# Patient Record
Sex: MALE | Race: WHITE | NOT HISPANIC OR LATINO | Employment: OTHER | URBAN - METROPOLITAN AREA
[De-identification: names, ages, dates, MRNs, and addresses within clinical notes are randomized per-mention and may not be internally consistent; named-entity substitution may affect disease eponyms.]

---

## 2018-03-13 ENCOUNTER — HOSPITAL ENCOUNTER (OUTPATIENT)
Dept: MRI IMAGING | Facility: HOSPITAL | Age: 43
Discharge: HOME | End: 2018-03-13
Attending: ORTHOPAEDIC SURGERY | Admitting: ORTHOPAEDIC SURGERY

## 2019-10-15 ENCOUNTER — APPOINTMENT (OUTPATIENT)
Dept: CT IMAGING | Facility: HOSPITAL | Age: 44
End: 2019-10-15

## 2019-10-15 ENCOUNTER — HOSPITAL ENCOUNTER (INPATIENT)
Facility: HOSPITAL | Age: 44
LOS: 9 days | Discharge: NURSING, REHAB OR LONG-TERM CARE | End: 2019-10-24
Attending: EMERGENCY MEDICINE | Admitting: INTERNAL MEDICINE

## 2019-10-15 ENCOUNTER — ANESTHESIA EVENT (OUTPATIENT)
Dept: PERIOP | Facility: HOSPITAL | Age: 44
End: 2019-10-15

## 2019-10-15 ENCOUNTER — ANESTHESIA (OUTPATIENT)
Dept: PERIOP | Facility: HOSPITAL | Age: 44
End: 2019-10-15

## 2019-10-15 ENCOUNTER — APPOINTMENT (OUTPATIENT)
Dept: GENERAL RADIOLOGY | Facility: HOSPITAL | Age: 44
End: 2019-10-15

## 2019-10-15 DIAGNOSIS — N49.3 FOURNIER'S GANGRENE IN MALE: ICD-10-CM

## 2019-10-15 DIAGNOSIS — L03.317 ABSCESS AND CELLULITIS OF GLUTEAL REGION: ICD-10-CM

## 2019-10-15 DIAGNOSIS — E11.65 TYPE 2 DIABETES MELLITUS WITH HYPERGLYCEMIA, UNSPECIFIED WHETHER LONG TERM INSULIN USE (HCC): ICD-10-CM

## 2019-10-15 DIAGNOSIS — L02.31 ABSCESS AND CELLULITIS OF GLUTEAL REGION: ICD-10-CM

## 2019-10-15 DIAGNOSIS — A41.9 SEPSIS, DUE TO UNSPECIFIED ORGANISM, UNSPECIFIED WHETHER ACUTE ORGAN DYSFUNCTION PRESENT (HCC): Primary | ICD-10-CM

## 2019-10-15 DIAGNOSIS — L02.31 ABSCESS, GLUTEAL, LEFT: ICD-10-CM

## 2019-10-15 PROBLEM — I87.2 DEEP VENOUS INSUFFICIENCY: Status: ACTIVE | Noted: 2018-10-03

## 2019-10-15 PROBLEM — K57.90 DIVERTICULOSIS: Status: ACTIVE | Noted: 2017-12-15

## 2019-10-15 LAB
ALBUMIN SERPL-MCNC: 2.4 G/DL (ref 3.5–5.2)
ALBUMIN/GLOB SERPL: 0.6 G/DL
ALP SERPL-CCNC: 150 U/L (ref 39–117)
ALT SERPL W P-5'-P-CCNC: 7 U/L (ref 1–41)
ANION GAP SERPL CALCULATED.3IONS-SCNC: 17.7 MMOL/L (ref 5–15)
AST SERPL-CCNC: 13 U/L (ref 1–40)
BACTERIA UR QL AUTO: ABNORMAL /HPF
BILIRUB SERPL-MCNC: 1 MG/DL (ref 0.2–1.2)
BILIRUB UR QL STRIP: NEGATIVE
BUN BLD-MCNC: 37 MG/DL (ref 6–20)
BUN/CREAT SERPL: 16.4 (ref 7–25)
CALCIUM SPEC-SCNC: 8.3 MG/DL (ref 8.6–10.5)
CHLORIDE SERPL-SCNC: 108 MMOL/L (ref 98–107)
CLARITY UR: ABNORMAL
CO2 SERPL-SCNC: 20 MMOL/L (ref 22–29)
COLOR UR: ABNORMAL
CREAT BLD-MCNC: 2.26 MG/DL (ref 0.76–1.27)
CREAT BLDA-MCNC: 2.3 MG/DL (ref 0.6–1.3)
D-LACTATE SERPL-SCNC: 2.3 MMOL/L (ref 0.5–2)
DEPRECATED RDW RBC AUTO: 51.6 FL (ref 37–54)
DEPRECATED RDW RBC AUTO: 54.3 FL (ref 37–54)
ERYTHROCYTE [DISTWIDTH] IN BLOOD BY AUTOMATED COUNT: 16.2 % (ref 12.3–15.4)
ERYTHROCYTE [DISTWIDTH] IN BLOOD BY AUTOMATED COUNT: 16.5 % (ref 12.3–15.4)
GFR SERPL CREATININE-BSD FRML MDRD: 32 ML/MIN/1.73
GLOBULIN UR ELPH-MCNC: 3.9 GM/DL
GLUCOSE BLD-MCNC: 252 MG/DL (ref 65–99)
GLUCOSE BLDC GLUCOMTR-MCNC: 155 MG/DL (ref 70–105)
GLUCOSE BLDC GLUCOMTR-MCNC: 168 MG/DL (ref 70–105)
GLUCOSE BLDC GLUCOMTR-MCNC: 183 MG/DL (ref 70–105)
GLUCOSE BLDC GLUCOMTR-MCNC: 194 MG/DL (ref 70–105)
GLUCOSE BLDC GLUCOMTR-MCNC: 209 MG/DL (ref 70–105)
GLUCOSE BLDC GLUCOMTR-MCNC: 242 MG/DL (ref 70–105)
GLUCOSE UR STRIP-MCNC: NEGATIVE MG/DL
HCT VFR BLD AUTO: 36.4 % (ref 37.5–51)
HCT VFR BLD AUTO: 43 % (ref 37.5–51)
HGB BLD-MCNC: 11.7 G/DL (ref 13–17.7)
HGB BLD-MCNC: 13.6 G/DL (ref 13–17.7)
HGB UR QL STRIP.AUTO: NEGATIVE
HOLD SPECIMEN: NORMAL
HYALINE CASTS UR QL AUTO: ABNORMAL /LPF
KETONES UR QL STRIP: ABNORMAL
LEUKOCYTE ESTERASE UR QL STRIP.AUTO: ABNORMAL
LIPASE SERPL-CCNC: 8 U/L (ref 13–60)
LYMPHOCYTES # BLD MANUAL: 0 10*3/MM3 (ref 0.7–3.1)
LYMPHOCYTES NFR BLD MANUAL: 0 % (ref 19.6–45.3)
LYMPHOCYTES NFR BLD MANUAL: 9 % (ref 5–12)
MCH RBC QN AUTO: 29.4 PG (ref 26.6–33)
MCH RBC QN AUTO: 29.6 PG (ref 26.6–33)
MCHC RBC AUTO-ENTMCNC: 31.5 G/DL (ref 31.5–35.7)
MCHC RBC AUTO-ENTMCNC: 32.3 G/DL (ref 31.5–35.7)
MCV RBC AUTO: 91.6 FL (ref 79–97)
MCV RBC AUTO: 93.4 FL (ref 79–97)
METAMYELOCYTES NFR BLD MANUAL: 3 % (ref 0–0)
MONOCYTES # BLD AUTO: 2.53 10*3/MM3 (ref 0.1–0.9)
NEUTROPHILS # BLD AUTO: 24.73 10*3/MM3 (ref 1.7–7)
NEUTROPHILS NFR BLD MANUAL: 58 % (ref 42.7–76)
NEUTS BAND NFR BLD MANUAL: 30 % (ref 0–5)
NEUTS VAC BLD QL SMEAR: ABNORMAL
NITRITE UR QL STRIP: NEGATIVE
PH UR STRIP.AUTO: <=5 [PH] (ref 5–8)
PLAT MORPH BLD: NORMAL
PLATELET # BLD AUTO: 167 10*3/MM3 (ref 140–450)
PLATELET # BLD AUTO: 184 10*3/MM3 (ref 140–450)
PMV BLD AUTO: 8.6 FL (ref 6–12)
PMV BLD AUTO: 9.3 FL (ref 6–12)
POTASSIUM BLD-SCNC: 4.7 MMOL/L (ref 3.5–5.2)
PROT SERPL-MCNC: 6.3 G/DL (ref 6–8.5)
PROT UR QL STRIP: ABNORMAL
RBC # BLD AUTO: 3.97 10*6/MM3 (ref 4.14–5.8)
RBC # BLD AUTO: 4.61 10*6/MM3 (ref 4.14–5.8)
RBC # UR: ABNORMAL /HPF
RBC MORPH BLD: NORMAL
REF LAB TEST METHOD: ABNORMAL
SCAN SLIDE: NORMAL
SODIUM BLD-SCNC: 141 MMOL/L (ref 136–145)
SP GR UR STRIP: 1.02 (ref 1–1.03)
SQUAMOUS #/AREA URNS HPF: ABNORMAL /HPF
UROBILINOGEN UR QL STRIP: ABNORMAL
WBC NRBC COR # BLD: 25.6 10*3/MM3 (ref 3.4–10.8)
WBC NRBC COR # BLD: 28.1 10*3/MM3 (ref 3.4–10.8)
WBC UR QL AUTO: ABNORMAL /HPF

## 2019-10-15 PROCEDURE — 82962 GLUCOSE BLOOD TEST: CPT

## 2019-10-15 PROCEDURE — 74176 CT ABD & PELVIS W/O CONTRAST: CPT

## 2019-10-15 PROCEDURE — 83036 HEMOGLOBIN GLYCOSYLATED A1C: CPT | Performed by: NURSE PRACTITIONER

## 2019-10-15 PROCEDURE — 80053 COMPREHEN METABOLIC PANEL: CPT | Performed by: PHYSICIAN ASSISTANT

## 2019-10-15 PROCEDURE — 82565 ASSAY OF CREATININE: CPT

## 2019-10-15 PROCEDURE — 99222 1ST HOSP IP/OBS MODERATE 55: CPT | Performed by: SURGERY

## 2019-10-15 PROCEDURE — 25010000002 KETOROLAC TROMETHAMINE PER 15 MG: Performed by: PHYSICIAN ASSISTANT

## 2019-10-15 PROCEDURE — 99223 1ST HOSP IP/OBS HIGH 75: CPT | Performed by: INTERNAL MEDICINE

## 2019-10-15 PROCEDURE — 25010000002 VANCOMYCIN 1 G RECONSTITUTED SOLUTION 1 EACH VIAL: Performed by: ANESTHESIOLOGY

## 2019-10-15 PROCEDURE — 87040 BLOOD CULTURE FOR BACTERIA: CPT | Performed by: EMERGENCY MEDICINE

## 2019-10-15 PROCEDURE — 63710000001 INSULIN LISPRO (HUMAN) PER 5 UNITS: Performed by: NURSE PRACTITIONER

## 2019-10-15 PROCEDURE — 80177 DRUG SCRN QUAN LEVETIRACETAM: CPT | Performed by: NURSE PRACTITIONER

## 2019-10-15 PROCEDURE — 87070 CULTURE OTHR SPECIMN AEROBIC: CPT | Performed by: EMERGENCY MEDICINE

## 2019-10-15 PROCEDURE — 25010000002 FENTANYL CITRATE (PF) 100 MCG/2ML SOLUTION: Performed by: ANESTHESIOLOGY

## 2019-10-15 PROCEDURE — 90471 IMMUNIZATION ADMIN: CPT | Performed by: EMERGENCY MEDICINE

## 2019-10-15 PROCEDURE — 85007 BL SMEAR W/DIFF WBC COUNT: CPT | Performed by: PHYSICIAN ASSISTANT

## 2019-10-15 PROCEDURE — 25010000002 MORPHINE PER 10 MG: Performed by: EMERGENCY MEDICINE

## 2019-10-15 PROCEDURE — 25010000003 AMPICILLIN-SULBACTAM PER 1.5 G: Performed by: EMERGENCY MEDICINE

## 2019-10-15 PROCEDURE — 25010000002 PROPOFOL 10 MG/ML EMULSION: Performed by: ANESTHESIOLOGY

## 2019-10-15 PROCEDURE — 90715 TDAP VACCINE 7 YRS/> IM: CPT | Performed by: EMERGENCY MEDICINE

## 2019-10-15 PROCEDURE — 83690 ASSAY OF LIPASE: CPT | Performed by: PHYSICIAN ASSISTANT

## 2019-10-15 PROCEDURE — 85027 COMPLETE CBC AUTOMATED: CPT | Performed by: NURSE PRACTITIONER

## 2019-10-15 PROCEDURE — 25010000002 TDAP 5-2.5-18.5 LF-MCG/0.5 SUSPENSION: Performed by: EMERGENCY MEDICINE

## 2019-10-15 PROCEDURE — 25010000002 PROMETHAZINE PER 50 MG: Performed by: EMERGENCY MEDICINE

## 2019-10-15 PROCEDURE — 25010000002 MEROPENEM PER 100 MG: Performed by: INTERNAL MEDICINE

## 2019-10-15 PROCEDURE — 25010000002 ONDANSETRON PER 1 MG: Performed by: ANESTHESIOLOGY

## 2019-10-15 PROCEDURE — 71045 X-RAY EXAM CHEST 1 VIEW: CPT

## 2019-10-15 PROCEDURE — 25010000002 ONDANSETRON PER 1 MG: Performed by: PHYSICIAN ASSISTANT

## 2019-10-15 PROCEDURE — 02HV33Z INSERTION OF INFUSION DEVICE INTO SUPERIOR VENA CAVA, PERCUTANEOUS APPROACH: ICD-10-PCS | Performed by: NURSE PRACTITIONER

## 2019-10-15 PROCEDURE — 83605 ASSAY OF LACTIC ACID: CPT

## 2019-10-15 PROCEDURE — 87147 CULTURE TYPE IMMUNOLOGIC: CPT | Performed by: EMERGENCY MEDICINE

## 2019-10-15 PROCEDURE — 25010000002 MIDAZOLAM PER 1 MG: Performed by: ANESTHESIOLOGY

## 2019-10-15 PROCEDURE — B548ZZA ULTRASONOGRAPHY OF SUPERIOR VENA CAVA, GUIDANCE: ICD-10-PCS | Performed by: NURSE PRACTITIONER

## 2019-10-15 PROCEDURE — 85025 COMPLETE CBC W/AUTO DIFF WBC: CPT | Performed by: PHYSICIAN ASSISTANT

## 2019-10-15 PROCEDURE — 81001 URINALYSIS AUTO W/SCOPE: CPT | Performed by: PHYSICIAN ASSISTANT

## 2019-10-15 PROCEDURE — 99285 EMERGENCY DEPT VISIT HI MDM: CPT

## 2019-10-15 PROCEDURE — 87205 SMEAR GRAM STAIN: CPT | Performed by: EMERGENCY MEDICINE

## 2019-10-15 PROCEDURE — 25010000002 DEXAMETHASONE PER 1 MG: Performed by: ANESTHESIOLOGY

## 2019-10-15 PROCEDURE — 25010000002 VANCOMYCIN 10 G RECONSTITUTED SOLUTION: Performed by: EMERGENCY MEDICINE

## 2019-10-15 PROCEDURE — 0JB90ZZ EXCISION OF BUTTOCK SUBCUTANEOUS TISSUE AND FASCIA, OPEN APPROACH: ICD-10-PCS | Performed by: SURGERY

## 2019-10-15 PROCEDURE — 10061 I&D ABSCESS COMP/MULTIPLE: CPT | Performed by: SURGERY

## 2019-10-15 RX ORDER — PROPOFOL 10 MG/ML
VIAL (ML) INTRAVENOUS AS NEEDED
Status: DISCONTINUED | OUTPATIENT
Start: 2019-10-15 | End: 2019-10-15 | Stop reason: SURG

## 2019-10-15 RX ORDER — KETOROLAC TROMETHAMINE 30 MG/ML
30 INJECTION, SOLUTION INTRAMUSCULAR; INTRAVENOUS ONCE
Status: COMPLETED | OUTPATIENT
Start: 2019-10-15 | End: 2019-10-15

## 2019-10-15 RX ORDER — BUPROPION HYDROCHLORIDE 150 MG/1
300 TABLET ORAL DAILY
Status: DISCONTINUED | OUTPATIENT
Start: 2019-10-15 | End: 2019-10-24 | Stop reason: HOSPADM

## 2019-10-15 RX ORDER — ROCURONIUM BROMIDE 10 MG/ML
INJECTION, SOLUTION INTRAVENOUS AS NEEDED
Status: DISCONTINUED | OUTPATIENT
Start: 2019-10-15 | End: 2019-10-15 | Stop reason: SURG

## 2019-10-15 RX ORDER — DEXAMETHASONE SODIUM PHOSPHATE 4 MG/ML
INJECTION, SOLUTION INTRA-ARTICULAR; INTRALESIONAL; INTRAMUSCULAR; INTRAVENOUS; SOFT TISSUE AS NEEDED
Status: DISCONTINUED | OUTPATIENT
Start: 2019-10-15 | End: 2019-10-15 | Stop reason: SURG

## 2019-10-15 RX ORDER — OXYCODONE HYDROCHLORIDE 5 MG/1
10 TABLET ORAL EVERY 4 HOURS PRN
Status: DISCONTINUED | OUTPATIENT
Start: 2019-10-15 | End: 2019-10-24 | Stop reason: HOSPADM

## 2019-10-15 RX ORDER — ONDANSETRON 2 MG/ML
INJECTION INTRAMUSCULAR; INTRAVENOUS AS NEEDED
Status: DISCONTINUED | OUTPATIENT
Start: 2019-10-15 | End: 2019-10-15 | Stop reason: SURG

## 2019-10-15 RX ORDER — PHENYLEPHRINE HCL IN 0.9% NACL 0.5 MG/5ML
SYRINGE (ML) INTRAVENOUS AS NEEDED
Status: DISCONTINUED | OUTPATIENT
Start: 2019-10-15 | End: 2019-10-15 | Stop reason: SURG

## 2019-10-15 RX ORDER — LIDOCAINE HYDROCHLORIDE 20 MG/ML
INJECTION, SOLUTION EPIDURAL; INFILTRATION; INTRACAUDAL; PERINEURAL AS NEEDED
Status: DISCONTINUED | OUTPATIENT
Start: 2019-10-15 | End: 2019-10-15 | Stop reason: SURG

## 2019-10-15 RX ORDER — MORPHINE SULFATE 4 MG/ML
4 INJECTION, SOLUTION INTRAMUSCULAR; INTRAVENOUS ONCE
Status: COMPLETED | OUTPATIENT
Start: 2019-10-15 | End: 2019-10-15

## 2019-10-15 RX ORDER — BACLOFEN 10 MG/1
10 TABLET ORAL DAILY
Status: DISCONTINUED | OUTPATIENT
Start: 2019-10-15 | End: 2019-10-24 | Stop reason: HOSPADM

## 2019-10-15 RX ORDER — ALUMINA, MAGNESIA, AND SIMETHICONE 2400; 2400; 240 MG/30ML; MG/30ML; MG/30ML
15 SUSPENSION ORAL EVERY 6 HOURS PRN
Status: DISCONTINUED | OUTPATIENT
Start: 2019-10-15 | End: 2019-10-24 | Stop reason: HOSPADM

## 2019-10-15 RX ORDER — ONDANSETRON 2 MG/ML
4 INJECTION INTRAMUSCULAR; INTRAVENOUS EVERY 6 HOURS PRN
Status: DISCONTINUED | OUTPATIENT
Start: 2019-10-15 | End: 2019-10-24 | Stop reason: HOSPADM

## 2019-10-15 RX ORDER — METOPROLOL TARTRATE 100 MG/1
100 TABLET ORAL 2 TIMES DAILY
COMMUNITY

## 2019-10-15 RX ORDER — DEXTROSE MONOHYDRATE 25 G/50ML
25 INJECTION, SOLUTION INTRAVENOUS
Status: DISCONTINUED | OUTPATIENT
Start: 2019-10-15 | End: 2019-10-24 | Stop reason: HOSPADM

## 2019-10-15 RX ORDER — MEPERIDINE HYDROCHLORIDE 25 MG/ML
12.5 INJECTION INTRAMUSCULAR; INTRAVENOUS; SUBCUTANEOUS
Status: DISCONTINUED | OUTPATIENT
Start: 2019-10-15 | End: 2019-10-15 | Stop reason: HOSPADM

## 2019-10-15 RX ORDER — METOPROLOL TARTRATE 50 MG/1
100 TABLET, FILM COATED ORAL EVERY 12 HOURS SCHEDULED
Status: CANCELLED | OUTPATIENT
Start: 2019-10-15

## 2019-10-15 RX ORDER — SODIUM CHLORIDE, SODIUM LACTATE, POTASSIUM CHLORIDE, CALCIUM CHLORIDE 600; 310; 30; 20 MG/100ML; MG/100ML; MG/100ML; MG/100ML
INJECTION, SOLUTION INTRAVENOUS CONTINUOUS PRN
Status: DISCONTINUED | OUTPATIENT
Start: 2019-10-15 | End: 2019-10-15 | Stop reason: SURG

## 2019-10-15 RX ORDER — BISACODYL 10 MG
10 SUPPOSITORY, RECTAL RECTAL DAILY PRN
Status: DISCONTINUED | OUTPATIENT
Start: 2019-10-15 | End: 2019-10-24 | Stop reason: HOSPADM

## 2019-10-15 RX ORDER — ROPINIROLE 0.25 MG/1
0.5 TABLET, FILM COATED ORAL 3 TIMES DAILY
Status: DISCONTINUED | OUTPATIENT
Start: 2019-10-15 | End: 2019-10-24 | Stop reason: HOSPADM

## 2019-10-15 RX ORDER — HYDROMORPHONE HCL 110MG/55ML
0.5 PATIENT CONTROLLED ANALGESIA SYRINGE INTRAVENOUS
Status: DISCONTINUED | OUTPATIENT
Start: 2019-10-15 | End: 2019-10-24 | Stop reason: HOSPADM

## 2019-10-15 RX ORDER — SODIUM CHLORIDE 0.9 % (FLUSH) 0.9 %
10 SYRINGE (ML) INJECTION EVERY 12 HOURS SCHEDULED
Status: DISCONTINUED | OUTPATIENT
Start: 2019-10-15 | End: 2019-10-24 | Stop reason: HOSPADM

## 2019-10-15 RX ORDER — SODIUM CHLORIDE 0.9 % (FLUSH) 0.9 %
3-10 SYRINGE (ML) INJECTION AS NEEDED
Status: DISCONTINUED | OUTPATIENT
Start: 2019-10-15 | End: 2019-10-15 | Stop reason: HOSPADM

## 2019-10-15 RX ORDER — HYDROMORPHONE HCL 110MG/55ML
0.5 PATIENT CONTROLLED ANALGESIA SYRINGE INTRAVENOUS
Status: DISCONTINUED | OUTPATIENT
Start: 2019-10-15 | End: 2019-10-15 | Stop reason: HOSPADM

## 2019-10-15 RX ORDER — ROPINIROLE 0.5 MG/1
0.5 TABLET, FILM COATED ORAL 3 TIMES DAILY
COMMUNITY

## 2019-10-15 RX ORDER — CHOLECALCIFEROL (VITAMIN D3) 125 MCG
5 CAPSULE ORAL NIGHTLY PRN
Status: DISCONTINUED | OUTPATIENT
Start: 2019-10-15 | End: 2019-10-24 | Stop reason: HOSPADM

## 2019-10-15 RX ORDER — ATORVASTATIN CALCIUM 40 MG/1
40 TABLET, FILM COATED ORAL DAILY
Status: DISCONTINUED | OUTPATIENT
Start: 2019-10-15 | End: 2019-10-24 | Stop reason: HOSPADM

## 2019-10-15 RX ORDER — SODIUM CHLORIDE 9 MG/ML
50 INJECTION, SOLUTION INTRAVENOUS CONTINUOUS
Status: DISCONTINUED | OUTPATIENT
Start: 2019-10-15 | End: 2019-10-24 | Stop reason: HOSPADM

## 2019-10-15 RX ORDER — OXYCODONE HYDROCHLORIDE 5 MG/1
5 TABLET ORAL EVERY 4 HOURS PRN
Status: DISCONTINUED | OUTPATIENT
Start: 2019-10-15 | End: 2019-10-24 | Stop reason: HOSPADM

## 2019-10-15 RX ORDER — NICOTINE POLACRILEX 4 MG
15 LOZENGE BUCCAL
Status: DISCONTINUED | OUTPATIENT
Start: 2019-10-15 | End: 2019-10-24 | Stop reason: HOSPADM

## 2019-10-15 RX ORDER — FENTANYL CITRATE 50 UG/ML
INJECTION, SOLUTION INTRAMUSCULAR; INTRAVENOUS AS NEEDED
Status: DISCONTINUED | OUTPATIENT
Start: 2019-10-15 | End: 2019-10-15 | Stop reason: SURG

## 2019-10-15 RX ORDER — PANTOPRAZOLE SODIUM 40 MG/10ML
40 INJECTION, POWDER, LYOPHILIZED, FOR SOLUTION INTRAVENOUS
Status: DISCONTINUED | OUTPATIENT
Start: 2019-10-16 | End: 2019-10-17

## 2019-10-15 RX ORDER — SODIUM CHLORIDE 0.9 % (FLUSH) 0.9 %
10 SYRINGE (ML) INJECTION AS NEEDED
Status: DISCONTINUED | OUTPATIENT
Start: 2019-10-15 | End: 2019-10-24 | Stop reason: HOSPADM

## 2019-10-15 RX ORDER — LISINOPRIL 40 MG/1
40 TABLET ORAL DAILY
COMMUNITY
End: 2020-10-05 | Stop reason: ALTCHOICE

## 2019-10-15 RX ORDER — BACLOFEN 10 MG/1
10 TABLET ORAL DAILY
COMMUNITY

## 2019-10-15 RX ORDER — LEVETIRACETAM 500 MG/1
500 TABLET ORAL EVERY 12 HOURS SCHEDULED
Status: DISCONTINUED | OUTPATIENT
Start: 2019-10-15 | End: 2019-10-24 | Stop reason: HOSPADM

## 2019-10-15 RX ORDER — BUPROPION HYDROCHLORIDE 150 MG/1
300 TABLET ORAL DAILY
COMMUNITY
End: 2020-01-13 | Stop reason: ALTCHOICE

## 2019-10-15 RX ORDER — ACETAMINOPHEN 650 MG
TABLET, EXTENDED RELEASE ORAL AS NEEDED
Status: DISCONTINUED | OUTPATIENT
Start: 2019-10-15 | End: 2019-10-15 | Stop reason: HOSPADM

## 2019-10-15 RX ORDER — ONDANSETRON 2 MG/ML
4 INJECTION INTRAMUSCULAR; INTRAVENOUS ONCE
Status: COMPLETED | OUTPATIENT
Start: 2019-10-15 | End: 2019-10-15

## 2019-10-15 RX ORDER — ONDANSETRON 4 MG/1
4 TABLET, FILM COATED ORAL EVERY 6 HOURS PRN
Status: DISCONTINUED | OUTPATIENT
Start: 2019-10-15 | End: 2019-10-24 | Stop reason: HOSPADM

## 2019-10-15 RX ORDER — FENTANYL CITRATE 50 UG/ML
50 INJECTION, SOLUTION INTRAMUSCULAR; INTRAVENOUS
Status: DISCONTINUED | OUTPATIENT
Start: 2019-10-15 | End: 2019-10-15 | Stop reason: HOSPADM

## 2019-10-15 RX ORDER — POVIDONE-IODINE 10 MG/G
OINTMENT TOPICAL AS NEEDED
Status: DISCONTINUED | OUTPATIENT
Start: 2019-10-15 | End: 2019-10-15 | Stop reason: HOSPADM

## 2019-10-15 RX ORDER — SODIUM CHLORIDE 0.9 % (FLUSH) 0.9 %
3 SYRINGE (ML) INJECTION EVERY 12 HOURS SCHEDULED
Status: DISCONTINUED | OUTPATIENT
Start: 2019-10-15 | End: 2019-10-15 | Stop reason: HOSPADM

## 2019-10-15 RX ORDER — CLINDAMYCIN PHOSPHATE 900 MG/50ML
900 INJECTION, SOLUTION INTRAVENOUS EVERY 8 HOURS
Status: DISCONTINUED | OUTPATIENT
Start: 2019-10-15 | End: 2019-10-23

## 2019-10-15 RX ORDER — ATORVASTATIN CALCIUM 40 MG/1
40 TABLET, FILM COATED ORAL DAILY
COMMUNITY

## 2019-10-15 RX ORDER — AMLODIPINE BESYLATE 10 MG/1
10 TABLET ORAL DAILY
COMMUNITY

## 2019-10-15 RX ORDER — NOREPINEPHRINE BIT/0.9 % NACL 8 MG/250ML
.02-.3 INFUSION BOTTLE (ML) INTRAVENOUS
Status: DISCONTINUED | OUTPATIENT
Start: 2019-10-15 | End: 2019-10-17

## 2019-10-15 RX ORDER — LEVETIRACETAM 500 MG/1
500 TABLET ORAL 2 TIMES DAILY
COMMUNITY

## 2019-10-15 RX ORDER — ONDANSETRON 2 MG/ML
4 INJECTION INTRAMUSCULAR; INTRAVENOUS ONCE AS NEEDED
Status: DISCONTINUED | OUTPATIENT
Start: 2019-10-15 | End: 2019-10-15 | Stop reason: HOSPADM

## 2019-10-15 RX ORDER — MIDAZOLAM HYDROCHLORIDE 1 MG/ML
INJECTION INTRAMUSCULAR; INTRAVENOUS AS NEEDED
Status: DISCONTINUED | OUTPATIENT
Start: 2019-10-15 | End: 2019-10-15 | Stop reason: SURG

## 2019-10-15 RX ORDER — VANCOMYCIN 1.75 GRAM/500 ML IN 0.9 % SODIUM CHLORIDE INTRAVENOUS
1750 ONCE
Status: COMPLETED | OUTPATIENT
Start: 2019-10-15 | End: 2019-10-15

## 2019-10-15 RX ADMIN — SODIUM CHLORIDE, SODIUM LACTATE, POTASSIUM CHLORIDE, AND CALCIUM CHLORIDE: .6; .31; .03; .02 INJECTION, SOLUTION INTRAVENOUS at 16:00

## 2019-10-15 RX ADMIN — PHENYLEPHRINE HYDROCHLORIDE 100 MCG: 10 INJECTION INTRAVENOUS at 16:44

## 2019-10-15 RX ADMIN — INSULIN LISPRO 4 UNITS: 100 INJECTION, SOLUTION INTRAVENOUS; SUBCUTANEOUS at 15:56

## 2019-10-15 RX ADMIN — ROCURONIUM BROMIDE 50 MG: 10 INJECTION, SOLUTION INTRAVENOUS at 16:09

## 2019-10-15 RX ADMIN — SODIUM CHLORIDE 1000 ML: 0.9 INJECTION, SOLUTION INTRAVENOUS at 19:39

## 2019-10-15 RX ADMIN — FENTANYL CITRATE 100 MCG: 50 INJECTION, SOLUTION INTRAMUSCULAR; INTRAVENOUS at 16:03

## 2019-10-15 RX ADMIN — LIDOCAINE HYDROCHLORIDE 50 MG: 20 INJECTION, SOLUTION EPIDURAL; INFILTRATION; INTRACAUDAL; PERINEURAL at 16:08

## 2019-10-15 RX ADMIN — INSULIN LISPRO 4 UNITS: 100 INJECTION, SOLUTION INTRAVENOUS; SUBCUTANEOUS at 22:57

## 2019-10-15 RX ADMIN — SODIUM CHLORIDE 125 ML/HR: 900 INJECTION, SOLUTION INTRAVENOUS at 19:12

## 2019-10-15 RX ADMIN — MORPHINE SULFATE 4 MG: 4 INJECTION INTRAVENOUS at 12:36

## 2019-10-15 RX ADMIN — TETANUS TOXOID, REDUCED DIPHTHERIA TOXOID AND ACELLULAR PERTUSSIS VACCINE, ADSORBED 0.5 ML: 5; 2.5; 8; 8; 2.5 SUSPENSION INTRAMUSCULAR at 12:37

## 2019-10-15 RX ADMIN — VANCOMYCIN HYDROCHLORIDE 1.75 G: 1 INJECTION, POWDER, LYOPHILIZED, FOR SOLUTION INTRAVENOUS at 16:40

## 2019-10-15 RX ADMIN — PROPOFOL 200 MG: 10 INJECTION, EMULSION INTRAVENOUS at 16:08

## 2019-10-15 RX ADMIN — NOREPINEPHRINE BITARTRATE 0.07 MCG/KG/MIN: 1 INJECTION, SOLUTION, CONCENTRATE INTRAVENOUS at 20:47

## 2019-10-15 RX ADMIN — PROMETHAZINE HYDROCHLORIDE 12.5 MG: 25 INJECTION INTRAMUSCULAR; INTRAVENOUS at 12:36

## 2019-10-15 RX ADMIN — PHENYLEPHRINE HYDROCHLORIDE 200 MCG: 10 INJECTION INTRAVENOUS at 16:49

## 2019-10-15 RX ADMIN — SUGAMMADEX 200 MG: 100 INJECTION, SOLUTION INTRAVENOUS at 17:16

## 2019-10-15 RX ADMIN — SODIUM CHLORIDE, SODIUM LACTATE, POTASSIUM CHLORIDE, AND CALCIUM CHLORIDE 1707 ML: 600; 310; 30; 20 INJECTION, SOLUTION INTRAVENOUS at 12:29

## 2019-10-15 RX ADMIN — SODIUM CHLORIDE 3 G: 9 INJECTION, SOLUTION INTRAVENOUS at 12:36

## 2019-10-15 RX ADMIN — ONDANSETRON 4 MG: 2 INJECTION INTRAMUSCULAR; INTRAVENOUS at 16:44

## 2019-10-15 RX ADMIN — KETOROLAC TROMETHAMINE 30 MG: 30 INJECTION, SOLUTION INTRAMUSCULAR at 10:38

## 2019-10-15 RX ADMIN — BACLOFEN 10 MG: 10 TABLET ORAL at 22:57

## 2019-10-15 RX ADMIN — MEROPENEM 1 G: 1 INJECTION, POWDER, FOR SOLUTION INTRAVENOUS at 22:56

## 2019-10-15 RX ADMIN — SODIUM CHLORIDE 1000 ML: 900 INJECTION, SOLUTION INTRAVENOUS at 10:38

## 2019-10-15 RX ADMIN — CLINDAMYCIN PHOSPHATE 900 MG: 900 INJECTION, SOLUTION INTRAVENOUS at 22:56

## 2019-10-15 RX ADMIN — MIDAZOLAM 2 MG: 1 INJECTION INTRAMUSCULAR; INTRAVENOUS at 16:03

## 2019-10-15 RX ADMIN — INSULIN LISPRO 4 UNITS: 100 INJECTION, SOLUTION INTRAVENOUS; SUBCUTANEOUS at 18:12

## 2019-10-15 RX ADMIN — LEVETIRACETAM 500 MG: 500 TABLET, FILM COATED ORAL at 22:56

## 2019-10-15 RX ADMIN — Medication 10 ML: at 22:58

## 2019-10-15 RX ADMIN — ATORVASTATIN CALCIUM 40 MG: 40 TABLET, FILM COATED ORAL at 22:56

## 2019-10-15 RX ADMIN — DEXAMETHASONE SODIUM PHOSPHATE 4 MG: 4 INJECTION, SOLUTION INTRAMUSCULAR; INTRAVENOUS at 16:44

## 2019-10-15 RX ADMIN — BUPROPION HYDROCHLORIDE 300 MG: 150 TABLET, EXTENDED RELEASE ORAL at 22:56

## 2019-10-15 RX ADMIN — ROPINIROLE 0.5 MG: 0.25 TABLET, FILM COATED ORAL at 22:57

## 2019-10-15 RX ADMIN — VANCOMYCIN HYDROCHLORIDE 1750 MG: 10 INJECTION, POWDER, LYOPHILIZED, FOR SOLUTION INTRAVENOUS at 13:46

## 2019-10-15 RX ADMIN — ONDANSETRON 4 MG: 2 INJECTION INTRAMUSCULAR; INTRAVENOUS at 10:38

## 2019-10-15 NOTE — ANESTHESIA PREPROCEDURE EVALUATION
Anesthesia Evaluation     Patient summary reviewed and Nursing notes reviewed   NPO Solid Status: > 8 hours  NPO Liquid Status: > 8 hours           Airway   Mallampati: I  TM distance: >3 FB  Neck ROM: full  No difficulty expected  Dental - normal exam     Pulmonary - negative pulmonary ROS and normal exam   Cardiovascular - normal exam    (+) hypertension, hyperlipidemia,       Neuro/Psych  (+) seizures,     GI/Hepatic/Renal/Endo    (+) obesity, morbid obesity, GERD,  diabetes mellitus,     Musculoskeletal (-) negative ROS    Abdominal   (+) obese,     Bowel sounds: normal.   Substance History - negative use     OB/GYN negative ob/gyn ROS         Other                        Anesthesia Plan    ASA 3     general     intravenous induction   Anesthetic plan, all risks, benefits, and alternatives have been provided, discussed and informed consent has been obtained with: patient.

## 2019-10-15 NOTE — H&P
"[facility] HOSPITALIST       PCP:  Sanjay Chance MD      CHIEF COMPLAINT:     Buttock pain      HISTORY OF PRESENT ILLNESS:    This is a 44-year-old  male with a past medical history of diabetes mellitus type 2, CVA, seizures, hypertension, hyperlipidemia, cholecystectomy, and kidney stones who presented to [facility] on 10/15/2019 with complaints of buttock pain.  Patient states 3 days ago he developed a pint on his rear end and has progressively gotten worse.  He states it hurts very badly to sit down and pain medication makes the pain pain better.  He thought he had been sitting in a recliner too long and this had caused a bump.  He states his pain is 8 out of 10.  He denies any recent nausea, vomiting, diarrhea, fever, chills.  Patient states when moving from one bed to the next bed in radiology, he felt a \"pop\"and his buttock started draining.  Patient will be rushed to the OR.    In the ED, CT abdomen and pelvis showed FINDINGS consistent with the clinical suspicion of Dale's  gangrene. Abnormal subcutaneous fat induration and superficial and deep  air loculations extend from the left gluteal region, into the left  ischial rectal fossa, and into the fat adjacent to the left lateral  margin of the penile shaft. Of note, the perineum is not imaged in its  entirety. No drainable fluid collection or abscess is seen within the pelvis. Nonobstructing left renal stone. Chronic calcific pancreatitis.  All labs unremarkable except creatinine 2.2, BUN 37, blood glucose 252, alk phos 150, white blood cells 28.1, lactate 2.3.  Blood culture and wound culture were obtained.  All vital signs stable upon admission except blood pressure 91/65 and heart rate 106.  Patient received ampicillin, Toradol, 30 mL/kg bolus, Flagyl, morphine, Zofran, Phenergan, normal saline 1 L bolus, Tdap, and vancomycin in the ED.  General surgery consulted in the ED.      Past Medical History: "   Diagnosis Date   • Diabetes (CMS/[facility])    • GERD (gastroesophageal reflux disease)    • Hypertension    • Seizures (CMS/HCC)      History reviewed. No pertinent surgical history.  History reviewed. No pertinent family history.  Social History     Tobacco Use   • Smoking status: Not on file   Substance Use Topics   • Alcohol use: Not on file   • Drug use: Not on file       Medications Prior to Admission   Medication Sig Dispense Refill Last Dose   • amLODIPine (NORVASC) 10 MG tablet Take 10 mg by mouth Daily.   10/15/2019 at 0800   • atorvastatin (LIPITOR) 40 MG tablet Take 40 mg by mouth Daily.   10/15/2019 at 0800   • baclofen (LIORESAL) 10 MG tablet Take 10 mg by mouth Daily.   10/15/2019 at 0800   • buPROPion XL (WELLBUTRIN XL) 150 MG 24 hr tablet Take 300 mg by mouth Daily.   10/15/2019 at 0800   • insulin aspart (novoLOG) 100 UNIT/ML injection Inject 40 Units under the skin into the appropriate area as directed 3 (Three) Times a Day Before Meals.   10/14/2019 at Unknown time   • levETIRAcetam (KEPPRA) 500 MG tablet Take 500 mg by mouth 2 (Two) Times a Day.   10/15/2019 at 0800   • lisinopril (PRINIVIL,ZESTRIL) 40 MG tablet Take 40 mg by mouth Daily.   10/15/2019 at 0800   • metoprolol tartrate (LOPRESSOR) 100 MG tablet Take 100 mg by mouth 2 (Two) Times a Day.   10/15/2019 at 0800   • rOPINIRole (REQUIP) 0.5 MG tablet Take 0.5 mg by mouth 3 (Three) Times a Day. Take 1 hour before bedtime.   10/15/2019 at 0800       Allergies:  Patient has no known allergies.    Immunization History   Administered Date(s) Administered   • Tdap 10/15/2019         REVIEW OF SYSTEMS:    Review of Systems   Constitutional: Negative.    HENT: Negative.    Respiratory: Negative.    Cardiovascular: Negative.    Gastrointestinal: Negative.    Genitourinary: Positive for scrotal swelling.        Buttock pain 8/10    Musculoskeletal: Negative.    Skin: Negative.    Neurological: Negative.    Psychiatric/Behavioral: Negative.        Vital  "Signs  Temp:  [98.3 °F (36.8 °C)] 98.3 °F (36.8 °C)  Heart Rate:  [101-106] 101  Resp:  [16-18] 16  BP: ()/(44-74) 96/60    Flowsheet Rows      First Filed Value   Admission Height  160 cm (63\") Documented at 10/15/2019 0954   Admission Weight  106 kg (232 lb 12.9 oz) Documented at 10/15/2019 0954           Physical Exam:  Physical Exam   Constitutional: He is oriented to person, place, and time and well-developed, well-nourished, and in no distress.   HENT:   Head: Normocephalic and atraumatic.   Eyes: Conjunctivae and EOM are normal. Pupils are equal, round, and reactive to light.   Neck: Normal range of motion.   Cardiovascular: Regular rhythm and normal heart sounds.   Sinus tachycardia noted    Pulmonary/Chest: Effort normal and breath sounds normal.   On room air    Abdominal: Soft. Bowel sounds are normal.   Musculoskeletal: Normal range of motion.   Neurological: He is alert and oriented to person, place, and time.   Skin:   Buttock erythema noted with brown discharge- foul smelling    Psychiatric: Mood, memory, affect and judgment normal.         Results Review:   I reviewed the patient's new clinical results.    Lab Results (most recent)     Procedure Component Value Units Date/Time    Wound Culture - Wound, Buttock, Left [[id number]] Collected:  10/15/19 1347    Specimen:  Wound from Buttock, Left Updated:  10/15/19 1353    Blood Culture - Blood, Arm, Left [[id number]] Collected:  10/15/19 1230    Specimen:  Blood from Arm, Left Updated:  10/15/19 1308    Comprehensive Metabolic Panel [[id number]]  (Abnormal) Collected:  10/15/19 1231    Specimen:  Blood Updated:  10/15/19 1301     Glucose 252 mg/dL      BUN 37 mg/dL      Creatinine 2.26 mg/dL      Sodium 141 mmol/L      Potassium 4.7 mmol/L      Chloride 108 mmol/L      CO2 20.0 mmol/L      Calcium 8.3 mg/dL      Total Protein 6.3 g/dL      Albumin 2.40 g/dL      ALT (SGPT) 7 U/L      Comment: Specimen hemolyzed.  Results may be affected.        AST " (SGOT) 13 U/L      Comment: Specimen hemolyzed.  Results may be affected.        Alkaline Phosphatase 150 U/L      Total Bilirubin 1.0 mg/dL      Comment: Specimen hemolyzed.  Results may be affected.        eGFR Non African Amer 32 mL/min/1.73      Globulin 3.9 gm/dL      A/G Ratio 0.6 g/dL      BUN/Creatinine Ratio 16.4     Anion Gap 17.7 mmol/L     Narrative:       GFR Normal >60  Chronic Kidney Disease <60  Kidney Failure <15    POC Creatinine [[id number]]  (Abnormal) Collected:  10/15/19 1248    Specimen:  Blood Updated:  10/15/19 1253     Creatinine 2.30 mg/dL      Comment: Serial Number: [id number]:  [id number]        GFR MDRD  --     Comment: Serial Number: [id number]:  [id number]        GFR MDRD Non  --     Comment: Serial Number: [id number]:  [id number]       POC Lactate [[id number]]  (Abnormal) Collected:  10/15/19 1236    Specimen:  Blood Updated:  10/15/19 1237     Lactate 2.3 mmol/L      Comment: Serial Number: [id number]:  [id number]       Lactic Acid, Reflex Timer (This will reflex a repeat order 3-3:15 hours after ordered.) [[id number]] Collected:  10/15/19 1236    Specimen:  Blood Updated:  10/15/19 1237    Scan Slide [[id number]] Collected:  10/15/19 1037    Specimen:  Blood Updated:  10/15/19 1148     Scan Slide --     Comment: See Manual Differential Results       Manual Differential [[id number]]  (Abnormal) Collected:  10/15/19 1037    Specimen:  Blood Updated:  10/15/19 1148     Neutrophil % 58.0 %      Lymphocyte % 0.0 %      Monocyte % 9.0 %      Bands %  30.0 %      Metamyelocyte % 3.0 %      Neutrophils Absolute 24.73 10*3/mm3      Lymphocytes Absolute 0.00 10*3/mm3      Monocytes Absolute 2.53 10*3/mm3      RBC Morphology Normal     Vacuolated Neutrophils Slight/1+     Platelet Morphology Normal    CBC & Differential [[id number]] Collected:  10/15/19 1037    Specimen:  Blood Updated:  10/15/19 1148    Narrative:       The following orders were created for  panel order CBC & Differential.  Procedure                               Abnormality         Status                     ---------                               -----------         ------                     CBC Auto Differential[[id number]]        Abnormal            Final result                 Please view results for these tests on the individual orders.    CBC Auto Differential [[id number]]  (Abnormal) Collected:  10/15/19 1037    Specimen:  Blood Updated:  10/15/19 1148     WBC 28.10 10*3/mm3      RBC 4.61 10*6/mm3      Hemoglobin 13.6 g/dL      Hematocrit 43.0 %      MCV 93.4 fL      MCH 29.4 pg      MCHC 31.5 g/dL      RDW 16.5 %      RDW-SD 54.3 fl      MPV 9.3 fL      Platelets 184 10*3/mm3     Lipase [[id number]]  (Abnormal) Collected:  10/15/19 1037    Specimen:  Blood Updated:  10/15/19 1121     Lipase 8 U/L     POC Glucose Once [[id number]]  (Abnormal) Collected:  10/15/19 1014    Specimen:  Blood Updated:  10/15/19 1017     Glucose 242 mg/dL      Comment: Serial Number: [id number]:  [id number]             Imaging Results (most recent)     Procedure Component Value Units Date/Time    CT Abdomen Pelvis Without Contrast [[id number]] Collected:  10/15/19 1332     Updated:  10/15/19 1340    Narrative:       CT ABDOMEN PELVIS WO CONTRAST-     Date of Exam: 10/15/2019 1:15 PM     Indication: 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  Elevated  creatinine prevents contrast  . Generalized abdominal pain.     Comparison: None. The patient's previous CT scan from 07/24/2014 is no  longer available for comparison.     Technique: Contiguous axial CT images were obtained from the lung bases  to the pubic symphysis without contrast. Sagittal and coronal  reconstructions were performed.  Automated exposure control and  iterative reconstruction methods were used.     FINDINGS:     Abnormal skin thickening and subcutaneous fat induration mild along with  multiple locules of gas, is seen within the posterior medial  left  gluteal soft tissues extending contiguously into the left ischial rectal  fossa.. While the perineum is not included in the imaging field of view  in its entirety, locules of gas are demonstrated within the deep pelvic  fat adjacent to the left lateral margin of the penile shaft, worrisome  for Dale's gangrene, as suggested in the clinical history. Drainable  fluid collection or abscess is seen.     The inflamed soft tissues in the left ischial rectal fossa displace the  rectum and anus slightly toward the right, but the anus and rectum  themselves do not appear abnormally thickened or inflamed.     There are shotty bilateral inguinal lymph nodes are thought to be  reactive. No pathologically enlarged lymph nodes are identified.     Cholecystectomy. Features of chronic calcific pancreatitis without acute  pancreatitis. Spleen, adrenals and right kidney have a normal  noncontrast appearance. Small nonobstructing left renal stone. Urinary  bladder, prostate are normal.     Heart size within normal limits. Minimal linear subsegmental atelectasis  in lung bases. No acute osseous abnormality.          Impression:          1. FINDINGS consistent with the clinical suspicion of Dale's  gangrene. Abnormal subcutaneous fat induration and superficial and deep  air loculations extend from the left gluteal region, into the left  ischial rectal fossa, and into the fat adjacent to the left lateral  margin of the penile shaft. Of note, the perineum is not imaged in its  entirety.  2. No drainable fluid collection or abscess is seen within the pelvis.  3. Nonobstructing left renal stone.  4. Chronic calcific pancreatitis.           Electronically Signed By-Dr. Rowena Carballo MD On:10/15/2019 1:38 PM  This report was finalized on [id number] by Dr. Rowena Carballo MD.            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 Abnormal subcutaneous fat induration and superficial and deep  air loculations extend from the left gluteal region, into the left  ischial rectal fossa, and into the fat adjacent to the left lateral  margin of the penile shaft. Of note, the perineum is not imaged in its  entirety. No drainable fluid collection or abscess is seen within the pelvis. Nonobstructing left renal stone. Chronic calcific pancreatitis.    -White blood cells 28.1, monitor  -Lactate 2.3, trend  -Blood cultures pending  -Wound culture pending  -Blood pressure 91/65 and heart rate 106 upon admission, monitor vital signs  -  Patient received ampicillin, Toradol, 30 mL/kg bolus, morphine, Zofran, Phenergan, normal saline 1 L bolus, Tdap, and vancomycin in the ED.  -IV fluids, vancomycin, and Unasyn ordered  -General surgery consulted    Acute hypotension with history of essential hypertension  -Blood pressure 91/65 upon admission, monitor  -IV fluids ordered  -Holding home blood pressure medications, lisinopril, metoprolol, and amlodipine     Acute kidney injury  -Creatinine 2.2, BUN 37-monitor  -Patient denies history of CKD  -Avoid nephrotoxic medications  -IV fluids ordered    Elevated alk phos  -Alk phos 150, monitor    Hyperlipidemia  -Continue atorvastatin    Diabetes mellitus type 2  -Blood glucose 252 upon admission, monitor  -Holding home NovoLog 40 units 3 times daily  -Start sliding scale  -Accu-Cheks ACH S  -Check hemoglobin A1c    GERD  - IV protonix ordered     Seizure  -Seizure precautions  -Continue Keppra    Restless leg syndrome  -Continue Requip and baclofen    Depression  -Continue Wellbutrin    Morbid obesity  -BMI 41.2, monitor    VTE Prophylaxis - SCD

## 2019-10-15 NOTE — ED PROVIDER NOTES
Subjective   44-year-old male with a 3-day history of increasing hardness and pain in his left buttocks.  He states that today the swelling appears to be taking a turn around his anal area and extending into the area at the base of the scrotum.  He reports he has had subjective fever as well as shaking chills today.  He reports no spontaneous drainage from the area.  He states his blood sugars are probably running higher than normal but he infrequently checks his blood sugar.  He reports that he is unsure of his tetanus status.  He reports no cough or dysuria.  He denies hematuria.  He states his last bowel movement was about 3 days ago and was associated with pain in the left side of the buttocks and anal region.  He denies anal intercourse and reports no recent unusual food water travel or activity            Review of Systems    Past medical history: The patient has history of seizure disorder.  He has a history of hypertension.  He states he was told in the past he may have had a stroke.  He states that he has been a type II diabetic for about 10 years  No Known Allergies    History reviewed. No pertinent surgical history.    History reviewed. No pertinent family history.    Social History     Socioeconomic History   • Marital status:      Spouse name: Not on file   • Number of children: Not on file   • Years of education: Not on file   • Highest education level: Not on file           Objective   Physical Exam    Procedures           ED Course  ED Course as of Oct 15 1432   Tue Oct 15, 2019   1208 Care assumed after patient sent from rapid assessment unit.  Patient fits sepsis presentation, sepsis work-up initiated with therapy IV vancomycin and 30 cc/kg bolus after cultures obtained  [TH]   1358 The patient was allowed to refuse catheterization by the nursing staff  [TH]   1358 Patient had the onset of spontaneous drainage after CT scan.  Culture was obtained  [TH]   1432 Discussion with surgery the  patient had metronidazole added to his antibiotic regimen.  The patient had an additional liter of lactated Ringer started his blood pressure remained marginal.  She vocalized understanding of this plan of treatment.  The patient's critical care time does not include any time spent on procedures  [TH]      ED Course User Index  [TH] Yassine Strong MD        Labs Reviewed   COMPREHENSIVE METABOLIC PANEL - Abnormal; Notable for the following components:       Result Value    Glucose 252 (*)     BUN 37 (*)     Creatinine 2.26 (*)     Chloride 108 (*)     CO2 20.0 (*)     Calcium 8.3 (*)     Albumin 2.40 (*)     Alkaline Phosphatase 150 (*)     eGFR Non  Amer 32 (*)     Anion Gap 17.7 (*)     All other components within normal limits    Narrative:     GFR Normal >60  Chronic Kidney Disease <60  Kidney Failure <15   LIPASE - Abnormal; Notable for the following components:    Lipase 8 (*)     All other components within normal limits   CBC WITH AUTO DIFFERENTIAL - Abnormal; Notable for the following components:    WBC 28.10 (*)     RDW 16.5 (*)     RDW-SD 54.3 (*)     All other components within normal limits   POCT GLUCOSE FINGERSTICK - Abnormal; Notable for the following components:    Glucose 242 (*)     All other components within normal limits   POC LACTATE - Abnormal; Notable for the following components:    Lactate 2.3 (*)     All other components within normal limits   POCT CREATININE - Abnormal; Notable for the following components:    Creatinine 2.30 (*)     All other components within normal limits   MANUAL DIFFERENTIAL - Abnormal; Notable for the following components:    Lymphocyte % 0.0 (*)     Bands %  30.0 (*)     Metamyelocyte % 3.0 (*)     Neutrophils Absolute 24.73 (*)     Lymphocytes Absolute 0.00 (*)     Monocytes Absolute 2.53 (*)     All other components within normal limits   WOUND CULTURE   BLOOD CULTURE   BLOOD CULTURE   SCAN SLIDE   URINALYSIS W/ MICROSCOPIC IF INDICATED (NO CULTURE)    LACTIC ACID REFLEX TIMER   POC LACTATE   CBC AND DIFFERENTIAL    Narrative:     The following orders were created for panel order CBC & Differential.  Procedure                               Abnormality         Status                     ---------                               -----------         ------                     CBC Auto Differential[[id number]]        Abnormal            Final result                 Please view results for these tests on the individual orders.     Medications   sodium chloride 0.9 % flush 10 mL (not administered)   Pharmacy to dose vancomycin (not administered)   ampicillin-sulbactam (UNASYN) 3 g in sodium chloride 0.9 % 100 mL IVPB-MBP (0 g Intravenous Stopped 10/15/19 1330)   metroNIDAZOLE (FLAGYL) 500 mg/100mL IVPB (not administered)   sodium chloride 0.9 % bolus 1,000 mL (0 mL Intravenous Stopped 10/15/19 1134)   ondansetron (ZOFRAN) injection 4 mg (4 mg Intravenous Given 10/15/19 1038)   ketorolac (TORADOL) injection 30 mg (30 mg Intravenous Given 10/15/19 1038)   lactated ringers - IBW for BMI > 30 bolus 1,707 mL (1,707 mL Intravenous New Bag 10/15/19 1229)   Tdap (BOOSTRIX) injection 0.5 mL (0.5 mL Intramuscular Given 10/15/19 1237)   Morphine sulfate (PF) injection 4 mg (4 mg Intravenous Given 10/15/19 1236)   promethazine (PHENERGAN) 12.5 mg in sodium chloride 0.9 % 50 mL (12.5 mg Intravenous Given 10/15/19 1236)   vancomycin IVPB 1750 mg in 0.9% Sodium Chloride (premix) 500 mL (1,750 mg Intravenous New Bag 10/15/19 1346)     Ct Abdomen Pelvis Without Contrast    Result Date: 10/15/2019   1. 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 Abnormal subcutaneous fat induration and superficial and deep air loculations extend from the left gluteal region, into the left ischial rectal fossa, and into the fat adjacent to the left lateral margin of the penile shaft. Of note, the perineum is not imaged in its entirety. 2. No drainable fluid collection or abscess  is seen within the pelvis. 3. Nonobstructing left renal stone. 4. Chronic calcific pancreatitis.    Electronically Signed By-Dr. Rowena Carballo MD On:10/15/2019 1:38 PM This report was finalized on [id number] by Dr. Rowena Carballo MD.              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:   Sepsis, due to unspecified organism, unspecified whether acute organ dysfunction present (CMS/HCC):   Type 2 diabetes mellitus with hyperglycemia, unspecified whether long term insulin use (CMS/HCC):      Amount and/or Complexity of Data Reviewed  Clinical lab tests: reviewed  Tests in the radiology section of CPT®: reviewed  Obtain history from someone other than the patient: yes  Review and summarize past medical records: yes  Discuss the patient with other providers: yes  Independent visualization of images, tracings, or specimens: yes    Risk of Complications, Morbidity, and/or Mortality  Presenting problems: high  Diagnostic procedures: high  Management options: high  General comments: Case was discussed with the radiologist.  The case was discussed with the on-call surgeon.  The patient received IV vancomycin and Unasyn.  The case was also discussed with pharmacy.  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

## 2019-10-15 NOTE — CONSULTS
GENERAL SURGERY CONSULTATION NOTE    Consult requested by: MD Tae    Patient Care Team:  Sanjay Chance MD as PCP - General    Reason for consult: Dale's gangrene    Subjective     Patient is a 44 y.o. male presents with a left gluteal abscess which started approximately 3 days ago.  Patient reports that he first noticed a small bump on his left buttocks, but then it got progressively worse over the last 3 days.  He noticed that the area of redness, induration got worse as well as the pain.  He reports that he sits on his buttocks for long periods of time while he plays Xbox with his brother-in-law.  He has had problems like this before in the past, but never been this severe.  He presented to the ER, and was found to have an elevated white blood cell count of 28.  He then received a CT scan of the abdomen and pelvis which demonstrated a large left-sided abscess with air-fluid levels and subcutaneous gas tracking into the soft tissues of the left gluteal region towards the left perirectal space and towards the left thigh.  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  Of note, the patient's past medical history significant for type 2 diabetes, hypertension, and history of stroke with left-sided hemiparesis and seizures.    Review of Systems   Constitutional: Positive for chills and fever. Negative for appetite change.   HENT: Negative for congestion and sore throat.    Respiratory: Negative for cough and shortness of breath.    Cardiovascular: Negative for chest pain and palpitations.   Gastrointestinal: Positive for abdominal pain and constipation. Negative for diarrhea, nausea, vomiting and GERD.   Genitourinary: Positive for difficulty urinating. Negative for dysuria and frequency.   Musculoskeletal: Positive for back pain. Negative for arthralgias.   Skin: Positive for color change and skin lesions. Negative for rash.   Neurological: Positive for seizures and headache. Negative for  dizziness and memory problem.   Hematological: Negative for adenopathy. Does not bruise/bleed easily.   Psychiatric/Behavioral: Positive for sleep disturbance and depressed mood.        History  Past Medical History:   Diagnosis Date   • Diabetes (CMS/HCC)    • GERD (gastroesophageal reflux disease)    • Hypertension    • Seizures (CMS/HCC)      History reviewed. No pertinent surgical history.  History reviewed. No pertinent family history.  Social History     Tobacco Use   • Smoking status: Not on file   Substance Use Topics   • Alcohol use: Not on file   • Drug use: Not on file     Medications Prior to Admission   Medication Sig Dispense Refill Last Dose   • amLODIPine (NORVASC) 10 MG tablet Take 10 mg by mouth Daily.   10/15/2019 at 0800   • atorvastatin (LIPITOR) 40 MG tablet Take 40 mg by mouth Daily.   10/15/2019 at 0800   • baclofen (LIORESAL) 10 MG tablet Take 10 mg by mouth Daily.   10/15/2019 at 0800   • buPROPion XL (WELLBUTRIN XL) 150 MG 24 hr tablet Take 300 mg by mouth Daily.   10/15/2019 at 0800   • insulin aspart (novoLOG) 100 UNIT/ML injection Inject 40 Units under the skin into the appropriate area as directed 3 (Three) Times a Day Before Meals.   10/14/2019 at Unknown time   • levETIRAcetam (KEPPRA) 500 MG tablet Take 500 mg by mouth 2 (Two) Times a Day.   10/15/2019 at 0800   • lisinopril (PRINIVIL,ZESTRIL) 40 MG tablet Take 40 mg by mouth Daily.   10/15/2019 at 0800   • metoprolol tartrate (LOPRESSOR) 100 MG tablet Take 100 mg by mouth 2 (Two) Times a Day.   10/15/2019 at 0800   • rOPINIRole (REQUIP) 0.5 MG tablet Take 0.5 mg by mouth 3 (Three) Times a Day. Take 1 hour before bedtime.   10/15/2019 at 0800     Allergies:  Patient has no known allergies.    Objective     Vital Signs  Temp:  [98.3 °F (36.8 °C)] 98.3 °F (36.8 °C)  Heart Rate:  [101-106] 101  Resp:  [16-18] 16  BP: ()/(44-74) 96/60    Physical Exam   Constitutional: He is oriented to person, place, and time. He appears  well-developed and well-nourished.   Obese   HENT:   Head: Normocephalic and atraumatic.   Eyes: Pupils are equal, round, and reactive to light.   Neck: Normal range of motion.   Cardiovascular: Normal rate and regular rhythm.   Pulmonary/Chest: Effort normal and breath sounds normal.   Abdominal: Soft. He exhibits no distension. There is no tenderness. No hernia.   Genitourinary:   Genitourinary Comments: Left buttocks is indurated with pinpoint area opening, and draining dishwater appearing fluid which is foul-smelling.  There is a moderate amount of subcutaneous crepitus and gas emanating from the pinpoint opening.   Musculoskeletal: Normal range of motion. He exhibits no edema.   Lymphadenopathy:     He has no cervical adenopathy.     He has no axillary adenopathy.   Neurological: He is alert and oriented to person, place, and time.   Skin: Skin is warm and dry. No rash noted.   Psychiatric: He has a normal mood and affect.       Results Review:   Lab Results (last 24 hours)     Procedure Component Value Units Date/Time    Urinalysis, Microscopic Only - Urine, Clean Catch [[id number]] Collected:  10/15/19 1444    Specimen:  Urine, Clean Catch Updated:  10/15/19 1459    Urinalysis With Microscopic If Indicated (No Culture) - Urine, Clean Catch [[id number]] Collected:  10/15/19 1444    Specimen:  Urine, Clean Catch Updated:  10/15/19 1449    Wound Culture - Wound, Buttock, Left [[id number]] Collected:  10/15/19 1347    Specimen:  Wound from Buttock, Left Updated:  10/15/19 1431     Gram Stain Many (4+) WBCs per low power field      Moderate (3+) Gram positive cocci in chains      Mixed bacterial morphotypes seen on Gram Stain    Blood Culture - Blood, Arm, Left [[id number]] Collected:  10/15/19 1230    Specimen:  Blood from Arm, Left Updated:  10/15/19 1308    Comprehensive Metabolic Panel [[id number]]  (Abnormal) Collected:  10/15/19 1231    Specimen:  Blood Updated:  10/15/19 1301     Glucose 252 mg/dL      BUN 37  mg/dL      Creatinine 2.26 mg/dL      Sodium 141 mmol/L      Potassium 4.7 mmol/L      Chloride 108 mmol/L      CO2 20.0 mmol/L      Calcium 8.3 mg/dL      Total Protein 6.3 g/dL      Albumin 2.40 g/dL      ALT (SGPT) 7 U/L      Comment: Specimen hemolyzed.  Results may be affected.        AST (SGOT) 13 U/L      Comment: Specimen hemolyzed.  Results may be affected.        Alkaline Phosphatase 150 U/L      Total Bilirubin 1.0 mg/dL      Comment: Specimen hemolyzed.  Results may be affected.        eGFR Non African Amer 32 mL/min/1.73      Globulin 3.9 gm/dL      A/G Ratio 0.6 g/dL      BUN/Creatinine Ratio 16.4     Anion Gap 17.7 mmol/L     Narrative:       GFR Normal >60  Chronic Kidney Disease <60  Kidney Failure <15    POC Creatinine [[id number]]  (Abnormal) Collected:  10/15/19 1248    Specimen:  Blood Updated:  10/15/19 1253     Creatinine 2.30 mg/dL      Comment: Serial Number: [id number]:  [id number]        GFR MDRD  --     Comment: Serial Number: [id number]:  [id number]        GFR MDRD Non  --     Comment: Serial Number: [id number]:  [id number]       POC Lactate [[id number]]  (Abnormal) Collected:  10/15/19 1236    Specimen:  Blood Updated:  10/15/19 1237     Lactate 2.3 mmol/L      Comment: Serial Number: [id number]:  [id number]       Lactic Acid, Reflex Timer (This will reflex a repeat order 3-3:15 hours after ordered.) [[id number]] Collected:  10/15/19 1236    Specimen:  Blood Updated:  10/15/19 1237    Scan Slide [[id number]] Collected:  10/15/19 1037    Specimen:  Blood Updated:  10/15/19 1148     Scan Slide --     Comment: See Manual Differential Results       Manual Differential [[id number]]  (Abnormal) Collected:  10/15/19 1037    Specimen:  Blood Updated:  10/15/19 1148     Neutrophil % 58.0 %      Lymphocyte % 0.0 %      Monocyte % 9.0 %      Bands %  30.0 %      Metamyelocyte % 3.0 %      Neutrophils Absolute 24.73 10*3/mm3      Lymphocytes Absolute 0.00 10*3/mm3       Monocytes Absolute 2.53 10*3/mm3      RBC Morphology Normal     Vacuolated Neutrophils Slight/1+     Platelet Morphology Normal    CBC & Differential [[id number]] Collected:  10/15/19 1037    Specimen:  Blood Updated:  10/15/19 1148    Narrative:       The following orders were created for panel order CBC & Differential.  Procedure                               Abnormality         Status                     ---------                               -----------         ------                     CBC Auto Differential[[id number]]        Abnormal            Final result                 Please view results for these tests on the individual orders.    CBC Auto Differential [[id number]]  (Abnormal) Collected:  10/15/19 1037    Specimen:  Blood Updated:  10/15/19 1148     WBC 28.10 10*3/mm3      RBC 4.61 10*6/mm3      Hemoglobin 13.6 g/dL      Hematocrit 43.0 %      MCV 93.4 fL      MCH 29.4 pg      MCHC 31.5 g/dL      RDW 16.5 %      RDW-SD 54.3 fl      MPV 9.3 fL      Platelets 184 10*3/mm3     Lipase [[id number]]  (Abnormal) Collected:  10/15/19 1037    Specimen:  Blood Updated:  10/15/19 1121     Lipase 8 U/L     POC Glucose Once [[id number]]  (Abnormal) Collected:  10/15/19 1014    Specimen:  Blood Updated:  10/15/19 1017     Glucose 242 mg/dL      Comment: Serial Number: [id number]:  [id number]           Ct Abdomen Pelvis Without Contrast    Result Date: 10/15/2019   1. 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 Abnormal subcutaneous fat induration and superficial and deep air loculations extend from the left gluteal region, into the left ischial rectal fossa, and into the fat adjacent to the left lateral margin of the penile shaft. Of note, the perineum is not imaged in its entirety. 2. No drainable fluid collection or abscess is seen within the pelvis. 3. Nonobstructing left renal stone. 4. Chronic calcific pancreatitis.    Electronically Signed By-Dr. Rowena Carballo MD On:10/15/2019  1:38 PM This report was finalized on [id number] by Dr. Rowena Carballo MD.        I reviewed the patient's new imaging results and agree with the interpretation.  I reviewed the patient's other test results and agree with the interpretation    Assessment/Plan     Principal Problem:    Abscess, gluteal, left  Active Problems:    Benign essential hypertension    GERD (gastroesophageal reflux disease)    Hyperlipidemia, mixed    Major depression, chronic    Seizure disorder (CMS/HCC)    Type 2 diabetes mellitus (CMS/HCC)    Sepsis (CMS/HCC)    Patient with evidence of necrotizing soft tissue infection of the left gluteal region.  Needs urgent surgical intervention.  Discussed with the patient and his wife, we will perform a incision and drainage with debridement of devitalized tissue and packed the wound.  He understands that he may require multiple trips to the operating room to fully debride the devitalized tissue.  Risks include bleeding, infection, and need to return to the operating room.  Admit to hospitalist, I will follow along as consult  Continue antibiotics, follow-up cultures  Encouraged to stop smoking    Niraj Almanzar MD  10/15/19  3:06 PM

## 2019-10-15 NOTE — ED NOTES
Pt c/o knot to his left buttocks about 3 days ago and is getting bigger and harder to touch. Denies it opening up or having any drainage. Also c/o new lower abdominal pain starting on left hip that travels around to periumbilical area. C/O intermittent nausea x 2 days.     Maritza Andrade, RN  10/15/19 7220

## 2019-10-15 NOTE — ED NOTES
Pt's delay in care was R/T poor vascular. Was able to obtain IV via ultrasound ACCUCATH.  Pt also was unable to void and still refuses to be cathed for urine Dr. Strong aware of situation.      Demetrius Saucedo, RN  10/15/19 2369

## 2019-10-15 NOTE — PROGRESS NOTES
"Pharmacokinetic Consult - Vancomycin Dosing    Lonnie Arora is a 44 y.o. 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 Pharmacy has been consulted to dose vancomycin.         Assessment/Plan  1. Day #1 vancomycin: Goal trough: 10-20 mg/L with -600 mg*h/mL. Patient received vancomycin 1750 mg IV once (~23mg/kg DBW) in Er. Will continue with 1250mg IV q24H (~16mg/kg DBW). Will obtain vancomycin peak on 10/17 @ 1800 and trough on 10/18 @ 1300 for clinical review, or earlier if clinically warranted.     2. Day # 1 ampicillin-sulbactam IV 3g q6H, appropriate for CrCL >30 ml/min    3. Day # 1 metronidazole IV 500mg q8H.    4. Pharmacy will continue to monitor for changes in culture results, renal function, and clinical status. Adjustments in antimicrobial pharmacokinetic dosing will be made per protocol and recommendations for de-escalation and adjustments to antimicrobial therapy will be communicated when clinically appropriate.        Relevant clinical data and objective history reviewed:  160 cm (63\")   106 kg (232 lb 12.9 oz)   Ideal body weight: 56.9 kg (125 lb 7.1 oz)  Adjusted ideal body weight: 76.4 kg (168 lb 6.2 oz)    Lab Results   Component Value Date    CREATININE 2.30 (H) 10/15/2019    CREATININE 2.26 (H) 10/15/2019    CREATININE 1.9 (H) 04/22/2019     Estimated Creatinine Clearance: 44.8 mL/min (A) (by C-G formula based on SCr of 2.3 mg/dL (H)).  No intake/output data recorded.    Lab Results   Component Value Date    WBC 28.10 (H) 10/15/2019     Temperature    10/15/19 0954 10/15/19 1510   Temp: 98.3 °F (36.8 °C) 100.5 °F (38.1 °C)             Baseline culture/source/susceptibility:  Microbiology Results (last 10 days)       Procedure Component Value - Date/Time    Wound Culture - Wound, Buttock, Left [[id number]] Collected:  10/15/19 5017    Lab Status:  Preliminary result Specimen:  Wound from Buttock, Left Updated:  10/15/19 1433     Gram Stain Many (4+) WBCs per low power " field      Moderate (3+) Gram positive cocci in chains      Mixed bacterial morphotypes seen on Gram Stain             Sanjay Quinteros, Pharm D Candidate  10/15/19 4:17 PM

## 2019-10-15 NOTE — ED NOTES
Pt's IV was pulled out by accident During CT per patient ; not able to obtain another site at this time. Flagyl is not compatible with Vanc and LR that's running in the left IV at this time.        Demetrius Saucedo, RN  10/15/19 8790       Demetrius Saucedo, CRISTINO  10/15/19 9447

## 2019-10-15 NOTE — ED NOTES
Jesu called for General Surgery @ 0789. Spoke to Linn in the office.  1404 call returned.       Jessica Ferrer  10/15/19 1400

## 2019-10-15 NOTE — ANESTHESIA PROCEDURE NOTES
Airway  Urgency: elective    Date/Time: 10/15/2019 4:10 PM  Airway not difficult    General Information and Staff    Patient location during procedure: OR    Indications and Patient Condition  Indications for airway management: airway protection    Preoxygenated: yes  Mask difficulty assessment: 1 - vent by mask    Final Airway Details  Final airway type: endotracheal airway      Successful airway: ETT  Cuffed: yes   Successful intubation technique: direct laryngoscopy  Endotracheal tube insertion site: oral  Blade: Stuart  Blade size: 4  ETT size (mm): 7.0  Cormack-Lehane Classification: grade I - full view of glottis  Placement verified by: chest auscultation and capnometry   Measured from: lips  ETT/EBT  to lips (cm): 21  Number of attempts at approach: 1  Assessment: lips, teeth, and gum same as pre-op and atraumatic intubation

## 2019-10-15 NOTE — OP NOTE
INCISION AND DRAINAGE WOUND  Operative Note    Patient Name:  Lonnie Arora  YOB: 1975    Date of Surgery:  10/15/2019     Indications: The patient is a 44-year-old gentleman with diabetes and history of stroke who presented to the ER with an abscess in the left gluteal region.  Patient was noted to have an elevated white blood cell count to 28,000, and CT scan of the abdomen and pelvis demonstrated a large perianal abscess with soft tissue subcutaneous emphysema concerning for Dale's gangrene.  The decision was made to proceed to the operating room emergently for incision and drainage and debridement of devitalized tissue.    Pre-op Diagnosis:   Abscess, gluteal, left [L02.31]    Post-op Diagnosis:  Post-Op Diagnosis Codes:     * Abscess, gluteal, left [L02.31]    Procedure/CPT® Codes:      Procedure(s):  INCISION AND DRAINAGE OF LEFT GLUTEAL REGION    Staff:  Surgeon(s):  Niraj Almanzar MD         Anesthesia: General    Estimated Blood Loss: <500ml    Implants:    Nothing was implanted during the procedure    Specimen:          None    Findings: Large amount of devitalized tissue with foul-smelling dishwater appearing gray fluid, the area of incision tore medially towards the anal canal, but does not appear to involve the anus directly.    Complications: None, immediately    Description of Procedure: After obtaining informed consent in the preop holding area, the patient was brought to the operating room and placed in the supine position.  SCDs were applied, and preoperative antibiotics were administered.  The patient underwent uncomplicated induction of general endotracheal anesthesia.  He was then transferred to the operating room table in the prone jackknife position.  The patient's buttocks and perineal area were then prepped and draped in the usual sterile fashion.  After a brief timeout the procedure began.  I began by making a generous incision on the lateral edge of the  area of induration and subcutaneous emphysema with electrocautery to maintain hemostasis.  Once I entered the abscess cavity I was met with a large reflux of gray/brown dishwater appearing fluid.  We then opened the gluteal skin widely to better examine the deep aspects of the abscess cavity.  Using sharp scissors, I was able to debride all of the gray, devitalized tissue back to yellow/pink healthy appearing subcutaneous fat.  It did not appear as though the abscess invaded the muscle, but rather ran along the fascial planes consistent with a Dale's gangrene type necrotizing soft tissue infection.  Following the fascial planes I was able to open these areas and debride more of the devitalized tissue sharply using a pair of scissors.  Care was taken to ensure that stasis was maintained using electrocautery.  The abscess cavity appeared to go close to the anal canal, but did not invade it medially.  It tracked posteriorly and superiorly towards the rectum, but again did not appear to invade the rectum.  It tracked anteriorly towards the base of the scrotum, but did not appear to invade the scrotum.  The skin of the medial aspect of the wound, split a 90 degree angle towards the incision and tracked towards the anus.  It did not appear to go all the way into the anus, however it did come very close to the anal verge in the perianal skin.  Once the area had been adequately debrided and drained, the wound was irrigated with warm normal saline and packed using Betadine soaked Kerlix.  We then cover the wound with fluffs, ABD pads.  Patient was extubated and taken to PACU in satisfactory condition.  I discussed with the patient's family the findings intraoperatively, as well as the need for potentially returning to the operating room for further debridements.  We also did mention that based on the proximity to the anal canal, wound care may become an issue, and if we are unable to adequately care for this wound due to  its proximity to the anus, he may require a diverting colostomy.    Niraj Almanzar MD     Date: 10/15/2019  Time: 5:29 PM

## 2019-10-16 LAB
ANION GAP SERPL CALCULATED.3IONS-SCNC: 16.7 MMOL/L (ref 5–15)
ANION GAP SERPL CALCULATED.3IONS-SCNC: 16.7 MMOL/L (ref 5–15)
BUN BLD-MCNC: 35 MG/DL (ref 6–20)
BUN BLD-MCNC: 36 MG/DL (ref 6–20)
BUN/CREAT SERPL: 16.6 (ref 7–25)
BUN/CREAT SERPL: 17.7 (ref 7–25)
CALCIUM SPEC-SCNC: 7.8 MG/DL (ref 8.6–10.5)
CALCIUM SPEC-SCNC: 8.4 MG/DL (ref 8.6–10.5)
CHLORIDE SERPL-SCNC: 109 MMOL/L (ref 98–107)
CHLORIDE SERPL-SCNC: 109 MMOL/L (ref 98–107)
CO2 SERPL-SCNC: 19 MMOL/L (ref 22–29)
CO2 SERPL-SCNC: 20 MMOL/L (ref 22–29)
CREAT BLD-MCNC: 1.98 MG/DL (ref 0.76–1.27)
CREAT BLD-MCNC: 2.17 MG/DL (ref 0.76–1.27)
D-LACTATE SERPL-SCNC: 1.2 MMOL/L (ref 0.5–2)
D-LACTATE SERPL-SCNC: 2.1 MMOL/L (ref 0.5–2)
D-LACTATE SERPL-SCNC: 3.4 MMOL/L (ref 0.5–2)
DEPRECATED RDW RBC AUTO: 52.9 FL (ref 37–54)
DOHLE BODIES: PRESENT
ERYTHROCYTE [DISTWIDTH] IN BLOOD BY AUTOMATED COUNT: 16.2 % (ref 12.3–15.4)
GFR SERPL CREATININE-BSD FRML MDRD: 33 ML/MIN/1.73
GFR SERPL CREATININE-BSD FRML MDRD: 37 ML/MIN/1.73
GLUCOSE BLD-MCNC: 226 MG/DL (ref 65–99)
GLUCOSE BLD-MCNC: 303 MG/DL (ref 65–99)
GLUCOSE BLDC GLUCOMTR-MCNC: 152 MG/DL (ref 70–105)
GLUCOSE BLDC GLUCOMTR-MCNC: 172 MG/DL (ref 70–105)
GLUCOSE BLDC GLUCOMTR-MCNC: 219 MG/DL (ref 70–105)
GLUCOSE BLDC GLUCOMTR-MCNC: 228 MG/DL (ref 70–105)
GLUCOSE BLDC GLUCOMTR-MCNC: 247 MG/DL (ref 70–105)
GLUCOSE BLDC GLUCOMTR-MCNC: 354 MG/DL (ref 70–105)
HBA1C MFR BLD: 8 % (ref 3.5–5.6)
HCT VFR BLD AUTO: 36.9 % (ref 37.5–51)
HGB BLD-MCNC: 11.8 G/DL (ref 13–17.7)
LARGE PLATELETS: ABNORMAL
LYMPHOCYTES # BLD MANUAL: 0 10*3/MM3 (ref 0.7–3.1)
LYMPHOCYTES NFR BLD MANUAL: 0 % (ref 19.6–45.3)
LYMPHOCYTES NFR BLD MANUAL: 4 % (ref 5–12)
MCH RBC QN AUTO: 29.5 PG (ref 26.6–33)
MCHC RBC AUTO-ENTMCNC: 32.1 G/DL (ref 31.5–35.7)
MCV RBC AUTO: 92.1 FL (ref 79–97)
MONOCYTES # BLD AUTO: 1.05 10*3/MM3 (ref 0.1–0.9)
NEUTROPHILS # BLD AUTO: 25.25 10*3/MM3 (ref 1.7–7)
NEUTROPHILS NFR BLD MANUAL: 55 % (ref 42.7–76)
NEUTS BAND NFR BLD MANUAL: 41 % (ref 0–5)
PLATELET # BLD AUTO: 175 10*3/MM3 (ref 140–450)
PMV BLD AUTO: 8.7 FL (ref 6–12)
POTASSIUM BLD-SCNC: 4.7 MMOL/L (ref 3.5–5.2)
POTASSIUM BLD-SCNC: 4.7 MMOL/L (ref 3.5–5.2)
RBC # BLD AUTO: 4.01 10*6/MM3 (ref 4.14–5.8)
RBC MORPH BLD: NORMAL
SCAN SLIDE: NORMAL
SODIUM BLD-SCNC: 140 MMOL/L (ref 136–145)
SODIUM BLD-SCNC: 141 MMOL/L (ref 136–145)
WBC NRBC COR # BLD: 26.3 10*3/MM3 (ref 3.4–10.8)

## 2019-10-16 PROCEDURE — 80048 BASIC METABOLIC PNL TOTAL CA: CPT | Performed by: NURSE PRACTITIONER

## 2019-10-16 PROCEDURE — 83605 ASSAY OF LACTIC ACID: CPT | Performed by: INTERNAL MEDICINE

## 2019-10-16 PROCEDURE — 25010000002 VANCOMYCIN 10 G RECONSTITUTED SOLUTION: Performed by: NURSE PRACTITIONER

## 2019-10-16 PROCEDURE — 63710000001 INSULIN GLARGINE PER 5 UNITS: Performed by: INTERNAL MEDICINE

## 2019-10-16 PROCEDURE — 63710000001 INSULIN LISPRO (HUMAN) PER 5 UNITS: Performed by: NURSE PRACTITIONER

## 2019-10-16 PROCEDURE — 85025 COMPLETE CBC W/AUTO DIFF WBC: CPT | Performed by: NURSE PRACTITIONER

## 2019-10-16 PROCEDURE — 99024 POSTOP FOLLOW-UP VISIT: CPT | Performed by: SURGERY

## 2019-10-16 PROCEDURE — 85007 BL SMEAR W/DIFF WBC COUNT: CPT | Performed by: NURSE PRACTITIONER

## 2019-10-16 PROCEDURE — 87040 BLOOD CULTURE FOR BACTERIA: CPT | Performed by: EMERGENCY MEDICINE

## 2019-10-16 PROCEDURE — 82962 GLUCOSE BLOOD TEST: CPT

## 2019-10-16 PROCEDURE — 25010000002 MEROPENEM PER 100 MG: Performed by: INTERNAL MEDICINE

## 2019-10-16 RX ORDER — INSULIN GLARGINE 100 [IU]/ML
20 INJECTION, SOLUTION SUBCUTANEOUS EVERY MORNING
Status: DISCONTINUED | OUTPATIENT
Start: 2019-10-16 | End: 2019-10-24 | Stop reason: HOSPADM

## 2019-10-16 RX ADMIN — INSULIN LISPRO 8 UNITS: 100 INJECTION, SOLUTION INTRAVENOUS; SUBCUTANEOUS at 18:13

## 2019-10-16 RX ADMIN — ATORVASTATIN CALCIUM 40 MG: 40 TABLET, FILM COATED ORAL at 08:35

## 2019-10-16 RX ADMIN — INSULIN GLARGINE 20 UNITS: 100 INJECTION, SOLUTION SUBCUTANEOUS at 10:28

## 2019-10-16 RX ADMIN — LEVETIRACETAM 500 MG: 500 TABLET, FILM COATED ORAL at 08:35

## 2019-10-16 RX ADMIN — OXYCODONE HYDROCHLORIDE 5 MG: 5 TABLET ORAL at 13:16

## 2019-10-16 RX ADMIN — NOREPINEPHRINE BITARTRATE 0.1 MCG/KG/MIN: 1 INJECTION, SOLUTION, CONCENTRATE INTRAVENOUS at 07:46

## 2019-10-16 RX ADMIN — INSULIN LISPRO 4 UNITS: 100 INJECTION, SOLUTION INTRAVENOUS; SUBCUTANEOUS at 20:24

## 2019-10-16 RX ADMIN — SODIUM CHLORIDE 1000 ML: 900 INJECTION, SOLUTION INTRAVENOUS at 10:46

## 2019-10-16 RX ADMIN — BUPROPION HYDROCHLORIDE 300 MG: 150 TABLET, EXTENDED RELEASE ORAL at 08:33

## 2019-10-16 RX ADMIN — ROPINIROLE 0.5 MG: 0.25 TABLET, FILM COATED ORAL at 16:50

## 2019-10-16 RX ADMIN — PANTOPRAZOLE SODIUM 40 MG: 40 INJECTION, POWDER, FOR SOLUTION INTRAVENOUS at 06:05

## 2019-10-16 RX ADMIN — VANCOMYCIN HYDROCHLORIDE 1250 MG: 10 INJECTION, POWDER, LYOPHILIZED, FOR SOLUTION INTRAVENOUS at 13:16

## 2019-10-16 RX ADMIN — BACLOFEN 10 MG: 10 TABLET ORAL at 08:35

## 2019-10-16 RX ADMIN — CLINDAMYCIN PHOSPHATE 900 MG: 900 INJECTION, SOLUTION INTRAVENOUS at 13:16

## 2019-10-16 RX ADMIN — INSULIN LISPRO 8 UNITS: 100 INJECTION, SOLUTION INTRAVENOUS; SUBCUTANEOUS at 07:39

## 2019-10-16 RX ADMIN — OXYCODONE HYDROCHLORIDE 5 MG: 5 TABLET ORAL at 09:40

## 2019-10-16 RX ADMIN — LEVETIRACETAM 500 MG: 500 TABLET, FILM COATED ORAL at 20:24

## 2019-10-16 RX ADMIN — INSULIN LISPRO 20 UNITS: 100 INJECTION, SOLUTION INTRAVENOUS; SUBCUTANEOUS at 11:30

## 2019-10-16 RX ADMIN — Medication 10 ML: at 20:35

## 2019-10-16 RX ADMIN — ROPINIROLE 0.5 MG: 0.25 TABLET, FILM COATED ORAL at 08:33

## 2019-10-16 RX ADMIN — MEROPENEM 1 G: 1 INJECTION, POWDER, FOR SOLUTION INTRAVENOUS at 22:06

## 2019-10-16 RX ADMIN — OXYCODONE HYDROCHLORIDE 5 MG: 5 TABLET ORAL at 08:35

## 2019-10-16 RX ADMIN — ROPINIROLE 0.5 MG: 0.25 TABLET, FILM COATED ORAL at 20:24

## 2019-10-16 RX ADMIN — MEROPENEM 1 G: 1 INJECTION, POWDER, FOR SOLUTION INTRAVENOUS at 10:40

## 2019-10-16 RX ADMIN — CLINDAMYCIN PHOSPHATE 900 MG: 900 INJECTION, SOLUTION INTRAVENOUS at 06:05

## 2019-10-16 RX ADMIN — Medication 10 ML: at 08:36

## 2019-10-16 RX ADMIN — CLINDAMYCIN PHOSPHATE 900 MG: 900 INJECTION, SOLUTION INTRAVENOUS at 22:05

## 2019-10-16 NOTE — PROGRESS NOTES
Discharge Planning Assessment  [facility]     Patient Name: Lonnie Arora  MRN: [id number]  Today's Date: 10/16/2019    Admit Date: 10/15/2019    Discharge Needs Assessment     Row Name 10/16/19 0932       Living Environment    Lives With  spouse    Current Living Arrangements  home/apartment/condo    Primary Care Provided by  self    Quality of Family Relationships  supportive    Able to Return to Prior Arrangements  yes       Resource/Environmental Concerns    Transportation Concerns  car, none       Transition Planning    Patient/Family Anticipates Transition to  home with help/services;inpatient rehabilitation facility       Discharge Needs Assessment    Discharge Coordination/Progress  D/c plan : Pending         Discharge Plan     Row Name 10/16/19 0933       Plan    Plan  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 Will watch for rehab needs vs home iv antibiotics    Plan Comments  Pt came in with a abscess and cellulitis of his gluteal region , Fourniers Gangrene . Pt is POD #1 of a I and D and is on iv Vanc and iv Merrem . Pt will need more surgeries in the near fututre .         Destination      No service coordination in this encounter.      Durable Medical Equipment      No service coordination in this encounter.      Dialysis/Infusion      No service coordination in this encounter.      Home Medical Care      No service coordination in this encounter.      Therapy      No service coordination in this encounter.      Community Resources      No service coordination in this encounter.          Demographic Summary    No documentation.       Functional Status    No documentation.       Psychosocial    No documentation.       Abuse/Neglect    No documentation.       Legal    No documentation.       Substance Abuse    No documentation.       Patient Forms    No documentation.           Jojo Lopes RN

## 2019-10-16 NOTE — PROGRESS NOTES
Pulmonary/ Critical Care progress Note        Patient Name:  Lonnie Arora    :  1975    Medical Record:  [id number]    Requesting Physician    Sanjay Chance MD    Primary Care Physician     Sanjay Chance MD    Reason for consultation    Lonnie Arora is a 44 y.o. 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  Patient was admitted through the ER on 10/15/19 with a three day history of left buttock pain.  He states that he was unable to sit in a chair or in bed due to the pain.  Workup in the ER with a CT scan was suspicious for Dale's gangrene for which general surgery was consulted and he was taken to the OR with I/D.  Postoperatively in the PCU he developed hypotension which was unresponsive to IVF fluids.  The patient was transferred to the ICU and started on Levophed.      10/16/19:  Remains on Levophed, patient without complaints.  No SOA.  No complaints of pain currently    Review of Systems    As above    Home medicationS  Prior to Admission medications    Medication Sig Start Date End Date Taking? Authorizing Provider   amLODIPine (NORVASC) 10 MG tablet Take 10 mg by mouth Daily.   Yes Zunilda Olmedo MD   atorvastatin (LIPITOR) 40 MG tablet Take 40 mg by mouth Daily.   Yes Zunilda Olmedo MD   baclofen (LIORESAL) 10 MG tablet Take 10 mg by mouth Daily.   Yes Zunilda Olmedo MD   buPROPion XL (WELLBUTRIN XL) 150 MG 24 hr tablet Take 300 mg by mouth Daily.   Yes Zunilda Olmedo MD   insulin aspart (novoLOG) 100 UNIT/ML injection Inject 40 Units under the skin into the appropriate area as directed 3 (Three) Times a Day Before Meals.   Yes Zunilda Olmedo MD   levETIRAcetam (KEPPRA) 500 MG tablet Take 500 mg by mouth 2 (Two) Times a Day.   Yes Zunilda Olmedo MD   lisinopril (PRINIVIL,ZESTRIL) 40 MG tablet Take 40 mg by mouth Daily.   Yes Zunilda Olmedo MD   metoprolol tartrate (LOPRESSOR) 100  MG tablet Take 100 mg by mouth 2 (Two) Times a Day.   Yes ProviderZunilda MD   rOPINIRole (REQUIP) 0.5 MG tablet Take 0.5 mg by mouth 3 (Three) Times a Day. Take 1 hour before bedtime.   Yes Provider, MD Zunilda       Medical History    Past Medical History:   Diagnosis Date   • Diabetes (CMS/HCC)    • GERD (gastroesophageal reflux disease)    • Hypertension    • Seizures (CMS/[facility])         Surgical History    History reviewed. No pertinent surgical history.     Family History    Family History   Problem Relation Age of Onset   • Breast cancer Mother    • Cervical cancer Mother        Social History    Social History     Tobacco Use   • Smoking status: Current Every Day Smoker     Types: Electronic Cigarette   Substance Use Topics   • Alcohol use: No     Frequency: Never     Comment: Quit several years ago at time of his CVA     Allergies    No Known Allergies    Medications    Scheduled Meds:    [START ON 10/18/2019] !Vancomycin Level Draw Needed  Does not apply Once   [START ON 10/17/2019] !Vancomycin Level Draw Needed  Does not apply Once   atorvastatin 40 mg Oral Daily   baclofen 10 mg Oral Daily   buPROPion  mg Oral Daily   clindamycin 900 mg Intravenous Q8H   insulin lispro 0-24 Units Subcutaneous 4x Daily With Meals & Nightly   levETIRAcetam 500 mg Oral Q12H   meropenem 1 g Intravenous Q12H   pantoprazole 40 mg Intravenous Q AM   rOPINIRole 0.5 mg Oral TID   sodium chloride 10 mL Intravenous Q12H   vancomycin 1,250 mg Intravenous Q24H     Continuous Infusions:    norepinephrine 0.02-0.3 mcg/kg/min Last Rate: 0.1 mcg/kg/min (10/16/19 0746)   Pharmacy to Dose meropenem (MERREM)     Pharmacy to dose vancomycin     sodium chloride 125 mL/hr Last Rate: 125 mL/hr (10/15/19 1912)     PRN Meds:.aluminum-magnesium hydroxide-simethicone  •  bisacodyl  •  dextrose  •  dextrose  •  glucagon (human recombinant)  •  HYDROmorphone  •  magnesium hydroxide  •  melatonin  •  ondansetron **OR** ondansetron  •   oxyCODONE **OR** oxyCODONE  •  Pharmacy to Dose meropenem (MERREM)  •  sodium chloride  •  sodium chloride      Physical Exam    tMax 24 hrs:  Temp (24hrs), Av.5 °F (36.9 °C), Min:97.3 °F (36.3 °C), Max:100.5 °F (38.1 °C)    Vitals Ranges:  Temp:  [97.3 °F (36.3 °C)-100.5 °F (38.1 °C)] 98.2 °F (36.8 °C)  Heart Rate:  [] 94  Resp:  [16-27] 18  BP: ()/(44-79) 106/51  Intake and Output Last 3 Shifts:  I/O last 3 completed shifts:  In: 3240 [I.V.:3140; IV Piggyback:100]  Out: 600 [Urine:600]    Constitutional:  Alert, no acute respiratory distress   HEENT:  Atraumatic, PERRL, conjunctiva normal, moist oral mucosa, no nasal discharge.  Trachea is midline.  Respiratory:  No respiratory distress, normal breath sounds, no rales, no wheezing   Cardiovascular:  Normal rate, normal rhythm and no murmurs.  Pulses 2+ and equal in all four extremities.    GI:  Soft, nondistended, positive bowel sounds.  :  No costovertebral angle tenderness   Extremities: No edema, cyanosis or tenderness.  Integument:  No rashes.   Left gluteal I/D surgical dressing intact without signs of bleeding.    Neurologic:  Alert & oriented x 3.    Psychiatric:  Speech and behavior appropriate     labs    Lab Results (last 24 hours)     Procedure Component Value Units Date/Time    Hemoglobin A1c [[id number]]  (Abnormal) Collected:  10/15/19 2344    Specimen:  Blood Updated:  10/16/19 0849     Hemoglobin A1C 8.0 %     Narrative:       Hemoglobin A1C Reference Range:    <5.7 %        Normal  5.7-6.4 %     Increased risk for diabetes  > 6.4 %        Diabetes       These guidelines have been recommended by the American Diabetic Association for Hgb A1c.      The following 2010 guidelines have been recommended by the American Diabetes Association for Hemoglobin A1c.    HBA1c 5.7-6.4% Increased risk for future diabetes (pre-diabetes)  HBA1c     >6.4% Diabetes    Scan Slide [[id number]] Collected:  10/16/19 0615    Specimen:  Blood Updated:   10/16/19 0749     Scan Slide --     Comment: See Manual Differential Results       Manual Differential [[id number]]  (Abnormal) Collected:  10/16/19 0615    Specimen:  Blood Updated:  10/16/19 0749     Neutrophil % 55.0 %      Lymphocyte % 0.0 %      Monocyte % 4.0 %      Bands %  41.0 %      Neutrophils Absolute 25.25 10*3/mm3      Lymphocytes Absolute 0.00 10*3/mm3      Monocytes Absolute 1.05 10*3/mm3      RBC Morphology Normal     Dohle Bodies Present     Large Platelets Slight/1+    CBC Auto Differential [[id number]]  (Abnormal) Collected:  10/16/19 0615    Specimen:  Blood Updated:  10/16/19 0749     WBC 26.30 10*3/mm3      RBC 4.01 10*6/mm3      Hemoglobin 11.8 g/dL      Hematocrit 36.9 %      MCV 92.1 fL      MCH 29.5 pg      MCHC 32.1 g/dL      RDW 16.2 %      RDW-SD 52.9 fl      MPV 8.7 fL      Platelets 175 10*3/mm3     POC Glucose Once [[id number]]  (Abnormal) Collected:  10/16/19 0718    Specimen:  Blood Updated:  10/16/19 0719     Glucose 247 mg/dL      Comment: Serial Number: [id number]:  [id number]       Basic Metabolic Panel [[id number]]  (Abnormal) Collected:  10/16/19 0615    Specimen:  Blood Updated:  10/16/19 0653     Glucose 303 mg/dL      BUN 35 mg/dL      Creatinine 1.98 mg/dL      Sodium 140 mmol/L      Potassium 4.7 mmol/L      Chloride 109 mmol/L      CO2 19.0 mmol/L      Calcium 7.8 mg/dL      eGFR Non African Amer 37 mL/min/1.73      BUN/Creatinine Ratio 17.7     Anion Gap 16.7 mmol/L     Narrative:       GFR Normal >60  Chronic Kidney Disease <60  Kidney Failure <15    Lactic Acid, Reflex [[id number]]  (Abnormal) Collected:  10/15/19 2344    Specimen:  Blood Updated:  10/16/19 0057     Lactate 2.1 mmol/L     Basic Metabolic Panel [[id number]]  (Abnormal) Collected:  10/15/19 2344    Specimen:  Blood Updated:  10/16/19 0044     Glucose 226 mg/dL      BUN 36 mg/dL      Creatinine 2.17 mg/dL      Sodium 141 mmol/L      Potassium 4.7 mmol/L      Chloride 109 mmol/L      CO2 20.0 mmol/L       Calcium 8.4 mg/dL      eGFR Non African Amer 33 mL/min/1.73      BUN/Creatinine Ratio 16.6     Anion Gap 16.7 mmol/L     Narrative:       GFR Normal >60  Chronic Kidney Disease <60  Kidney Failure <15    CBC (No Diff) [[id number]]  (Abnormal) Collected:  10/15/19 2344    Specimen:  Blood Updated:  10/15/19 2354     WBC 25.60 10*3/mm3      RBC 3.97 10*6/mm3      Hemoglobin 11.7 g/dL      Hematocrit 36.4 %      MCV 91.6 fL      MCH 29.6 pg      MCHC 32.3 g/dL      RDW 16.2 %      RDW-SD 51.6 fl      MPV 8.6 fL      Platelets 167 10*3/mm3     POC Glucose Once [[id number]]  (Abnormal) Collected:  10/15/19 2350    Specimen:  Blood Updated:  10/15/19 2351     Glucose 209 mg/dL      Comment: Serial Number: [id number]:  [id number]       Levetiracetam Level (Keppra) [[id number]] Collected:  10/15/19 2344    Specimen:  Blood Updated:  10/15/19 2350    POC Glucose Once [[id number]]  (Abnormal) Collected:  10/15/19 2159    Specimen:  Blood Updated:  10/15/19 2200     Glucose 168 mg/dL      Comment: Serial Number: [id number]:  [id number]       POC Glucose Once [[id number]]  (Abnormal) Collected:  10/15/19 2033    Specimen:  Blood Updated:  10/15/19 2034     Glucose 155 mg/dL      Comment: Serial Number: [id number]:  [id number]       POC Glucose Once [[id number]]  (Abnormal) Collected:  10/15/19 1743    Specimen:  Blood Updated:  10/15/19 1745     Glucose 183 mg/dL      Comment: Serial Number: [id number]:  [id number]       Lactic Acid, Reflex Timer (This will reflex a repeat order 3-3:15 hours after ordered.) [[id number]] Collected:  10/15/19 1236    Specimen:  Blood Updated:  10/15/19 1546     Extra Tube Hold for add-ons.     Comment: Auto resulted.       Urinalysis With Microscopic If Indicated (No Culture) - Urine, Clean Catch [[id number]]  (Abnormal) Collected:  10/15/19 1444    Specimen:  Urine, Clean Catch Updated:  10/15/19 1523     Color, UA Dark Yellow     Appearance, UA Turbid     pH, UA <=5.0      Specific Gravity, UA 1.024     Glucose, UA Negative     Ketones, UA Trace     Bilirubin, UA Negative     Blood, UA Negative     Protein, UA 30 mg/dL (1+)     Leuk Esterase, UA Trace     Nitrite, UA Negative     Urobilinogen, UA 0.2 E.U./dL    Urinalysis, Microscopic Only - Urine, Clean Catch [[id number]]  (Abnormal) Collected:  10/15/19 1444    Specimen:  Urine, Clean Catch Updated:  10/15/19 1523     RBC, UA None Seen /HPF      WBC, UA 3-5 /HPF      Bacteria, UA 1+ /HPF      Squamous Epithelial Cells, UA 0-2 /HPF      Hyaline Casts, UA None Seen /LPF      Methodology Manual Light Microscopy    POC Glucose Once [[id number]]  (Abnormal) Collected:  10/15/19 1509    Specimen:  Blood Updated:  10/15/19 1510     Glucose 194 mg/dL      Comment: Serial Number: [id number]:  [id number]       Wound Culture - Wound, Buttock, Left [[id number]] Collected:  10/15/19 1347    Specimen:  Wound from Buttock, Left Updated:  10/15/19 1431     Gram Stain Many (4+) WBCs per low power field      Moderate (3+) Gram positive cocci in chains      Mixed bacterial morphotypes seen on Gram Stain    Blood Culture - Blood, Arm, Left [[id number]] Collected:  10/15/19 1230    Specimen:  Blood from Arm, Left Updated:  10/15/19 1308    Comprehensive Metabolic Panel [[id number]]  (Abnormal) Collected:  10/15/19 1231    Specimen:  Blood Updated:  10/15/19 1301     Glucose 252 mg/dL      BUN 37 mg/dL      Creatinine 2.26 mg/dL      Sodium 141 mmol/L      Potassium 4.7 mmol/L      Chloride 108 mmol/L      CO2 20.0 mmol/L      Calcium 8.3 mg/dL      Total Protein 6.3 g/dL      Albumin 2.40 g/dL      ALT (SGPT) 7 U/L      Comment: Specimen hemolyzed.  Results may be affected.        AST (SGOT) 13 U/L      Comment: Specimen hemolyzed.  Results may be affected.        Alkaline Phosphatase 150 U/L      Total Bilirubin 1.0 mg/dL      Comment: Specimen hemolyzed.  Results may be affected.        eGFR Non African Amer 32 mL/min/1.73      Globulin 3.9 gm/dL       A/G Ratio 0.6 g/dL      BUN/Creatinine Ratio 16.4     Anion Gap 17.7 mmol/L     Narrative:       GFR Normal >60  Chronic Kidney Disease <60  Kidney Failure <15    POC Creatinine [[id number]]  (Abnormal) Collected:  10/15/19 1248    Specimen:  Blood Updated:  10/15/19 1253     Creatinine 2.30 mg/dL      Comment: Serial Number: [id number]:  [id number]        GFR MDRD  --     Comment: Serial Number: [id number]:  [id number]        GFR MDRD Non  --     Comment: Serial Number: [id number]:  [id number]       POC Lactate [[id number]]  (Abnormal) Collected:  10/15/19 1236    Specimen:  Blood Updated:  10/15/19 1237     Lactate 2.3 mmol/L      Comment: Serial Number: [id number]:  [id number]       Scan Slide [[id number]] Collected:  10/15/19 1037    Specimen:  Blood Updated:  10/15/19 1148     Scan Slide --     Comment: See Manual Differential Results       Manual Differential [[id number]]  (Abnormal) Collected:  10/15/19 1037    Specimen:  Blood Updated:  10/15/19 1148     Neutrophil % 58.0 %      Lymphocyte % 0.0 %      Monocyte % 9.0 %      Bands %  30.0 %      Metamyelocyte % 3.0 %      Neutrophils Absolute 24.73 10*3/mm3      Lymphocytes Absolute 0.00 10*3/mm3      Monocytes Absolute 2.53 10*3/mm3      RBC Morphology Normal     Vacuolated Neutrophils Slight/1+     Platelet Morphology Normal    CBC & Differential [[id number]] Collected:  10/15/19 1037    Specimen:  Blood Updated:  10/15/19 1148    Narrative:       The following orders were created for panel order CBC & Differential.  Procedure                               Abnormality         Status                     ---------                               -----------         ------                     CBC Auto Differential[[id number]]        Abnormal            Final result                 Please view results for these tests on the individual orders.    CBC Auto Differential [[id number]]  (Abnormal) Collected:  10/15/19 1037    Specimen:   Blood Updated:  10/15/19 1148     WBC 28.10 10*3/mm3      RBC 4.61 10*6/mm3      Hemoglobin 13.6 g/dL      Hematocrit 43.0 %      MCV 93.4 fL      MCH 29.4 pg      MCHC 31.5 g/dL      RDW 16.5 %      RDW-SD 54.3 fl      MPV 9.3 fL      Platelets 184 10*3/mm3     Lipase [[id number]]  (Abnormal) Collected:  10/15/19 1037    Specimen:  Blood Updated:  10/15/19 1121     Lipase 8 U/L     POC Glucose Once [[id number]]  (Abnormal) Collected:  10/15/19 1014    Specimen:  Blood Updated:  10/15/19 1017     Glucose 242 mg/dL      Comment: Serial Number: [id number]:  [id number]                 CBC  Results from last 7 days   Lab Units 10/16/19  0615 10/15/19  2344 10/15/19  1037   WBC 10*3/mm3 26.30* 25.60* 28.10*   RBC 10*6/mm3 4.01* 3.97* 4.61   HEMOGLOBIN g/dL 11.8* 11.7* 13.6   HEMATOCRIT % 36.9* 36.4* 43.0   MCV fL 92.1 91.6 93.4   PLATELETS 10*3/mm3 175 167 184       BMP  Results from last 7 days   Lab Units 10/16/19  0615 10/15/19  2344 10/15/19  1248 10/15/19  1231   SODIUM mmol/L 140 141  --  141   POTASSIUM mmol/L 4.7 4.7  --  4.7   CHLORIDE mmol/L 109* 109*  --  108*   CO2 mmol/L 19.0* 20.0*  --  20.0*   BUN mg/dL 35* 36*  --  37*   CREATININE mg/dL 1.98* 2.17* 2.30* 2.26*   GLUCOSE mg/dL 303* 226*  --  252*       CMP   Results from last 7 days   Lab Units 10/16/19  0615 10/15/19  2344 10/15/19  1248 10/15/19  1231 10/15/19  1037   SODIUM mmol/L 140 141  --  141  --    POTASSIUM mmol/L 4.7 4.7  --  4.7  --    CHLORIDE mmol/L 109* 109*  --  108*  --    CO2 mmol/L 19.0* 20.0*  --  20.0*  --    BUN mg/dL 35* 36*  --  37*  --    CREATININE mg/dL 1.98* 2.17* 2.30* 2.26*  --    GLUCOSE mg/dL 303* 226*  --  252*  --    ALBUMIN g/dL  --   --   --  2.40*  --    BILIRUBIN mg/dL  --   --   --  1.0  --    ALK PHOS U/L  --   --   --  150*  --    AST (SGOT) U/L  --   --   --  13  --    ALT (SGPT) U/L  --   --   --  7  --    LIPASE U/L  --   --   --   --  8*         BNP        TROPONIN        CoAg        Creatinine  Clearance  Estimated Creatinine Clearance: 54.2 mL/min (A) (by C-G formula based on SCr of 1.98 mg/dL (H)).    ABG        Imaging & Other Studies    Imaging Results (last 72 hours)     Procedure Component Value Units Date/Time    XR Chest 1 View [[id number]] Updated:  10/15/19 2349    CT Abdomen Pelvis Without Contrast [[id number]] Collected:  10/15/19 1332     Updated:  10/15/19 1340    Narrative:       CT ABDOMEN PELVIS WO CONTRAST-     Date of Exam: 10/15/2019 1:15 PM     Indication: 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  Elevated  creatinine prevents contrast  . Generalized abdominal pain.     Comparison: None. The patient's previous CT scan from 07/24/2014 is no  longer available for comparison.     Technique: Contiguous axial CT images were obtained from the lung bases  to the pubic symphysis without contrast. Sagittal and coronal  reconstructions were performed.  Automated exposure control and  iterative reconstruction methods were used.     FINDINGS:     Abnormal skin thickening and subcutaneous fat induration mild along with  multiple locules of gas, is seen within the posterior medial left  gluteal soft tissues extending contiguously into the left ischial rectal  fossa.. While the perineum is not included in the imaging field of view  in its entirety, locules of gas are demonstrated within the deep pelvic  fat adjacent to the left lateral margin of the penile shaft, worrisome  for Dale's gangrene, as suggested in the clinical history. Drainable  fluid collection or abscess is seen.     The inflamed soft tissues in the left ischial rectal fossa displace the  rectum and anus slightly toward the right, but the anus and rectum  themselves do not appear abnormally thickened or inflamed.     There are shotty bilateral inguinal lymph nodes are thought to be  reactive. No pathologically enlarged lymph nodes are identified.     Cholecystectomy. Features of chronic calcific pancreatitis without  acute  pancreatitis. Spleen, adrenals and right kidney have a normal  noncontrast appearance. Small nonobstructing left renal stone. Urinary  bladder, prostate are normal.     Heart size within normal limits. Minimal linear subsegmental atelectasis  in lung bases. No acute osseous abnormality.          Impression:          1. FINDINGS consistent with the clinical suspicion of Dale's  gangrene. Abnormal subcutaneous fat induration and superficial and deep  air loculations extend from the left gluteal region, into the left  ischial rectal fossa, and into the fat adjacent to the left lateral  margin of the penile shaft. Of note, the perineum is not imaged in its  entirety.  2. No drainable fluid collection or abscess is seen within the pelvis.  3. Nonobstructing left renal stone.  4. Chronic calcific pancreatitis.           Electronically Signed By-Dr. Rowena Carballo MD On:10/15/2019 1:38 PM  This report was finalized on [id number] by Dr. Rowena Carballo MD.            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  If glucose remains high he may need an insulin gtt     [facility] 10/15    PUD: Protonix  Insulin:  Sliding scale  VTE:  SCDs  Nutrition:  CC, NPO at midnight for possible OR tomorrow     F/C

## 2019-10-16 NOTE — NURSING NOTE
Patient's normal saline started at 125 ml/hr (see orders and MAR). Patient's systolic BP below 90's. RN placed call-out to Dr. Tabor. Dr. Tabor stated to give 1L fluid bolus (see orders and MAR). Patient's BP 75/50, so RN placed call-out and spoke with Dr. Tabor. Dr. Tabor stated for patient to go to ICU and have drip started. Fast team called at 2030. ICU nurse started patient on drip per NP order (see orders and MAR). Family present at time of fast team.

## 2019-10-16 NOTE — ANESTHESIA POSTPROCEDURE EVALUATION
Patient: Lonnie Arora    Procedure Summary     Date:  10/15/19 Room / Location:  [facility] OR 09 / [facility] MAIN OR    Anesthesia Start:  1600 Anesthesia Stop:  1728    Procedure:  INCISION AND DRAINAGE OF LEFT GLUTEAL REGION (Left ) Diagnosis:       Abscess, gluteal, left      (Abscess, gluteal, left [L02.31])    Surgeon:  Niraj Almanzar MD Provider:  Dusty Soriano MD    Anesthesia Type:  general ASA Status:  3          Anesthesia Type: general  Last vitals  BP   92/50 (10/15/19 1848)   Temp   98.6 °F (37 °C) (10/15/19 1848)   Pulse   100 (10/15/19 1848)   Resp   18 (10/15/19 1848)     SpO2   97 % (10/15/19 1848)     Post Anesthesia Care and Evaluation    Patient location during evaluation: PACU  Patient participation: complete - patient participated  Level of consciousness: awake  Pain scale: See nurse's notes for pain score.  Pain management: adequate  Airway patency: patent  Anesthetic complications: No anesthetic complications  PONV Status: none  Cardiovascular status: acceptable  Respiratory status: acceptable  Hydration status: acceptable    Comments: Patient seen and examined postoperatively; vital signs stable; SpO2 greater than or equal to 90%; cardiopulmonary status stable; nausea/vomiting adequately controlled; pain adequately controlled; no apparent anesthesia complications; patient discharged from anesthesia care when discharge criteria were met

## 2019-10-16 NOTE — CONSULTS
Pulmonary/ Critical Care Consult Note        Patient Name:  Lonnie Arora    :  1975    Medical Record:  [id number]    Requesting Physician    Sanjay Chance MD    Primary Care Physician     Sanjay Chance MD    Reason for consultation    Lonnie Arora is a 44 y.o. 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  Patient was admitted through the ER on 10/15/19 with a three day history of left buttock pain.  He states that he was unable to sit in a chair or in bed due to the pain.  Workup in the ER with a CT scan was suspicious for Dale's gangrene for which general surgery was consulted and he was taken to the OR with I/D.  Postoperatively in the PCU he developed hypotension which was unresponsive to IVF fluids.  The patient was transferred to the ICU and started on Levophed.      Review of Systems    Constitutional:  Denies fever or chills   Eyes:  Denies change in visual acuity   HENT:  Denies nasal congestion or sore throat   Respiratory:  Denies cough or shortness of breath   Cardiovascular:  Denies chest pain or edema   GI:  Denies abdominal pain, nausea, vomiting, bloody stools or diarrhea   :  Denies dysuria   Musculoskeletal:  Denies back pain or joint pain   Integument:  Denies rash.    Neurologic:  Denies headache, focal weakness or sensory changes   Endocrine:  Denies polyuria or polydipsia   Lymphatic:  Denies swollen glands   Psychiatric:  Denies depression or anxiety     Home medicationS  Prior to Admission medications    Medication Sig Start Date End Date Taking? Authorizing Provider   amLODIPine (NORVASC) 10 MG tablet Take 10 mg by mouth Daily.   Yes ProviderZunilda MD   atorvastatin (LIPITOR) 40 MG tablet Take 40 mg by mouth Daily.   Yes ProviderZunilda MD   baclofen (LIORESAL) 10 MG tablet Take 10 mg by mouth Daily.   Yes Zunilda Olmedo MD   buPROPion XL (WELLBUTRIN XL) 150 MG 24 hr tablet Take 300 mg by  mouth Daily.   Yes Zunilda Olmedo MD   insulin aspart (novoLOG) 100 UNIT/ML injection Inject 40 Units under the skin into the appropriate area as directed 3 (Three) Times a Day Before Meals.   Yes Zunilda Olmedo MD   levETIRAcetam (KEPPRA) 500 MG tablet Take 500 mg by mouth 2 (Two) Times a Day.   Yes Zunilda Olmedo MD   lisinopril (PRINIVIL,ZESTRIL) 40 MG tablet Take 40 mg by mouth Daily.   Yes Zunilda Olmedo MD   metoprolol tartrate (LOPRESSOR) 100 MG tablet Take 100 mg by mouth 2 (Two) Times a Day.   Yes Zunilda Olmedo MD   rOPINIRole (REQUIP) 0.5 MG tablet Take 0.5 mg by mouth 3 (Three) Times a Day. Take 1 hour before bedtime.   Yes Zunilda Olmedo MD       Medical History    Past Medical History:   Diagnosis Date   • Diabetes (CMS/HCC)    • GERD (gastroesophageal reflux disease)    • Hypertension    • Seizures (CMS/[facility])         Surgical History    History reviewed. No pertinent surgical history.     Family History    Family History   Problem Relation Age of Onset   • Breast cancer Mother    • Cervical cancer Mother        Social History    Social History     Tobacco Use   • Smoking status: Current Every Day Smoker     Types: Electronic Cigarette   Substance Use Topics   • Alcohol use: No     Frequency: Never     Comment: Quit several years ago at time of his CVA     Allergies    No Known Allergies    Medications    Scheduled Meds:    [START ON 10/18/2019] !Vancomycin Level Draw Needed  Does not apply Once   [START ON 10/17/2019] !Vancomycin Level Draw Needed  Does not apply Once   atorvastatin 40 mg Oral Daily   baclofen 10 mg Oral Daily   buPROPion  mg Oral Daily   clindamycin 900 mg Intravenous Q8H   insulin lispro 0-24 Units Subcutaneous 4x Daily With Meals & Nightly   levETIRAcetam 500 mg Oral Q12H   meropenem 1 g Intravenous Q12H   pantoprazole 40 mg Intravenous Q AM   rOPINIRole 0.5 mg Oral TID   sodium chloride 10 mL Intravenous Q12H   vancomycin 1,250 mg  Intravenous Q24H     Continuous Infusions:    norepinephrine 0.02-0.3 mcg/kg/min Last Rate: 0.06 mcg/kg/min (10/15/19 2108)   Pharmacy to Dose meropenem (MERREM)     Pharmacy to dose vancomycin     sodium chloride 125 mL/hr Last Rate: 125 mL/hr (10/15/19 1912)     PRN Meds:.aluminum-magnesium hydroxide-simethicone  •  bisacodyl  •  dextrose  •  dextrose  •  glucagon (human recombinant)  •  HYDROmorphone  •  magnesium hydroxide  •  melatonin  •  ondansetron **OR** ondansetron  •  oxyCODONE **OR** oxyCODONE  •  Pharmacy to Dose meropenem (MERREM)  •  sodium chloride  •  sodium chloride      Physical Exam    tMax 24 hrs:  Temp (24hrs), Av.5 °F (36.9 °C), Min:97.3 °F (36.3 °C), Max:100.5 °F (38.1 °C)    Vitals Ranges:  Temp:  [97.3 °F (36.3 °C)-100.5 °F (38.1 °C)] 97.8 °F (36.6 °C)  Heart Rate:  [] 89  Resp:  [16-27] 18  BP: ()/(44-79) 91/62  Intake and Output Last 3 Shifts:  I/O last 3 completed shifts:  In: 1200 [I.V.:1100; IV Piggyback:100]  Out: -     Constitutional:  Alert, no acute respiratory distress   HEENT:  Atraumatic, PERRL, conjunctiva normal, moist oral mucosa, no nasal discharge.  Trachea is midline.  Respiratory:  No respiratory distress, normal breath sounds, no rales, no wheezing   Cardiovascular:  Normal rate, normal rhythm and no murmurs.  Pulses 2+ and equal in all four extremities.    GI:  Soft, nondistended, positive bowel sounds.  :  No costovertebral angle tenderness   Extremities: No edema, cyanosis or tenderness.  Integument:  No rashes.   Left gluteal I/D surgical dressing intact without signs of bleeding.    Neurologic:  Alert & oriented x 3.    Psychiatric:  Speech and behavior appropriate     labs    Lab Results (last 24 hours)     Procedure Component Value Units Date/Time    Lactic Acid, Reflex [[id number]]  (Abnormal) Collected:  10/15/19 2344    Specimen:  Blood Updated:  10/16/19 0057     Lactate 2.1 mmol/L     Basic Metabolic Panel [[id number]]  (Abnormal) Collected:   10/15/19 2344    Specimen:  Blood Updated:  10/16/19 0044     Glucose 226 mg/dL      BUN 36 mg/dL      Creatinine 2.17 mg/dL      Sodium 141 mmol/L      Potassium 4.7 mmol/L      Chloride 109 mmol/L      CO2 20.0 mmol/L      Calcium 8.4 mg/dL      eGFR Non African Amer 33 mL/min/1.73      BUN/Creatinine Ratio 16.6     Anion Gap 16.7 mmol/L     Narrative:       GFR Normal >60  Chronic Kidney Disease <60  Kidney Failure <15    CBC (No Diff) [[id number]]  (Abnormal) Collected:  10/15/19 2344    Specimen:  Blood Updated:  10/15/19 2354     WBC 25.60 10*3/mm3      RBC 3.97 10*6/mm3      Hemoglobin 11.7 g/dL      Hematocrit 36.4 %      MCV 91.6 fL      MCH 29.6 pg      MCHC 32.3 g/dL      RDW 16.2 %      RDW-SD 51.6 fl      MPV 8.6 fL      Platelets 167 10*3/mm3     POC Glucose Once [[id number]]  (Abnormal) Collected:  10/15/19 2350    Specimen:  Blood Updated:  10/15/19 2351     Glucose 209 mg/dL      Comment: Serial Number: [id number]:  [id number]       Levetiracetam Level (Keppra) [[id number]] Collected:  10/15/19 2344    Specimen:  Blood Updated:  10/15/19 2350    Hemoglobin A1c [[id number]] Collected:  10/15/19 2344    Specimen:  Blood Updated:  10/15/19 2350    POC Glucose Once [[id number]]  (Abnormal) Collected:  10/15/19 2159    Specimen:  Blood Updated:  10/15/19 2200     Glucose 168 mg/dL      Comment: Serial Number: [id number]:  [id number]       POC Glucose Once [[id number]]  (Abnormal) Collected:  10/15/19 2033    Specimen:  Blood Updated:  10/15/19 2034     Glucose 155 mg/dL      Comment: Serial Number: [id number]:  [id number]       POC Glucose Once [[id number]]  (Abnormal) Collected:  10/15/19 1743    Specimen:  Blood Updated:  10/15/19 1745     Glucose 183 mg/dL      Comment: Serial Number: [id number]:  [id number]       Lactic Acid, Reflex Timer (This will reflex a repeat order 3-3:15 hours after ordered.) [[id number]] Collected:  10/15/19 1236    Specimen:  Blood Updated:  10/15/19 6688      Extra Tube Hold for add-ons.     Comment: Auto resulted.       Urinalysis With Microscopic If Indicated (No Culture) - Urine, Clean Catch [[id number]]  (Abnormal) Collected:  10/15/19 1444    Specimen:  Urine, Clean Catch Updated:  10/15/19 1523     Color, UA Dark Yellow     Appearance, UA Turbid     pH, UA <=5.0     Specific Gravity, UA 1.024     Glucose, UA Negative     Ketones, UA Trace     Bilirubin, UA Negative     Blood, UA Negative     Protein, UA 30 mg/dL (1+)     Leuk Esterase, UA Trace     Nitrite, UA Negative     Urobilinogen, UA 0.2 E.U./dL    Urinalysis, Microscopic Only - Urine, Clean Catch [[id number]]  (Abnormal) Collected:  10/15/19 1444    Specimen:  Urine, Clean Catch Updated:  10/15/19 1523     RBC, UA None Seen /HPF      WBC, UA 3-5 /HPF      Bacteria, UA 1+ /HPF      Squamous Epithelial Cells, UA 0-2 /HPF      Hyaline Casts, UA None Seen /LPF      Methodology Manual Light Microscopy    POC Glucose Once [[id number]]  (Abnormal) Collected:  10/15/19 1509    Specimen:  Blood Updated:  10/15/19 1510     Glucose 194 mg/dL      Comment: Serial Number: [id number]:  [id number]       Wound Culture - Wound, Buttock, Left [[id number]] Collected:  10/15/19 1347    Specimen:  Wound from Buttock, Left Updated:  10/15/19 1431     Gram Stain Many (4+) WBCs per low power field      Moderate (3+) Gram positive cocci in chains      Mixed bacterial morphotypes seen on Gram Stain    Blood Culture - Blood, Arm, Left [[id number]] Collected:  10/15/19 1230    Specimen:  Blood from Arm, Left Updated:  10/15/19 1308    Comprehensive Metabolic Panel [[id number]]  (Abnormal) Collected:  10/15/19 1231    Specimen:  Blood Updated:  10/15/19 1301     Glucose 252 mg/dL      BUN 37 mg/dL      Creatinine 2.26 mg/dL      Sodium 141 mmol/L      Potassium 4.7 mmol/L      Chloride 108 mmol/L      CO2 20.0 mmol/L      Calcium 8.3 mg/dL      Total Protein 6.3 g/dL      Albumin 2.40 g/dL      ALT (SGPT) 7 U/L      Comment: Specimen  hemolyzed.  Results may be affected.        AST (SGOT) 13 U/L      Comment: Specimen hemolyzed.  Results may be affected.        Alkaline Phosphatase 150 U/L      Total Bilirubin 1.0 mg/dL      Comment: Specimen hemolyzed.  Results may be affected.        eGFR Non African Amer 32 mL/min/1.73      Globulin 3.9 gm/dL      A/G Ratio 0.6 g/dL      BUN/Creatinine Ratio 16.4     Anion Gap 17.7 mmol/L     Narrative:       GFR Normal >60  Chronic Kidney Disease <60  Kidney Failure <15    POC Creatinine [[id number]]  (Abnormal) Collected:  10/15/19 1248    Specimen:  Blood Updated:  10/15/19 1253     Creatinine 2.30 mg/dL      Comment: Serial Number: [id number]:  [id number]        GFR MDRD  --     Comment: Serial Number: [id number]:  [id number]        GFR MDRD Non  --     Comment: Serial Number: [id number]:  [id number]       POC Lactate [[id number]]  (Abnormal) Collected:  10/15/19 1236    Specimen:  Blood Updated:  10/15/19 1237     Lactate 2.3 mmol/L      Comment: Serial Number: [id number]:  [id number]       Scan Slide [[id number]] Collected:  10/15/19 1037    Specimen:  Blood Updated:  10/15/19 1148     Scan Slide --     Comment: See Manual Differential Results       Manual Differential [[id number]]  (Abnormal) Collected:  10/15/19 1037    Specimen:  Blood Updated:  10/15/19 1148     Neutrophil % 58.0 %      Lymphocyte % 0.0 %      Monocyte % 9.0 %      Bands %  30.0 %      Metamyelocyte % 3.0 %      Neutrophils Absolute 24.73 10*3/mm3      Lymphocytes Absolute 0.00 10*3/mm3      Monocytes Absolute 2.53 10*3/mm3      RBC Morphology Normal     Vacuolated Neutrophils Slight/1+     Platelet Morphology Normal    CBC & Differential [[id number]] Collected:  10/15/19 1037    Specimen:  Blood Updated:  10/15/19 1148    Narrative:       The following orders were created for panel order CBC & Differential.  Procedure                               Abnormality         Status                      ---------                               -----------         ------                     CBC Auto Differential[[id number]]        Abnormal            Final result                 Please view results for these tests on the individual orders.    CBC Auto Differential [[id number]]  (Abnormal) Collected:  10/15/19 1037    Specimen:  Blood Updated:  10/15/19 1148     WBC 28.10 10*3/mm3      RBC 4.61 10*6/mm3      Hemoglobin 13.6 g/dL      Hematocrit 43.0 %      MCV 93.4 fL      MCH 29.4 pg      MCHC 31.5 g/dL      RDW 16.5 %      RDW-SD 54.3 fl      MPV 9.3 fL      Platelets 184 10*3/mm3     Lipase [[id number]]  (Abnormal) Collected:  10/15/19 1037    Specimen:  Blood Updated:  10/15/19 1121     Lipase 8 U/L     POC Glucose Once [[id number]]  (Abnormal) Collected:  10/15/19 1014    Specimen:  Blood Updated:  10/15/19 1017     Glucose 242 mg/dL      Comment: Serial Number: [id number]:  [id number]                 CBC  Results from last 7 days   Lab Units 10/15/19  2344 10/15/19  1037   WBC 10*3/mm3 25.60* 28.10*   RBC 10*6/mm3 3.97* 4.61   HEMOGLOBIN g/dL 11.7* 13.6   HEMATOCRIT % 36.4* 43.0   MCV fL 91.6 93.4   PLATELETS 10*3/mm3 167 184       BMP  Results from last 7 days   Lab Units 10/15/19  2344 10/15/19  1248 10/15/19  1231   SODIUM mmol/L 141  --  141   POTASSIUM mmol/L 4.7  --  4.7   CHLORIDE mmol/L 109*  --  108*   CO2 mmol/L 20.0*  --  20.0*   BUN mg/dL 36*  --  37*   CREATININE mg/dL 2.17* 2.30* 2.26*   GLUCOSE mg/dL 226*  --  252*       CMP   Results from last 7 days   Lab Units 10/15/19  2344 10/15/19  1248 10/15/19  1231 10/15/19  1037   SODIUM mmol/L 141  --  141  --    POTASSIUM mmol/L 4.7  --  4.7  --    CHLORIDE mmol/L 109*  --  108*  --    CO2 mmol/L 20.0*  --  20.0*  --    BUN mg/dL 36*  --  37*  --    CREATININE mg/dL 2.17* 2.30* 2.26*  --    GLUCOSE mg/dL 226*  --  252*  --    ALBUMIN g/dL  --   --  2.40*  --    BILIRUBIN mg/dL  --   --  1.0  --    ALK PHOS U/L  --   --  150*  --    AST (SGOT) U/L   --   --  13  --    ALT (SGPT) U/L  --   --  7  --    LIPASE U/L  --   --   --  8*         BNP        TROPONIN        CoAg        Creatinine Clearance  Estimated Creatinine Clearance: 49 mL/min (A) (by C-G formula based on SCr of 2.17 mg/dL (H)).    ABG        Imaging & Other Studies    Imaging Results (last 72 hours)     Procedure Component Value Units Date/Time    XR Chest 1 View [[id number]] Updated:  10/15/19 2349    CT Abdomen Pelvis Without Contrast [[id number]] Collected:  10/15/19 1332     Updated:  10/15/19 1340    Narrative:       CT ABDOMEN PELVIS WO CONTRAST-     Date of Exam: 10/15/2019 1:15 PM     Indication: 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  Elevated  creatinine prevents contrast  . Generalized abdominal pain.     Comparison: None. The patient's previous CT scan from 07/24/2014 is no  longer available for comparison.     Technique: Contiguous axial CT images were obtained from the lung bases  to the pubic symphysis without contrast. Sagittal and coronal  reconstructions were performed.  Automated exposure control and  iterative reconstruction methods were used.     FINDINGS:     Abnormal skin thickening and subcutaneous fat induration mild along with  multiple locules of gas, is seen within the posterior medial left  gluteal soft tissues extending contiguously into the left ischial rectal  fossa.. While the perineum is not included in the imaging field of view  in its entirety, locules of gas are demonstrated within the deep pelvic  fat adjacent to the left lateral margin of the penile shaft, worrisome  for Dale's gangrene, as suggested in the clinical history. Drainable  fluid collection or abscess is seen.     The inflamed soft tissues in the left ischial rectal fossa displace the  rectum and anus slightly toward the right, but the anus and rectum  themselves do not appear abnormally thickened or inflamed.     There are shotty bilateral inguinal lymph nodes are thought to be  reactive.  No pathologically enlarged lymph nodes are identified.     Cholecystectomy. Features of chronic calcific pancreatitis without acute  pancreatitis. Spleen, adrenals and right kidney have a normal  noncontrast appearance. Small nonobstructing left renal stone. Urinary  bladder, prostate are normal.     Heart size within normal limits. Minimal linear subsegmental atelectasis  in lung bases. No acute osseous abnormality.          Impression:          1. FINDINGS consistent with the clinical suspicion of Dale's  gangrene. Abnormal subcutaneous fat induration and superficial and deep  air loculations extend from the left gluteal region, into the left  ischial rectal fossa, and into the fat adjacent to the left lateral  margin of the penile shaft. Of note, the perineum is not imaged in its  entirety.  2. No drainable fluid collection or abscess is seen within the pelvis.  3. Nonobstructing left renal stone.  4. Chronic calcific pancreatitis.           Electronically Signed By-Dr. Rowena Carballo MD On:10/15/2019 1:38 PM  This report was finalized on [id number] by Dr. Rowena Carballo MD.            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

## 2019-10-16 NOTE — PROGRESS NOTES
"Pharmacokinetic Consult - Vancomycin Dosing    Lonnie Arora is a 44 y.o. year old male  with abscess on left gluteal w/ carlos's gangrene. Pharmacy has been consulted to dose vancomycin.         Assessment/Plan    1. Day #2 vancomycin:  Patient received vancomycin 1750 mg IV once (~23mg/kg DBW) on admission followed by current regimen of 1250 mg IV q24H (~16mg/kg DBW). Will obtain pharmacokinetic levels prior to 4th total dose. Peak 10/17 @ 1800 and trough 10/18 @ 1300. Goal trough: 10-20 mg/L with -600 mg*h/mL.    2. Day #2 meropenem: 1q IV q12h, appropriate for CrCL >50 ml/min    3. Day #2 clindamycin: 900mg IV q8h         Relevant clinical data and objective history reviewed:  160 cm (63\")   114 kg (250 lb 7.1 oz)   Ideal body weight: 56.9 kg (125 lb 7.1 oz)  Adjusted ideal body weight: 79.6 kg (175 lb 7.1 oz)    Lab Results   Component Value Date    CREATININE 1.98 (H) 10/16/2019    CREATININE 2.17 (H) 10/15/2019    CREATININE 2.30 (H) 10/15/2019     Estimated Creatinine Clearance: 54.2 mL/min (A) (by C-G formula based on SCr of 1.98 mg/dL (H)).  I/O last 3 completed shifts:  In: 3240 [I.V.:3140; IV Piggyback:100]  Out: 600 [Urine:600]    Lab Results   Component Value Date    WBC 26.30 (H) 10/16/2019    WBC 25.60 (H) 10/15/2019    WBC 28.10 (H) 10/15/2019     Temperature    10/16/19 0000 10/16/19 0400 10/16/19 0800   Temp: 97.8 °F (36.6 °C) 98.7 °F (37.1 °C) 98.2 °F (36.8 °C)             Baseline culture/source/susceptibility:  Microbiology Results (last 10 days)       Procedure Component Value - Date/Time    Wound Culture - Wound, Buttock, Left [[id number]]  (Abnormal) Collected:  10/15/19 5570    Lab Status:  Preliminary result Specimen:  Wound from Buttock, Left Updated:  10/16/19 1202     Wound Culture Moderate growth (3+) Streptococcus, Beta Hemolytic, Group F     Comment: This organism is considered to be universally susceptible to penicillin.  No further antibiotic testing will be performed. "        STREP GROUPING F     Wound Culture Scant growth (1+) Normal Skin [place]     Gram Stain Many (4+) WBCs per low power field      Moderate (3+) Gram positive cocci in chains      Mixed bacterial morphotypes seen on Gram Stain             Jyotsna Waddell PharmD  10/16/19 12:12 PM

## 2019-10-16 NOTE — CONSULTS
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    Subjective     History of present illness:      This is 44-year-old male with past medical history significant for diabetes mellitus.  Patient noticed to have boil in his left buttock 3 days ago.  Things got worse over time and required to come to the hospital yesterday with severe pain.  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  Patient was found to have large abscess.  The abscess was very close to the anus.  The patient scheduled to have diverting colostomy tomorrow.  The patient is hypotensive and required to be started on Levophed.  He is currently on low-dose.  The patient is awake and alert and denied having any other complaints other than left left buttock pain.    Review of Systems   Review of Systems   Constitutional: Negative.    HENT: Negative.    Eyes: Negative.    Respiratory: Negative.    Cardiovascular: Negative.    Gastrointestinal: Negative.    Genitourinary: Negative.    Musculoskeletal: Negative.         Left buttock pain   Skin: Negative.    Neurological: Negative.    Hematological: Negative.    Psychiatric/Behavioral: Negative.        Medications  Medications Prior to Admission   Medication Sig Dispense Refill Last Dose   • amLODIPine (NORVASC) 10 MG tablet Take 10 mg by mouth Daily.   10/15/2019 at 0800   • atorvastatin (LIPITOR) 40 MG tablet Take 40 mg by mouth Daily.   10/15/2019 at 0800   • baclofen (LIORESAL) 10 MG tablet Take 10 mg by mouth Daily.   10/15/2019 at 0800   • buPROPion XL (WELLBUTRIN XL) 150 MG 24 hr tablet Take 300 mg by mouth Daily.   10/15/2019 at 0800   • insulin aspart (novoLOG) 100 UNIT/ML injection Inject 40 Units under the skin into the appropriate  area as directed 3 (Three) Times a Day Before Meals.   10/14/2019 at Unknown time   • levETIRAcetam (KEPPRA) 500 MG tablet Take 500 mg by mouth 2 (Two) Times a Day.   10/15/2019 at 0800   • lisinopril (PRINIVIL,ZESTRIL) 40 MG tablet Take 40 mg by mouth Daily.   10/15/2019 at 0800   • metoprolol tartrate (LOPRESSOR) 100 MG tablet Take 100 mg by mouth 2 (Two) Times a Day.   10/15/2019 at 0800   • rOPINIRole (REQUIP) 0.5 MG tablet Take 0.5 mg by mouth 3 (Three) Times a Day. Take 1 hour before bedtime.   10/15/2019 at 0800       History  Past Medical History:   Diagnosis Date   • Diabetes (CMS/HCC)    • GERD (gastroesophageal reflux disease)    • Hypertension    • Seizures (CMS/HCC)      Past Surgical History:   Procedure Laterality Date   • INCISION AND DRAINAGE OF WOUND Left 10/15/2019    Procedure: INCISION AND DRAINAGE OF LEFT GLUTEAL REGION;  Surgeon: Niraj Almanzar MD;  Location: [facility];  Service: General       Family History  Family History   Problem Relation Age of Onset   • Breast cancer Mother    • Cervical cancer Mother        Social History   reports that he has been smoking electronic cigarette.  He does not have any smokeless tobacco history on file. He reports that he uses drugs. Drug: Marijuana. He reports that he does not drink alcohol.    Allergies  Patient has no known allergies.    Objective     Vital Signs   Vital Signs (last 24 hours)       10/15 0700  -  10/16 0659 10/16 0700  -  10/16 1316   Most Recent    Temp (°F) 97.3 -  100.5      98.2     98.2 (36.8)    Heart Rate 82 -  110      102     102    Resp 16 -  27       18    BP (!)70/44 -  111/79       106/51    SpO2 (%) 91 -  100      94     94          Physical Exam:  Physical Exam   Constitutional: He is oriented to person, place, and time. He appears well-developed and well-nourished.   HENT:   Head: Normocephalic and atraumatic.   Eyes: EOM are normal. Pupils are equal, round, and reactive to light.   Neck: Normal range of  motion. Neck supple.   Cardiovascular: Normal rate, regular rhythm and normal heart sounds.   Pulmonary/Chest: Effort normal and breath sounds normal. No respiratory distress. He has no wheezes. He has no rales.   Abdominal: Soft. Bowel sounds are normal. He exhibits no distension and no mass. There is no tenderness. There is no rebound and no guarding.   Musculoskeletal: Normal range of motion. He exhibits no edema or deformity.   Neurological: He is alert and oriented to person, place, and time. No cranial nerve deficit.   Skin: Skin is warm. No rash noted. No erythema.   Large wound in the left buttock very close to the rectum with some mild necrotic areas.  There is no significant induration or surrounding erythema.   Psychiatric: He has a normal mood and affect.   Nursing note and vitals reviewed.      Microbiology  Microbiology Results (last 10 days)     Procedure Component Value - Date/Time    Wound Culture - Wound, Buttock, Left [[id number]]  (Abnormal) Collected:  10/15/19 1347    Lab Status:  Preliminary result Specimen:  Wound from Buttock, Left Updated:  10/16/19 1202     Wound Culture Moderate growth (3+) Streptococcus, Beta Hemolytic, Group F     Comment: This organism is considered to be universally susceptible to penicillin.  No further antibiotic testing will be performed.        STREP GROUPING F     Wound Culture Scant growth (1+) Normal Skin [place]     Gram Stain Many (4+) WBCs per low power field      Moderate (3+) Gram positive cocci in chains      Mixed bacterial morphotypes seen on Gram Stain    Blood Culture - Blood, Arm, Left [[id number]] Collected:  10/15/19 1230    Lab Status:  Preliminary result Specimen:  Blood from Arm, Left Updated:  10/16/19 1315     Blood Culture No growth at 24 hours          Laboratory  Results from last 7 days   Lab Units 10/16/19  0615   WBC 10*3/mm3 26.30*   HEMOGLOBIN g/dL 11.8*   HEMATOCRIT % 36.9*   PLATELETS 10*3/mm3 175     Results from last 7 days   Lab Units  10/16/19  0615   SODIUM mmol/L 140   POTASSIUM mmol/L 4.7   CHLORIDE mmol/L 109*   CO2 mmol/L 19.0*   BUN mg/dL 35*   CREATININE mg/dL 1.98*   GLUCOSE mg/dL 303*   CALCIUM mg/dL 7.8*     Results from last 7 days   Lab Units 10/16/19  0615   SODIUM mmol/L 140   POTASSIUM mmol/L 4.7   CHLORIDE mmol/L 109*   CO2 mmol/L 19.0*   BUN mg/dL 35*   CREATININE mg/dL 1.98*   GLUCOSE mg/dL 303*   CALCIUM mg/dL 7.8*                   Radiology  Imaging Results (last 72 hours)     Procedure Component Value Units Date/Time    XR Chest 1 View [[id number]] Collected:  10/16/19 0734     Updated:  10/16/19 0934    Narrative:       DATE OF EXAM:  10/15/2019 11:40 PM     PROCEDURE:  XR CHEST 1 VW-     INDICATIONS:  central line placement; A41.9-Sepsis, unspecified organism;  L02.31-Cutaneous abscess of buttock; 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     FINDINGS:  Lungs normal expanded. Cardiomegaly. Internal jugular central venous  catheter tip terminated the superior vena cava. Subsegmental atelectasis  right lower lobe. Osteopenia.       Impression:       1.Subsegmental atelectasis right lower lobe.  2.Cardiomegaly.  3.Central venous catheter tip has appropriate position.     Electronically Signed By-Winifred Simental On:10/16/2019 7:36 AM  This report was finalized on [id number] by  Winifred Simental, .    CT Abdomen Pelvis Without Contrast [[id number]] Collected:  10/15/19 1332     Updated:  10/15/19 1340    Narrative:       CT ABDOMEN PELVIS WO CONTRAST-     Date of Exam: 10/15/2019 1:15 PM     Indication: 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  Elevated  creatinine prevents contrast  . Generalized abdominal pain.     Comparison: None. The patient's previous CT scan from 07/24/2014 is no  longer available for  comparison.     Technique: Contiguous axial CT images were obtained from the lung bases  to the pubic symphysis without contrast. Sagittal and coronal  reconstructions were performed.  Automated exposure control and  iterative reconstruction methods were used.     FINDINGS:     Abnormal skin thickening and subcutaneous fat induration mild along with  multiple locules of gas, is seen within the posterior medial left  gluteal soft tissues extending contiguously into the left ischial rectal  fossa.. While the perineum is not included in the imaging field of view  in its entirety, locules of gas are demonstrated within the deep pelvic  fat adjacent to the left lateral margin of the penile shaft, worrisome  for Dale's gangrene, as suggested in the clinical history. Drainable  fluid collection or abscess is seen.     The inflamed soft tissues in the left ischial rectal fossa displace the  rectum and anus slightly toward the right, but the anus and rectum  themselves do not appear abnormally thickened or inflamed.     There are shotty bilateral inguinal lymph nodes are thought to be  reactive. No pathologically enlarged lymph nodes are identified.     Cholecystectomy. Features of chronic calcific pancreatitis without acute  pancreatitis. Spleen, adrenals and right kidney have a normal  noncontrast appearance. Small nonobstructing left renal stone. Urinary  bladder, prostate are normal.     Heart size within normal limits. Minimal linear subsegmental atelectasis  in lung bases. No acute osseous abnormality.          Impression:          1. FINDINGS consistent with the clinical suspicion of Dale's  gangrene. Abnormal subcutaneous fat induration and superficial and deep  air loculations extend from the left gluteal region, into the left  ischial rectal fossa, and into the fat adjacent to the left lateral  margin of the penile shaft. Of note, the perineum is not imaged in its  entirety.  2. No drainable fluid  collection or abscess is seen within the pelvis.  3. Nonobstructing left renal stone.  4. Chronic calcific pancreatitis.           Electronically Signed By-Dr. Rowena Carballo MD On:10/15/2019 1:38 PM  This report was finalized on [id number] by Dr. Rowena Carballo MD.          Cardiology      Results Review:  I have reviewed all clinical data, test, lab, and imaging results.       Schedule Meds    [START ON 10/18/2019] !Vancomycin Level Draw Needed  Does not apply Once   [START ON 10/17/2019] !Vancomycin Level Draw Needed  Does not apply Once   atorvastatin 40 mg Oral Daily   baclofen 10 mg Oral Daily   buPROPion  mg Oral Daily   clindamycin 900 mg Intravenous Q8H   insulin glargine 20 Units Subcutaneous QAM   insulin lispro 0-24 Units Subcutaneous 4x Daily With Meals & Nightly   levETIRAcetam 500 mg Oral Q12H   meropenem 1 g Intravenous Q12H   pantoprazole 40 mg Intravenous Q AM   rOPINIRole 0.5 mg Oral TID   sodium chloride 10 mL Intravenous Q12H   vancomycin 1,250 mg Intravenous Q24H       Infusion Meds    norepinephrine 0.02-0.3 mcg/kg/min Last Rate: 0.1 mcg/kg/min (10/16/19 0746)   Pharmacy to Dose meropenem (MERREM)     Pharmacy to dose vancomycin     sodium chloride 125 mL/hr Last Rate: 125 mL/hr (10/15/19 1912)       PRN Meds  aluminum-magnesium hydroxide-simethicone  •  bisacodyl  •  dextrose  •  dextrose  •  glucagon (human recombinant)  •  HYDROmorphone  •  magnesium hydroxide  •  melatonin  •  ondansetron **OR** ondansetron  •  oxyCODONE **OR** oxyCODONE  •  Pharmacy to Dose meropenem (MERREM)  •  sodium chloride  •  sodium chloride      Assessment/Plan       Assessment    Left buttock abscess associated with skin and soft tissue necrosis.  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  Intraoperative cultures are pending.  This type of infection usually associated with polymicrobial infection.  So far be growing beta-hemolytic Streptococcus probably group F    Diabetes mellitus with diabetic  neuropathy    Mild septic shock currently on low-dose of norepinephrine    Plan    Continue IV vancomycin, IV clindamycin and IV meropenem waiting on the final culture results  De-escalate antimicrobial therapy upon final culture results.  I will probably de-escalate to IV Rocephin and leave clindamycin on board for a few days  Continue supportive care  Labs in a.m.  Diverting colostomy probably tomorrow    Joel Portillo MD  10/16/19  1:16 PM      Note is dictated utilizing voice recognition software/Dragon

## 2019-10-16 NOTE — PROCEDURES
"Insert Central Line At Bedside  Date/Time: 10/15/2019 11:00 PM  Performed by: Alex Devine APRN  Authorized by: Alex Devine APRN   Consent: Written consent obtained.  Risks and benefits: risks, benefits and alternatives were discussed  Consent given by: patient  Patient understanding: patient states understanding of the procedure being performed  Patient consent: the patient's understanding of the procedure matches consent given  Procedure consent: procedure consent matches procedure scheduled  Relevant documents: relevant documents present and verified  Test results: test results available and properly labeled  Site marked: the operative site was marked  Imaging studies: imaging studies available  Required items: required blood products, implants, devices, and special equipment available  Patient identity confirmed: arm band  Time out: Immediately prior to procedure a \"time out\" was called to verify the correct patient, procedure, equipment, support staff and site/side marked as required.  Indications: vascular access    Anesthesia:  Local Anesthetic: lidocaine 1% without epinephrine  Preparation: skin prepped with 2% chlorhexidine  Skin prep agent dried: skin prep agent completely dried prior to procedure  Sterile barriers: all five maximum sterile barriers used - cap, mask, sterile gown, sterile gloves, and large sterile sheet  Hand hygiene: hand hygiene performed prior to central venous catheter insertion  Location details: right internal jugular  Patient position: flat  Catheter type: triple lumen  Catheter size: 7 Fr  Pre-procedure: landmarks identified  Ultrasound guidance: yes  Sterile ultrasound techniques: sterile gel and sterile probe covers were used  Number of attempts: 1  Successful placement: yes  Post-procedure: line sutured and dressing applied  Assessment: blood return through all ports,  free fluid flow,  placement verified by x-ray and no pneumothorax on x-ray  Patient tolerance: Patient " tolerated the procedure well with no immediate complications

## 2019-10-16 NOTE — CODE DOCUMENTATION
Awaiting bed from ICU to be cleaned to transfer for Levophed. Patient stable on levophed at this time. ICU RN at bedside.

## 2019-10-16 NOTE — PROGRESS NOTES
"Post-op Note  INCISION AND DRAINAGE WOUND  10/15/2019    Subjective   Lonnie Arora is a 44 y.o. male s/p incision and drainage with debridement of left gluteal wound. Patient became hypotensive post-op and was transferred to the ICU to be placed on pressors. His pain is better. He has no complaints      Objective   /51   Pulse 102   Temp 98.2 °F (36.8 °C) (Axillary)   Resp 18   Ht 160 cm (63\")   Wt 114 kg (250 lb 7.1 oz)   SpO2 94%   BMI 44.36 kg/m²   Wound bed still has some malodor, but no evidence of necrotic tissue requiring immediate re-excision    WBC   Date Value Ref Range Status   10/16/2019 26.30 (H) 3.40 - 10.80 10*3/mm3 Final     RBC   Date Value Ref Range Status   10/16/2019 4.01 (L) 4.14 - 5.80 10*6/mm3 Final     Hemoglobin   Date Value Ref Range Status   10/16/2019 11.8 (L) 13.0 - 17.7 g/dL Final     Hematocrit   Date Value Ref Range Status   10/16/2019 36.9 (L) 37.5 - 51.0 % Final     MCV   Date Value Ref Range Status   10/16/2019 92.1 79.0 - 97.0 fL Final     MCH   Date Value Ref Range Status   10/16/2019 29.5 26.6 - 33.0 pg Final     MCHC   Date Value Ref Range Status   10/16/2019 32.1 31.5 - 35.7 g/dL Final     RDW   Date Value Ref Range Status   10/16/2019 16.2 (H) 12.3 - 15.4 % Final     RDW-SD   Date Value Ref Range Status   10/16/2019 52.9 37.0 - 54.0 fl Final     MPV   Date Value Ref Range Status   10/16/2019 8.7 6.0 - 12.0 fL Final     Platelets   Date Value Ref Range Status   10/16/2019 175 140 - 450 10*3/mm3 Final     Neutrophils Absolute   Date Value Ref Range Status   10/16/2019 25.25 (H) 1.70 - 7.00 10*3/mm3 Final       Assessment/Plan   43 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    - continue supportive care per ICU  - If WBC not improved tomorrow and still on pressors will proceed to OR to re-examine wound and potentially perform re-excision of the area  - May need diverting colostomy " and this was discussed with the patient and his SO at bedside    Niraj Almanzar MD  10/16/2019  12:58 PM

## 2019-10-16 NOTE — NURSING NOTE
The patient is a 44-year-old gentleman with diabetes and history of stroke who presented to the ER with an abscess in the left gluteal region.  Patient was noted to have an elevated white blood cell count to 28,000, and CT scan of the abdomen and pelvis demonstrated a large perianal abscess with soft tissue subcutaneous emphysema concerning for Dale's gangrene.  Patient underwent I&D yesterday.    The abscess has a dark red base there is a great deal of induration surrounding it and the ecchymosis is noted surrounding the tissue.  There is a moderate amount of serosanguineous exudate noted.  Approximate size of the wound is 6 x 7 cm with a depth of 4.8 cm there is some undermining noted 3-9 o'clock of approximately 1 cm.    Per patient patient is going back to surgery tomorrow for further debridement of the area.  I would agree with the current orders for wound care will follow along as needed.  Patient may need diverting ostomy to facilitate closure of the wound.

## 2019-10-17 LAB
ALBUMIN SERPL-MCNC: 1.9 G/DL (ref 3.5–5.2)
ALBUMIN/GLOB SERPL: 0.6 G/DL
ALP SERPL-CCNC: 124 U/L (ref 39–117)
ALT SERPL W P-5'-P-CCNC: 11 U/L (ref 1–41)
ANION GAP SERPL CALCULATED.3IONS-SCNC: 13.2 MMOL/L (ref 5–15)
ANISOCYTOSIS BLD QL: ABNORMAL
AST SERPL-CCNC: 19 U/L (ref 1–40)
BILIRUB SERPL-MCNC: 0.4 MG/DL (ref 0.2–1.2)
BUN BLD-MCNC: 31 MG/DL (ref 6–20)
BUN/CREAT SERPL: 18.2 (ref 7–25)
CALCIUM SPEC-SCNC: 7.2 MG/DL (ref 8.6–10.5)
CHLORIDE SERPL-SCNC: 113 MMOL/L (ref 98–107)
CO2 SERPL-SCNC: 20 MMOL/L (ref 22–29)
CREAT BLD-MCNC: 1.7 MG/DL (ref 0.76–1.27)
DEPRECATED RDW RBC AUTO: 53.8 FL (ref 37–54)
ERYTHROCYTE [DISTWIDTH] IN BLOOD BY AUTOMATED COUNT: 16.6 % (ref 12.3–15.4)
GFR SERPL CREATININE-BSD FRML MDRD: 44 ML/MIN/1.73
GLOBULIN UR ELPH-MCNC: 3.3 GM/DL
GLUCOSE BLD-MCNC: 121 MG/DL (ref 65–99)
GLUCOSE BLDC GLUCOMTR-MCNC: 107 MG/DL (ref 70–105)
GLUCOSE BLDC GLUCOMTR-MCNC: 108 MG/DL (ref 70–105)
GLUCOSE BLDC GLUCOMTR-MCNC: 111 MG/DL (ref 70–105)
GLUCOSE BLDC GLUCOMTR-MCNC: 151 MG/DL (ref 70–105)
GLUCOSE BLDC GLUCOMTR-MCNC: 154 MG/DL (ref 70–105)
HCT VFR BLD AUTO: 32 % (ref 37.5–51)
HGB BLD-MCNC: 10.2 G/DL (ref 13–17.7)
LYMPHOCYTES # BLD MANUAL: 0.42 10*3/MM3 (ref 0.7–3.1)
LYMPHOCYTES NFR BLD MANUAL: 2 % (ref 19.6–45.3)
LYMPHOCYTES NFR BLD MANUAL: 5 % (ref 5–12)
MCH RBC QN AUTO: 29.5 PG (ref 26.6–33)
MCHC RBC AUTO-ENTMCNC: 32 G/DL (ref 31.5–35.7)
MCV RBC AUTO: 92.3 FL (ref 79–97)
MONOCYTES # BLD AUTO: 1.04 10*3/MM3 (ref 0.1–0.9)
NEUTROPHILS # BLD AUTO: 19.34 10*3/MM3 (ref 1.7–7)
NEUTROPHILS NFR BLD MANUAL: 75 % (ref 42.7–76)
NEUTS BAND NFR BLD MANUAL: 18 % (ref 0–5)
PLAT MORPH BLD: NORMAL
PLATELET # BLD AUTO: 132 10*3/MM3 (ref 140–450)
PMV BLD AUTO: 8.6 FL (ref 6–12)
POTASSIUM BLD-SCNC: 4.2 MMOL/L (ref 3.5–5.2)
PROT SERPL-MCNC: 5.2 G/DL (ref 6–8.5)
RBC # BLD AUTO: 3.47 10*6/MM3 (ref 4.14–5.8)
SCAN SLIDE: NORMAL
SODIUM BLD-SCNC: 142 MMOL/L (ref 136–145)
TOXIC GRANULATION: ABNORMAL
VANCOMYCIN PEAK SERPL-MCNC: 31.9 MCG/ML (ref 20–40)
WBC NRBC COR # BLD: 20.8 10*3/MM3 (ref 3.4–10.8)

## 2019-10-17 PROCEDURE — 25010000002 LEVETIRACETAM IN NACL 0.82% 500 MG/100ML SOLUTION: Performed by: NURSE PRACTITIONER

## 2019-10-17 PROCEDURE — 99221 1ST HOSP IP/OBS SF/LOW 40: CPT | Performed by: FAMILY MEDICINE

## 2019-10-17 PROCEDURE — 85025 COMPLETE CBC W/AUTO DIFF WBC: CPT | Performed by: INTERNAL MEDICINE

## 2019-10-17 PROCEDURE — 80202 ASSAY OF VANCOMYCIN: CPT | Performed by: SURGERY

## 2019-10-17 PROCEDURE — 25010000002 VANCOMYCIN 10 G RECONSTITUTED SOLUTION: Performed by: NURSE PRACTITIONER

## 2019-10-17 PROCEDURE — 25010000002 MEROPENEM PER 100 MG: Performed by: INTERNAL MEDICINE

## 2019-10-17 PROCEDURE — 82962 GLUCOSE BLOOD TEST: CPT

## 2019-10-17 PROCEDURE — 63710000001 INSULIN LISPRO (HUMAN) PER 5 UNITS: Performed by: NURSE PRACTITIONER

## 2019-10-17 PROCEDURE — 99024 POSTOP FOLLOW-UP VISIT: CPT | Performed by: SURGERY

## 2019-10-17 PROCEDURE — 80053 COMPREHEN METABOLIC PANEL: CPT | Performed by: INTERNAL MEDICINE

## 2019-10-17 PROCEDURE — 85007 BL SMEAR W/DIFF WBC COUNT: CPT | Performed by: INTERNAL MEDICINE

## 2019-10-17 PROCEDURE — 25010000002 HYDROMORPHONE PER 4 MG: Performed by: SURGERY

## 2019-10-17 RX ORDER — LEVETIRACETAM 5 MG/ML
500 INJECTION INTRAVASCULAR ONCE
Status: COMPLETED | OUTPATIENT
Start: 2019-10-17 | End: 2019-10-17

## 2019-10-17 RX ADMIN — ROPINIROLE 0.5 MG: 0.25 TABLET, FILM COATED ORAL at 21:01

## 2019-10-17 RX ADMIN — Medication 10 ML: at 09:06

## 2019-10-17 RX ADMIN — LEVETIRACETAM 500 MG: 500 TABLET, FILM COATED ORAL at 21:01

## 2019-10-17 RX ADMIN — OXYCODONE HYDROCHLORIDE 10 MG: 5 TABLET ORAL at 10:50

## 2019-10-17 RX ADMIN — INSULIN LISPRO 4 UNITS: 100 INJECTION, SOLUTION INTRAVENOUS; SUBCUTANEOUS at 18:13

## 2019-10-17 RX ADMIN — Medication: at 18:00

## 2019-10-17 RX ADMIN — PANTOPRAZOLE SODIUM 40 MG: 40 INJECTION, POWDER, FOR SOLUTION INTRAVENOUS at 05:52

## 2019-10-17 RX ADMIN — VANCOMYCIN HYDROCHLORIDE 1250 MG: 10 INJECTION, POWDER, LYOPHILIZED, FOR SOLUTION INTRAVENOUS at 14:22

## 2019-10-17 RX ADMIN — ROPINIROLE 0.5 MG: 0.25 TABLET, FILM COATED ORAL at 16:00

## 2019-10-17 RX ADMIN — CLINDAMYCIN PHOSPHATE 900 MG: 900 INJECTION, SOLUTION INTRAVENOUS at 05:52

## 2019-10-17 RX ADMIN — HYDROMORPHONE HYDROCHLORIDE 0.5 MG: 2 INJECTION, SOLUTION INTRAMUSCULAR; INTRAVENOUS; SUBCUTANEOUS at 14:25

## 2019-10-17 RX ADMIN — MEROPENEM 1 G: 1 INJECTION, POWDER, FOR SOLUTION INTRAVENOUS at 16:00

## 2019-10-17 RX ADMIN — Medication 10 ML: at 21:20

## 2019-10-17 RX ADMIN — CLINDAMYCIN PHOSPHATE 900 MG: 900 INJECTION, SOLUTION INTRAVENOUS at 13:26

## 2019-10-17 RX ADMIN — CLINDAMYCIN PHOSPHATE 900 MG: 900 INJECTION, SOLUTION INTRAVENOUS at 21:00

## 2019-10-17 RX ADMIN — LEVETIRACETAM 500 MG: 5 INJECTION INTRAVENOUS at 09:06

## 2019-10-17 RX ADMIN — MEROPENEM 1 G: 1 INJECTION, POWDER, FOR SOLUTION INTRAVENOUS at 09:06

## 2019-10-17 RX ADMIN — INSULIN LISPRO 4 UNITS: 100 INJECTION, SOLUTION INTRAVENOUS; SUBCUTANEOUS at 21:00

## 2019-10-17 NOTE — PLAN OF CARE
Problem: Patient Care Overview  Goal: Plan of Care Review  Outcome: Ongoing (interventions implemented as appropriate)   10/17/19 0509   Coping/Psychosocial   Plan of Care Reviewed With patient;family   Plan of Care Review   Progress improving   OTHER   Outcome Summary Pt is not requiring pressors at this time. Awaiting decision on surgery for today. NPO since midnight. Will continue to monitor.        Problem: Skin Injury Risk (Adult)  Goal: Skin Health and Integrity  Outcome: Ongoing (interventions implemented as appropriate)   10/17/19 1780   Skin Injury Risk (Adult)   Skin Health and Integrity making progress toward outcome

## 2019-10-17 NOTE — PLAN OF CARE
Problem: Patient Care Overview  Goal: Plan of Care Review  Outcome: Ongoing (interventions implemented as appropriate)   10/17/19 1320   Coping/Psychosocial   Plan of Care Reviewed With patient;spouse   Plan of Care Review   Progress improving   OTHER   Outcome Summary Pt remains off pressors. Dr. Almanzar to re-eval for possible colostomy tomorrow or this weekend. No further debridement needed at this time. PCU overflow.     Goal: Discharge Needs Assessment  Outcome: Ongoing (interventions implemented as appropriate)   10/17/19 1320   Discharge Needs Assessment   Readmission Within the Last 30 Days no previous admission in last 30 days   Concerns to be Addressed home safety;medication   Patient/Family Anticipates Transition to home with help/services;inpatient rehabilitation facility   Patient/Family Anticipated Services at Transition home health care;rehabilitation services;skilled nursing     Goal: Interprofessional Rounds/Family Conf  Outcome: Ongoing (interventions implemented as appropriate)   10/17/19 1320   Interdisciplinary Rounds/Family Conf   Summary Pt remains off pressors. Dr. Almanzar to re-eval for possible colostomy tomorrow or this weekend. No further debridement needed at this time. PCU overflow.   Participants nursing;family;patient;physician       Problem: Skin Injury Risk (Adult)  Goal: Identify Related Risk Factors and Signs and Symptoms  Outcome: Ongoing (interventions implemented as appropriate)   10/17/19 1320   Skin Injury Risk (Adult)   Related Risk Factors (Skin Injury Risk) critical care admission;infection;mobility impaired;medication     Goal: Skin Health and Integrity  Outcome: Ongoing (interventions implemented as appropriate)   10/17/19 1320   Skin Injury Risk (Adult)   Skin Health and Integrity making progress toward outcome

## 2019-10-17 NOTE — PROGRESS NOTES
Pulmonary/ Critical Care progress Note        Patient Name:  Lonnie Arora    :  1975    Medical Record:  [id number]    Requesting Physician    Sanjay Chance MD    Primary Care Physician     Sanjay Chance MD    Reason for consultation    Lonnie Arora is a 44 y.o. 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  Patient was admitted through the ER on 10/15/19 with a three day history of left buttock pain.  He states that he was unable to sit in a chair or in bed due to the pain.  Workup in the ER with a CT scan was suspicious for Dale's gangrene for which general surgery was consulted and he was taken to the OR with I/D.  Postoperatively in the PCU he developed hypotension which was unresponsive to IVF fluids.  The patient was transferred to the ICU and started on Levophed.      10/16/19:  Remains on Levophed, patient without complaints.  No SOA.  No complaints of pain currently  10/17/19:  Off Levophed.  Patient reports no new issues.  Feeling ok.  Remains on 2L    Review of Systems    As above    Home medicationS  Prior to Admission medications    Medication Sig Start Date End Date Taking? Authorizing Provider   amLODIPine (NORVASC) 10 MG tablet Take 10 mg by mouth Daily.   Yes Zunilda Olmedo MD   atorvastatin (LIPITOR) 40 MG tablet Take 40 mg by mouth Daily.   Yes Zunilda Olmedo MD   baclofen (LIORESAL) 10 MG tablet Take 10 mg by mouth Daily.   Yes Zunilda Olmedo MD   buPROPion XL (WELLBUTRIN XL) 150 MG 24 hr tablet Take 300 mg by mouth Daily.   Yes Zunilda Olmedo MD   insulin aspart (novoLOG) 100 UNIT/ML injection Inject 40 Units under the skin into the appropriate area as directed 3 (Three) Times a Day Before Meals.   Yes Zunilda Olmedo MD   levETIRAcetam (KEPPRA) 500 MG tablet Take 500 mg by mouth 2 (Two) Times a Day.   Yes Zunilda Olmedo MD   lisinopril (PRINIVIL,ZESTRIL) 40 MG tablet Take 40  mg by mouth Daily.   Yes ProviderZunilda MD   metoprolol tartrate (LOPRESSOR) 100 MG tablet Take 100 mg by mouth 2 (Two) Times a Day.   Yes Zunilda Olmedo MD   rOPINIRole (REQUIP) 0.5 MG tablet Take 0.5 mg by mouth 3 (Three) Times a Day. Take 1 hour before bedtime.   Yes Zunilda Olmedo MD       Medical History    Past Medical History:   Diagnosis Date   • Diabetes (CMS/HCC)    • GERD (gastroesophageal reflux disease)    • Hypertension    • Seizures (CMS/HCC)         Surgical History    Past Surgical History:   Procedure Laterality Date   • INCISION AND DRAINAGE OF WOUND Left 10/15/2019    Procedure: INCISION AND DRAINAGE OF LEFT GLUTEAL REGION;  Surgeon: Niraj Almanzar MD;  Location: [facility] MAIN OR;  Service: General        Family History    Family History   Problem Relation Age of Onset   • Breast cancer Mother    • Cervical cancer Mother        Social History    Social History     Tobacco Use   • Smoking status: Current Every Day Smoker     Types: Electronic Cigarette   Substance Use Topics   • Alcohol use: No     Frequency: Never     Comment: Quit several years ago at time of his CVA     Allergies    No Known Allergies    Medications    Scheduled Meds:    [START ON 10/18/2019] !Vancomycin Level Draw Needed  Does not apply Once   !Vancomycin Level Draw Needed  Does not apply Once   atorvastatin 40 mg Oral Daily   baclofen 10 mg Oral Daily   buPROPion  mg Oral Daily   clindamycin 900 mg Intravenous Q8H   insulin glargine 20 Units Subcutaneous QAM   insulin lispro 0-24 Units Subcutaneous 4x Daily With Meals & Nightly   levETIRAcetam 500 mg Oral Q12H   meropenem 1 g Intravenous Q8H   pantoprazole 40 mg Intravenous Q AM   rOPINIRole 0.5 mg Oral TID   sodium chloride 10 mL Intravenous Q12H   vancomycin 1,250 mg Intravenous Q24H     Continuous Infusions:    norepinephrine 0.02-0.3 mcg/kg/min Last Rate: Stopped (10/17/19 0052)   Pharmacy to Dose meropenem (MERREM)     Pharmacy to dose  vancomycin     sodium chloride 125 mL/hr Last Rate: 125 mL/hr (10/15/19 1912)     PRN Meds:.aluminum-magnesium hydroxide-simethicone  •  bisacodyl  •  dextrose  •  dextrose  •  glucagon (human recombinant)  •  HYDROmorphone  •  magnesium hydroxide  •  melatonin  •  ondansetron **OR** ondansetron  •  oxyCODONE **OR** oxyCODONE  •  Pharmacy to Dose meropenem (MERREM)  •  sodium chloride  •  sodium chloride      Physical Exam    tMax 24 hrs:  Temp (24hrs), Av.3 °F (36.8 °C), Min:97.8 °F (36.6 °C), Max:98.8 °F (37.1 °C)    Vitals Ranges:  Temp:  [97.8 °F (36.6 °C)-98.8 °F (37.1 °C)] 97.8 °F (36.6 °C)  Heart Rate:  [] 97  Resp:  [15-19] 15  BP: ()/(40-80) 111/73  Intake and Output Last 3 Shifts:  I/O last 3 completed shifts:  In: 7139 [I.V.:7139]  Out: 2300 [Urine:2300]    Constitutional:  Alert, no acute respiratory distress   HEENT:  Atraumatic, PERRL, conjunctiva normal, moist oral mucosa, no nasal discharge.  Trachea is midline.  Respiratory:  No respiratory distress, normal breath sounds, no rales, no wheezing   Cardiovascular:  Normal rate, normal rhythm and no murmurs.  Pulses 2+ and equal in all four extremities.    GI:  Soft, nondistended, positive bowel sounds.  :  No costovertebral angle tenderness   Extremities: No edema, cyanosis or tenderness.  Integument:  No rashes.   Left gluteal I/D surgical dressing intact without signs of bleeding.    Neurologic:  Alert & oriented x 3.    Psychiatric:  Speech and behavior appropriate     labs    Lab Results (last 24 hours)     Procedure Component Value Units Date/Time    Wound Culture - Wound, Buttock, Left [[id number]]  (Abnormal) Collected:  10/15/19 1347    Specimen:  Wound from Buttock, Left Updated:  10/17/19 0746     Wound Culture Moderate growth (3+) Streptococcus, Beta Hemolytic, Group F     Comment: This organism is considered to be universally susceptible to penicillin.  No further antibiotic testing will be performed.        STREP GROUPING  F     Wound Culture Scant growth (1+) Normal Skin Chuyita     Gram Stain Many (4+) WBCs per low power field      Moderate (3+) Gram positive cocci in chains      Mixed bacterial morphotypes seen on Gram Stain    POC Glucose Once [[id number]]  (Abnormal) Collected:  10/17/19 0736    Specimen:  Blood Updated:  10/17/19 0740     Glucose 111 mg/dL      Comment: Serial Number: [id number]:  [id number]       Comprehensive Metabolic Panel [[id number]]  (Abnormal) Collected:  10/17/19 0609    Specimen:  Blood Updated:  10/17/19 0740     Glucose 121 mg/dL      BUN 31 mg/dL      Creatinine 1.70 mg/dL      Sodium 142 mmol/L      Potassium 4.2 mmol/L      Chloride 113 mmol/L      CO2 20.0 mmol/L      Calcium 7.2 mg/dL      Total Protein 5.2 g/dL      Albumin 1.90 g/dL      ALT (SGPT) 11 U/L      AST (SGOT) 19 U/L      Alkaline Phosphatase 124 U/L      Total Bilirubin 0.4 mg/dL      eGFR Non African Amer 44 mL/min/1.73      Globulin 3.3 gm/dL      A/G Ratio 0.6 g/dL      BUN/Creatinine Ratio 18.2     Anion Gap 13.2 mmol/L     Narrative:       GFR Normal >60  Chronic Kidney Disease <60  Kidney Failure <15    Scan Slide [[id number]] Collected:  10/17/19 0609    Specimen:  Blood Updated:  10/17/19 0657     Scan Slide --     Comment: See Manual Differential Results       Manual Differential [[id number]]  (Abnormal) Collected:  10/17/19 0609    Specimen:  Blood Updated:  10/17/19 0657     Neutrophil % 75.0 %      Lymphocyte % 2.0 %      Monocyte % 5.0 %      Bands %  18.0 %      Neutrophils Absolute 19.34 10*3/mm3      Lymphocytes Absolute 0.42 10*3/mm3      Monocytes Absolute 1.04 10*3/mm3      Anisocytosis Slight/1+     Toxic Granulation Slight/1+     Platelet Morphology Normal    CBC & Differential [[id number]] Collected:  10/17/19 0609    Specimen:  Blood Updated:  10/17/19 0657    Narrative:       The following orders were created for panel order CBC & Differential.  Procedure                               Abnormality          Status                     ---------                               -----------         ------                     CBC Auto Differential[[id number]]        Abnormal            Final result                 Please view results for these tests on the individual orders.    CBC Auto Differential [[id number]]  (Abnormal) Collected:  10/17/19 0609    Specimen:  Blood Updated:  10/17/19 0657     WBC 20.80 10*3/mm3      RBC 3.47 10*6/mm3      Hemoglobin 10.2 g/dL      Hematocrit 32.0 %      MCV 92.3 fL      MCH 29.5 pg      MCHC 32.0 g/dL      RDW 16.6 %      RDW-SD 53.8 fl      MPV 8.6 fL      Platelets 132 10*3/mm3     POC Glucose Once [[id number]]  (Abnormal) Collected:  10/17/19 0551    Specimen:  Blood Updated:  10/17/19 0551     Glucose 107 mg/dL      Comment: Serial Number: [id number]:  [id number]       POC Glucose Once [[id number]]  (Abnormal) Collected:  10/16/19 2208    Specimen:  Blood Updated:  10/16/19 2209     Glucose 152 mg/dL      Comment: Serial Number: [id number]:  [id number]       POC Glucose Once [[id number]]  (Abnormal) Collected:  10/16/19 1944    Specimen:  Blood Updated:  10/16/19 1946     Glucose 172 mg/dL      Comment: Serial Number: [id number]:  [id number]       POC Glucose Once [[id number]]  (Abnormal) Collected:  10/16/19 1724    Specimen:  Blood Updated:  10/16/19 1726     Glucose 219 mg/dL      Comment: Serial Number: [id number]:  [id number]       Lactic Acid, Plasma [[id number]]  (Normal) Collected:  10/16/19 1548    Specimen:  Blood Updated:  10/16/19 1636     Lactate 1.2 mmol/L     POC Glucose Once [[id number]]  (Abnormal) Collected:  10/16/19 1610    Specimen:  Blood Updated:  10/16/19 1612     Glucose 228 mg/dL      Comment: Serial Number: [id number]:  [id number]       Blood Culture - Blood, Arm, Left [[id number]] Collected:  10/15/19 1230    Specimen:  Blood from Arm, Left Updated:  10/16/19 1315     Blood Culture No growth at 24 hours    POC Glucose Once [[id number]]   (Abnormal) Collected:  10/16/19 1121    Specimen:  Blood Updated:  10/16/19 1130     Glucose 354 mg/dL      Comment: Serial Number: [id number]:  [id number]       Lactic Acid, Plasma [[id number]]  (Abnormal) Collected:  10/16/19 1032    Specimen:  Blood Updated:  10/16/19 1121     Lactate 3.4 mmol/L     Blood Culture - Blood, Arm, Right [[id number]] Collected:  10/16/19 1032    Specimen:  Blood from Arm, Right Updated:  10/16/19 1052              CBC  Results from last 7 days   Lab Units 10/17/19  0609 10/16/19  0615 10/15/19  2344 10/15/19  1037   WBC 10*3/mm3 20.80* 26.30* 25.60* 28.10*   RBC 10*6/mm3 3.47* 4.01* 3.97* 4.61   HEMOGLOBIN g/dL 10.2* 11.8* 11.7* 13.6   HEMATOCRIT % 32.0* 36.9* 36.4* 43.0   MCV fL 92.3 92.1 91.6 93.4   PLATELETS 10*3/mm3 132* 175 167 184       BMP  Results from last 7 days   Lab Units 10/17/19  0609 10/16/19  0615 10/15/19  2344 10/15/19  1248 10/15/19  1231   SODIUM mmol/L 142 140 141  --  141   POTASSIUM mmol/L 4.2 4.7 4.7  --  4.7   CHLORIDE mmol/L 113* 109* 109*  --  108*   CO2 mmol/L 20.0* 19.0* 20.0*  --  20.0*   BUN mg/dL 31* 35* 36*  --  37*   CREATININE mg/dL 1.70* 1.98* 2.17* 2.30* 2.26*   GLUCOSE mg/dL 121* 303* 226*  --  252*       CMP   Results from last 7 days   Lab Units 10/17/19  0609 10/16/19  0615 10/15/19  2344 10/15/19  1248 10/15/19  1231 10/15/19  1037   SODIUM mmol/L 142 140 141  --  141  --    POTASSIUM mmol/L 4.2 4.7 4.7  --  4.7  --    CHLORIDE mmol/L 113* 109* 109*  --  108*  --    CO2 mmol/L 20.0* 19.0* 20.0*  --  20.0*  --    BUN mg/dL 31* 35* 36*  --  37*  --    CREATININE mg/dL 1.70* 1.98* 2.17* 2.30* 2.26*  --    GLUCOSE mg/dL 121* 303* 226*  --  252*  --    ALBUMIN g/dL 1.90*  --   --   --  2.40*  --    BILIRUBIN mg/dL 0.4  --   --   --  1.0  --    ALK PHOS U/L 124*  --   --   --  150*  --    AST (SGOT) U/L 19  --   --   --  13  --    ALT (SGPT) U/L 11  --   --   --  7  --    LIPASE U/L  --   --   --   --   --  8*         BNP        TROPONIN         CoAg        Creatinine Clearance  Estimated Creatinine Clearance: 62.8 mL/min (A) (by C-G formula based on SCr of 1.7 mg/dL (H)).    ABG        Imaging & Other Studies    Imaging Results (last 72 hours)     Procedure Component Value Units Date/Time    XR Chest 1 View [[id number]] Updated:  10/15/19 2349    CT Abdomen Pelvis Without Contrast [[id number]] Collected:  10/15/19 1332     Updated:  10/15/19 1340    Narrative:       CT ABDOMEN PELVIS WO CONTRAST-     Date of Exam: 10/15/2019 1:15 PM     Indication: 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  Elevated  creatinine prevents contrast  . Generalized abdominal pain.     Comparison: None. The patient's previous CT scan from 07/24/2014 is no  longer available for comparison.     Technique: Contiguous axial CT images were obtained from the lung bases  to the pubic symphysis without contrast. Sagittal and coronal  reconstructions were performed.  Automated exposure control and  iterative reconstruction methods were used.     FINDINGS:     Abnormal skin thickening and subcutaneous fat induration mild along with  multiple locules of gas, is seen within the posterior medial left  gluteal soft tissues extending contiguously into the left ischial rectal  fossa.. While the perineum is not included in the imaging field of view  in its entirety, locules of gas are demonstrated within the deep pelvic  fat adjacent to the left lateral margin of the penile shaft, worrisome  for Dale's gangrene, as suggested in the clinical history. Drainable  fluid collection or abscess is seen.     The inflamed soft tissues in the left ischial rectal fossa displace the  rectum and anus slightly toward the right, but the anus and rectum  themselves do not appear abnormally thickened or inflamed.     There are shotty bilateral inguinal lymph nodes are thought to be  reactive. No pathologically enlarged lymph nodes are identified.     Cholecystectomy. Features of chronic calcific  pancreatitis without acute  pancreatitis. Spleen, adrenals and right kidney have a normal  noncontrast appearance. Small nonobstructing left renal stone. Urinary  bladder, prostate are normal.     Heart size within normal limits. Minimal linear subsegmental atelectasis  in lung bases. No acute osseous abnormality.          Impression:          1. FINDINGS consistent with the clinical suspicion of Dale's  gangrene. Abnormal subcutaneous fat induration and superficial and deep  air loculations extend from the left gluteal region, into the left  ischial rectal fossa, and into the fat adjacent to the left lateral  margin of the penile shaft. Of note, the perineum is not imaged in its  entirety.  2. No drainable fluid collection or abscess is seen within the pelvis.  3. Nonobstructing left renal stone.  4. Chronic calcific pancreatitis.           Electronically Signed By-Dr. Rowena Carballo MD On:10/15/2019 1:38 PM  This report was finalized on [id number] by Dr. Rowena Carballo MD.            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

## 2019-10-17 NOTE — PROGRESS NOTES
Continued Stay Note  LILLIAN Lowery     Patient Name: Lonnie Arora  MRN: [id number]  Today's Date: 10/17/2019    Admit Date: 10/15/2019    Discharge Plan     Row Name 10/17/19 8557       Plan    Plan Comments   Continuing to follow for potential long term IV abx needs. Cultures pending at this time. Awaiting ID decision regarding duration of IV Abx.         Lisa Rivers, SHANNONW, LSW  PRN   Phone: [phone]

## 2019-10-17 NOTE — PROGRESS NOTES
"Pharmacokinetic Consult - Vancomycin Dosing    Lonnie Arora is a 44 y.o. year old male  with abscess on left gluteal w/ carlos's gangrene. Pharmacy has been consulted to dose vancomycin. ID following.     10/15 Wound culture: Streptococcus, group F      Assessment/Plan  1. Day #3 vancomycin:  Patient received vancomycin 1750 mg IV once (~23mg/kg DBW) on admission followed by current regimen of 1250 mg IV q24H (~16mg/kg DBW). Will obtain pharmacokinetic levels prior to 4th total dose. Peak 10/17 @ 1800 and trough 10/18 @ 1300. Goal trough: 10-20 mg/L with -600 mg*h/mL.    2. Day #3 meropenem: 1q IV q8h, appropriate for ICU patient w/ CrCL >50 ml/min    3. Day #3 clindamycin: 900mg IV q8h         Relevant clinical data and objective history reviewed:  160 cm (63\")   113 kg (249 lb 1.9 oz)   Ideal body weight: 56.9 kg (125 lb 7.1 oz)  Adjusted ideal body weight: 79.3 kg (174 lb 14.6 oz)    Lab Results   Component Value Date    CREATININE 1.70 (H) 10/17/2019    CREATININE 1.98 (H) 10/16/2019    CREATININE 2.17 (H) 10/15/2019     Estimated Creatinine Clearance: 62.8 mL/min (A) (by C-G formula based on SCr of 1.7 mg/dL (H)).  I/O last 3 completed shifts:  In: 7139 [I.V.:7139]  Out: 2300 [Urine:2300]    Lab Results   Component Value Date    WBC 20.80 (H) 10/17/2019    WBC 26.30 (H) 10/16/2019    WBC 25.60 (H) 10/15/2019     Temperature    10/17/19 0403 10/17/19 0700 10/17/19 1200   Temp: 98.2 °F (36.8 °C) 97.8 °F (36.6 °C) 99 °F (37.2 °C)             Baseline culture/source/susceptibility:  Microbiology Results (last 10 days)       Procedure Component Value - Date/Time    Blood Culture - Blood, Arm, Right [[id number]] Collected:  10/16/19 1032    Lab Status:  Preliminary result Specimen:  Blood from Arm, Right Updated:  10/17/19 1101     Blood Culture No growth at 24 hours    Wound Culture - Wound, Buttock, Left [[id number]]  (Abnormal) Collected:  10/15/19 1347    Lab Status:  Preliminary result Specimen:  " Wound from Buttock, Left Updated:  10/17/19 0746     Wound Culture Moderate growth (3+) Streptococcus, Beta Hemolytic, Group F     Comment: This organism is considered to be universally susceptible to penicillin.  No further antibiotic testing will be performed.        STREP GROUPING F     Wound Culture Scant growth (1+) Normal Skin Chuyita     Gram Stain Many (4+) WBCs per low power field      Moderate (3+) Gram positive cocci in chains      Mixed bacterial morphotypes seen on Gram Stain    Blood Culture - Blood, Arm, Left [[id number]] Collected:  10/15/19 1230    Lab Status:  Preliminary result Specimen:  Blood from Arm, Left Updated:  10/16/19 1315     Blood Culture No growth at 24 hours             Jyotsna Waddell PharmD  10/17/19 1:12 PM

## 2019-10-17 NOTE — PROGRESS NOTES
"Post-op Note  INCISION AND DRAINAGE WOUND  10/15/2019    Subjective   Lonnie Arora is a 44 y.o. male s/p incision and drainage with debridement of left gluteal wound.  Patient off pressors around 0100.  Feeling better.      Objective   /67   Pulse 99   Temp 97.8 °F (36.6 °C)   Resp 15   Ht 160 cm (63\")   Wt 113 kg (249 lb 1.9 oz)   SpO2 98%   BMI 44.13 kg/m²   Wound bed with some soupy, brown exudates overlying the base of the wound.  These were wiped away using 4 x 4 gauze to reveal healthy appearing pink tissue at the base.  The medial aspect of the wound still tracks very close to the patient's anus.  This was dressed with a bordered gauze in an attempt to wall this area off from any stool.    WBC   Date Value Ref Range Status   10/17/2019 20.80 (H) 3.40 - 10.80 10*3/mm3 Final     RBC   Date Value Ref Range Status   10/17/2019 3.47 (L) 4.14 - 5.80 10*6/mm3 Final     Hemoglobin   Date Value Ref Range Status   10/17/2019 10.2 (L) 13.0 - 17.7 g/dL Final     Hematocrit   Date Value Ref Range Status   10/17/2019 32.0 (L) 37.5 - 51.0 % Final     MCV   Date Value Ref Range Status   10/17/2019 92.3 79.0 - 97.0 fL Final     MCH   Date Value Ref Range Status   10/17/2019 29.5 26.6 - 33.0 pg Final     MCHC   Date Value Ref Range Status   10/17/2019 32.0 31.5 - 35.7 g/dL Final     RDW   Date Value Ref Range Status   10/17/2019 16.6 (H) 12.3 - 15.4 % Final     RDW-SD   Date Value Ref Range Status   10/17/2019 53.8 37.0 - 54.0 fl Final     MPV   Date Value Ref Range Status   10/17/2019 8.6 6.0 - 12.0 fL Final     Platelets   Date Value Ref Range Status   10/17/2019 132 (L) 140 - 450 10*3/mm3 Final     Neutrophils Absolute   Date Value Ref Range Status   10/17/2019 19.34 (H) 1.70 - 7.00 10*3/mm3 Final       Assessment/Plan   43 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    Patient is okay for transfer out of the ICU at this " point  Continue broad-spectrum antibiotics per ID  Okay for consistent carbohydrate diet today, please make n.p.o. at midnight in the event that the patient needs to go to the operating room tomorrow.  We discussed that he may need further debridement if there is ongoing signs of necrosis within the wound.  He also understands that if we are unable to adequately protect the wound from the flow of stool coming from his anus, he may require a colostomy.  The patient would prefer to not have a colostomy if at all possible, but does understand that it may be necessary to heal his wound.    Niraj Almanzar MD  10/17/2019  10:51 AM

## 2019-10-17 NOTE — PROGRESS NOTES
Infectious Diseases Progress Note      LOS: 2 days   Patient Care Team:  Sanjay Chance MD as PCP - General    Chief Complaint: Buttock pain    Subjective     The patient had no high-grade fever during the last 24 hours.  The patient was taking off vasopressors earlier today.  He is awake and alert and hemodynamically stable.  He is tolerating the current antimicrobial therapy with no side effects.    Review of Systems:   Review of Systems   Constitutional: Negative.    HENT: Negative.    Eyes: Negative.    Respiratory: Negative.    Cardiovascular: Negative.    Gastrointestinal: Negative.    Genitourinary: Negative.    Musculoskeletal: Negative.    Skin: Positive for wound.   Neurological: Negative.    Hematological: Negative.    Psychiatric/Behavioral: Negative.         Objective     Vital Signs  Temp:  [97.8 °F (36.6 °C)-99 °F (37.2 °C)] 99 °F (37.2 °C)  Heart Rate:  [] 97  Resp:  [15-19] 15  BP: ()/(40-80) 108/70    Physical Exam:  Physical Exam   Constitutional: He is oriented to person, place, and time. He appears well-developed and well-nourished.   HENT:   Head: Normocephalic and atraumatic.   Eyes: EOM are normal. Pupils are equal, round, and reactive to light.   Neck: Normal range of motion. Neck supple.   Cardiovascular: Normal rate, regular rhythm and normal heart sounds.   Pulmonary/Chest: Effort normal and breath sounds normal. No respiratory distress. He has no wheezes. He has no rales.   Abdominal: Soft. Bowel sounds are normal. He exhibits no distension and no mass. There is no tenderness. There is no rebound and no guarding.   Musculoskeletal: Normal range of motion. He exhibits no edema or deformity.   Neurological: He is alert and oriented to person, place, and time. No cranial nerve deficit.   Skin: Skin is warm. No rash noted. No erythema.   Large wound in the left buttock very close anus.  The wound is packed with no significant purulent or necrotic tissue   Psychiatric: He has a  normal mood and affect.   Nursing note and vitals reviewed.       Results Review:    I have reviewed all clinical data, test, lab, and imaging results.     Radiology  No Radiology Exams Resulted Within Past 24 Hours    Cardiology    Laboratory  Results from last 7 days   Lab Units 10/17/19  0609   WBC 10*3/mm3 20.80*   HEMOGLOBIN g/dL 10.2*   HEMATOCRIT % 32.0*   PLATELETS 10*3/mm3 132*     Results from last 7 days   Lab Units 10/17/19  0609   SODIUM mmol/L 142   POTASSIUM mmol/L 4.2   CHLORIDE mmol/L 113*   CO2 mmol/L 20.0*   BUN mg/dL 31*   CREATININE mg/dL 1.70*   GLUCOSE mg/dL 121*   CALCIUM mg/dL 7.2*     Results from last 7 days   Lab Units 10/17/19  0609   SODIUM mmol/L 142   POTASSIUM mmol/L 4.2   CHLORIDE mmol/L 113*   CO2 mmol/L 20.0*   BUN mg/dL 31*   CREATININE mg/dL 1.70*   GLUCOSE mg/dL 121*   CALCIUM mg/dL 7.2*                 Microbiology   Microbiology Results (last 10 days)     Procedure Component Value - Date/Time    Blood Culture - Blood, Arm, Right [[id number]] Collected:  10/16/19 1032    Lab Status:  Preliminary result Specimen:  Blood from Arm, Right Updated:  10/17/19 1101     Blood Culture No growth at 24 hours    Wound Culture - Wound, Buttock, Left [[id number]]  (Abnormal) Collected:  10/15/19 1347    Lab Status:  Preliminary result Specimen:  Wound from Buttock, Left Updated:  10/17/19 0746     Wound Culture Moderate growth (3+) Streptococcus, Beta Hemolytic, Group F     Comment: This organism is considered to be universally susceptible to penicillin.  No further antibiotic testing will be performed.        STREP GROUPING F     Wound Culture Scant growth (1+) Normal Skin Chuyita     Gram Stain Many (4+) WBCs per low power field      Moderate (3+) Gram positive cocci in chains      Mixed bacterial morphotypes seen on Gram Stain    Blood Culture - Blood, Arm, Left [[id number]] Collected:  10/15/19 1230    Lab Status:  Preliminary result Specimen:  Blood from Arm, Left Updated:  10/16/19 1315      Blood Culture No growth at 24 hours          Medication Review:       Schedule Meds    [START ON 10/18/2019] !Vancomycin Level Draw Needed  Does not apply Once   !Vancomycin Level Draw Needed  Does not apply Once   atorvastatin 40 mg Oral Daily   baclofen 10 mg Oral Daily   buPROPion  mg Oral Daily   clindamycin 900 mg Intravenous Q8H   insulin glargine 20 Units Subcutaneous QAM   insulin lispro 0-24 Units Subcutaneous 4x Daily With Meals & Nightly   levETIRAcetam 500 mg Oral Q12H   meropenem 1 g Intravenous Q8H   pantoprazole 40 mg Intravenous Q AM   rOPINIRole 0.5 mg Oral TID   sodium chloride 10 mL Intravenous Q12H   vancomycin 1,250 mg Intravenous Q24H       Infusion Meds    norepinephrine 0.02-0.3 mcg/kg/min Last Rate: Stopped (10/17/19 0052)   Pharmacy to Dose meropenem (MERREM)     Pharmacy to dose vancomycin     sodium chloride 125 mL/hr Last Rate: 125 mL/hr (10/15/19 1912)       PRN Meds  aluminum-magnesium hydroxide-simethicone  •  bisacodyl  •  dextrose  •  dextrose  •  glucagon (human recombinant)  •  HYDROmorphone  •  magnesium hydroxide  •  melatonin  •  ondansetron **OR** ondansetron  •  oxyCODONE **OR** oxyCODONE  •  Pharmacy to Dose meropenem (MERREM)  •  sodium chloride  •  sodium chloride        Assessment/Plan       Antimicrobial Therapy   1.  IV clindamycin      day  2.  IV meropenem      day  3.  IV vancomycin      day  4.      Day  5.      Day      Assessment     Left buttock abscess associated with skin and soft tissue necrosis.  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  Intraoperative cultures are pending.  This type of infection usually associated with polymicrobial infection.  So far be growing beta-hemolytic Streptococcus probably group F     Diabetes mellitus with diabetic neuropathy     Mild septic shock was on low-dose of norepinephrine.  Currently off pressors     Plan     Continue IV vancomycin, IV clindamycin and IV meropenem waiting on the final culture  results  De-escalate antimicrobial therapy upon final culture results.  I will probably de-escalate to IV Rocephin and leave clindamycin on board for a few days  Continue supportive care  Labs in a.m.  Probable diverting colostomy this admission        Joel Portillo MD  10/17/19  12:13 PM    Note is dictated utilizing voice recognition software/Dragon

## 2019-10-17 NOTE — PROGRESS NOTES
"      [facility] Medicine Services Daily Progress Note      Hospitalist Team  LOS 2 days      Patient Care Team:  Sanjay Chance MD as PCP - General        Chief Complaint / Subjective  Chief Complaint   Patient presents with   • Abdominal Pain       Brief Synopsis of Hospital Course/HPI    This is a 44-year-old  male with a past medical history of diabetes mellitus type 2, CVA, seizures, hypertension, hyperlipidemia, cholecystectomy, and kidney stones who presented to the ED on 10/15/2019 with complaints of buttock pain.  Patient stated 3 days ago he developed a bump on his rear end and has progressively gotten worse.  He states it hurts very badly to sit down and pain medication makes the pain pain better.  He thought he had been sitting in a recliner too long and this had caused a bump.  He states his pain is 8 out of 10.  He denies any recent nausea, vomiting, diarrhea, fever, chills.  Patient states when moving from one bed to the next bed in radiology, he felt a \"pop\"and his buttock started draining.  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  General surgery was consulted and the patient was taken to the OR for an I&D and debridement of devitalized tissue.      The patient was subsequently admitted to the PCU postoperatively, the patient became hypotensive and was unresponsive to IV fluids.  He developed septic shock and required Levophed.       General surgery advised the patient may need a diverting colostomy.  The patient will be placed NPO at midnight 10/17 for potential colostomy and/or further debridement of the wound.  This could occur later this weekend, as well.    Infectious disease has started the patient on IV vancomycin/clindamycin/meropenem, pending final culture results.      The hospitalists were consulted on 10/17 for medical management.  The " "patient complains of left buttock pain since general surgery re-packed the wound.  He states he is doing well otherwise.      Review of Systems   Constitution: Negative for chills and fever.   Cardiovascular: Negative for chest pain.   Respiratory: Negative for shortness of breath.    Gastrointestinal: Negative for abdominal pain, constipation, diarrhea, nausea and vomiting.        Left buttock pain   All other systems reviewed and are negative.      Family History   Problem Relation Age of Onset   • Breast cancer Mother    • Cervical cancer Mother        Past Medical History:   Diagnosis Date   • Diabetes (CMS/HCC)    • GERD (gastroesophageal reflux disease)    • Hypertension    • Seizures (CMS/HCC)        Social History     Socioeconomic History   • Marital status:      Spouse name: Not on file   • Number of children: Not on file   • Years of education: Not on file   • Highest education level: Not on file   Tobacco Use   • Smoking status: Current Every Day Smoker     Types: Electronic Cigarette   Substance and Sexual Activity   • Alcohol use: No     Frequency: Never     Comment: Quit several years ago at time of his CVA   • Drug use: Yes     Types: Marijuana           Objective      Vital Signs  Temp:  [97.8 °F (36.6 °C)-99 °F (37.2 °C)] 99 °F (37.2 °C)  Heart Rate:  [] 103  Resp:  [15-19] 15  BP: ()/() 108/72  Oxygen Therapy  SpO2: 92 %  Pulse Oximetry Type: Continuous  Device (Oxygen Therapy): nasal cannula  Flow (L/min): 2  Flowsheet Rows      First Filed Value   Admission Height  160 cm (63\") Documented at 10/15/2019 0954   Admission Weight  106 kg (232 lb 12.9 oz) Documented at 10/15/2019 0954        Intake & Output (last 3 days)       10/14 0701 - 10/15 0700 10/15 0701 - 10/16 0700 10/16 0701 - 10/17 0700 10/17 0701 - 10/18 0700    I.V. (mL/kg)  3140 (27.5) 5099 (45.1) 1020 (9)    IV Piggyback  100  100    Total Intake(mL/kg)  3240 (28.4) 5099 (45.1) 1120 (9.9)    Urine (mL/kg/hr)  " 600 1700 (0.6) 400 (0.4)    Total Output  600 1700 400    Net  +2640 +3399 +720                Lines, Drains & Airways    Active LDAs     Name:   Placement date:   Placement time:   Site:   Days:    CVC Triple Lumen 10/15/19 Right Internal jugular   10/15/19    2345    Internal jugular   1    Peripheral IV 10/15/19 1249 Left Antecubital   10/15/19    1249    Antecubital   2    Peripheral IV 10/15/19 1900 Right Wrist   10/15/19    1900 IV site present when patient arrived to PCU    Wrist   1    Urethral Catheter Non-latex 16 Fr.   10/15/19    1615     1                  Physical Exam:    Physical Exam   Constitutional: He is oriented to person, place, and time. He appears well-developed and well-nourished. No distress.   HENT:   Head: Normocephalic and atraumatic.   Eyes: EOM are normal. Pupils are equal, round, and reactive to light.   Neck: Normal range of motion.   Cardiovascular: Normal rate, regular rhythm and normal heart sounds. Exam reveals no gallop and no friction rub.   No murmur heard.  Pulmonary/Chest: Effort normal and breath sounds normal. No stridor. No respiratory distress. He has no wheezes.   Abdominal: Soft. Bowel sounds are normal. He exhibits no distension. There is no tenderness. There is no guarding.   Genitourinary:   Genitourinary Comments: Left buttock dressing clean and dry without drainage   Musculoskeletal: Normal range of motion. He exhibits no edema or deformity.   Neurological: He is alert and oriented to person, place, and time.   Skin: Skin is warm and dry. He is not diaphoretic. No erythema.   Psychiatric: He has a normal mood and affect.     Procedures:    Procedure(s):  INCISION AND DRAINAGE OF LEFT GLUTEAL REGION      Results Review:     I reviewed the patient's new clinical results.  reviewed    Results from last 7 days   Lab Units 10/17/19  0609 10/16/19  0615 10/15/19  2344 10/15/19  1037   WBC 10*3/mm3 20.80* 26.30* 25.60* 28.10*   HEMOGLOBIN g/dL 10.2* 11.8* 11.7* 13.6    HEMATOCRIT % 32.0* 36.9* 36.4* 43.0   PLATELETS 10*3/mm3 132* 175 167 184   MONOCYTES % % 5.0 4.0*  --  9.0     Results from last 7 days   Lab Units 10/17/19  0609 10/16/19  0615 10/15/19  2344  10/15/19  1231   SODIUM mmol/L 142 140 141  --  141   POTASSIUM mmol/L 4.2 4.7 4.7  --  4.7   CHLORIDE mmol/L 113* 109* 109*  --  108*   CO2 mmol/L 20.0* 19.0* 20.0*  --  20.0*   BUN mg/dL 31* 35* 36*  --  37*   CREATININE mg/dL 1.70* 1.98* 2.17*   < > 2.26*   CALCIUM mg/dL 7.2* 7.8* 8.4*  --  8.3*   BILIRUBIN mg/dL 0.4  --   --   --  1.0   ALK PHOS U/L 124*  --   --   --  150*   ALT (SGPT) U/L 11  --   --   --  7   AST (SGOT) U/L 19  --   --   --  13   GLUCOSE mg/dL 121* 303* 226*  --  252*    < > = values in this interval not displayed.         Lab Results   Component Value Date    CALCIUM 7.2 (L) 10/17/2019     Hemoglobin A1C   Date Value Ref Range Status   10/15/2019 8.0 (H) 3.5 - 5.6 % Final                   Microbiology Results (last 10 days)     Procedure Component Value - Date/Time    Blood Culture - Blood, Arm, Right [[id number]] Collected:  10/16/19 1032    Lab Status:  Preliminary result Specimen:  Blood from Arm, Right Updated:  10/17/19 1101     Blood Culture No growth at 24 hours    Wound Culture - Wound, Buttock, Left [[id number]]  (Abnormal) Collected:  10/15/19 1347    Lab Status:  Preliminary result Specimen:  Wound from Buttock, Left Updated:  10/17/19 0746     Wound Culture Moderate growth (3+) Streptococcus, Beta Hemolytic, Group F     Comment: This organism is considered to be universally susceptible to penicillin.  No further antibiotic testing will be performed.        STREP GROUPING F     Wound Culture Scant growth (1+) Normal Skin Chuyita     Gram Stain Many (4+) WBCs per low power field      Moderate (3+) Gram positive cocci in chains      Mixed bacterial morphotypes seen on Gram Stain    Blood Culture - Blood, Arm, Left [[id number]] Collected:  10/15/19 1230    Lab Status:  Preliminary result  Specimen:  Blood from Arm, Left Updated:  10/17/19 1316     Blood Culture No growth at 2 days          ECG/EMG Results (most recent)     None                    Ct Abdomen Pelvis Without Contrast    Result Date: 10/15/2019   1. 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 Abnormal subcutaneous fat induration and superficial and deep air loculations extend from the left gluteal region, into the left ischial rectal fossa, and into the fat adjacent to the left lateral margin of the penile shaft. Of note, the perineum is not imaged in its entirety. 2. No drainable fluid collection or abscess is seen within the pelvis. 3. Nonobstructing left renal stone. 4. Chronic calcific pancreatitis.    Electronically Signed By-Dr. Rowena Carballo MD On:10/15/2019 1:38 PM This report was finalized on [id number] by Dr. Rowena Carballo MD.    Xr Chest 1 View    Result Date: 10/16/2019  1.Subsegmental atelectasis right lower lobe. 2.Cardiomegaly. 3.Central venous catheter tip has appropriate position.  Electronically Signed By-Winifred Simental On:10/16/2019 7:36 AM This report was finalized on [id number] by  Winifred Simental, .      Xrays, labs reviewed personally by physician.    Medication Review:   I have reviewed the patient's current medication list  reviewed    Scheduled Meds    [START ON 10/18/2019] !Vancomycin Level Draw Needed  Does not apply Once   !Vancomycin Level Draw Needed  Does not apply Once   atorvastatin 40 mg Oral Daily   baclofen 10 mg Oral Daily   buPROPion  mg Oral Daily   clindamycin 900 mg Intravenous Q8H   insulin glargine 20 Units Subcutaneous QAM   insulin lispro 0-24 Units Subcutaneous 4x Daily With Meals & Nightly   levETIRAcetam 500 mg Oral Q12H   meropenem 1 g Intravenous Q8H   rOPINIRole 0.5 mg Oral TID   sodium chloride 10 mL Intravenous Q12H   vancomycin 1,250 mg Intravenous Q24H       Meds Infusions    norepinephrine 0.02-0.3 mcg/kg/min Last Rate: Stopped (10/17/19 0052)    Pharmacy to Dose meropenem (MERREM)     Pharmacy to dose vancomycin     sodium chloride 125 mL/hr Last Rate: 125 mL/hr (10/15/19 1912)       Meds PRN  aluminum-magnesium hydroxide-simethicone  •  bisacodyl  •  dextrose  •  dextrose  •  glucagon (human recombinant)  •  HYDROmorphone  •  magnesium hydroxide  •  melatonin  •  ondansetron **OR** ondansetron  •  oxyCODONE **OR** oxyCODONE  •  Pharmacy to Dose meropenem (MERREM)  •  sodium chloride  •  sodium chloride        Assessment / Plan    Active Hospital Problems    Diagnosis  POA   • **Abscess, gluteal, left [L02.31]  Yes   • Sepsis (CMS/HCC) [A41.9]  Yes   • Benign essential hypertension [I10]  Yes   • GERD (gastroesophageal reflux disease) [K21.9]  Yes   • Hyperlipidemia, mixed [E78.2]  Yes   • Major depression, chronic [F32.9]  Yes   • Seizure disorder (CMS/[facility]) [G40.909]  Yes   • Type 2 diabetes mellitus (CMS/[facility]) [E11.9]  Yes      Resolved Hospital Problems   No resolved problems to display.     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 Almanzar   - possible colostomy tomorrow 10/18 or this weekend   - general surgery following  - ID following  - cultures pending   - patient given fluid bolus without improvement, transfer to ICU and started on Levophed  - OFF Levophed   - continue IV vancomycin, IV Clindamycin and IV Meropenem  - diabetic diet / NPO at midnight     Acute kidney injury, improving   - Cr 1.7  - continue IV NS @ 125/hr   - monitor BMP     Diabetes mellitus type 2   - continue Lantus daily  - SSI   - accuchecks AC HS    Essential hypertension, chronic  - home meds held secondary to hypotension for now     Hyperlipidemia   - continue home atorvastatin     Depression, chronic  - continue home wellbutrin     Seizure disorder   - continue home Keppra     Morbid obesity (BMI 44.13)  - encourage lifestyle modifications     VTE prophylaxis - bilateral  SCDs      Tashia Irwin PA-C  10/17/19  3:55 PM

## 2019-10-18 LAB
ALBUMIN SERPL-MCNC: 1.9 G/DL (ref 3.5–5.2)
ALBUMIN/GLOB SERPL: 0.5 G/DL
ALP SERPL-CCNC: 142 U/L (ref 39–117)
ALT SERPL W P-5'-P-CCNC: 29 U/L (ref 1–41)
ANION GAP SERPL CALCULATED.3IONS-SCNC: 6 MMOL/L (ref 5–15)
ANISOCYTOSIS BLD QL: ABNORMAL
AST SERPL-CCNC: 49 U/L (ref 1–40)
BACTERIA SPEC AEROBE CULT: ABNORMAL
BACTERIA SPEC AEROBE CULT: ABNORMAL
BILIRUB SERPL-MCNC: 0.5 MG/DL (ref 0.2–1.2)
BUN BLD-MCNC: 32 MG/DL (ref 6–20)
BUN/CREAT SERPL: 20.5 (ref 7–25)
CALCIUM SPEC-SCNC: 7.6 MG/DL (ref 8.6–10.5)
CHLORIDE SERPL-SCNC: 113 MMOL/L (ref 98–107)
CO2 SERPL-SCNC: 21 MMOL/L (ref 22–29)
CREAT BLD-MCNC: 1.56 MG/DL (ref 0.76–1.27)
DEPRECATED RDW RBC AUTO: 54.7 FL (ref 37–54)
ERYTHROCYTE [DISTWIDTH] IN BLOOD BY AUTOMATED COUNT: 16.8 % (ref 12.3–15.4)
GFR SERPL CREATININE-BSD FRML MDRD: 49 ML/MIN/1.73
GLOBULIN UR ELPH-MCNC: 3.5 GM/DL
GLUCOSE BLD-MCNC: 180 MG/DL (ref 65–99)
GLUCOSE BLDC GLUCOMTR-MCNC: 125 MG/DL (ref 70–105)
GLUCOSE BLDC GLUCOMTR-MCNC: 166 MG/DL (ref 70–105)
GLUCOSE BLDC GLUCOMTR-MCNC: 166 MG/DL (ref 70–105)
GLUCOSE BLDC GLUCOMTR-MCNC: 173 MG/DL (ref 70–105)
GLUCOSE BLDC GLUCOMTR-MCNC: 198 MG/DL (ref 70–105)
GRAM STN SPEC: ABNORMAL
HCT VFR BLD AUTO: 33.2 % (ref 37.5–51)
HGB BLD-MCNC: 10.6 G/DL (ref 13–17.7)
LEVETIRACETAM SERPL-MCNC: 27.4 UG/ML (ref 10–40)
LYMPHOCYTES # BLD MANUAL: 0 10*3/MM3 (ref 0.7–3.1)
LYMPHOCYTES NFR BLD MANUAL: 0 % (ref 19.6–45.3)
LYMPHOCYTES NFR BLD MANUAL: 5 % (ref 5–12)
MCH RBC QN AUTO: 29.3 PG (ref 26.6–33)
MCHC RBC AUTO-ENTMCNC: 32 G/DL (ref 31.5–35.7)
MCV RBC AUTO: 91.5 FL (ref 79–97)
MONOCYTES # BLD AUTO: 0.92 10*3/MM3 (ref 0.1–0.9)
MYELOCYTES NFR BLD MANUAL: 1 % (ref 0–0)
NEUTROPHILS # BLD AUTO: 17.2 10*3/MM3 (ref 1.7–7)
NEUTROPHILS NFR BLD MANUAL: 78 % (ref 42.7–76)
NEUTS BAND NFR BLD MANUAL: 16 % (ref 0–5)
NEUTS VAC BLD QL SMEAR: ABNORMAL
PLAT MORPH BLD: NORMAL
PLATELET # BLD AUTO: 120 10*3/MM3 (ref 140–450)
PMV BLD AUTO: 8.9 FL (ref 6–12)
POTASSIUM BLD-SCNC: 4.4 MMOL/L (ref 3.5–5.2)
PROT SERPL-MCNC: 5.4 G/DL (ref 6–8.5)
RBC # BLD AUTO: 3.63 10*6/MM3 (ref 4.14–5.8)
SCAN SLIDE: NORMAL
SODIUM BLD-SCNC: 140 MMOL/L (ref 136–145)
STREP GROUPING: ABNORMAL
TOXIC GRANULATION: ABNORMAL
WBC NRBC COR # BLD: 18.3 10*3/MM3 (ref 3.4–10.8)

## 2019-10-18 PROCEDURE — 99024 POSTOP FOLLOW-UP VISIT: CPT | Performed by: SURGERY

## 2019-10-18 PROCEDURE — 99232 SBSQ HOSP IP/OBS MODERATE 35: CPT | Performed by: FAMILY MEDICINE

## 2019-10-18 PROCEDURE — 82962 GLUCOSE BLOOD TEST: CPT

## 2019-10-18 PROCEDURE — 63710000001 INSULIN LISPRO (HUMAN) PER 5 UNITS: Performed by: INTERNAL MEDICINE

## 2019-10-18 PROCEDURE — 85025 COMPLETE CBC W/AUTO DIFF WBC: CPT | Performed by: INTERNAL MEDICINE

## 2019-10-18 PROCEDURE — 63710000001 INSULIN GLARGINE PER 5 UNITS: Performed by: INTERNAL MEDICINE

## 2019-10-18 PROCEDURE — 85007 BL SMEAR W/DIFF WBC COUNT: CPT | Performed by: INTERNAL MEDICINE

## 2019-10-18 PROCEDURE — 25010000002 MEROPENEM PER 100 MG: Performed by: INTERNAL MEDICINE

## 2019-10-18 PROCEDURE — 80053 COMPREHEN METABOLIC PANEL: CPT | Performed by: INTERNAL MEDICINE

## 2019-10-18 PROCEDURE — 63710000001 INSULIN LISPRO (HUMAN) PER 5 UNITS: Performed by: NURSE PRACTITIONER

## 2019-10-18 PROCEDURE — 25010000002 CEFTRIAXONE PER 250 MG: Performed by: INTERNAL MEDICINE

## 2019-10-18 PROCEDURE — 25010000002 HYDROMORPHONE PER 4 MG: Performed by: SURGERY

## 2019-10-18 RX ADMIN — BACLOFEN 10 MG: 10 TABLET ORAL at 09:30

## 2019-10-18 RX ADMIN — HYDROMORPHONE HYDROCHLORIDE 0.5 MG: 2 INJECTION, SOLUTION INTRAMUSCULAR; INTRAVENOUS; SUBCUTANEOUS at 11:08

## 2019-10-18 RX ADMIN — MEROPENEM 1 G: 1 INJECTION, POWDER, FOR SOLUTION INTRAVENOUS at 00:53

## 2019-10-18 RX ADMIN — INSULIN GLARGINE 20 UNITS: 100 INJECTION, SOLUTION SUBCUTANEOUS at 06:35

## 2019-10-18 RX ADMIN — Medication 10 ML: at 09:30

## 2019-10-18 RX ADMIN — INSULIN LISPRO 4 UNITS: 100 INJECTION, SOLUTION INTRAVENOUS; SUBCUTANEOUS at 22:41

## 2019-10-18 RX ADMIN — HYDROMORPHONE HYDROCHLORIDE 0.5 MG: 2 INJECTION, SOLUTION INTRAMUSCULAR; INTRAVENOUS; SUBCUTANEOUS at 16:41

## 2019-10-18 RX ADMIN — MEROPENEM 1 G: 1 INJECTION, POWDER, FOR SOLUTION INTRAVENOUS at 09:29

## 2019-10-18 RX ADMIN — HYDROMORPHONE HYDROCHLORIDE 0.5 MG: 2 INJECTION, SOLUTION INTRAMUSCULAR; INTRAVENOUS; SUBCUTANEOUS at 09:29

## 2019-10-18 RX ADMIN — BUPROPION HYDROCHLORIDE 300 MG: 150 TABLET, EXTENDED RELEASE ORAL at 09:30

## 2019-10-18 RX ADMIN — Medication 10 ML: at 23:03

## 2019-10-18 RX ADMIN — CLINDAMYCIN PHOSPHATE 900 MG: 900 INJECTION, SOLUTION INTRAVENOUS at 21:43

## 2019-10-18 RX ADMIN — HYDROMORPHONE HYDROCHLORIDE 0.5 MG: 2 INJECTION, SOLUTION INTRAMUSCULAR; INTRAVENOUS; SUBCUTANEOUS at 21:43

## 2019-10-18 RX ADMIN — ATORVASTATIN CALCIUM 40 MG: 40 TABLET, FILM COATED ORAL at 09:33

## 2019-10-18 RX ADMIN — CLINDAMYCIN PHOSPHATE 900 MG: 900 INJECTION, SOLUTION INTRAVENOUS at 14:55

## 2019-10-18 RX ADMIN — CEFTRIAXONE SODIUM 2 G: 2 INJECTION, POWDER, FOR SOLUTION INTRAMUSCULAR; INTRAVENOUS at 14:55

## 2019-10-18 RX ADMIN — LEVETIRACETAM 500 MG: 500 TABLET, FILM COATED ORAL at 21:43

## 2019-10-18 RX ADMIN — CLINDAMYCIN PHOSPHATE 900 MG: 900 INJECTION, SOLUTION INTRAVENOUS at 06:23

## 2019-10-18 RX ADMIN — ROPINIROLE 0.5 MG: 0.25 TABLET, FILM COATED ORAL at 16:41

## 2019-10-18 RX ADMIN — INSULIN LISPRO 4 UNITS: 100 INJECTION, SOLUTION INTRAVENOUS; SUBCUTANEOUS at 18:47

## 2019-10-18 RX ADMIN — ROPINIROLE 0.5 MG: 0.25 TABLET, FILM COATED ORAL at 21:43

## 2019-10-18 RX ADMIN — LEVETIRACETAM 500 MG: 500 TABLET, FILM COATED ORAL at 09:30

## 2019-10-18 RX ADMIN — ROPINIROLE 0.5 MG: 0.25 TABLET, FILM COATED ORAL at 09:29

## 2019-10-18 NOTE — PLAN OF CARE
Problem: Patient Care Overview  Goal: Plan of Care Review  Outcome: Ongoing (interventions implemented as appropriate)   10/18/19 3734   Coping/Psychosocial   Plan of Care Reviewed With patient;family   Plan of Care Review   Progress no change   OTHER   Outcome Summary Pt remains off pressors and stable. Monitoring for BM to determine plan for colostomy. Pt is PCU OF. Will continue to monitor.

## 2019-10-18 NOTE — PROGRESS NOTES
Infectious Diseases Progress Note      LOS: 3 days   Patient Care Team:  Sanjay Chance MD as PCP - General    Chief Complaint: Buttock pain    Subjective     The patient had no high-grade fever during the last 24 hours.  The patient remained off vasopressors.  The patient remained hemodynamically stable.  He is awake and alert.    Review of Systems:   Review of Systems   Constitutional: Negative.    HENT: Negative.    Eyes: Negative.    Respiratory: Negative.    Cardiovascular: Negative.    Gastrointestinal: Negative.    Genitourinary: Negative.    Musculoskeletal: Negative.    Skin: Positive for wound.   Neurological: Negative.    Hematological: Negative.    Psychiatric/Behavioral: Negative.         Objective     Vital Signs  Temp:  [98.1 °F (36.7 °C)-99.6 °F (37.6 °C)] 99.6 °F (37.6 °C)  Heart Rate:  [] 99  Resp:  [24] 24  BP: ()/() 110/70    Physical Exam:  Physical Exam   Constitutional: He is oriented to person, place, and time. He appears well-developed and well-nourished.   HENT:   Head: Normocephalic and atraumatic.   Eyes: EOM are normal. Pupils are equal, round, and reactive to light.   Neck: Normal range of motion. Neck supple.   Cardiovascular: Normal rate, regular rhythm and normal heart sounds.   Pulmonary/Chest: Effort normal and breath sounds normal. No respiratory distress. He has no wheezes. He has no rales.   Abdominal: Soft. Bowel sounds are normal. He exhibits no distension and no mass. There is no tenderness. There is no rebound and no guarding.   Musculoskeletal: Normal range of motion. He exhibits no edema or deformity.   Neurological: He is alert and oriented to person, place, and time. No cranial nerve deficit.   Skin: Skin is warm. No rash noted. No erythema.   Large wound in the left buttock very close anus.  The wound is packed with no significant purulent or necrotic tissue   Psychiatric: He has a normal mood and affect.   Nursing note and vitals  reviewed.       Results Review:    I have reviewed all clinical data, test, lab, and imaging results.     Radiology  No Radiology Exams Resulted Within Past 24 Hours    Cardiology    Laboratory  Results from last 7 days   Lab Units 10/18/19  0640   WBC 10*3/mm3 18.30*   HEMOGLOBIN g/dL 10.6*   HEMATOCRIT % 33.2*   PLATELETS 10*3/mm3 120*     Results from last 7 days   Lab Units 10/18/19  0640   SODIUM mmol/L 140   POTASSIUM mmol/L 4.4   CHLORIDE mmol/L 113*   CO2 mmol/L 21.0*   BUN mg/dL 32*   CREATININE mg/dL 1.56*   GLUCOSE mg/dL 180*   CALCIUM mg/dL 7.6*     Results from last 7 days   Lab Units 10/18/19  0640   SODIUM mmol/L 140   POTASSIUM mmol/L 4.4   CHLORIDE mmol/L 113*   CO2 mmol/L 21.0*   BUN mg/dL 32*   CREATININE mg/dL 1.56*   GLUCOSE mg/dL 180*   CALCIUM mg/dL 7.6*                 Microbiology   Microbiology Results (last 10 days)     Procedure Component Value - Date/Time    Blood Culture - Blood, Arm, Right [[id number]] Collected:  10/16/19 1032    Lab Status:  Preliminary result Specimen:  Blood from Arm, Right Updated:  10/17/19 1101     Blood Culture No growth at 24 hours    Wound Culture - Wound, Buttock, Left [[id number]]  (Abnormal) Collected:  10/15/19 1347    Lab Status:  Final result Specimen:  Wound from Buttock, Left Updated:  10/18/19 1004     Wound Culture Moderate growth (3+) Streptococcus, Beta Hemolytic, Group F     Comment: This organism is considered to be universally susceptible to penicillin.  No further antibiotic testing will be performed.        STREP GROUPING F     Wound Culture Scant growth (1+) Normal Skin Chuyita     Gram Stain Many (4+) WBCs per low power field      Moderate (3+) Gram positive cocci in chains      Mixed bacterial morphotypes seen on Gram Stain    Blood Culture - Blood, Arm, Left [[id number]] Collected:  10/15/19 1230    Lab Status:  Preliminary result Specimen:  Blood from Arm, Left Updated:  10/17/19 1316     Blood Culture No growth at 2 days          Medication  Review:       Schedule Meds    atorvastatin 40 mg Oral Daily   baclofen 10 mg Oral Daily   buPROPion  mg Oral Daily   ceftriaxone 2 g Intravenous Q24H   clindamycin 900 mg Intravenous Q8H   insulin glargine 20 Units Subcutaneous QAM   insulin lispro 0-24 Units Subcutaneous Q6H   levETIRAcetam 500 mg Oral Q12H   rOPINIRole 0.5 mg Oral TID   sodium chloride 10 mL Intravenous Q12H       Infusion Meds    Pharmacy to dose vancomycin     sodium chloride 125 mL/hr Last Rate: 125 mL/hr (10/15/19 1912)       PRN Meds  •  aluminum-magnesium hydroxide-simethicone  •  bisacodyl  •  dextrose  •  dextrose  •  glucagon (human recombinant)  •  HYDROmorphone  •  magnesium hydroxide  •  melatonin  •  ondansetron **OR** ondansetron  •  oxyCODONE **OR** oxyCODONE  •  sodium chloride  •  sodium chloride        Assessment/Plan       Antimicrobial Therapy   1.  IV clindamycin      day  2.  IV meropenem      day  3.  IV vancomycin      day  4.      Day  5.      Day      Assessment     Left buttock abscess associated with skin and soft tissue necrosis.  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  Intraoperative cultures are pending.  This type of infection usually associated with polymicrobial infection.    Intraoperative culture grew group F Streptococcus     Diabetes mellitus with diabetic neuropathy     Mild septic shock was on low-dose of norepinephrine.  Currently off pressors     Plan     Continue IV clindamycin for now and consider short course  Discontinue IV vancomycin and IV meropenem  Start Rocephin 2 g IV daily for 2 weeks  Continue supportive care  Labs in a.m.  Probable diverting colostomy this admission  Case was discussed with general surgery service        Joel Portillo MD  10/18/19  12:28 PM    Note is dictated utilizing voice recognition software/Dragon

## 2019-10-18 NOTE — PROGRESS NOTES
"      [facility] Medicine Services Daily Progress Note      Hospitalist Team  LOS 3 days      Patient Care Team:  Sanjay Chance MD as PCP - General        Chief Complaint / Subjective  Chief Complaint   Patient presents with   • Abdominal Pain     Patient reports overall doing well.  Pain controlled and no respiratory distress.  Patient thinks he is most likely going towards having colostomy done which would happen on Monday.  No nausea.    Brief Synopsis of Hospital Course/HPI    This is a 44-year-old  male with a past medical history of diabetes mellitus type 2, CVA, seizures, hypertension, hyperlipidemia, cholecystectomy, and kidney stones who presented to the ED on 10/15/2019 with complaints of buttock pain.  Patient stated 3 days ago he developed a bump on his rear end and has progressively gotten worse.  He states it hurts very badly to sit down and pain medication makes the pain pain better.  He thought he had been sitting in a recliner too long and this had caused a bump.  He states his pain is 8 out of 10.  He denies any recent nausea, vomiting, diarrhea, fever, chills.  Patient states when moving from one bed to the next bed in radiology, he felt a \"pop\"and his buttock started draining.  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  General surgery was consulted and the patient was taken to the OR for an I&D and debridement of devitalized tissue.       The patient was subsequently admitted to the PCU postoperatively, the patient became hypotensive and was unresponsive to IV fluids.  He developed septic shock and required Levophed.       General surgery advised the patient may need a diverting colostomy.  The patient will be placed NPO at midnight 10/17 for potential colostomy and/or further debridement of the wound.  This could occur later this weekend, as " "well.     Infectious disease has started the patient on IV vancomycin/clindamycin/meropenem, pending final culture results.       The hospitalists were consulted on 10/17 for medical management.  The patient complains of left buttock pain since general surgery re-packed the wound.  He states he is doing well otherwise.        Review of systems negative for all other 10 systems    Review of Systems   Constitution: Negative for chills and fever.   Cardiovascular: Negative for chest pain and irregular heartbeat.   Respiratory: Negative for cough and shortness of breath.    Gastrointestinal: Negative for nausea and vomiting.   Neurological: Negative for dizziness and headaches.   Psychiatric/Behavioral: Negative for altered mental status. The patient does not have insomnia.        Family History   Problem Relation Age of Onset   • Breast cancer Mother    • Cervical cancer Mother        Past Medical History:   Diagnosis Date   • Diabetes (CMS/[facility])    • GERD (gastroesophageal reflux disease)    • Hypertension    • Seizures (CMS/HCC)        Social History     Socioeconomic History   • Marital status:      Spouse name: Not on file   • Number of children: Not on file   • Years of education: Not on file   • Highest education level: Not on file   Tobacco Use   • Smoking status: Current Every Day Smoker     Types: Electronic Cigarette   Substance and Sexual Activity   • Alcohol use: No     Frequency: Never     Comment: Quit several years ago at time of his CVA   • Drug use: Yes     Types: Marijuana           Objective      Vital Signs  Temp:  [98.1 °F (36.7 °C)-99.6 °F (37.6 °C)] 99.6 °F (37.6 °C)  Heart Rate:  [] 99  Resp:  [24] 24  BP: ()/(47-75) 110/70  Oxygen Therapy  SpO2: 92 %  Pulse Oximetry Type: Continuous  Device (Oxygen Therapy): nasal cannula  Flow (L/min): 2  Flowsheet Rows      First Filed Value   Admission Height  160 cm (63\") Documented at 10/15/2019 0954   Admission Weight  106 kg (232 lb " 12.9 oz) Documented at 10/15/2019 0954        Intake & Output (last 3 days)       10/15 0701 - 10/16 0700 10/16 0701 - 10/17 0700 10/17 0701 - 10/18 0700 10/18 0701 - 10/19 0700    I.V. (mL/kg) 3140 (27.5) 5099 (45.1) 1020 (9)     IV Piggyback 100  100     Total Intake(mL/kg) 3240 (28.4) 5099 (45.1) 1120 (9.9)     Urine (mL/kg/hr) 600 1700 (0.6) 400 (0.1) 1300 (1.6)    Stool    0    Total Output 600 [phone]    Net +2640 +3399 +720 -1300            Stool Unmeasured Occurrence    1 x        Lines, Drains & Airways    Active LDAs     Name:   Placement date:   Placement time:   Site:   Days:    CVC Triple Lumen 10/15/19 Right Internal jugular   10/15/19    2345    Internal jugular   2    Peripheral IV 10/15/19 1249 Left Antecubital   10/15/19    1249    Antecubital   3    Peripheral IV 10/15/19 1900 Right Wrist   10/15/19    1900 IV site present when patient arrived to PCU    Wrist   2    Urethral Catheter Non-latex 16 Fr.   10/15/19    1615     2                  Physical Exam:     General: Morbidly obese male lying in bed appearing comfortable, on supplemental oxygen via nasal cannula   HEENT: NC/AT, EOMI, mucosa moist  Heart: RRR. No murmur   Chest: CTAB, no w/r/r, normal respiratory effort  Abdominal: Soft. NT/ND. Bowel sounds present  Musculoskeletal: Normal ROM.  No edema. No calf tenderness.  Neurological: AAOx3, no focal deficits  Skin: Skin is warm and dry.  Gluteal wound dressed and not assessed  Psychiatric: Normal mood and affect.    Procedures:    Procedure(s):  INCISION AND DRAINAGE OF LEFT GLUTEAL REGION          Results Review:     I reviewed the patient's new clinical results.  reviewed    Results from last 7 days   Lab Units 10/18/19  0640 10/17/19  0609 10/16/19  0615   WBC 10*3/mm3 18.30* 20.80* 26.30*   HEMOGLOBIN g/dL 10.6* 10.2* 11.8*   HEMATOCRIT % 33.2* 32.0* 36.9*   PLATELETS 10*3/mm3 120* 132* 175   MONOCYTES % % 5.0 5.0 4.0*     Results from last 7 days   Lab Units 10/18/19  0696  10/17/19  0609 10/16/19  0615  10/15/19  1231   SODIUM mmol/L 140 142 140   < > 141   POTASSIUM mmol/L 4.4 4.2 4.7   < > 4.7   CHLORIDE mmol/L 113* 113* 109*   < > 108*   CO2 mmol/L 21.0* 20.0* 19.0*   < > 20.0*   BUN mg/dL 32* 31* 35*   < > 37*   CREATININE mg/dL 1.56* 1.70* 1.98*   < > 2.26*   CALCIUM mg/dL 7.6* 7.2* 7.8*   < > 8.3*   BILIRUBIN mg/dL 0.5 0.4  --   --  1.0   ALK PHOS U/L 142* 124*  --   --  150*   ALT (SGPT) U/L 29 11  --   --  7   AST (SGOT) U/L 49* 19  --   --  13   GLUCOSE mg/dL 180* 121* 303*   < > 252*    < > = values in this interval not displayed.         Lab Results   Component Value Date    CALCIUM 7.6 (L) 10/18/2019     Hemoglobin A1C   Date Value Ref Range Status   10/15/2019 8.0 (H) 3.5 - 5.6 % Final                   Microbiology Results (last 10 days)     Procedure Component Value - Date/Time    Blood Culture - Blood, Arm, Right [[id number]] Collected:  10/16/19 1032    Lab Status:  Preliminary result Specimen:  Blood from Arm, Right Updated:  10/17/19 1101     Blood Culture No growth at 24 hours    Wound Culture - Wound, Buttock, Left [[id number]]  (Abnormal) Collected:  10/15/19 1347    Lab Status:  Final result Specimen:  Wound from Buttock, Left Updated:  10/18/19 1004     Wound Culture Moderate growth (3+) Streptococcus, Beta Hemolytic, Group F     Comment: This organism is considered to be universally susceptible to penicillin.  No further antibiotic testing will be performed.        STREP GROUPING F     Wound Culture Scant growth (1+) Normal Skin Chuyita     Gram Stain Many (4+) WBCs per low power field      Moderate (3+) Gram positive cocci in chains      Mixed bacterial morphotypes seen on Gram Stain    Blood Culture - Blood, Arm, Left [[id number]] Collected:  10/15/19 1230    Lab Status:  Preliminary result Specimen:  Blood from Arm, Left Updated:  10/18/19 1314     Blood Culture No growth at 3 days          ECG/EMG Results (most recent)     None                    Ct Abdomen  Pelvis Without Contrast    Result Date: 10/15/2019   1. 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 Abnormal subcutaneous fat induration and superficial and deep air loculations extend from the left gluteal region, into the left ischial rectal fossa, and into the fat adjacent to the left lateral margin of the penile shaft. Of note, the perineum is not imaged in its entirety. 2. No drainable fluid collection or abscess is seen within the pelvis. 3. Nonobstructing left renal stone. 4. Chronic calcific pancreatitis.    Electronically Signed By-Dr. Rowena Carballo MD On:10/15/2019 1:38 PM This report was finalized on [id number] by Dr. Rowena Carballo MD.    Xr Chest 1 View    Result Date: 10/16/2019  1.Subsegmental atelectasis right lower lobe. 2.Cardiomegaly. 3.Central venous catheter tip has appropriate position.  Electronically Signed By-Winifred Simental On:10/16/2019 7:36 AM This report was finalized on [id number] by  Winifred Simental, .      Xrays, labs reviewed personally by physician.    Medication Review:   I have reviewed the patient's current medication list  reviewed    Scheduled Meds    atorvastatin 40 mg Oral Daily   baclofen 10 mg Oral Daily   buPROPion  mg Oral Daily   ceftriaxone 2 g Intravenous Q24H   clindamycin 900 mg Intravenous Q8H   insulin glargine 20 Units Subcutaneous QAM   insulin lispro 0-24 Units Subcutaneous Q6H   levETIRAcetam 500 mg Oral Q12H   rOPINIRole 0.5 mg Oral TID   sodium chloride 10 mL Intravenous Q12H       Meds Infusions    sodium chloride 125 mL/hr Last Rate: 125 mL/hr (10/15/19 1912)       Meds PRN  •  aluminum-magnesium hydroxide-simethicone  •  bisacodyl  •  dextrose  •  dextrose  •  glucagon (human recombinant)  •  HYDROmorphone  •  magnesium hydroxide  •  melatonin  •  ondansetron **OR** ondansetron  •  oxyCODONE **OR** oxyCODONE  •  sodium chloride  •  sodium chloride        Assessment / Plan    Active Hospital Problems    Diagnosis  POA   •  **Abscess, gluteal, left [L02.31]  Yes   • Sepsis (CMS/HCC) [A41.9]  Yes   • Benign essential hypertension [I10]  Yes   • GERD (gastroesophageal reflux disease) [K21.9]  Yes   • Hyperlipidemia, mixed [E78.2]  Yes   • Major depression, chronic [F32.9]  Yes   • Seizure disorder (CMS/[facility]) [G40.909]  Yes   • Type 2 diabetes mellitus (CMS/[facility]) [E11.9]  Yes      Resolved Hospital Problems   No resolved problems to display.     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  -Pain control  -IV antibiotics  -Status post I&D, discussion of possible diverting colostomy given patient unsure if he can manage wound  -General surgery following  -Off pressors  -Antibiotics per ID  -Wound cultures showing group F Streptococcus     Septic shock -as above  -No further pressor support     Acute kidney injury, improving -likely component of chronic renal insufficiency  -Improving from 1.7-1.56  - continue IV NS @ 125/hr   - monitor BMP     Leukocytosis -secondary to infection, improving   -Monitor daily    Anemia -mild likely some loss postoperatively and then degree of chronic inflammation currently stable  -Monitor daily    diabetes mellitus type 2 -uncertain degree of control at home  - continue Lantus daily  - SSI   - accuchecks AC HS     Essential hypertension - chronic relatively controlled  - home meds held secondary to hypotension for now      Hyperlipidemia -stable  - continue home atorvastatin      Depression, chronic -appears controlled  - continue home wellbutrin      Seizure disorder -no seizure episodes noted  - continue home Keppra      Morbid obesity (BMI 44.13)  - encourage lifestyle modifications      VTE prophylaxis - bilateral SCDs    Disposition -inpatient      Jett Skelton MD  10/18/19  2:25 PM

## 2019-10-18 NOTE — PROGRESS NOTES
Pulmonary/ Critical Care progress Note        Lonnie Arora is a 44 y.o. 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  Patient was admitted through the ER on 10/15/19 with a three day history of left buttock pain.  He states that he was unable to sit in a chair or in bed due to the pain.  Workup in the ER with a CT scan was suspicious for Dale's gangrene for which general surgery was consulted and he was taken to the OR with I/D.  Postoperatively in the PCU he developed hypotension which was unresponsive to IVF fluids.  The patient was transferred to the ICU and started on Levophed.      10/16/19:  Remains on Levophed, patient without complaints.  No SOA.  No complaints of pain currently  10/17/19:  Off Levophed.  Patient reports no new issues.  Feeling ok.  Remains on 2L  10/18: Remains on 2 L N/C. Remains on NS. Off pressors. No C/O cough or productive phlegm, No CP, No N/V    Review of Systems    As above      Allergies    No Known Allergies    Medications    Scheduled Meds:    !Vancomycin Level Draw Needed  Does not apply Once   atorvastatin 40 mg Oral Daily   baclofen 10 mg Oral Daily   buPROPion  mg Oral Daily   clindamycin 900 mg Intravenous Q8H   insulin glargine 20 Units Subcutaneous QAM   insulin lispro 0-24 Units Subcutaneous 4x Daily With Meals & Nightly   levETIRAcetam 500 mg Oral Q12H   meropenem 1 g Intravenous Q8H   rOPINIRole 0.5 mg Oral TID   sodium chloride 10 mL Intravenous Q12H   vancomycin 1,250 mg Intravenous Q24H     Continuous Infusions:    Pharmacy to Dose meropenem (MERREM)     Pharmacy to dose vancomycin     sodium chloride 125 mL/hr Last Rate: 125 mL/hr (10/15/19 1912)     PRN Meds:.aluminum-magnesium hydroxide-simethicone  •  bisacodyl  •  dextrose  •  dextrose  •  glucagon (human recombinant)  •  HYDROmorphone  •  magnesium hydroxide  •  melatonin  •  ondansetron **OR** ondansetron  •  oxyCODONE **OR** oxyCODONE  •   Pharmacy to Dose meropenem (MERREM)  •  sodium chloride  •  sodium chloride      Physical Exam    tMax 24 hrs:  Temp (24hrs), Av.7 °F (37.1 °C), Min:98.1 °F (36.7 °C), Max:99.2 °F (37.3 °C)    Vitals Ranges:  Temp:  [98.1 °F (36.7 °C)-99.2 °F (37.3 °C)] 98.4 °F (36.9 °C)  Heart Rate:  [] 93  BP: ()/() 117/63  Intake and Output Last 3 Shifts:  I/O last 3 completed shifts:  In: 6219 [I.V.:6119; IV Piggyback:100]  Out: 2100 [Urine:2100]    Constitutional:  Alert, no acute respiratory distress   HEENT:  Atraumatic, PERRL, conjunctiva normal, moist oral mucosa, no nasal discharge.  Trachea is midline.  Respiratory:  No respiratory distress, normal breath sounds, no rales, no wheezing   Cardiovascular:  Normal rate, normal rhythm and no murmurs.  Pulses 2+ and equal in all four extremities.    GI:  Soft, nondistended, positive bowel sounds.  :  No costovertebral angle tenderness   Extremities: No edema, cyanosis or tenderness.  Integument:  No rashes.   Left gluteal I/D surgical dressing intact without signs of bleeding.    Neurologic:  Alert & oriented x 3.    Psychiatric:  Speech and behavior appropriate     labs    Lab Results (last 24 hours)     Procedure Component Value Units Date/Time    CBC Auto Differential [[id number]]  (Abnormal) Collected:  10/18/19 0640    Specimen:  Blood Updated:  10/18/19 0652     WBC 18.30 10*3/mm3      RBC 3.63 10*6/mm3      Hemoglobin 10.6 g/dL      Hematocrit 33.2 %      MCV 91.5 fL      MCH 29.3 pg      MCHC 32.0 g/dL      RDW 16.8 %      RDW-SD 54.7 fl      MPV 8.9 fL      Platelets 120 10*3/mm3     Scan Slide [[id number]] Collected:  10/18/19 0640    Specimen:  Blood Updated:  10/18/19 0646    Comprehensive Metabolic Panel [[id number]] Collected:  10/18/19 0640    Specimen:  Blood Updated:  10/18/19 0642    CBC & Differential [[id number]] Collected:  10/18/19 0640    Specimen:  Blood Updated:  10/18/19 0642    Narrative:       The following orders were created for  panel order CBC & Differential.  Procedure                               Abnormality         Status                     ---------                               -----------         ------                     CBC Auto Differential[[id number]]        Abnormal            Preliminary result           Please view results for these tests on the individual orders.    POC Glucose Once [[id number]]  (Abnormal) Collected:  10/18/19 0632    Specimen:  Blood Updated:  10/18/19 0634     Glucose 173 mg/dL      Comment: Serial Number: [id number]:  [id number]       POC Glucose Once [[id number]]  (Abnormal) Collected:  10/17/19 2056    Specimen:  Blood Updated:  10/17/19 2057     Glucose 154 mg/dL      Comment: Serial Number: [id number]:  [id number]       Vancomycin, Peak [[id number]]  (Normal) Collected:  10/17/19 1755    Specimen:  Blood Updated:  10/17/19 1827     Vancomycin Peak 31.90 mcg/mL     POC Glucose Once [[id number]]  (Abnormal) Collected:  10/17/19 1757    Specimen:  Blood Updated:  10/17/19 1758     Glucose 151 mg/dL      Comment: Serial Number: [id number]:  [id number]       Blood Culture - Blood, Arm, Left [[id number]] Collected:  10/15/19 1230    Specimen:  Blood from Arm, Left Updated:  10/17/19 1316     Blood Culture No growth at 2 days    POC Glucose Once [[id number]]  (Abnormal) Collected:  10/17/19 1143    Specimen:  Blood Updated:  10/17/19 1152     Glucose 108 mg/dL      Comment: Serial Number: [id number]:  [id number]       Blood Culture - Blood, Arm, Right [[id number]] Collected:  10/16/19 1032    Specimen:  Blood from Arm, Right Updated:  10/17/19 1101     Blood Culture No growth at 24 hours    Wound Culture - Wound, Buttock, Left [[id number]]  (Abnormal) Collected:  10/15/19 1347    Specimen:  Wound from Buttock, Left Updated:  10/17/19 0746     Wound Culture Moderate growth (3+) Streptococcus, Beta Hemolytic, Group F     Comment: This organism is considered to be universally susceptible to  penicillin.  No further antibiotic testing will be performed.        STREP GROUPING F     Wound Culture Scant growth (1+) Normal Skin Chuyita     Gram Stain Many (4+) WBCs per low power field      Moderate (3+) Gram positive cocci in chains      Mixed bacterial morphotypes seen on Gram Stain    POC Glucose Once [[id number]]  (Abnormal) Collected:  10/17/19 0736    Specimen:  Blood Updated:  10/17/19 0740     Glucose 111 mg/dL      Comment: Serial Number: [id number]:  [id number]       Comprehensive Metabolic Panel [[id number]]  (Abnormal) Collected:  10/17/19 0609    Specimen:  Blood Updated:  10/17/19 0740     Glucose 121 mg/dL      BUN 31 mg/dL      Creatinine 1.70 mg/dL      Sodium 142 mmol/L      Potassium 4.2 mmol/L      Chloride 113 mmol/L      CO2 20.0 mmol/L      Calcium 7.2 mg/dL      Total Protein 5.2 g/dL      Albumin 1.90 g/dL      ALT (SGPT) 11 U/L      AST (SGOT) 19 U/L      Alkaline Phosphatase 124 U/L      Total Bilirubin 0.4 mg/dL      eGFR Non African Amer 44 mL/min/1.73      Globulin 3.3 gm/dL      A/G Ratio 0.6 g/dL      BUN/Creatinine Ratio 18.2     Anion Gap 13.2 mmol/L     Narrative:       GFR Normal >60  Chronic Kidney Disease <60  Kidney Failure <15              CBC  Results from last 7 days   Lab Units 10/18/19  0640 10/17/19  0609 10/16/19  0615 10/15/19  2344 10/15/19  1037   WBC 10*3/mm3 18.30* 20.80* 26.30* 25.60* 28.10*   RBC 10*6/mm3 3.63* 3.47* 4.01* 3.97* 4.61   HEMOGLOBIN g/dL 10.6* 10.2* 11.8* 11.7* 13.6   HEMATOCRIT % 33.2* 32.0* 36.9* 36.4* 43.0   MCV fL 91.5 92.3 92.1 91.6 93.4   PLATELETS 10*3/mm3 120* 132* 175 167 184       BMP  Results from last 7 days   Lab Units 10/17/19  0609 10/16/19  0615 10/15/19  2344 10/15/19  1248 10/15/19  1231   SODIUM mmol/L 142 140 141  --  141   POTASSIUM mmol/L 4.2 4.7 4.7  --  4.7   CHLORIDE mmol/L 113* 109* 109*  --  108*   CO2 mmol/L 20.0* 19.0* 20.0*  --  20.0*   BUN mg/dL 31* 35* 36*  --  37*   CREATININE mg/dL 1.70* 1.98* 2.17* 2.30*  2.26*   GLUCOSE mg/dL 121* 303* 226*  --  252*       CMP   Results from last 7 days   Lab Units 10/17/19  0609 10/16/19  0615 10/15/19  2344 10/15/19  1248 10/15/19  1231 10/15/19  1037   SODIUM mmol/L 142 140 141  --  141  --    POTASSIUM mmol/L 4.2 4.7 4.7  --  4.7  --    CHLORIDE mmol/L 113* 109* 109*  --  108*  --    CO2 mmol/L 20.0* 19.0* 20.0*  --  20.0*  --    BUN mg/dL 31* 35* 36*  --  37*  --    CREATININE mg/dL 1.70* 1.98* 2.17* 2.30* 2.26*  --    GLUCOSE mg/dL 121* 303* 226*  --  252*  --    ALBUMIN g/dL 1.90*  --   --   --  2.40*  --    BILIRUBIN mg/dL 0.4  --   --   --  1.0  --    ALK PHOS U/L 124*  --   --   --  150*  --    AST (SGOT) U/L 19  --   --   --  13  --    ALT (SGPT) U/L 11  --   --   --  7  --    LIPASE U/L  --   --   --   --   --  8*         BNP        TROPONIN        CoAg        Creatinine Clearance  Estimated Creatinine Clearance: 62.8 mL/min (A) (by C-G formula based on SCr of 1.7 mg/dL (H)).    ABG        Imaging & Other Studies    Imaging Results (last 72 hours)     Procedure Component Value Units Date/Time    XR Chest 1 View [[id number]] Updated:  10/15/19 2349    CT Abdomen Pelvis Without Contrast [[id number]] Collected:  10/15/19 1332     Updated:  10/15/19 1340    Narrative:       CT ABDOMEN PELVIS WO CONTRAST-     Date of Exam: 10/15/2019 1:15 PM     Indication: 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  Elevated  creatinine prevents contrast  . Generalized abdominal pain.     Comparison: None. The patient's previous CT scan from 07/24/2014 is no  longer available for comparison.     Technique: Contiguous axial CT images were obtained from the lung bases  to the pubic symphysis without contrast. Sagittal and coronal  reconstructions were performed.  Automated exposure control and  iterative reconstruction methods were used.     FINDINGS:     Abnormal skin thickening and subcutaneous fat induration mild along with  multiple locules of gas, is seen within the posterior medial  left  gluteal soft tissues extending contiguously into the left ischial rectal  fossa.. While the perineum is not included in the imaging field of view  in its entirety, locules of gas are demonstrated within the deep pelvic  fat adjacent to the left lateral margin of the penile shaft, worrisome  for Dale's gangrene, as suggested in the clinical history. Drainable  fluid collection or abscess is seen.     The inflamed soft tissues in the left ischial rectal fossa displace the  rectum and anus slightly toward the right, but the anus and rectum  themselves do not appear abnormally thickened or inflamed.     There are shotty bilateral inguinal lymph nodes are thought to be  reactive. No pathologically enlarged lymph nodes are identified.     Cholecystectomy. Features of chronic calcific pancreatitis without acute  pancreatitis. Spleen, adrenals and right kidney have a normal  noncontrast appearance. Small nonobstructing left renal stone. Urinary  bladder, prostate are normal.     Heart size within normal limits. Minimal linear subsegmental atelectasis  in lung bases. No acute osseous abnormality.          Impression:          1. FINDINGS consistent with the clinical suspicion of Dale's  gangrene. Abnormal subcutaneous fat induration and superficial and deep  air loculations extend from the left gluteal region, into the left  ischial rectal fossa, and into the fat adjacent to the left lateral  margin of the penile shaft. Of note, the perineum is not imaged in its  entirety.  2. No drainable fluid collection or abscess is seen within the pelvis.  3. Nonobstructing left renal stone.  4. Chronic calcific pancreatitis.           Electronically Signed By-Dr. Rowean Carballo MD On:10/15/2019 1:38 PM  This report was finalized on [id number] by Dr. Rowena Carballo MD.            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 Remains npo after midnight today  IVFs 2L given, give an additional liter bolus 10/16. Currently on NS @125 ml/hr  Vanc, Meropenem, and Clindamycin #4 per ID. Wound Cx with Strep Group F  Lactic 2.1, repeat 1.2  Continue anti-seizure meds  OFF Levophed   SSI and Lantus 20 units daily     RIJ 10/15  F/C  PUD: Protonix  Insulin:  Sliding scale  VTE:  SCDs  Nutrition:  CC, NPO for possible Sx  D/W patient's family  Await bed to transfer out of ICU

## 2019-10-18 NOTE — PLAN OF CARE
Problem: Patient Care Overview  Goal: Plan of Care Review  Outcome: Ongoing (interventions implemented as appropriate)   10/18/19 1902   Coping/Psychosocial   Plan of Care Reviewed With patient;spouse   Plan of Care Review   Progress no change   OTHER   Outcome Summary Pt remains PCU overflow. BM x2 today. Plan for colostomy Monday 10/21

## 2019-10-18 NOTE — PROGRESS NOTES
"Post-op Note  INCISION AND DRAINAGE WOUND  10/15/2019    Subjective   Lonnie Arora is a 44 y.o. male s/p incision and drainage with debridement of left gluteal wound.  Remains off pressors, afebrile. Had small BM, able to be cleaned, but reported it was extremely painful.    Objective   /70   Pulse 99   Temp 99.3 °F (37.4 °C) (Oral)   Resp 24   Ht 160 cm (63\")   Wt 113 kg (249 lb 1.9 oz)   SpO2 92%   BMI 44.13 kg/m²   Wound bed with some minor bleeding. Mostly pink tissue, some feculent drainage on medial aspect very close to anus. Able to be cleaned effectively and re-dressed    WBC   Date Value Ref Range Status   10/18/2019 18.30 (H) 3.40 - 10.80 10*3/mm3 Final     RBC   Date Value Ref Range Status   10/18/2019 3.63 (L) 4.14 - 5.80 10*6/mm3 Final     Hemoglobin   Date Value Ref Range Status   10/18/2019 10.6 (L) 13.0 - 17.7 g/dL Final     Hematocrit   Date Value Ref Range Status   10/18/2019 33.2 (L) 37.5 - 51.0 % Final     MCV   Date Value Ref Range Status   10/18/2019 91.5 79.0 - 97.0 fL Final     MCH   Date Value Ref Range Status   10/18/2019 29.3 26.6 - 33.0 pg Final     MCHC   Date Value Ref Range Status   10/18/2019 32.0 31.5 - 35.7 g/dL Final     RDW   Date Value Ref Range Status   10/18/2019 16.8 (H) 12.3 - 15.4 % Final     RDW-SD   Date Value Ref Range Status   10/18/2019 54.7 (H) 37.0 - 54.0 fl Final     MPV   Date Value Ref Range Status   10/18/2019 8.9 6.0 - 12.0 fL Final     Platelets   Date Value Ref Range Status   10/18/2019 120 (L) 140 - 450 10*3/mm3 Final     Neutrophils Absolute   Date Value Ref Range Status   10/18/2019 17.20 (H) 1.70 - 7.00 10*3/mm3 Final       Assessment/Plan   43 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    Agree with PCU status  Continue antibiotics per ID - cxs show Group F strep  Okay for consistent carbohydrate diet  Will place patient on schedule for Monday to have " diverting colostomy. Patient is unsure whether or not he can manage wound without colostomy and wants to wait until after the weekend.  Please make patient NPO at [facility] for possible colostomy on Monday.    Niraj Almanzar MD  10/18/2019  11:47 AM

## 2019-10-19 LAB
ALBUMIN SERPL-MCNC: 2.2 G/DL (ref 3.5–5.2)
ALBUMIN/GLOB SERPL: 0.7 G/DL
ALP SERPL-CCNC: 163 U/L (ref 39–117)
ALT SERPL W P-5'-P-CCNC: 46 U/L (ref 1–41)
ANION GAP SERPL CALCULATED.3IONS-SCNC: 6 MMOL/L (ref 5–15)
AST SERPL-CCNC: 67 U/L (ref 1–40)
BILIRUB SERPL-MCNC: 0.4 MG/DL (ref 0.2–1.2)
BUN BLD-MCNC: 27 MG/DL (ref 6–20)
BUN/CREAT SERPL: 17.4 (ref 7–25)
CALCIUM SPEC-SCNC: 7.8 MG/DL (ref 8.6–10.5)
CHLORIDE SERPL-SCNC: 111 MMOL/L (ref 98–107)
CO2 SERPL-SCNC: 22 MMOL/L (ref 22–29)
CREAT BLD-MCNC: 1.55 MG/DL (ref 0.76–1.27)
DEPRECATED RDW RBC AUTO: 52.9 FL (ref 37–54)
EOSINOPHIL # BLD MANUAL: 0.22 10*3/MM3 (ref 0–0.4)
EOSINOPHIL NFR BLD MANUAL: 1 % (ref 0.3–6.2)
ERYTHROCYTE [DISTWIDTH] IN BLOOD BY AUTOMATED COUNT: 16.7 % (ref 12.3–15.4)
GFR SERPL CREATININE-BSD FRML MDRD: 49 ML/MIN/1.73
GLOBULIN UR ELPH-MCNC: 3.3 GM/DL
GLUCOSE BLD-MCNC: 133 MG/DL (ref 65–99)
GLUCOSE BLDC GLUCOMTR-MCNC: 113 MG/DL (ref 70–105)
GLUCOSE BLDC GLUCOMTR-MCNC: 117 MG/DL (ref 70–105)
GLUCOSE BLDC GLUCOMTR-MCNC: 127 MG/DL (ref 70–105)
GLUCOSE BLDC GLUCOMTR-MCNC: 147 MG/DL (ref 70–105)
GLUCOSE BLDC GLUCOMTR-MCNC: 158 MG/DL (ref 70–105)
HCT VFR BLD AUTO: 32.8 % (ref 37.5–51)
HGB BLD-MCNC: 10.6 G/DL (ref 13–17.7)
LYMPHOCYTES # BLD MANUAL: 0.65 10*3/MM3 (ref 0.7–3.1)
LYMPHOCYTES NFR BLD MANUAL: 3 % (ref 19.6–45.3)
LYMPHOCYTES NFR BLD MANUAL: 4 % (ref 5–12)
MCH RBC QN AUTO: 29.5 PG (ref 26.6–33)
MCHC RBC AUTO-ENTMCNC: 32.4 G/DL (ref 31.5–35.7)
MCV RBC AUTO: 91 FL (ref 79–97)
METAMYELOCYTES NFR BLD MANUAL: 2 % (ref 0–0)
MONOCYTES # BLD AUTO: 0.86 10*3/MM3 (ref 0.1–0.9)
NEUTROPHILS # BLD AUTO: 19.35 10*3/MM3 (ref 1.7–7)
NEUTROPHILS NFR BLD MANUAL: 73 % (ref 42.7–76)
NEUTS BAND NFR BLD MANUAL: 17 % (ref 0–5)
PLAT MORPH BLD: NORMAL
PLATELET # BLD AUTO: 141 10*3/MM3 (ref 140–450)
PMV BLD AUTO: 8.5 FL (ref 6–12)
POLYCHROMASIA BLD QL SMEAR: ABNORMAL
POTASSIUM BLD-SCNC: 4.4 MMOL/L (ref 3.5–5.2)
PROT SERPL-MCNC: 5.5 G/DL (ref 6–8.5)
RBC # BLD AUTO: 3.6 10*6/MM3 (ref 4.14–5.8)
SCAN SLIDE: NORMAL
SODIUM BLD-SCNC: 139 MMOL/L (ref 136–145)
WBC MORPH BLD: NORMAL
WBC NRBC COR # BLD: 21.5 10*3/MM3 (ref 3.4–10.8)

## 2019-10-19 PROCEDURE — 99232 SBSQ HOSP IP/OBS MODERATE 35: CPT | Performed by: FAMILY MEDICINE

## 2019-10-19 PROCEDURE — 85007 BL SMEAR W/DIFF WBC COUNT: CPT | Performed by: INTERNAL MEDICINE

## 2019-10-19 PROCEDURE — 85025 COMPLETE CBC W/AUTO DIFF WBC: CPT | Performed by: INTERNAL MEDICINE

## 2019-10-19 PROCEDURE — 63710000001 INSULIN LISPRO (HUMAN) PER 5 UNITS: Performed by: NURSE PRACTITIONER

## 2019-10-19 PROCEDURE — 99024 POSTOP FOLLOW-UP VISIT: CPT | Performed by: SURGERY

## 2019-10-19 PROCEDURE — 80053 COMPREHEN METABOLIC PANEL: CPT | Performed by: INTERNAL MEDICINE

## 2019-10-19 PROCEDURE — 25010000002 HYDROMORPHONE PER 4 MG: Performed by: SURGERY

## 2019-10-19 PROCEDURE — 25010000002 CEFTRIAXONE PER 250 MG: Performed by: INTERNAL MEDICINE

## 2019-10-19 PROCEDURE — 63710000001 INSULIN GLARGINE PER 5 UNITS: Performed by: INTERNAL MEDICINE

## 2019-10-19 PROCEDURE — 82962 GLUCOSE BLOOD TEST: CPT

## 2019-10-19 PROCEDURE — 94799 UNLISTED PULMONARY SVC/PX: CPT

## 2019-10-19 RX ADMIN — INSULIN GLARGINE 20 UNITS: 100 INJECTION, SOLUTION SUBCUTANEOUS at 06:19

## 2019-10-19 RX ADMIN — BUPROPION HYDROCHLORIDE 300 MG: 150 TABLET, EXTENDED RELEASE ORAL at 08:40

## 2019-10-19 RX ADMIN — Medication 10 ML: at 08:40

## 2019-10-19 RX ADMIN — HYDROMORPHONE HYDROCHLORIDE 0.5 MG: 2 INJECTION, SOLUTION INTRAMUSCULAR; INTRAVENOUS; SUBCUTANEOUS at 11:03

## 2019-10-19 RX ADMIN — LEVETIRACETAM 500 MG: 500 TABLET, FILM COATED ORAL at 21:28

## 2019-10-19 RX ADMIN — ROPINIROLE 0.5 MG: 0.25 TABLET, FILM COATED ORAL at 16:58

## 2019-10-19 RX ADMIN — CLINDAMYCIN PHOSPHATE 900 MG: 900 INJECTION, SOLUTION INTRAVENOUS at 06:19

## 2019-10-19 RX ADMIN — ATORVASTATIN CALCIUM 40 MG: 40 TABLET, FILM COATED ORAL at 08:40

## 2019-10-19 RX ADMIN — CLINDAMYCIN PHOSPHATE 900 MG: 900 INJECTION, SOLUTION INTRAVENOUS at 21:28

## 2019-10-19 RX ADMIN — ROPINIROLE 0.5 MG: 0.25 TABLET, FILM COATED ORAL at 21:28

## 2019-10-19 RX ADMIN — HYDROMORPHONE HYDROCHLORIDE 0.5 MG: 2 INJECTION, SOLUTION INTRAMUSCULAR; INTRAVENOUS; SUBCUTANEOUS at 20:59

## 2019-10-19 RX ADMIN — HYDROMORPHONE HYDROCHLORIDE 0.5 MG: 2 INJECTION, SOLUTION INTRAMUSCULAR; INTRAVENOUS; SUBCUTANEOUS at 07:36

## 2019-10-19 RX ADMIN — Medication 10 ML: at 21:28

## 2019-10-19 RX ADMIN — LEVETIRACETAM 500 MG: 500 TABLET, FILM COATED ORAL at 08:40

## 2019-10-19 RX ADMIN — BACLOFEN 10 MG: 10 TABLET ORAL at 08:40

## 2019-10-19 RX ADMIN — ROPINIROLE 0.5 MG: 0.25 TABLET, FILM COATED ORAL at 08:40

## 2019-10-19 RX ADMIN — OXYCODONE HYDROCHLORIDE 10 MG: 5 TABLET ORAL at 21:59

## 2019-10-19 RX ADMIN — INSULIN LISPRO 4 UNITS: 100 INJECTION, SOLUTION INTRAVENOUS; SUBCUTANEOUS at 17:38

## 2019-10-19 RX ADMIN — HYDROMORPHONE HYDROCHLORIDE 0.5 MG: 2 INJECTION, SOLUTION INTRAMUSCULAR; INTRAVENOUS; SUBCUTANEOUS at 17:36

## 2019-10-19 RX ADMIN — CLINDAMYCIN PHOSPHATE 900 MG: 900 INJECTION, SOLUTION INTRAVENOUS at 14:58

## 2019-10-19 RX ADMIN — CEFTRIAXONE SODIUM 2 G: 2 INJECTION, POWDER, FOR SOLUTION INTRAMUSCULAR; INTRAVENOUS at 15:22

## 2019-10-19 NOTE — PROGRESS NOTES
Infectious Diseases Progress Note      LOS: 4 days   Patient Care Team:  Sanjay Chance MD as PCP - General    Chief Complaint: Buttock pain    Subjective     The patient had no high-grade fever during the last 24 hours.  The patient remained off vasopressors.  The patient remained hemodynamically stable.  He is awake and alert.    Review of Systems:   Review of Systems   Constitutional: Negative.    HENT: Negative.    Eyes: Negative.    Respiratory: Negative.    Cardiovascular: Negative.    Gastrointestinal: Negative.    Genitourinary: Negative.    Musculoskeletal: Negative.    Skin: Positive for wound.   Neurological: Negative.    Hematological: Negative.    Psychiatric/Behavioral: Negative.         Objective     Vital Signs  Temp:  [97.7 °F (36.5 °C)-99 °F (37.2 °C)] 97.7 °F (36.5 °C)  Heart Rate:  [] 99  BP: ()/(50-85) 136/76    Physical Exam:  Physical Exam   Constitutional: He is oriented to person, place, and time. He appears well-developed and well-nourished.   HENT:   Head: Normocephalic and atraumatic.   Eyes: EOM are normal. Pupils are equal, round, and reactive to light.   Neck: Normal range of motion. Neck supple.   Cardiovascular: Normal rate, regular rhythm and normal heart sounds.   Pulmonary/Chest: Effort normal and breath sounds normal. No respiratory distress. He has no wheezes. He has no rales.   Abdominal: Soft. Bowel sounds are normal. He exhibits no distension and no mass. There is no tenderness. There is no rebound and no guarding.   Musculoskeletal: Normal range of motion. He exhibits no edema or deformity.   Neurological: He is alert and oriented to person, place, and time. No cranial nerve deficit.   Skin: Skin is warm. No rash noted. No erythema.   Large wound in the left buttock very close anus.  The wound is packed with no significant purulent or necrotic tissue   Psychiatric: He has a normal mood and affect.   Nursing note and vitals reviewed.       Results Review:    I  have reviewed all clinical data, test, lab, and imaging results.     Radiology  No Radiology Exams Resulted Within Past 24 Hours    Cardiology    Laboratory  Results from last 7 days   Lab Units 10/19/19  0624   WBC 10*3/mm3 21.50*   HEMOGLOBIN g/dL 10.6*   HEMATOCRIT % 32.8*   PLATELETS 10*3/mm3 141     Results from last 7 days   Lab Units 10/19/19  0624   SODIUM mmol/L 139   POTASSIUM mmol/L 4.4   CHLORIDE mmol/L 111*   CO2 mmol/L 22.0   BUN mg/dL 27*   CREATININE mg/dL 1.55*   GLUCOSE mg/dL 133*   CALCIUM mg/dL 7.8*     Results from last 7 days   Lab Units 10/19/19  0624   SODIUM mmol/L 139   POTASSIUM mmol/L 4.4   CHLORIDE mmol/L 111*   CO2 mmol/L 22.0   BUN mg/dL 27*   CREATININE mg/dL 1.55*   GLUCOSE mg/dL 133*   CALCIUM mg/dL 7.8*                 Microbiology   Microbiology Results (last 10 days)     Procedure Component Value - Date/Time    Blood Culture - Blood, Arm, Right [[id number]] Collected:  10/16/19 1032    Lab Status:  Preliminary result Specimen:  Blood from Arm, Right Updated:  10/17/19 1101     Blood Culture No growth at 24 hours    Wound Culture - Wound, Buttock, Left [[id number]]  (Abnormal) Collected:  10/15/19 1347    Lab Status:  Final result Specimen:  Wound from Buttock, Left Updated:  10/18/19 1004     Wound Culture Moderate growth (3+) Streptococcus, Beta Hemolytic, Group F     Comment: This organism is considered to be universally susceptible to penicillin.  No further antibiotic testing will be performed.        STREP GROUPING F     Wound Culture Scant growth (1+) Normal Skin Chuyita     Gram Stain Many (4+) WBCs per low power field      Moderate (3+) Gram positive cocci in chains      Mixed bacterial morphotypes seen on Gram Stain    Blood Culture - Blood, Arm, Left [[id number]] Collected:  10/15/19 1230    Lab Status:  Preliminary result Specimen:  Blood from Arm, Left Updated:  10/19/19 1315     Blood Culture No growth at 4 days          Medication Review:       Schedule  Meds    atorvastatin 40 mg Oral Daily   baclofen 10 mg Oral Daily   buPROPion  mg Oral Daily   ceftriaxone 2 g Intravenous Q24H   clindamycin 900 mg Intravenous Q8H   insulin glargine 20 Units Subcutaneous QAM   insulin lispro 0-24 Units Subcutaneous 4x Daily With Meals & Nightly   levETIRAcetam 500 mg Oral Q12H   rOPINIRole 0.5 mg Oral TID   sodium chloride 10 mL Intravenous Q12H       Infusion Meds    sodium chloride 50 mL/hr Last Rate: 50 mL/hr (10/19/19 0591)       PRN Meds  •  aluminum-magnesium hydroxide-simethicone  •  bisacodyl  •  dextrose  •  dextrose  •  glucagon (human recombinant)  •  HYDROmorphone  •  magnesium hydroxide  •  melatonin  •  ondansetron **OR** ondansetron  •  oxyCODONE **OR** oxyCODONE  •  sodium chloride  •  sodium chloride        Assessment/Plan       Antimicrobial Therapy   1.  IV clindamycin      day  2.  IV Rocephin      day  3.      day  4.      Day  5.      Day      Assessment     Left buttock abscess associated with skin and soft tissue necrosis.  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  Intraoperative cultures are pending.  This type of infection usually associated with polymicrobial infection.    Intraoperative culture grew group F Streptococcus     Diabetes mellitus with diabetic neuropathy     Mild septic shock was on low-dose of norepinephrine.  Currently off pressors     Plan     Continue IV clindamycin for now and consider short course  Continue Rocephin 2 g IV daily for 2 weeks  Continue supportive care  Labs in a.m.  Probable diverting colostomy this admission  Case was discussed with primary service  May transfer to PCU        Joel Portillo MD  10/19/19  2:07 PM    Note is dictated utilizing voice recognition software/Dragon

## 2019-10-19 NOTE — PROGRESS NOTES
"      [facility] Medicine Services Daily Progress Note      Hospitalist Team  LOS 4 days      Patient Care Team:  Sanjay Chance MD as PCP - General        Chief Complaint / Subjective  Chief Complaint   Patient presents with   • Abdominal Pain     Patient doing well overall.  Patient elected to have diverting colostomy.  Patient having no respiratory or cardiac concerns.  Tolerating diet.  Doing well with dressing changes.    Brief Synopsis of Hospital Course/HPI    This is a 44-year-old  male with a past medical history of diabetes mellitus type 2, CVA, seizures, hypertension, hyperlipidemia, cholecystectomy, and kidney stones who presented to the ED on 10/15/2019 with complaints of buttock pain.  Patient stated 3 days ago he developed a bump on his rear end and has progressively gotten worse.  He states it hurts very badly to sit down and pain medication makes the pain pain better.  He thought he had been sitting in a recliner too long and this had caused a bump.  He states his pain is 8 out of 10.  He denies any recent nausea, vomiting, diarrhea, fever, chills.  Patient states when moving from one bed to the next bed in radiology, he felt a \"pop\"and his buttock started draining.  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  General surgery was consulted and the patient was taken to the OR for an I&D and debridement of devitalized tissue.       The patient was subsequently admitted to the PCU postoperatively, the patient became hypotensive and was unresponsive to IV fluids.  He developed septic shock and required Levophed.       General surgery advised the patient may need a diverting colostomy.  The patient will be placed NPO at midnight 10/17 for potential colostomy and/or further debridement of the wound.  This could occur later this weekend, as " "well.     Infectious disease has started the patient on IV vancomycin/clindamycin/meropenem, pending final culture results.       The hospitalists were consulted on 10/17 for medical management.  The patient complains of left buttock pain since general surgery re-packed the wound.  He states he is doing well otherwise.        Review of systems negative for all other 10 systems    Review of Systems   Constitution: Negative for chills and fever.   Cardiovascular: Negative for chest pain and cyanosis.   Respiratory: Negative for shortness of breath and wheezing.    Gastrointestinal: Negative for bowel incontinence and nausea.   Neurological: Negative for dizziness and light-headedness.   Psychiatric/Behavioral: Negative for altered mental status. The patient does not have insomnia.        Family History   Problem Relation Age of Onset   • Breast cancer Mother    • Cervical cancer Mother        Past Medical History:   Diagnosis Date   • Diabetes (CMS/HCC)    • GERD (gastroesophageal reflux disease)    • Hypertension    • Seizures (CMS/HCC)        Social History     Socioeconomic History   • Marital status:      Spouse name: Not on file   • Number of children: Not on file   • Years of education: Not on file   • Highest education level: Not on file   Tobacco Use   • Smoking status: Current Every Day Smoker     Types: Electronic Cigarette   Substance and Sexual Activity   • Alcohol use: No     Frequency: Never     Comment: Quit several years ago at time of his CVA   • Drug use: Yes     Types: Marijuana           Objective      Vital Signs  Temp:  [97.7 °F (36.5 °C)-99 °F (37.2 °C)] 97.7 °F (36.5 °C)  Heart Rate:  [] 99  BP: ()/(50-85) 136/76  Oxygen Therapy  SpO2: 100 %  Pulse Oximetry Type: Continuous  Device (Oxygen Therapy): nasal cannula  Flow (L/min): 2  Flowsheet Rows      First Filed Value   Admission Height  160 cm (63\") Documented at 10/15/2019 0954   Admission Weight  106 kg (232 lb 12.9 oz) " Documented at 10/15/2019 0954        Intake & Output (last 3 days)       10/16 0701 - 10/17 0700 10/17 0701 - 10/18 0700 10/18 0701 - 10/19 0700 10/19 0701 - 10/20 0700    I.V. (mL/kg) 5099 (45.1) 1020 (9) 4260 (37.7)     IV Piggyback  100      Total Intake(mL/kg) 5099 (45.1) 1120 (9.9) 4260 (37.7)     Urine (mL/kg/hr) 1700 (0.6) 400 (0.1) 3450 (1.3)     Stool   0     Total Output 8002 598 4307     Net +3399 +720 +810             Stool Unmeasured Occurrence   1 x         Lines, Drains & Airways    Active LDAs     Name:   Placement date:   Placement time:   Site:   Days:    CVC Triple Lumen 10/15/19 Right Internal jugular   10/15/19    2345    Internal jugular   2    Peripheral IV 10/15/19 1249 Left Antecubital   10/15/19    1249    Antecubital   3    Peripheral IV 10/15/19 1900 Right Wrist   10/15/19    1900 IV site present when patient arrived to PCU    Wrist   2    Urethral Catheter Non-latex 16 Fr.   10/15/19    1615     2                  Physical Exam:     General: Morbidly obese male lying in bed appearing comfortable, on supplemental oxygen via nasal cannula   HEENT: NC/AT, EOMI, mucosa moist  Heart: RRR. No murmur   Chest: CTAB, no w/r/r, normal respiratory effort  Abdominal: Soft. NT/ND. Bowel sounds present  Musculoskeletal: Normal ROM.  No edema. No calf tenderness.  Neurological: AAOx3, no focal deficits  Skin: Skin is warm and dry.  Gluteal wound dressed and not assessed  Psychiatric: Normal mood and affect.    Procedures:    Procedure(s):  INCISION AND DRAINAGE OF LEFT GLUTEAL REGION          Results Review:     I reviewed the patient's new clinical results.  reviewed    Results from last 7 days   Lab Units 10/19/19  0624 10/18/19  0640 10/17/19  0609   WBC 10*3/mm3 21.50* 18.30* 20.80*   HEMOGLOBIN g/dL 10.6* 10.6* 10.2*   HEMATOCRIT % 32.8* 33.2* 32.0*   PLATELETS 10*3/mm3 141 120* 132*   MONOCYTES % % 4.0* 5.0 5.0     Results from last 7 days   Lab Units 10/19/19  0624 10/18/19  0640 10/17/19  0609    SODIUM mmol/L 139 140 142   POTASSIUM mmol/L 4.4 4.4 4.2   CHLORIDE mmol/L 111* 113* 113*   CO2 mmol/L 22.0 21.0* 20.0*   BUN mg/dL 27* 32* 31*   CREATININE mg/dL 1.55* 1.56* 1.70*   CALCIUM mg/dL 7.8* 7.6* 7.2*   BILIRUBIN mg/dL 0.4 0.5 0.4   ALK PHOS U/L 163* 142* 124*   ALT (SGPT) U/L 46* 29 11   AST (SGOT) U/L 67* 49* 19   GLUCOSE mg/dL 133* 180* 121*         Lab Results   Component Value Date    CALCIUM 7.8 (L) 10/19/2019     No results found for: HGBA1C                Microbiology Results (last 10 days)     Procedure Component Value - Date/Time    Blood Culture - Blood, Arm, Right [[id number]] Collected:  10/16/19 1032    Lab Status:  Preliminary result Specimen:  Blood from Arm, Right Updated:  10/17/19 1101     Blood Culture No growth at 24 hours    Wound Culture - Wound, Buttock, Left [[id number]]  (Abnormal) Collected:  10/15/19 1347    Lab Status:  Final result Specimen:  Wound from Buttock, Left Updated:  10/18/19 1004     Wound Culture Moderate growth (3+) Streptococcus, Beta Hemolytic, Group F     Comment: This organism is considered to be universally susceptible to penicillin.  No further antibiotic testing will be performed.        STREP GROUPING F     Wound Culture Scant growth (1+) Normal Skin [place]     Gram Stain Many (4+) WBCs per low power field      Moderate (3+) Gram positive cocci in chains      Mixed bacterial morphotypes seen on Gram Stain    Blood Culture - Blood, Arm, Left [[id number]] Collected:  10/15/19 1230    Lab Status:  Preliminary result Specimen:  Blood from Arm, Left Updated:  10/18/19 1315     Blood Culture No growth at 3 days          ECG/EMG Results (most recent)     None                    Ct Abdomen Pelvis Without Contrast    Result Date: 10/15/2019   1. 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 Abnormal subcutaneous fat induration and superficial and deep air loculations extend from the left gluteal region, into the left ischial rectal fossa,  and into the fat adjacent to the left lateral margin of the penile shaft. Of note, the perineum is not imaged in its entirety. 2. No drainable fluid collection or abscess is seen within the pelvis. 3. Nonobstructing left renal stone. 4. Chronic calcific pancreatitis.    Electronically Signed By-Dr. Rowena Carballo MD On:10/15/2019 1:38 PM This report was finalized on [id number] by Dr. Rowena Carballo MD.    Xr Chest 1 View    Result Date: 10/16/2019  1.Subsegmental atelectasis right lower lobe. 2.Cardiomegaly. 3.Central venous catheter tip has appropriate position.  Electronically Signed By-Winifred Simental On:10/16/2019 7:36 AM This report was finalized on [id number] by  Winifred Simental, .      Xrays, labs reviewed personally by physician.    Medication Review:   I have reviewed the patient's current medication list  reviewed    Scheduled Meds    atorvastatin 40 mg Oral Daily   baclofen 10 mg Oral Daily   buPROPion  mg Oral Daily   ceftriaxone 2 g Intravenous Q24H   clindamycin 900 mg Intravenous Q8H   insulin glargine 20 Units Subcutaneous QAM   insulin lispro 0-24 Units Subcutaneous 4x Daily With Meals & Nightly   levETIRAcetam 500 mg Oral Q12H   rOPINIRole 0.5 mg Oral TID   sodium chloride 10 mL Intravenous Q12H       Meds Infusions    sodium chloride 125 mL/hr Last Rate: 125 mL/hr (10/15/19 1912)       Meds PRN  •  aluminum-magnesium hydroxide-simethicone  •  bisacodyl  •  dextrose  •  dextrose  •  glucagon (human recombinant)  •  HYDROmorphone  •  magnesium hydroxide  •  melatonin  •  ondansetron **OR** ondansetron  •  oxyCODONE **OR** oxyCODONE  •  sodium chloride  •  sodium chloride        Assessment / Plan    Active Hospital Problems    Diagnosis  POA   • **Abscess, gluteal, left [L02.31]  Yes   • Sepsis (CMS/HCC) [A41.9]  Yes   • Benign essential hypertension [I10]  Yes   • GERD (gastroesophageal reflux disease) [K21.9]  Yes   • Hyperlipidemia, mixed [E78.2]  Yes   • Major depression, chronic [F32.9]  Yes    • Seizure disorder (CMS/[facility]) [G40.909]  Yes   • Type 2 diabetes mellitus (CMS/[facility]) [E11.9]  Yes      Resolved Hospital Problems   No resolved problems to display.     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  -Pain control  -IV antibiotics  -Status post I&D, patient getting diverting colostomy  -General surgery following  -Off pressors  -Antibiotics per ID  -Wound cultures showing group F Streptococcus     Septic shock -as above  -No further pressor support     Acute kidney injury - improving, likely component of chronic renal insufficiency now at 1.55 which may be patient's new baseline  -Improving from 1.7-1.56  - continue IV NS @ 125/hr likely can de-escalate IV fluid  - monitor BMP     Leukocytosis -secondary to infection  -Monitor daily    Anemia -mild likely some loss postoperatively and then degree of chronic inflammation currently stable  -Monitor daily    diabetes mellitus type 2 -uncertain degree of control at home  - continue Lantus daily  - SSI   - accuchecks AC HS     Essential hypertension - chronic relatively controlled  - home meds held secondary to hypotension for now      Hyperlipidemia -stable  - continue home atorvastatin      Depression, chronic -appears controlled  - continue home wellbutrin      Seizure disorder -no seizure episodes noted  - continue home Keppra      Morbid obesity (BMI 44.13)  - encourage lifestyle modifications      VTE prophylaxis - bilateral SCDs    Disposition -inpatient      Jett Skelton MD  10/19/19  12:51 PM

## 2019-10-19 NOTE — PROGRESS NOTES
Patient is stable from a pulmonary/critical care standpoint.  He is a PCU overflow at this time.  Dr. Skelton is following the patient for medical management.  We will sign off at this time.  Please do not hesitate to call if we may be of any further assistance.  Shannon Polanco APRN  [facility]  [phone]

## 2019-10-19 NOTE — PLAN OF CARE
Problem: Patient Care Overview  Goal: Plan of Care Review  Outcome: Ongoing (interventions implemented as appropriate)   10/19/19 5322   Coping/Psychosocial   Plan of Care Reviewed With patient   Plan of Care Review   Progress no change   OTHER   Outcome Summary PCU overflow. Requires dressing change with every bowel movement. Vital signs remain stable

## 2019-10-19 NOTE — PLAN OF CARE
Problem: Patient Care Overview  Goal: Plan of Care Review  Outcome: Ongoing (interventions implemented as appropriate)   10/19/19 0504   Coping/Psychosocial   Plan of Care Reviewed With patient;spouse   Plan of Care Review   Progress no change   OTHER   Outcome Summary Pt remains PCU OF. Dressing change done at 2230 due to stool soiled dressing. Colostomy planned for monday. Will continue to monitor.        Problem: Skin Injury Risk (Adult)  Goal: Identify Related Risk Factors and Signs and Symptoms  Outcome: Outcome(s) achieved Date Met: 10/19/19   10/19/19 0504   Skin Injury Risk (Adult)   Related Risk Factors (Skin Injury Risk) body weight extremes;critical care admission;hospitalization prolonged;infection;mobility impaired     Goal: Skin Health and Integrity  Outcome: Ongoing (interventions implemented as appropriate)   10/19/19 0504   Skin Injury Risk (Adult)   Skin Health and Integrity making progress toward outcome

## 2019-10-19 NOTE — PROGRESS NOTES
LOS: 4 days   Patient Care Team:  Sanjay Chance MD as PCP - General    Chief Complaint: buttock wound  Interval History:   Patient Denies: Vomiting  History taken from: Patient      Objective     Vital Signs  Temp:  [99 °F (37.2 °C)] 99 °F (37.2 °C)  Heart Rate:  [] 99  BP: ()/(50-92) 136/76      Physical Exam:   Heart: RR   Lungs:  CTA B   Abd: ntnd   Buttock wound: wound is soiled due to bowel movement, nurses are cleaning it now. No erythema.    Ext:  No clubbing, cyanosis, edema     Results Review:     I reviewed the patient's new clinical results.    Lab Results (last 24 hours)     Procedure Component Value Units Date/Time    CBC & Differential [[id number]] Collected:  10/19/19 0624    Specimen:  Blood Updated:  10/19/19 0759    Narrative:       The following orders were created for panel order CBC & Differential.  Procedure                               Abnormality         Status                     ---------                               -----------         ------                     CBC Auto Differential[[id number]]        Abnormal            Final result                 Please view results for these tests on the individual orders.    CBC Auto Differential [[id number]]  (Abnormal) Collected:  10/19/19 0624    Specimen:  Blood Updated:  10/19/19 0759     WBC 21.50 10*3/mm3      RBC 3.60 10*6/mm3      Hemoglobin 10.6 g/dL      Hematocrit 32.8 %      MCV 91.0 fL      MCH 29.5 pg      MCHC 32.4 g/dL      RDW 16.7 %      RDW-SD 52.9 fl      MPV 8.5 fL      Platelets 141 10*3/mm3     Scan Slide [[id number]] Collected:  10/19/19 0624    Specimen:  Blood Updated:  10/19/19 0759     Scan Slide --     Comment: See Manual Differential Results       Manual Differential [[id number]]  (Abnormal) Collected:  10/19/19 0624    Specimen:  Blood Updated:  10/19/19 0759     Neutrophil % 73.0 %      Lymphocyte % 3.0 %      Monocyte % 4.0 %      Eosinophil % 1.0 %      Bands %  17.0 %      Metamyelocyte % 2.0 %       Neutrophils Absolute 19.35 10*3/mm3      Lymphocytes Absolute 0.65 10*3/mm3      Monocytes Absolute 0.86 10*3/mm3      Eosinophils Absolute 0.22 10*3/mm3      Polychromasia Slight/1+     WBC Morphology Normal     Platelet Morphology Normal    Narrative:       Slide Reviewed    POC Glucose Once [[id number]]  (Abnormal) Collected:  10/19/19 0741    Specimen:  Blood Updated:  10/19/19 0742     Glucose 117 mg/dL      Comment: Serial Number: [id number]:  [id number]       Comprehensive Metabolic Panel [[id number]]  (Abnormal) Collected:  10/19/19 0624    Specimen:  Blood Updated:  10/19/19 0652     Glucose 133 mg/dL      BUN 27 mg/dL      Creatinine 1.55 mg/dL      Sodium 139 mmol/L      Potassium 4.4 mmol/L      Chloride 111 mmol/L      CO2 22.0 mmol/L      Calcium 7.8 mg/dL      Total Protein 5.5 g/dL      Albumin 2.20 g/dL      ALT (SGPT) 46 U/L      AST (SGOT) 67 U/L      Alkaline Phosphatase 163 U/L      Total Bilirubin 0.4 mg/dL      eGFR Non African Amer 49 mL/min/1.73      Globulin 3.3 gm/dL      A/G Ratio 0.7 g/dL      BUN/Creatinine Ratio 17.4     Anion Gap 6.0 mmol/L     Narrative:       GFR Normal >60  Chronic Kidney Disease <60  Kidney Failure <15    POC Glucose Once [[id number]]  (Abnormal) Collected:  10/19/19 0129    Specimen:  Blood Updated:  10/19/19 0130     Glucose 127 mg/dL      Comment: Serial Number: [id number]:  [id number]       POC Glucose Once [[id number]]  (Abnormal) Collected:  10/18/19 2133    Specimen:  Blood Updated:  10/18/19 2133     Glucose 166 mg/dL      Comment: Serial Number: [id number]:  [id number]       POC Glucose Once [[id number]]  (Abnormal) Collected:  10/18/19 1938    Specimen:  Blood Updated:  10/18/19 1940     Glucose 198 mg/dL      Comment: Serial Number: [id number]:  [id number]       POC Glucose Once [[id number]]  (Abnormal) Collected:  10/18/19 1831    Specimen:  Blood Updated:  10/18/19 1832     Glucose 166 mg/dL      Comment: Serial Number:  [id number]:  [id number]       Blood Culture - Blood, Arm, Left [[id number]] Collected:  10/15/19 1230    Specimen:  Blood from Arm, Left Updated:  10/18/19 1315     Blood Culture No growth at 3 days    POC Glucose Once [[id number]]  (Abnormal) Collected:  10/18/19 1136    Specimen:  Blood Updated:  10/18/19 1144     Glucose 125 mg/dL      Comment: Serial Number: [id number]:  [id number]             Assessment/Plan:    Abscess, gluteal, left    Benign essential hypertension    GERD (gastroesophageal reflux disease)    Hyperlipidemia, mixed    Major depression, chronic    Seizure disorder (CMS/HCC)    Type 2 diabetes mellitus (CMS/HCC)    Sepsis (CMS/HCC)      44 y.o. 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 He is agreeable to colostomy on Monday with Dr. Almanzar.       Ksenia Roy MD  General Surgeon  10/19/19

## 2019-10-20 PROBLEM — N49.3 FOURNIER'S GANGRENE IN MALE: Status: ACTIVE | Noted: 2019-10-15

## 2019-10-20 LAB
ALBUMIN SERPL-MCNC: 2.2 G/DL (ref 3.5–5.2)
ALBUMIN/GLOB SERPL: 0.7 G/DL
ALP SERPL-CCNC: 157 U/L (ref 39–117)
ALT SERPL W P-5'-P-CCNC: 60 U/L (ref 1–41)
ANION GAP SERPL CALCULATED.3IONS-SCNC: 5 MMOL/L (ref 5–15)
AST SERPL-CCNC: 76 U/L (ref 1–40)
BACTERIA SPEC AEROBE CULT: NORMAL
BILIRUB SERPL-MCNC: 0.3 MG/DL (ref 0.2–1.2)
BUN BLD-MCNC: 27 MG/DL (ref 6–20)
BUN/CREAT SERPL: 19.6 (ref 7–25)
CALCIUM SPEC-SCNC: 7.7 MG/DL (ref 8.6–10.5)
CHLORIDE SERPL-SCNC: 108 MMOL/L (ref 98–107)
CO2 SERPL-SCNC: 23 MMOL/L (ref 22–29)
CREAT BLD-MCNC: 1.38 MG/DL (ref 0.76–1.27)
DEPRECATED RDW RBC AUTO: 51.6 FL (ref 37–54)
EOSINOPHIL # BLD MANUAL: 0.22 10*3/MM3 (ref 0–0.4)
EOSINOPHIL NFR BLD MANUAL: 1 % (ref 0.3–6.2)
ERYTHROCYTE [DISTWIDTH] IN BLOOD BY AUTOMATED COUNT: 16.6 % (ref 12.3–15.4)
GFR SERPL CREATININE-BSD FRML MDRD: 56 ML/MIN/1.73
GLOBULIN UR ELPH-MCNC: 3.3 GM/DL
GLUCOSE BLD-MCNC: 153 MG/DL (ref 65–99)
GLUCOSE BLDC GLUCOMTR-MCNC: 102 MG/DL (ref 70–105)
GLUCOSE BLDC GLUCOMTR-MCNC: 112 MG/DL (ref 70–105)
GLUCOSE BLDC GLUCOMTR-MCNC: 123 MG/DL (ref 70–105)
GLUCOSE BLDC GLUCOMTR-MCNC: 136 MG/DL (ref 70–105)
GLUCOSE BLDC GLUCOMTR-MCNC: 98 MG/DL (ref 70–105)
HCT VFR BLD AUTO: 31.7 % (ref 37.5–51)
HGB BLD-MCNC: 10.7 G/DL (ref 13–17.7)
LYMPHOCYTES # BLD MANUAL: 1.34 10*3/MM3 (ref 0.7–3.1)
LYMPHOCYTES NFR BLD MANUAL: 5 % (ref 5–12)
LYMPHOCYTES NFR BLD MANUAL: 6 % (ref 19.6–45.3)
MCH RBC QN AUTO: 30.3 PG (ref 26.6–33)
MCHC RBC AUTO-ENTMCNC: 33.7 G/DL (ref 31.5–35.7)
MCV RBC AUTO: 89.9 FL (ref 79–97)
METAMYELOCYTES NFR BLD MANUAL: 5 % (ref 0–0)
MONOCYTES # BLD AUTO: 1.12 10*3/MM3 (ref 0.1–0.9)
MYELOCYTES NFR BLD MANUAL: 1 % (ref 0–0)
NEUTROPHILS # BLD AUTO: 18.37 10*3/MM3 (ref 1.7–7)
NEUTROPHILS NFR BLD MANUAL: 67 % (ref 42.7–76)
NEUTS BAND NFR BLD MANUAL: 15 % (ref 0–5)
PLAT MORPH BLD: NORMAL
PLATELET # BLD AUTO: 160 10*3/MM3 (ref 140–450)
PMV BLD AUTO: 8.3 FL (ref 6–12)
POTASSIUM BLD-SCNC: 4.6 MMOL/L (ref 3.5–5.2)
PROT SERPL-MCNC: 5.5 G/DL (ref 6–8.5)
RBC # BLD AUTO: 3.52 10*6/MM3 (ref 4.14–5.8)
RBC MORPH BLD: NORMAL
SCAN SLIDE: NORMAL
SODIUM BLD-SCNC: 136 MMOL/L (ref 136–145)
TOXIC GRANULATION: ABNORMAL
WBC NRBC COR # BLD: 22.4 10*3/MM3 (ref 3.4–10.8)

## 2019-10-20 PROCEDURE — 94799 UNLISTED PULMONARY SVC/PX: CPT

## 2019-10-20 PROCEDURE — 82962 GLUCOSE BLOOD TEST: CPT

## 2019-10-20 PROCEDURE — 85007 BL SMEAR W/DIFF WBC COUNT: CPT | Performed by: INTERNAL MEDICINE

## 2019-10-20 PROCEDURE — 94760 N-INVAS EAR/PLS OXIMETRY 1: CPT

## 2019-10-20 PROCEDURE — 94640 AIRWAY INHALATION TREATMENT: CPT

## 2019-10-20 PROCEDURE — 25010000002 HYDROMORPHONE PER 4 MG: Performed by: SURGERY

## 2019-10-20 PROCEDURE — 85025 COMPLETE CBC W/AUTO DIFF WBC: CPT | Performed by: INTERNAL MEDICINE

## 2019-10-20 PROCEDURE — 25010000002 CEFTRIAXONE PER 250 MG: Performed by: INTERNAL MEDICINE

## 2019-10-20 PROCEDURE — 63710000001 INSULIN GLARGINE PER 5 UNITS: Performed by: INTERNAL MEDICINE

## 2019-10-20 PROCEDURE — 80053 COMPREHEN METABOLIC PANEL: CPT | Performed by: INTERNAL MEDICINE

## 2019-10-20 PROCEDURE — 99024 POSTOP FOLLOW-UP VISIT: CPT | Performed by: SURGERY

## 2019-10-20 PROCEDURE — 99232 SBSQ HOSP IP/OBS MODERATE 35: CPT | Performed by: FAMILY MEDICINE

## 2019-10-20 RX ORDER — GINSENG 100 MG
CAPSULE ORAL EVERY 12 HOURS SCHEDULED
Status: DISCONTINUED | OUTPATIENT
Start: 2019-10-20 | End: 2019-10-24 | Stop reason: HOSPADM

## 2019-10-20 RX ORDER — IPRATROPIUM BROMIDE AND ALBUTEROL SULFATE 2.5; .5 MG/3ML; MG/3ML
3 SOLUTION RESPIRATORY (INHALATION)
Status: DISCONTINUED | OUTPATIENT
Start: 2019-10-20 | End: 2019-10-24 | Stop reason: HOSPADM

## 2019-10-20 RX ADMIN — BUPROPION HYDROCHLORIDE 300 MG: 150 TABLET, EXTENDED RELEASE ORAL at 11:01

## 2019-10-20 RX ADMIN — IPRATROPIUM BROMIDE AND ALBUTEROL SULFATE 3 ML: .5; 3 SOLUTION RESPIRATORY (INHALATION) at 15:50

## 2019-10-20 RX ADMIN — INSULIN GLARGINE 20 UNITS: 100 INJECTION, SOLUTION SUBCUTANEOUS at 06:24

## 2019-10-20 RX ADMIN — HYDROMORPHONE HYDROCHLORIDE 0.5 MG: 2 INJECTION, SOLUTION INTRAMUSCULAR; INTRAVENOUS; SUBCUTANEOUS at 22:02

## 2019-10-20 RX ADMIN — CLINDAMYCIN PHOSPHATE 900 MG: 900 INJECTION, SOLUTION INTRAVENOUS at 13:59

## 2019-10-20 RX ADMIN — LEVETIRACETAM 500 MG: 500 TABLET, FILM COATED ORAL at 11:01

## 2019-10-20 RX ADMIN — LEVETIRACETAM 500 MG: 500 TABLET, FILM COATED ORAL at 22:06

## 2019-10-20 RX ADMIN — HYDROMORPHONE HYDROCHLORIDE 0.5 MG: 2 INJECTION, SOLUTION INTRAMUSCULAR; INTRAVENOUS; SUBCUTANEOUS at 11:01

## 2019-10-20 RX ADMIN — ATORVASTATIN CALCIUM 40 MG: 40 TABLET, FILM COATED ORAL at 11:01

## 2019-10-20 RX ADMIN — ROPINIROLE 0.5 MG: 0.25 TABLET, FILM COATED ORAL at 22:06

## 2019-10-20 RX ADMIN — IPRATROPIUM BROMIDE AND ALBUTEROL SULFATE 3 ML: .5; 3 SOLUTION RESPIRATORY (INHALATION) at 19:30

## 2019-10-20 RX ADMIN — HYDROMORPHONE HYDROCHLORIDE 0.5 MG: 2 INJECTION, SOLUTION INTRAMUSCULAR; INTRAVENOUS; SUBCUTANEOUS at 06:24

## 2019-10-20 RX ADMIN — ROPINIROLE 0.5 MG: 0.25 TABLET, FILM COATED ORAL at 15:38

## 2019-10-20 RX ADMIN — BACLOFEN 10 MG: 10 TABLET ORAL at 11:01

## 2019-10-20 RX ADMIN — CLINDAMYCIN PHOSPHATE 900 MG: 900 INJECTION, SOLUTION INTRAVENOUS at 06:24

## 2019-10-20 RX ADMIN — CEFTRIAXONE SODIUM 2 G: 2 INJECTION, POWDER, FOR SOLUTION INTRAMUSCULAR; INTRAVENOUS at 14:48

## 2019-10-20 RX ADMIN — Medication 10 ML: at 11:02

## 2019-10-20 RX ADMIN — SODIUM CHLORIDE 50 ML/HR: 900 INJECTION, SOLUTION INTRAVENOUS at 14:48

## 2019-10-20 RX ADMIN — Medication 10 ML: at 22:31

## 2019-10-20 RX ADMIN — HYDROMORPHONE HYDROCHLORIDE 0.5 MG: 2 INJECTION, SOLUTION INTRAMUSCULAR; INTRAVENOUS; SUBCUTANEOUS at 15:38

## 2019-10-20 RX ADMIN — ROPINIROLE 0.5 MG: 0.25 TABLET, FILM COATED ORAL at 11:01

## 2019-10-20 RX ADMIN — CLINDAMYCIN PHOSPHATE 900 MG: 900 INJECTION, SOLUTION INTRAVENOUS at 22:06

## 2019-10-20 NOTE — PLAN OF CARE
Problem: Patient Care Overview  Goal: Plan of Care Review  Outcome: Ongoing (interventions implemented as appropriate)   10/20/19 1800   Coping/Psychosocial   Plan of Care Reviewed With patient;spouse   Plan of Care Review   Progress no change   OTHER   Outcome Summary vss; dressing change 2x today with each bm; plan for colostomy on monday.

## 2019-10-20 NOTE — PROGRESS NOTES
Infectious Diseases Progress Note      LOS: 5 days   Patient Care Team:  Sanjay Chance MD as PCP - General    Chief Complaint: Buttock pain    Subjective     The patient had no high-grade fever during the last 24 hours.  The patient remained off vasopressors.  The patient remained hemodynamically stable.  He is awake and alert.    Review of Systems:   Review of Systems   Constitutional: Negative.    HENT: Negative.    Eyes: Negative.    Respiratory: Negative.    Cardiovascular: Negative.    Gastrointestinal: Negative.    Genitourinary: Negative.    Musculoskeletal: Negative.    Skin: Positive for wound.   Neurological: Negative.    Hematological: Negative.    Psychiatric/Behavioral: Negative.         Objective     Vital Signs  Temp:  [97.7 °F (36.5 °C)-99.1 °F (37.3 °C)] 97.8 °F (36.6 °C)  Heart Rate:  [] 91  Resp:  [16-22] 22  BP: (126-164)/(65-90) 144/90    Physical Exam:  Physical Exam   Constitutional: He is oriented to person, place, and time. He appears well-developed and well-nourished.   HENT:   Head: Normocephalic and atraumatic.   Eyes: EOM are normal. Pupils are equal, round, and reactive to light.   Neck: Normal range of motion. Neck supple.   Cardiovascular: Normal rate, regular rhythm and normal heart sounds.   Pulmonary/Chest: Effort normal and breath sounds normal. No respiratory distress. He has no wheezes. He has no rales.   Abdominal: Soft. Bowel sounds are normal. He exhibits no distension and no mass. There is no tenderness. There is no rebound and no guarding.   Musculoskeletal: Normal range of motion. He exhibits no edema or deformity.   Neurological: He is alert and oriented to person, place, and time. No cranial nerve deficit.   Skin: Skin is warm. No rash noted. No erythema.   Large wound in the left buttock very close anus.  The wound is packed with no significant purulent or necrotic tissue   Psychiatric: He has a normal mood and affect.   Nursing note and vitals  reviewed.       Results Review:    I have reviewed all clinical data, test, lab, and imaging results.     Radiology  No Radiology Exams Resulted Within Past 24 Hours    Cardiology    Laboratory  Results from last 7 days   Lab Units 10/20/19  0418   WBC 10*3/mm3 22.40*   HEMOGLOBIN g/dL 10.7*   HEMATOCRIT % 31.7*   PLATELETS 10*3/mm3 160     Results from last 7 days   Lab Units 10/20/19  0418   SODIUM mmol/L 136   POTASSIUM mmol/L 4.6   CHLORIDE mmol/L 108*   CO2 mmol/L 23.0   BUN mg/dL 27*   CREATININE mg/dL 1.38*   GLUCOSE mg/dL 153*   CALCIUM mg/dL 7.7*     Results from last 7 days   Lab Units 10/20/19  0418   SODIUM mmol/L 136   POTASSIUM mmol/L 4.6   CHLORIDE mmol/L 108*   CO2 mmol/L 23.0   BUN mg/dL 27*   CREATININE mg/dL 1.38*   GLUCOSE mg/dL 153*   CALCIUM mg/dL 7.7*                 Microbiology   Microbiology Results (last 10 days)     Procedure Component Value - Date/Time    Blood Culture - Blood, Arm, Right [[id number]] Collected:  10/16/19 1032    Lab Status:  Preliminary result Specimen:  Blood from Arm, Right Updated:  10/17/19 1101     Blood Culture No growth at 24 hours    Wound Culture - Wound, Buttock, Left [[id number]]  (Abnormal) Collected:  10/15/19 1347    Lab Status:  Final result Specimen:  Wound from Buttock, Left Updated:  10/18/19 1004     Wound Culture Moderate growth (3+) Streptococcus, Beta Hemolytic, Group F     Comment: This organism is considered to be universally susceptible to penicillin.  No further antibiotic testing will be performed.        STREP GROUPING F     Wound Culture Scant growth (1+) Normal Skin Chuyita     Gram Stain Many (4+) WBCs per low power field      Moderate (3+) Gram positive cocci in chains      Mixed bacterial morphotypes seen on Gram Stain    Blood Culture - Blood, Arm, Left [[id number]] Collected:  10/15/19 1230    Lab Status:  Preliminary result Specimen:  Blood from Arm, Left Updated:  10/19/19 1315     Blood Culture No growth at 4 days          Medication  Review:       Schedule Meds    atorvastatin 40 mg Oral Daily   baclofen 10 mg Oral Daily   buPROPion  mg Oral Daily   ceftriaxone 2 g Intravenous Q24H   clindamycin 900 mg Intravenous Q8H   insulin glargine 20 Units Subcutaneous QAM   insulin lispro 0-24 Units Subcutaneous 4x Daily With Meals & Nightly   levETIRAcetam 500 mg Oral Q12H   rOPINIRole 0.5 mg Oral TID   sodium chloride 10 mL Intravenous Q12H   Sodium Hypochlorite 0.0625 % (Dakin's 1/8th Strength) topical solution 946 mL Irrigation Q24H       Infusion Meds    sodium chloride 50 mL/hr Last Rate: 50 mL/hr (10/19/19 0710)       PRN Meds  •  aluminum-magnesium hydroxide-simethicone  •  bisacodyl  •  dextrose  •  dextrose  •  glucagon (human recombinant)  •  HYDROmorphone  •  magnesium hydroxide  •  melatonin  •  ondansetron **OR** ondansetron  •  oxyCODONE **OR** oxyCODONE  •  sodium chloride  •  sodium chloride        Assessment/Plan       Antimicrobial Therapy   1.  IV clindamycin      day  2.  IV Rocephin      day  3.      day  4.      Day  5.      Day      Assessment     Left buttock abscess associated with skin and soft tissue necrosis.  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  Intraoperative cultures are pending.  This type of infection usually associated with polymicrobial infection.    Intraoperative culture grew group F Streptococcus     Diabetes mellitus with diabetic neuropathy     Mild septic shock was on low-dose of norepinephrine.  Currently off pressors     Reactive leukocytosis     Plan     Continue IV clindamycin for now and consider short course  Continue Rocephin 2 g IV daily for 2 weeks  Continue supportive care  Labs in a.m.  Probable diverting colostomy this admission          Joel Portillo MD  10/20/19  1:10 PM    Note is dictated utilizing voice recognition software/Dragon

## 2019-10-20 NOTE — PROGRESS NOTES
"      [facility] Medicine Services Daily Progress Note      Hospitalist Team  LOS 5 days      Patient Care Team:  Sanjay Chance MD as PCP - General        Chief Complaint / Subjective  Chief Complaint   Patient presents with   • Abdominal Pain     Patient doing well overall.  Patient reports some mild tightness when taking deep breaths.  He reports he has not been using his incentive spirometer much.  Mild wheezing.  No chest pain.    Brief Synopsis of Hospital Course/HPI    This is a 44-year-old  male with a past medical history of diabetes mellitus type 2, CVA, seizures, hypertension, hyperlipidemia, cholecystectomy, and kidney stones who presented to the ED on 10/15/2019 with complaints of buttock pain.  Patient stated 3 days ago he developed a bump on his rear end and has progressively gotten worse.  He states it hurts very badly to sit down and pain medication makes the pain pain better.  He thought he had been sitting in a recliner too long and this had caused a bump.  He states his pain is 8 out of 10.  He denies any recent nausea, vomiting, diarrhea, fever, chills.  Patient states when moving from one bed to the next bed in radiology, he felt a \"pop\"and his buttock started draining.  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  General surgery was consulted and the patient was taken to the OR for an I&D and debridement of devitalized tissue.       The patient was subsequently admitted to the PCU postoperatively, the patient became hypotensive and was unresponsive to IV fluids.  He developed septic shock and required Levophed.       General surgery advised the patient may need a diverting colostomy.  The patient will be placed NPO at midnight 10/17 for potential colostomy and/or further debridement of the wound.  This could occur later this weekend, as " "well.     Infectious disease has started the patient on IV vancomycin/clindamycin/meropenem, pending final culture results.       The hospitalists were consulted on 10/17 for medical management.  The patient complains of left buttock pain since general surgery re-packed the wound.  He states he is doing well otherwise.        Review of systems negative for all other 10 systems    Review of Systems   Constitution: Negative for chills and fever.   Cardiovascular: Negative for chest pain and cyanosis.   Respiratory: Negative for shortness of breath and wheezing.    Gastrointestinal: Negative for bowel incontinence and nausea.   Neurological: Negative for dizziness and light-headedness.   Psychiatric/Behavioral: Negative for altered mental status. The patient does not have insomnia.        Family History   Problem Relation Age of Onset   • Breast cancer Mother    • Cervical cancer Mother        Past Medical History:   Diagnosis Date   • Diabetes (CMS/HCC)    • GERD (gastroesophageal reflux disease)    • Hypertension    • Seizures (CMS/HCC)        Social History     Socioeconomic History   • Marital status:      Spouse name: Not on file   • Number of children: Not on file   • Years of education: Not on file   • Highest education level: Not on file   Tobacco Use   • Smoking status: Current Every Day Smoker     Types: Electronic Cigarette   Substance and Sexual Activity   • Alcohol use: No     Frequency: Never     Comment: Quit several years ago at time of his CVA   • Drug use: Yes     Types: Marijuana           Objective      Vital Signs  Temp:  [97.7 °F (36.5 °C)-99.1 °F (37.3 °C)] 97.8 °F (36.6 °C)  Heart Rate:  [] 91  Resp:  [16-22] 22  BP: (126-164)/(65-90) 144/90  Oxygen Therapy  SpO2: 97 %  Pulse Oximetry Type: Continuous  Device (Oxygen Therapy): nasal cannula  Flow (L/min): 2  Flowsheet Rows      First Filed Value   Admission Height  160 cm (63\") Documented at 10/15/2019 0954   Admission Weight  106 " kg (232 lb 12.9 oz) Documented at 10/15/2019 0954        Intake & Output (last 3 days)       10/17 0701 - 10/18 0700 10/18 0701 - 10/19 0700 10/19 0701 - 10/20 0700 10/20 0701 - 10/21 0700    P.O.   480 960    I.V. (mL/kg) 1020 (9) 4260 (37.7) 1402 (11.7)     IV Piggyback 100       Total Intake(mL/kg) 1120 (9.9) 4260 (37.7) 1882 (15.7) 960 (8)    Urine (mL/kg/hr) 400 (0.1) 3450 (1.3) 2400 (0.8)     Stool  0 0     Total Output 400 3450 2400     Net +720 +810 -518 +960            Stool Unmeasured Occurrence  1 x 1 x 1 x        Lines, Drains & Airways    Active LDAs     Name:   Placement date:   Placement time:   Site:   Days:    CVC Triple Lumen 10/15/19 Right Internal jugular   10/15/19    2345    Internal jugular   2    Peripheral IV 10/15/19 1249 Left Antecubital   10/15/19    1249    Antecubital   3    Peripheral IV 10/15/19 1900 Right Wrist   10/15/19    1900 IV site present when patient arrived to PCU    Wrist   2    Urethral Catheter Non-latex 16 Fr.   10/15/19    1615     2                  Physical Exam:     General: Morbidly obese male lying in bed appearing comfortable, on supplemental oxygen via nasal cannula   HEENT: NC/AT, EOMI, mucosa moist  Heart: RRR. No murmur   Chest: Diminished lung sounds, no wheezes appreciated, normal respiratory effort  Abdominal: Soft. NT/ND. Bowel sounds present  Musculoskeletal: Normal ROM.  No edema. No calf tenderness.  Neurological: AAOx3, no focal deficits  Skin: Skin is warm and dry.  Gluteal wound dressed and not assessed  Psychiatric: Normal mood and affect.    Procedures:    Procedure(s):  INCISION AND DRAINAGE OF LEFT GLUTEAL REGION          Results Review:     I reviewed the patient's new clinical results.  reviewed    Results from last 7 days   Lab Units 10/20/19  0418 10/19/19  0624 10/18/19  0640   WBC 10*3/mm3 22.40* 21.50* 18.30*   HEMOGLOBIN g/dL 10.7* 10.6* 10.6*   HEMATOCRIT % 31.7* 32.8* 33.2*   PLATELETS 10*3/mm3 160 141 120*   MONOCYTES % % 5.0 4.0* 5.0      Results from last 7 days   Lab Units 10/20/19  0418 10/19/19  0624 10/18/19  0640   SODIUM mmol/L 136 139 140   POTASSIUM mmol/L 4.6 4.4 4.4   CHLORIDE mmol/L 108* 111* 113*   CO2 mmol/L 23.0 22.0 21.0*   BUN mg/dL 27* 27* 32*   CREATININE mg/dL 1.38* 1.55* 1.56*   CALCIUM mg/dL 7.7* 7.8* 7.6*   BILIRUBIN mg/dL 0.3 0.4 0.5   ALK PHOS U/L 157* 163* 142*   ALT (SGPT) U/L 60* 46* 29   AST (SGOT) U/L 76* 67* 49*   GLUCOSE mg/dL 153* 133* 180*         Lab Results   Component Value Date    CALCIUM 7.7 (L) 10/20/2019     No results found for: HGBA1C                Microbiology Results (last 10 days)     Procedure Component Value - Date/Time    Blood Culture - Blood, Arm, Right [[id number]] Collected:  10/16/19 1032    Lab Status:  Preliminary result Specimen:  Blood from Arm, Right Updated:  10/17/19 1101     Blood Culture No growth at 24 hours    Wound Culture - Wound, Buttock, Left [[id number]]  (Abnormal) Collected:  10/15/19 1347    Lab Status:  Final result Specimen:  Wound from Buttock, Left Updated:  10/18/19 1004     Wound Culture Moderate growth (3+) Streptococcus, Beta Hemolytic, Group F     Comment: This organism is considered to be universally susceptible to penicillin.  No further antibiotic testing will be performed.        STREP GROUPING F     Wound Culture Scant growth (1+) Normal Skin Chuyita     Gram Stain Many (4+) WBCs per low power field      Moderate (3+) Gram positive cocci in chains      Mixed bacterial morphotypes seen on Gram Stain    Blood Culture - Blood, Arm, Left [[id number]] Collected:  10/15/19 1230    Lab Status:  Final result Specimen:  Blood from Arm, Left Updated:  10/20/19 1315     Blood Culture No growth at 5 days          ECG/EMG Results (most recent)     None                    Ct Abdomen Pelvis Without Contrast    Result Date: 10/15/2019   1. 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 Abnormal subcutaneous fat induration and superficial and deep air  loculations extend from the left gluteal region, into the left ischial rectal fossa, and into the fat adjacent to the left lateral margin of the penile shaft. Of note, the perineum is not imaged in its entirety. 2. No drainable fluid collection or abscess is seen within the pelvis. 3. Nonobstructing left renal stone. 4. Chronic calcific pancreatitis.    Electronically Signed By-Dr. Rowena Carballo MD On:10/15/2019 1:38 PM This report was finalized on [id number] by Dr. Rowena Carballo MD.    Xr Chest 1 View    Result Date: 10/16/2019  1.Subsegmental atelectasis right lower lobe. 2.Cardiomegaly. 3.Central venous catheter tip has appropriate position.  Electronically Signed By-Winifred Simental On:10/16/2019 7:36 AM This report was finalized on [id number] by  Winifred Simental, .      Xrays, labs reviewed personally by physician.    Medication Review:   I have reviewed the patient's current medication list  reviewed    Scheduled Meds    atorvastatin 40 mg Oral Daily   baclofen 10 mg Oral Daily   buPROPion  mg Oral Daily   ceftriaxone 2 g Intravenous Q24H   clindamycin 900 mg Intravenous Q8H   insulin glargine 20 Units Subcutaneous QAM   insulin lispro 0-24 Units Subcutaneous 4x Daily With Meals & Nightly   levETIRAcetam 500 mg Oral Q12H   rOPINIRole 0.5 mg Oral TID   sodium chloride 10 mL Intravenous Q12H   Sodium Hypochlorite 0.0625 % (Dakin's 1/8th Strength) topical solution 946 mL Irrigation Q24H       Meds Infusions    sodium chloride 50 mL/hr Last Rate: 50 mL/hr (10/19/19 1334)       Meds PRN  •  aluminum-magnesium hydroxide-simethicone  •  bisacodyl  •  dextrose  •  dextrose  •  glucagon (human recombinant)  •  HYDROmorphone  •  magnesium hydroxide  •  melatonin  •  ondansetron **OR** ondansetron  •  oxyCODONE **OR** oxyCODONE  •  sodium chloride  •  sodium chloride        Assessment / Plan    Active Hospital Problems    Diagnosis  POA   • **Abscess, gluteal, left [L02.31]  Yes   • Sepsis (CMS/HCC) [A41.9]  Yes   •  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     Patient is a 44-year-old male with history of CVA as well as type 2 diabetes and vascular disease developed 40 years gangrene now status post I&D going back to the OR for diverting colostomy on 10/21/2019, appearing stable     Gluteal pain -secondary to developing infection and carlos's gangrene.  -Pain control  -IV antibiotics  -Status post I&D, patient getting diverting colostomy  -General surgery following  -Off pressors  -Antibiotics per ID  -Wound cultures showing group F Streptococcus     Chest tightness -given prolonged time in bed with minimal physical activity likely component of atelectasis  -Discussed importance of using incentive spirometer at least every 2 hours  -Adding duo nebs every 6 while awake    Septic shock -as above  -No further pressor support     Acute kidney injury - improving, likely component of chronic renal insufficiency now at 1.38  -  Improving from 1.7-1.56  - continue IV fluid but at reduced rate  - monitor BMP     Leukocytosis -secondary to infection continues to be elevated  -Monitor daily    Anemia -mild likely some loss postoperatively and then degree of chronic inflammation currently stable  -Monitor daily    diabetes mellitus type 2 -uncertain degree of control at home, glucoses less than 200  - continue Lantus daily  - SSI   - accuchecks AC HS     Essential hypertension - chronic relatively controlled, hypotension resolved  -Resume meds once taking p.o. postop     Hyperlipidemia -stable  - continue home atorvastatin      Depression, chronic -appears controlled  - continue home  wellbutrin      Seizure disorder -no seizure episodes noted  - continue home Keppra      Morbid obesity (BMI 44.13)  - encourage lifestyle modifications      VTE prophylaxis - bilateral SCDs    Disposition -inpatient    Patient being treated for Dale's gangrene.  Patient to get diverting colostomy on 10/21/2019 to allow for wound healing.  Managing medications and comorbidities.    Jett Skelton MD  10/20/19  1:52 PM

## 2019-10-20 NOTE — PROGRESS NOTES
LOS: 5 days   Patient Care Team:  Sanjay Chance MD as PCP - General    Chief Complaint: none  Interval History:   Patient Denies: Vomiting  History taken from: Patient      Objective     Vital Signs  Temp:  [97.7 °F (36.5 °C)-99.1 °F (37.3 °C)] 98.2 °F (36.8 °C)  Heart Rate:  [] 105  Resp:  [16-20] 20  BP: (126-164)/() 140/83      Physical Exam:   Heart: RR   Lungs:  CTA B   Abd: ntnd               Buttock wound= dressing intact   Ext:  No clubbing, cyanosis, edema     Results Review:     I reviewed the patient's new clinical results.    Lab Results (last 24 hours)     Procedure Component Value Units Date/Time    POC Glucose Once [[id number]]  (Abnormal) Collected:  10/20/19 0927    Specimen:  Blood Updated:  10/20/19 0927     Glucose 123 mg/dL      Comment: Serial Number: [id number]:  [id number]       POC Glucose Once [[id number]]  (Abnormal) Collected:  10/20/19 0712    Specimen:  Blood Updated:  10/20/19 0713     Glucose 112 mg/dL      Comment: Serial Number: [id number]:  [id number]       CBC & Differential [[id number]] Collected:  10/20/19 0418    Specimen:  Blood Updated:  10/20/19 0630    Narrative:       The following orders were created for panel order CBC & Differential.  Procedure                               Abnormality         Status                     ---------                               -----------         ------                     CBC Auto Differential[[id number]]        Abnormal            Final result                 Please view results for these tests on the individual orders.    CBC Auto Differential [[id number]]  (Abnormal) Collected:  10/20/19 0418    Specimen:  Blood Updated:  10/20/19 0630     WBC 22.40 10*3/mm3      RBC 3.52 10*6/mm3      Hemoglobin 10.7 g/dL      Hematocrit 31.7 %      MCV 89.9 fL      MCH 30.3 pg      MCHC 33.7 g/dL      RDW 16.6 %      RDW-SD 51.6 fl      MPV 8.3 fL      Platelets 160 10*3/mm3     Scan Slide [[id number]] Collected:  10/20/19  0418    Specimen:  Blood Updated:  10/20/19 0630     Scan Slide --     Comment: See Manual Differential Results       Manual Differential [[id number]]  (Abnormal) Collected:  10/20/19 0418    Specimen:  Blood Updated:  10/20/19 0630     Neutrophil % 67.0 %      Lymphocyte % 6.0 %      Monocyte % 5.0 %      Eosinophil % 1.0 %      Bands %  15.0 %      Metamyelocyte % 5.0 %      Myelocyte % 1.0 %      Neutrophils Absolute 18.37 10*3/mm3      Lymphocytes Absolute 1.34 10*3/mm3      Monocytes Absolute 1.12 10*3/mm3      Eosinophils Absolute 0.22 10*3/mm3      RBC Morphology Normal     Toxic Granulation Slight/1+     Platelet Morphology Normal    Narrative:       Slide Reviewed    Comprehensive Metabolic Panel [[id number]]  (Abnormal) Collected:  10/20/19 0418    Specimen:  Blood Updated:  10/20/19 0451     Glucose 153 mg/dL      BUN 27 mg/dL      Creatinine 1.38 mg/dL      Sodium 136 mmol/L      Potassium 4.6 mmol/L      Chloride 108 mmol/L      CO2 23.0 mmol/L      Calcium 7.7 mg/dL      Total Protein 5.5 g/dL      Albumin 2.20 g/dL      ALT (SGPT) 60 U/L      AST (SGOT) 76 U/L      Alkaline Phosphatase 157 U/L      Total Bilirubin 0.3 mg/dL      eGFR Non African Amer 56 mL/min/1.73      Globulin 3.3 gm/dL      A/G Ratio 0.7 g/dL      BUN/Creatinine Ratio 19.6     Anion Gap 5.0 mmol/L     Narrative:       GFR Normal >60  Chronic Kidney Disease <60  Kidney Failure <15    POC Glucose Once [[id number]]  (Abnormal) Collected:  10/19/19 2124    Specimen:  Blood Updated:  10/19/19 2125     Glucose 147 mg/dL      Comment: Serial Number: [id number]:  [id number]       POC Glucose Once [[id number]]  (Abnormal) Collected:  10/19/19 1736    Specimen:  Blood Updated:  10/19/19 1738     Glucose 158 mg/dL      Comment: Serial Number: [id number]:  [id number]       Blood Culture - Blood, Arm, Left [[id number]] Collected:  10/15/19 1230    Specimen:  Blood from Arm, Left Updated:  10/19/19 1315     Blood Culture No growth at 4  days    POC Glucose Once [[id number]]  (Abnormal) Collected:  10/19/19 1153    Specimen:  Blood Updated:  10/19/19 1155     Glucose 113 mg/dL      Comment: Serial Number: [id number]:  [id number]             Assessment/Plan:    Abscess, gluteal, left    Benign essential hypertension    GERD (gastroesophageal reflux disease)    Hyperlipidemia, mixed    Major depression, chronic    Seizure disorder (CMS/HCC)    Type 2 diabetes mellitus (CMS/HCC)    Sepsis (CMS/HCC)    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 male with necrotizing soft tissue infection of perianal region. He consents to surgery with Dr. Almanzar. Will be tomorrow afternoon.       Ksenia Roy MD  General Surgeon  10/20/19

## 2019-10-20 NOTE — PLAN OF CARE
Problem: Patient Care Overview  Goal: Discharge Needs Assessment  Outcome: Ongoing (interventions implemented as appropriate)  Patient will require multiple wound changes on his gluteal fold. Likely has generalized weakness.

## 2019-10-21 ENCOUNTER — ANESTHESIA (OUTPATIENT)
Dept: PERIOP | Facility: HOSPITAL | Age: 44
End: 2019-10-21

## 2019-10-21 ENCOUNTER — ANESTHESIA EVENT (OUTPATIENT)
Dept: PERIOP | Facility: HOSPITAL | Age: 44
End: 2019-10-21

## 2019-10-21 LAB
ABO GROUP BLD: NORMAL
ALBUMIN SERPL-MCNC: 2.1 G/DL (ref 3.5–5.2)
ALBUMIN/GLOB SERPL: 0.6 G/DL
ALP SERPL-CCNC: 150 U/L (ref 39–117)
ALT SERPL W P-5'-P-CCNC: 52 U/L (ref 1–41)
ANION GAP SERPL CALCULATED.3IONS-SCNC: 7 MMOL/L (ref 5–15)
APTT PPP: 23.1 SECONDS (ref 24–31)
AST SERPL-CCNC: 53 U/L (ref 1–40)
BACTERIA SPEC AEROBE CULT: NORMAL
BILIRUB SERPL-MCNC: 0.4 MG/DL (ref 0.2–1.2)
BLD GP AB SCN SERPL QL: NEGATIVE
BUN BLD-MCNC: 22 MG/DL (ref 6–20)
BUN/CREAT SERPL: 18.3 (ref 7–25)
CALCIUM SPEC-SCNC: 7.9 MG/DL (ref 8.6–10.5)
CHLORIDE SERPL-SCNC: 105 MMOL/L (ref 98–107)
CO2 SERPL-SCNC: 25 MMOL/L (ref 22–29)
CREAT BLD-MCNC: 1.2 MG/DL (ref 0.76–1.27)
DEPRECATED RDW RBC AUTO: 49.9 FL (ref 37–54)
EOSINOPHIL # BLD MANUAL: 0.35 10*3/MM3 (ref 0–0.4)
EOSINOPHIL NFR BLD MANUAL: 2 % (ref 0.3–6.2)
ERYTHROCYTE [DISTWIDTH] IN BLOOD BY AUTOMATED COUNT: 16.1 % (ref 12.3–15.4)
GFR SERPL CREATININE-BSD FRML MDRD: 66 ML/MIN/1.73
GLOBULIN UR ELPH-MCNC: 3.5 GM/DL
GLUCOSE BLD-MCNC: 89 MG/DL (ref 65–99)
GLUCOSE BLDC GLUCOMTR-MCNC: 101 MG/DL (ref 70–105)
GLUCOSE BLDC GLUCOMTR-MCNC: 109 MG/DL (ref 70–105)
GLUCOSE BLDC GLUCOMTR-MCNC: 124 MG/DL (ref 70–105)
GLUCOSE BLDC GLUCOMTR-MCNC: 76 MG/DL (ref 70–105)
GLUCOSE BLDC GLUCOMTR-MCNC: 91 MG/DL (ref 70–105)
HCT VFR BLD AUTO: 31.9 % (ref 37.5–51)
HGB BLD-MCNC: 10.6 G/DL (ref 13–17.7)
INR PPP: 0.97 (ref 0.9–1.1)
LARGE PLATELETS: ABNORMAL
LYMPHOCYTES # BLD MANUAL: 0.88 10*3/MM3 (ref 0.7–3.1)
LYMPHOCYTES NFR BLD MANUAL: 5 % (ref 19.6–45.3)
LYMPHOCYTES NFR BLD MANUAL: 7 % (ref 5–12)
MCH RBC QN AUTO: 29.6 PG (ref 26.6–33)
MCHC RBC AUTO-ENTMCNC: 33.2 G/DL (ref 31.5–35.7)
MCV RBC AUTO: 89.3 FL (ref 79–97)
METAMYELOCYTES NFR BLD MANUAL: 2 % (ref 0–0)
MONOCYTES # BLD AUTO: 1.23 10*3/MM3 (ref 0.1–0.9)
NEUTROPHILS # BLD AUTO: 14.7 10*3/MM3 (ref 1.7–7)
NEUTROPHILS NFR BLD MANUAL: 74 % (ref 42.7–76)
NEUTS BAND NFR BLD MANUAL: 10 % (ref 0–5)
PLATELET # BLD AUTO: 164 10*3/MM3 (ref 140–450)
PMV BLD AUTO: 8 FL (ref 6–12)
POLYCHROMASIA BLD QL SMEAR: ABNORMAL
POTASSIUM BLD-SCNC: 4.2 MMOL/L (ref 3.5–5.2)
PROT SERPL-MCNC: 5.6 G/DL (ref 6–8.5)
PROTHROMBIN TIME: 10.2 SECONDS (ref 9.6–11.7)
RBC # BLD AUTO: 3.57 10*6/MM3 (ref 4.14–5.8)
RH BLD: NEGATIVE
SCAN SLIDE: NORMAL
SMALL PLATELETS BLD QL SMEAR: ADEQUATE
SODIUM BLD-SCNC: 137 MMOL/L (ref 136–145)
T&S EXPIRATION DATE: NORMAL
WBC MORPH BLD: NORMAL
WBC NRBC COR # BLD: 17.5 10*3/MM3 (ref 3.4–10.8)

## 2019-10-21 PROCEDURE — 82962 GLUCOSE BLOOD TEST: CPT

## 2019-10-21 PROCEDURE — 25010000002 PROPOFOL 200 MG/20ML EMULSION: Performed by: NURSE ANESTHETIST, CERTIFIED REGISTERED

## 2019-10-21 PROCEDURE — 94799 UNLISTED PULMONARY SVC/PX: CPT

## 2019-10-21 PROCEDURE — 25010000002 NEOSTIGMINE 10 MG/10ML SOLUTION: Performed by: ANESTHESIOLOGY

## 2019-10-21 PROCEDURE — 86901 BLOOD TYPING SEROLOGIC RH(D): CPT

## 2019-10-21 PROCEDURE — 85730 THROMBOPLASTIN TIME PARTIAL: CPT | Performed by: FAMILY MEDICINE

## 2019-10-21 PROCEDURE — 0D1M4Z4 BYPASS DESCENDING COLON TO CUTANEOUS, PERCUTANEOUS ENDOSCOPIC APPROACH: ICD-10-PCS | Performed by: SURGERY

## 2019-10-21 PROCEDURE — 86900 BLOOD TYPING SEROLOGIC ABO: CPT | Performed by: SURGERY

## 2019-10-21 PROCEDURE — 25010000002 DEXAMETHASONE PER 1 MG: Performed by: NURSE ANESTHETIST, CERTIFIED REGISTERED

## 2019-10-21 PROCEDURE — 85025 COMPLETE CBC W/AUTO DIFF WBC: CPT | Performed by: SURGERY

## 2019-10-21 PROCEDURE — 25010000002 CEFAZOLIN PER 500 MG: Performed by: NURSE ANESTHETIST, CERTIFIED REGISTERED

## 2019-10-21 PROCEDURE — 99232 SBSQ HOSP IP/OBS MODERATE 35: CPT | Performed by: INTERNAL MEDICINE

## 2019-10-21 PROCEDURE — 25010000002 HYDROMORPHONE PER 4 MG: Performed by: SURGERY

## 2019-10-21 PROCEDURE — 87081 CULTURE SCREEN ONLY: CPT | Performed by: FAMILY MEDICINE

## 2019-10-21 PROCEDURE — 86900 BLOOD TYPING SEROLOGIC ABO: CPT

## 2019-10-21 PROCEDURE — 99024 POSTOP FOLLOW-UP VISIT: CPT | Performed by: SURGERY

## 2019-10-21 PROCEDURE — 44188 LAP COLOSTOMY: CPT | Performed by: SURGERY

## 2019-10-21 PROCEDURE — 85610 PROTHROMBIN TIME: CPT | Performed by: SURGERY

## 2019-10-21 PROCEDURE — 25010000002 ONDANSETRON PER 1 MG: Performed by: ANESTHESIOLOGY

## 2019-10-21 PROCEDURE — 25010000002 FENTANYL CITRATE (PF) 100 MCG/2ML SOLUTION: Performed by: NURSE ANESTHETIST, CERTIFIED REGISTERED

## 2019-10-21 PROCEDURE — 85007 BL SMEAR W/DIFF WBC COUNT: CPT | Performed by: SURGERY

## 2019-10-21 PROCEDURE — 93005 ELECTROCARDIOGRAM TRACING: CPT | Performed by: FAMILY MEDICINE

## 2019-10-21 PROCEDURE — 80053 COMPREHEN METABOLIC PANEL: CPT | Performed by: SURGERY

## 2019-10-21 PROCEDURE — 86901 BLOOD TYPING SEROLOGIC RH(D): CPT | Performed by: SURGERY

## 2019-10-21 PROCEDURE — 86850 RBC ANTIBODY SCREEN: CPT | Performed by: SURGERY

## 2019-10-21 RX ORDER — PROMETHAZINE HYDROCHLORIDE 25 MG/1
25 SUPPOSITORY RECTAL ONCE AS NEEDED
Status: DISCONTINUED | OUTPATIENT
Start: 2019-10-21 | End: 2019-10-21 | Stop reason: HOSPADM

## 2019-10-21 RX ORDER — ONDANSETRON 2 MG/ML
4 INJECTION INTRAMUSCULAR; INTRAVENOUS ONCE AS NEEDED
Status: COMPLETED | OUTPATIENT
Start: 2019-10-21 | End: 2019-10-21

## 2019-10-21 RX ORDER — HYDROCODONE BITARTRATE AND ACETAMINOPHEN 5; 325 MG/1; MG/1
1 TABLET ORAL ONCE AS NEEDED
Status: DISCONTINUED | OUTPATIENT
Start: 2019-10-21 | End: 2019-10-21 | Stop reason: HOSPADM

## 2019-10-21 RX ORDER — PROMETHAZINE HYDROCHLORIDE 25 MG/1
25 TABLET ORAL ONCE AS NEEDED
Status: DISCONTINUED | OUTPATIENT
Start: 2019-10-21 | End: 2019-10-21 | Stop reason: HOSPADM

## 2019-10-21 RX ORDER — PROPOFOL 10 MG/ML
INJECTION, EMULSION INTRAVENOUS AS NEEDED
Status: DISCONTINUED | OUTPATIENT
Start: 2019-10-21 | End: 2019-10-21 | Stop reason: SURG

## 2019-10-21 RX ORDER — FENTANYL CITRATE 50 UG/ML
INJECTION, SOLUTION INTRAMUSCULAR; INTRAVENOUS AS NEEDED
Status: DISCONTINUED | OUTPATIENT
Start: 2019-10-21 | End: 2019-10-21 | Stop reason: SURG

## 2019-10-21 RX ORDER — ROCURONIUM BROMIDE 10 MG/ML
INJECTION, SOLUTION INTRAVENOUS AS NEEDED
Status: DISCONTINUED | OUTPATIENT
Start: 2019-10-21 | End: 2019-10-21 | Stop reason: SURG

## 2019-10-21 RX ORDER — BUPIVACAINE HYDROCHLORIDE AND EPINEPHRINE 2.5; 5 MG/ML; UG/ML
INJECTION, SOLUTION EPIDURAL; INFILTRATION; INTRACAUDAL; PERINEURAL AS NEEDED
Status: DISCONTINUED | OUTPATIENT
Start: 2019-10-21 | End: 2019-10-21 | Stop reason: HOSPADM

## 2019-10-21 RX ORDER — NEOSTIGMINE METHYLSULFATE 1 MG/ML
INJECTION, SOLUTION INTRAVENOUS AS NEEDED
Status: DISCONTINUED | OUTPATIENT
Start: 2019-10-21 | End: 2019-10-21 | Stop reason: SURG

## 2019-10-21 RX ORDER — KETOROLAC TROMETHAMINE 15 MG/ML
15 INJECTION, SOLUTION INTRAMUSCULAR; INTRAVENOUS EVERY 6 HOURS PRN
Status: DISCONTINUED | OUTPATIENT
Start: 2019-10-21 | End: 2019-10-21 | Stop reason: HOSPADM

## 2019-10-21 RX ORDER — FLUMAZENIL 0.1 MG/ML
0.5 INJECTION INTRAVENOUS AS NEEDED
Status: DISCONTINUED | OUTPATIENT
Start: 2019-10-21 | End: 2019-10-21 | Stop reason: HOSPADM

## 2019-10-21 RX ORDER — PROMETHAZINE HYDROCHLORIDE 25 MG/ML
12.5 INJECTION, SOLUTION INTRAMUSCULAR; INTRAVENOUS ONCE AS NEEDED
Status: DISCONTINUED | OUTPATIENT
Start: 2019-10-21 | End: 2019-10-21 | Stop reason: HOSPADM

## 2019-10-21 RX ORDER — MEPERIDINE HYDROCHLORIDE 25 MG/ML
12.5 INJECTION INTRAMUSCULAR; INTRAVENOUS; SUBCUTANEOUS
Status: DISCONTINUED | OUTPATIENT
Start: 2019-10-21 | End: 2019-10-21 | Stop reason: HOSPADM

## 2019-10-21 RX ORDER — MORPHINE SULFATE 4 MG/ML
2 INJECTION, SOLUTION INTRAMUSCULAR; INTRAVENOUS
Status: DISCONTINUED | OUTPATIENT
Start: 2019-10-21 | End: 2019-10-21

## 2019-10-21 RX ORDER — ACETAMINOPHEN 325 MG/1
650 TABLET ORAL ONCE AS NEEDED
Status: DISCONTINUED | OUTPATIENT
Start: 2019-10-21 | End: 2019-10-21 | Stop reason: HOSPADM

## 2019-10-21 RX ORDER — GLYCOPYRROLATE 0.2 MG/ML
INJECTION INTRAMUSCULAR; INTRAVENOUS AS NEEDED
Status: DISCONTINUED | OUTPATIENT
Start: 2019-10-21 | End: 2019-10-21 | Stop reason: SURG

## 2019-10-21 RX ORDER — NALOXONE HCL 0.4 MG/ML
0.4 VIAL (ML) INJECTION AS NEEDED
Status: DISCONTINUED | OUTPATIENT
Start: 2019-10-21 | End: 2019-10-21 | Stop reason: HOSPADM

## 2019-10-21 RX ORDER — KETAMINE HYDROCHLORIDE 10 MG/ML
INJECTION INTRAMUSCULAR; INTRAVENOUS AS NEEDED
Status: DISCONTINUED | OUTPATIENT
Start: 2019-10-21 | End: 2019-10-21 | Stop reason: SURG

## 2019-10-21 RX ORDER — LIDOCAINE HYDROCHLORIDE 10 MG/ML
INJECTION, SOLUTION EPIDURAL; INFILTRATION; INTRACAUDAL; PERINEURAL AS NEEDED
Status: DISCONTINUED | OUTPATIENT
Start: 2019-10-21 | End: 2019-10-21 | Stop reason: SURG

## 2019-10-21 RX ORDER — ACETAMINOPHEN 650 MG/1
650 SUPPOSITORY RECTAL ONCE AS NEEDED
Status: DISCONTINUED | OUTPATIENT
Start: 2019-10-21 | End: 2019-10-21 | Stop reason: HOSPADM

## 2019-10-21 RX ORDER — MIDAZOLAM HYDROCHLORIDE 1 MG/ML
1 INJECTION INTRAMUSCULAR; INTRAVENOUS ONCE
Status: DISCONTINUED | OUTPATIENT
Start: 2019-10-21 | End: 2019-10-21 | Stop reason: HOSPADM

## 2019-10-21 RX ADMIN — CLINDAMYCIN PHOSPHATE 900 MG: 900 INJECTION, SOLUTION INTRAVENOUS at 21:26

## 2019-10-21 RX ADMIN — BACITRACIN: 500 OINTMENT TOPICAL at 20:52

## 2019-10-21 RX ADMIN — BACITRACIN: 500 OINTMENT TOPICAL at 08:11

## 2019-10-21 RX ADMIN — HYDROMORPHONE HYDROCHLORIDE 0.5 MG: 2 INJECTION, SOLUTION INTRAMUSCULAR; INTRAVENOUS; SUBCUTANEOUS at 12:14

## 2019-10-21 RX ADMIN — HYDROMORPHONE HYDROCHLORIDE 0.5 MG: 2 INJECTION, SOLUTION INTRAMUSCULAR; INTRAVENOUS; SUBCUTANEOUS at 08:15

## 2019-10-21 RX ADMIN — OXYCODONE HYDROCHLORIDE 10 MG: 5 TABLET ORAL at 20:47

## 2019-10-21 RX ADMIN — FENTANYL CITRATE 50 MCG: 50 INJECTION, SOLUTION INTRAMUSCULAR; INTRAVENOUS at 14:59

## 2019-10-21 RX ADMIN — ROCURONIUM BROMIDE 50 MG: 10 INJECTION, SOLUTION INTRAVENOUS at 14:02

## 2019-10-21 RX ADMIN — ATORVASTATIN CALCIUM 40 MG: 40 TABLET, FILM COATED ORAL at 08:09

## 2019-10-21 RX ADMIN — SODIUM CHLORIDE 50 ML/HR: 900 INJECTION, SOLUTION INTRAVENOUS at 20:50

## 2019-10-21 RX ADMIN — FENTANYL CITRATE 100 MCG: 50 INJECTION, SOLUTION INTRAMUSCULAR; INTRAVENOUS at 14:35

## 2019-10-21 RX ADMIN — BACITRACIN: 500 OINTMENT TOPICAL at 01:16

## 2019-10-21 RX ADMIN — ROPINIROLE 0.5 MG: 0.25 TABLET, FILM COATED ORAL at 20:50

## 2019-10-21 RX ADMIN — NEOSTIGMINE METHYLSULFATE 3 MG: 1 INJECTION, SOLUTION INTRAVENOUS at 17:05

## 2019-10-21 RX ADMIN — SODIUM CHLORIDE: 900 INJECTION, SOLUTION INTRAVENOUS at 15:13

## 2019-10-21 RX ADMIN — ROPINIROLE 0.5 MG: 0.25 TABLET, FILM COATED ORAL at 08:10

## 2019-10-21 RX ADMIN — Medication 10 ML: at 20:51

## 2019-10-21 RX ADMIN — HYDROMORPHONE HYDROCHLORIDE 0.5 MG: 2 INJECTION, SOLUTION INTRAMUSCULAR; INTRAVENOUS; SUBCUTANEOUS at 05:49

## 2019-10-21 RX ADMIN — IPRATROPIUM BROMIDE AND ALBUTEROL SULFATE 3 ML: .5; 3 SOLUTION RESPIRATORY (INHALATION) at 06:46

## 2019-10-21 RX ADMIN — ONDANSETRON 4 MG: 2 INJECTION INTRAMUSCULAR; INTRAVENOUS at 17:05

## 2019-10-21 RX ADMIN — KETAMINE HYDROCHLORIDE 50 MG: 10 INJECTION INTRAMUSCULAR; INTRAVENOUS at 14:02

## 2019-10-21 RX ADMIN — FENTANYL CITRATE 100 MCG: 50 INJECTION, SOLUTION INTRAMUSCULAR; INTRAVENOUS at 14:02

## 2019-10-21 RX ADMIN — LEVETIRACETAM 500 MG: 500 TABLET, FILM COATED ORAL at 08:09

## 2019-10-21 RX ADMIN — BACLOFEN 10 MG: 10 TABLET ORAL at 08:09

## 2019-10-21 RX ADMIN — CLINDAMYCIN PHOSPHATE 900 MG: 900 INJECTION, SOLUTION INTRAVENOUS at 05:50

## 2019-10-21 RX ADMIN — LIDOCAINE HYDROCHLORIDE 100 MG: 10 INJECTION, SOLUTION EPIDURAL; INFILTRATION; INTRACAUDAL; PERINEURAL at 14:02

## 2019-10-21 RX ADMIN — GLYCOPYRROLATE 0.6 MG: 0.2 INJECTION, SOLUTION INTRAMUSCULAR; INTRAVENOUS at 17:05

## 2019-10-21 RX ADMIN — OXYCODONE HYDROCHLORIDE 10 MG: 5 TABLET ORAL at 05:59

## 2019-10-21 RX ADMIN — ROCURONIUM BROMIDE 25 MG: 10 INJECTION, SOLUTION INTRAVENOUS at 15:29

## 2019-10-21 RX ADMIN — SODIUM CHLORIDE 50 ML/HR: 900 INJECTION, SOLUTION INTRAVENOUS at 12:47

## 2019-10-21 RX ADMIN — BUPROPION HYDROCHLORIDE 300 MG: 150 TABLET, EXTENDED RELEASE ORAL at 08:10

## 2019-10-21 RX ADMIN — Medication 10 ML: at 08:09

## 2019-10-21 RX ADMIN — LEVETIRACETAM 500 MG: 500 TABLET, FILM COATED ORAL at 20:50

## 2019-10-21 RX ADMIN — HYDROMORPHONE HYDROCHLORIDE 0.5 MG: 2 INJECTION, SOLUTION INTRAMUSCULAR; INTRAVENOUS; SUBCUTANEOUS at 21:26

## 2019-10-21 RX ADMIN — CEFAZOLIN SODIUM 2 G: 10 INJECTION, POWDER, FOR SOLUTION INTRAVENOUS at 14:02

## 2019-10-21 RX ADMIN — MEPERIDINE HYDROCHLORIDE 12.5 MG: 25 INJECTION INTRAMUSCULAR; INTRAVENOUS; SUBCUTANEOUS at 17:40

## 2019-10-21 RX ADMIN — CLINDAMYCIN PHOSPHATE 900 MG: 900 INJECTION, SOLUTION INTRAVENOUS at 12:47

## 2019-10-21 RX ADMIN — Medication 4 MG: at 14:02

## 2019-10-21 RX ADMIN — PROPOFOL 200 MG: 10 INJECTION, EMULSION INTRAVENOUS at 14:02

## 2019-10-21 NOTE — PROGRESS NOTES
Infectious Diseases Progress Note      LOS: 6 days   Patient Care Team:  Sanjay Chance MD as PCP - General    Chief Complaint: Buttock pain    Subjective     The patient had no high-grade fever during the last 24 hours.  The patient remained off vasopressors.  The patient remained hemodynamically stable.  He is awake and alert.    Review of Systems:   Review of Systems   Constitutional: Negative.    HENT: Negative.    Eyes: Negative.    Respiratory: Negative.    Cardiovascular: Negative.    Gastrointestinal: Negative.    Genitourinary: Negative.    Musculoskeletal: Negative.    Skin: Positive for wound.   Neurological: Negative.    Hematological: Negative.    Psychiatric/Behavioral: Negative.         Objective     Vital Signs  Temp:  [98 °F (36.7 °C)-99.2 °F (37.3 °C)] 98.4 °F (36.9 °C)  Heart Rate:  [76-97] 97  Resp:  [14-20] 14  BP: (134-158)/(72-89) 152/89    Physical Exam:  Physical Exam   Constitutional: He is oriented to person, place, and time. He appears well-developed and well-nourished.   HENT:   Head: Normocephalic and atraumatic.   Eyes: EOM are normal. Pupils are equal, round, and reactive to light.   Neck: Normal range of motion. Neck supple.   Cardiovascular: Normal rate, regular rhythm and normal heart sounds.   Pulmonary/Chest: Effort normal and breath sounds normal. No respiratory distress. He has no wheezes. He has no rales.   Abdominal: Soft. Bowel sounds are normal. He exhibits no distension and no mass. There is no tenderness. There is no rebound and no guarding.   Musculoskeletal: Normal range of motion. He exhibits no edema or deformity.   Neurological: He is alert and oriented to person, place, and time. No cranial nerve deficit.   Skin: Skin is warm. No rash noted. No erythema.   Large wound in the left buttock very close anus.  The wound is packed with no significant purulent or necrotic tissue   Psychiatric: He has a normal mood and affect.   Nursing note and vitals  reviewed.       Results Review:    I have reviewed all clinical data, test, lab, and imaging results.     Radiology  No Radiology Exams Resulted Within Past 24 Hours    Cardiology    Laboratory  Results from last 7 days   Lab Units 10/21/19  0251   WBC 10*3/mm3 17.50*   HEMOGLOBIN g/dL 10.6*   HEMATOCRIT % 31.9*   PLATELETS 10*3/mm3 164     Results from last 7 days   Lab Units 10/21/19  0251   SODIUM mmol/L 137   POTASSIUM mmol/L 4.2   CHLORIDE mmol/L 105   CO2 mmol/L 25.0   BUN mg/dL 22*   CREATININE mg/dL 1.20   GLUCOSE mg/dL 89   CALCIUM mg/dL 7.9*     Results from last 7 days   Lab Units 10/21/19  0251   SODIUM mmol/L 137   POTASSIUM mmol/L 4.2   CHLORIDE mmol/L 105   CO2 mmol/L 25.0   BUN mg/dL 22*   CREATININE mg/dL 1.20   GLUCOSE mg/dL 89   CALCIUM mg/dL 7.9*                 Microbiology   Microbiology Results (last 10 days)     Procedure Component Value - Date/Time    Blood Culture - Blood, Arm, Right [[id number]] Collected:  10/16/19 1032    Lab Status:  Final result Specimen:  Blood from Arm, Right Updated:  10/21/19 1100     Blood Culture No growth at 5 days    Wound Culture - Wound, Buttock, Left [[id number]]  (Abnormal) Collected:  10/15/19 1347    Lab Status:  Final result Specimen:  Wound from Buttock, Left Updated:  10/18/19 1004     Wound Culture Moderate growth (3+) Streptococcus, Beta Hemolytic, Group F     Comment: This organism is considered to be universally susceptible to penicillin.  No further antibiotic testing will be performed.        STREP GROUPING F     Wound Culture Scant growth (1+) Normal Skin [place]     Gram Stain Many (4+) WBCs per low power field      Moderate (3+) Gram positive cocci in chains      Mixed bacterial morphotypes seen on Gram Stain    Blood Culture - Blood, Arm, Left [[id number]] Collected:  10/15/19 1230    Lab Status:  Final result Specimen:  Blood from Arm, Left Updated:  10/20/19 1315     Blood Culture No growth at 5 days          Medication Review:       Schedule  Meds    [MAR Hold] atorvastatin 40 mg Oral Daily   [MAR Hold] bacitracin  Topical Q12H   [MAR Hold] baclofen 10 mg Oral Daily   [MAR Hold] buPROPion  mg Oral Daily   [MAR Hold] ceftriaxone 2 g Intravenous Q24H   [MAR Hold] clindamycin 900 mg Intravenous Q8H   [MAR Hold] insulin glargine 20 Units Subcutaneous QAM   [MAR Hold] insulin lispro 0-24 Units Subcutaneous 4x Daily With Meals & Nightly   [MAR Hold] ipratropium-albuterol 3 mL Nebulization Q6H While Awake - RT   levETIRAcetam 500 mg Oral Q12H   midazolam 1 mg Intravenous Once   [MAR Hold] rOPINIRole 0.5 mg Oral TID   [MAR Hold] sodium chloride 10 mL Intravenous Q12H   [MAR Hold] Sodium Hypochlorite 0.0625 % (Dakin's 1/8th Strength) topical solution 946 mL Irrigation Q24H       Infusion Meds    sodium chloride 50 mL/hr Last Rate: 50 mL/hr (10/21/19 1247)       PRN Meds  •  acetaminophen **OR** acetaminophen  •  [MAR Hold] aluminum-magnesium hydroxide-simethicone  •  [MAR Hold] bisacodyl  •  [MAR Hold] dextrose  •  [MAR Hold] dextrose  •  flumazenil  •  [MAR Hold] glucagon (human recombinant)  •  HYDROcodone-acetaminophen  •  [MAR Hold] HYDROmorphone  •  [MAR Hold] influenza vaccine  •  ketorolac  •  [MAR Hold] magnesium hydroxide  •  [MAR Hold] melatonin  •  meperidine  •  morphine  •  naloxone  •  [MAR Hold] ondansetron **OR** [MAR Hold] ondansetron  •  ondansetron  •  [MAR Hold] oxyCODONE **OR** [MAR Hold] oxyCODONE  •  promethazine **OR** promethazine **OR** promethazine **OR** promethazine  •  [MAR Hold] sodium chloride  •  [MAR Hold] sodium chloride        Assessment/Plan       Antimicrobial Therapy   1.  IV clindamycin      day  2.  IV Rocephin      day  3.      day  4.      Day  5.      Day      Assessment     Left buttock abscess associated with skin and soft tissue necrosis.  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  Intraoperative cultures are pending.  This type of infection usually associated with polymicrobial infection.    Intraoperative  culture grew group F Streptococcus     Diabetes mellitus with diabetic neuropathy     Mild septic shock was on low-dose of norepinephrine.  Currently off pressors     Reactive leukocytosis.  Improved     Plan     Continue IV clindamycin for now and consider short course  Continue Rocephin 2 g IV daily for 2 weeks  Continue supportive care  Labs in a.m.  The patient is scheduled for diverting colostomy later today          Joel Portillo MD  10/21/19  1:41 PM    Note is dictated utilizing voice recognition software/Dragon

## 2019-10-21 NOTE — PLAN OF CARE
Problem: Patient Care Overview  Goal: Plan of Care Review  Outcome: Ongoing (interventions implemented as appropriate)    Goal: Individualization and Mutuality  Outcome: Ongoing (interventions implemented as appropriate)    Goal: Discharge Needs Assessment  Outcome: Ongoing (interventions implemented as appropriate)      Problem: Skin Injury Risk (Adult)  Goal: Skin Health and Integrity  Outcome: Ongoing (interventions implemented as appropriate)

## 2019-10-21 NOTE — PLAN OF CARE
Problem: Patient Care Overview  Goal: Plan of Care Review  Outcome: Ongoing (interventions implemented as appropriate)   10/21/19 9526   Coping/Psychosocial   Plan of Care Reviewed With patient;spouse   Plan of Care Review   Progress improving   OTHER   Outcome Summary pt currently down in OR for colostomy placement. Pt has left gluteal abscess. f/c in place. complains of pain, medicated with prn medications. will comt to monitor when pt returns from surgery

## 2019-10-21 NOTE — ANESTHESIA POSTPROCEDURE EVALUATION
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 [N49.3])    Surgeon:  Niraj Almanzar MD Provider:  Dayanna Chaudhary MD    Anesthesia Type:  general ASA Status:  3          Anesthesia Type: general  Last vitals  BP   152/89 (10/21/19 1319)   Temp   98.4 °F (36.9 °C) (10/21/19 1319)   Pulse   97 (10/21/19 1319)   Resp   14 (10/21/19 1319)     SpO2   96 % (10/21/19 1319)     Post Anesthesia Care and Evaluation    Patient location during evaluation: PACU  Patient participation: complete - patient participated  Level of consciousness: awake  Pain management: adequate  Airway patency: patent  Anesthetic complications: No anesthetic complications  PONV Status: controlled  Cardiovascular status: acceptable  Respiratory status: acceptable  Hydration status: acceptable  Post Neuraxial Block status: Motor and sensory function returned to baseline

## 2019-10-21 NOTE — OP NOTE
COLOSTOMY LAPAROSCOPIC  Operative Note    Patient Name:  Lonnie Arora  YOB: 1975    Date of Surgery:  10/21/2019     Indications:  Patient is a 44 year old gentleman with history of Dale's gangrene status post debridement.  He requires a diverting colostomy to divert the flow of stool away from the wound which is in relatively close proximity to his anal canal.  The risk, benefits, and alternatives to surgery were discussed with the patient who agreed to proceed.    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  There was a tremendous amount of intra-abdominal fat.  The colostomy was brought through the external oblique muscle, rather than the rectus sheath on the left.    Complications: None, immediately    Description of Procedure: After obtaining informed consent in the preop holding area, the patient was brought to the operating room and placed in the supine position.  SCDs were applied, and preoperative antibiotics had already been administered as the patient was on antibiotics on the floor.  After undergoing uneventful induction with general endotracheal anesthesia, the abdomen was prepped and draped in the usual sterile fashion.  We then performed a brief timeout and the procedure began.  I began by infiltrating local anesthesia in the left upper quadrant and introduced a Veress needle into the abdomen.  I then insufflated the abdomen to 15 mmHg and use a  Visiport to access the abdomen.  Initial sweep of the abdomen revealed no injury upon entry, there were noted to be dense omental adhesions to the anterior abdominal wall in the vicinity of a prior umbilical hernia repair with mesh.  Additional working trochars were placed in the left mid abdomen to facilitate lysing the dense adhesions near the umbilicus.  A combination of blunt, sharp, and electrocautery were used to dissect the dense omental adhesions to the anterior abdominal wall.  With the adhesions all mobilized, we turned our attention towards mobilization of the left colon.  We placed the patient in reverse Trendelenburg with the left side elevated, and mobilized along the white line of Toldt.  The descending colon was medialized all the way from the splenic flexure down to the inlet of the pelvic brim.  There was a tremendous amount of intra-abdominal fat which continued to flop in the field of dissection there for complicating our mobilization.  With the descending colon now fully mobilized, we tested it to make sure that it would reach the anterior abdominal wall without any difficulty.  We then turned our attention towards maturing the loop colostomy.  The skin was grasped with a Hannah and incised to make a quarter size opening.  Then used electrocautery to dissect through the subcutaneous fat to the level of the fascia.  Once I opened the fascia, it was apparent that we were not within the rectus sheath, but rather the external oblique aponeurosis.  These muscle fibers were split along their length, and we were able to open the peritoneum thus exposing the descending colon.  This was passed through the aperture and grasped with a Bakari.  I secured the colon to the posterior fascial sheath using a 2-0 Vicryl suture.  I then opened the colon transversely, and secured the edges of the colostomy circumferentially using 2-0 Vicryl suture.  I then passed a finger both in the proximal and distal limbs of  the colostomy which both felt patent.  We then closed the incisions using 4-0 Monocryl suture in a subcuticular fashion.  The incisions were dressed using skin affix skin glue, and an ostomy appliance was placed over the new colostomy. Patient tolerated the procedure well, was extubated, and taken to PACU in satisfactory condition.    Niraj Almanzar MD     Date: 10/21/2019  Time: 5:20 PM

## 2019-10-21 NOTE — ANESTHESIA PREPROCEDURE EVALUATION
Anesthesia Evaluation     Patient summary reviewed and Nursing notes reviewed   NPO Solid Status: > 6 hours  NPO Liquid Status: > 6 hours           Airway   Mallampati: III  TM distance: >3 FB  Neck ROM: full  Possible difficult intubation  Dental      Pulmonary - negative pulmonary ROS   Cardiovascular     ECG reviewed  Rhythm: regular  Rate: normal    (+) hypertension, hyperlipidemia,       Neuro/Psych  (+) seizures, CVA, psychiatric history,     GI/Hepatic/Renal/Endo    (+) obesity, morbid obesity, GERD,  diabetes mellitus,     Musculoskeletal (-) negative ROS    Abdominal   (+) obese,    Substance History - negative use     OB/GYN negative ob/gyn ROS         Other - negative ROS                       Anesthesia Plan    ASA 3     general     intravenous induction   Anesthetic plan, all risks, benefits, and alternatives have been provided, discussed and informed consent has been obtained with: patient.  Use of blood products discussed with patient .

## 2019-10-21 NOTE — PROGRESS NOTES
"      [facility] Medicine Services Daily Progress Note      Hospitalist Team  LOS 6 days      Patient Care Team:  Sanjay Chance MD as PCP - General        Chief Complaint / Subjective  Chief Complaint   Patient presents with   • Abdominal Pain     Patient doing well overall.  Patient reports some mild tightness when taking deep breaths.  He reports he has not been using his incentive spirometer much.  Mild wheezing.  No chest pain.  He is scheduled to undergo a diverting colonoscopy today    Brief Synopsis of Hospital Course/HPI    This is a 44-year-old  male with a past medical history of diabetes mellitus type 2, CVA, seizures, hypertension, hyperlipidemia, cholecystectomy, and kidney stones who presented to the ED on 10/15/2019 with complaints of buttock pain.  Patient stated 3 days ago he developed a bump on his rear end and has progressively gotten worse.  He states it hurts very badly to sit down and pain medication makes the pain pain better.  He thought he had been sitting in a recliner too long and this had caused a bump.  He states his pain is 8 out of 10.  He denies any recent nausea, vomiting, diarrhea, fever, chills.  Patient states when moving from one bed to the next bed in radiology, he felt a \"pop\"and his buttock started draining.  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  General surgery was consulted and the patient was taken to the OR for an I&D and debridement of devitalized tissue.       The patient was subsequently admitted to the PCU postoperatively, the patient became hypotensive and was unresponsive to IV fluids.  He developed septic shock and required Levophed.       General surgery advised the patient may need a diverting colostomy.  The patient will be placed NPO at midnight 10/17 for potential colostomy and/or further debridement of the " "wound.  This could occur later this weekend, as well.     Infectious disease has started the patient on IV vancomycin/clindamycin/meropenem, pending final culture results.       The hospitalists were consulted on 10/17 for medical management.  The patient complains of left buttock pain since general surgery re-packed the wound.  He states he is doing well otherwise.        Review of systems negative for all other 10 systems    Review of Systems   Constitution: Negative for chills and fever.   Cardiovascular: Negative for chest pain and cyanosis.   Respiratory: Negative for shortness of breath and wheezing.    Gastrointestinal: Negative for bowel incontinence and nausea.   Neurological: Negative for dizziness and light-headedness.   Psychiatric/Behavioral: Negative for altered mental status. The patient does not have insomnia.        Family History   Problem Relation Age of Onset   • Breast cancer Mother    • Cervical cancer Mother        Past Medical History:   Diagnosis Date   • Diabetes (CMS/HCC)    • GERD (gastroesophageal reflux disease)    • Hypertension    • Seizures (CMS/HCC)        Social History     Socioeconomic History   • Marital status:      Spouse name: Not on file   • Number of children: Not on file   • Years of education: Not on file   • Highest education level: Not on file   Tobacco Use   • Smoking status: Current Every Day Smoker     Types: Electronic Cigarette   Substance and Sexual Activity   • Alcohol use: No     Frequency: Never     Comment: Quit several years ago at time of his CVA   • Drug use: Yes     Types: Marijuana           Objective      Vital Signs  Temp:  [98 °F (36.7 °C)-99.2 °F (37.3 °C)] 98.4 °F (36.9 °C)  Heart Rate:  [76-97] 97  Resp:  [14-20] 14  BP: (144-158)/(72-89) 152/89  Oxygen Therapy  SpO2: 96 %  Pulse Oximetry Type: Continuous  Device (Oxygen Therapy): nasal cannula  Flow (L/min): 2  Flowsheet Rows      First Filed Value   Admission Height  160 cm (63\") Documented " at 10/15/2019 0954   Admission Weight  106 kg (232 lb 12.9 oz) Documented at 10/15/2019 0954        Intake & Output (last 3 days)       10/18 0701 - 10/19 0700 10/19 0701 - 10/20 0700 10/20 0701 - 10/21 0700 10/21 0701 - 10/22 0700    P.O.  480 1200     I.V. (mL/kg) 4260 (37.7) 1402 (11.7)  1000 (8.4)    IV Piggyback    50    Total Intake(mL/kg) 4260 (37.7) 1882 (15.7) 1200 (10.1) 1050 (8.8)    Urine (mL/kg/hr) 3450 (1.3) 2400 (0.8) 2750 (1) 500 (0.5)    Stool 0 0 1     Total Output 3450 2400 2751 500    Net +810 -518 -1551 +550            Stool Unmeasured Occurrence 1 x 1 x 2 x         Lines, Drains & Airways    Active LDAs     Name:   Placement date:   Placement time:   Site:   Days:    CVC Triple Lumen 10/15/19 Right Internal jugular   10/15/19    2345    Internal jugular   2    Peripheral IV 10/15/19 1249 Left Antecubital   10/15/19    1249    Antecubital   3    Peripheral IV 10/15/19 1900 Right Wrist   10/15/19    1900 IV site present when patient arrived to PCU    Wrist   2    Urethral Catheter Non-latex 16 Fr.   10/15/19    1615     2                  Physical Exam:     General: Morbidly obese male lying in bed appearing comfortable, on supplemental oxygen via nasal cannula   HEENT: NC/AT, EOMI, mucosa moist  Heart: RRR. No murmur   Chest: Diminished lung sounds, no wheezes appreciated, normal respiratory effort  Abdominal: Soft. NT/ND. Bowel sounds present  Musculoskeletal: Normal ROM.  No edema. No calf tenderness.  Neurological: AAOx3, no focal deficits  Skin: Skin is warm and dry.  Gluteal wound dressed and not assessed  Psychiatric: Normal mood and affect.    Procedures:    Procedure(s):  INCISION AND DRAINAGE OF LEFT GLUTEAL REGION          Results Review:     I reviewed the patient's new clinical results.  reviewed    Results from last 7 days   Lab Units 10/21/19  0251 10/20/19  0418 10/19/19  0624   WBC 10*3/mm3 17.50* 22.40* 21.50*   HEMOGLOBIN g/dL 10.6* 10.7* 10.6*   HEMATOCRIT % 31.9* 31.7* 32.8*    PLATELETS 10*3/mm3 164 160 141   MONOCYTES % % 7.0 5.0 4.0*     Results from last 7 days   Lab Units 10/21/19  0251 10/20/19  0418 10/19/19  0624   SODIUM mmol/L 137 136 139   POTASSIUM mmol/L 4.2 4.6 4.4   CHLORIDE mmol/L 105 108* 111*   CO2 mmol/L 25.0 23.0 22.0   BUN mg/dL 22* 27* 27*   CREATININE mg/dL 1.20 1.38* 1.55*   CALCIUM mg/dL 7.9* 7.7* 7.8*   BILIRUBIN mg/dL 0.4 0.3 0.4   ALK PHOS U/L 150* 157* 163*   ALT (SGPT) U/L 52* 60* 46*   AST (SGOT) U/L 53* 76* 67*   GLUCOSE mg/dL 89 153* 133*         Lab Results   Component Value Date    CALCIUM 7.9 (L) 10/21/2019     No results found for: HGBA1C  Results from last 7 days   Lab Units 10/21/19  0251   INR  0.97               Microbiology Results (last 10 days)     Procedure Component Value - Date/Time    Blood Culture - Blood, Arm, Right [[id number]] Collected:  10/16/19 1032    Lab Status:  Final result Specimen:  Blood from Arm, Right Updated:  10/21/19 1100     Blood Culture No growth at 5 days    Wound Culture - Wound, Buttock, Left [[id number]]  (Abnormal) Collected:  10/15/19 1347    Lab Status:  Final result Specimen:  Wound from Buttock, Left Updated:  10/18/19 1004     Wound Culture Moderate growth (3+) Streptococcus, Beta Hemolytic, Group F     Comment: This organism is considered to be universally susceptible to penicillin.  No further antibiotic testing will be performed.        STREP GROUPING F     Wound Culture Scant growth (1+) Normal Skin Chuyita     Gram Stain Many (4+) WBCs per low power field      Moderate (3+) Gram positive cocci in chains      Mixed bacterial morphotypes seen on Gram Stain    Blood Culture - Blood, Arm, Left [[id number]] Collected:  10/15/19 1230    Lab Status:  Final result Specimen:  Blood from Arm, Left Updated:  10/20/19 1315     Blood Culture No growth at 5 days          ECG/EMG Results (most recent)     Procedure Component Value Units Date/Time    ECG 12 Lead [[id number]] Collected:  10/21/19 0750     Updated:  10/21/19  0751    Narrative:       HEART RATE= 84  bpm  RR Interval= 716  ms  LA Interval= 149  ms  P Horizontal Axis= 14  deg  P Front Axis= 41  deg  QRSD Interval= 90  ms  QT Interval= 380  ms  QRS Axis= -1  deg  T Wave Axis= 36  deg  - NORMAL ECG -  Sinus rhythm  Electronically Signed By:   Date and Time of Study: 2019-10-21 07:50:07                    Ct Abdomen Pelvis Without Contrast    Result Date: 10/15/2019   1. 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 Abnormal subcutaneous fat induration and superficial and deep air loculations extend from the left gluteal region, into the left ischial rectal fossa, and into the fat adjacent to the left lateral margin of the penile shaft. Of note, the perineum is not imaged in its entirety. 2. No drainable fluid collection or abscess is seen within the pelvis. 3. Nonobstructing left renal stone. 4. Chronic calcific pancreatitis.    Electronically Signed By-Dr. Rowena Carballo MD On:10/15/2019 1:38 PM This report was finalized on [id number] by Dr. Rowena Carballo MD.    Xr Chest 1 View    Result Date: 10/16/2019  1.Subsegmental atelectasis right lower lobe. 2.Cardiomegaly. 3.Central venous catheter tip has appropriate position.  Electronically Signed By-Winifred Simental On:10/16/2019 7:36 AM This report was finalized on [id number] by  Winifred Simental, .      Xrays, labs reviewed personally by physician.    Medication Review:   I have reviewed the patient's current medication list  reviewed    Scheduled Meds    [MAR Hold] atorvastatin 40 mg Oral Daily   [MAR Hold] bacitracin  Topical Q12H   [MAR Hold] baclofen 10 mg Oral Daily   [MAR Hold] buPROPion  mg Oral Daily   [MAR Hold] ceftriaxone 2 g Intravenous Q24H   [MAR Hold] clindamycin 900 mg Intravenous Q8H   [MAR Hold] insulin glargine 20 Units Subcutaneous QAM   [MAR Hold] insulin lispro 0-24 Units Subcutaneous 4x Daily With Meals & Nightly   [MAR Hold] ipratropium-albuterol 3 mL Nebulization Q6H While  Awake - RT   levETIRAcetam 500 mg Oral Q12H   midazolam 1 mg Intravenous Once   [MAR Hold] rOPINIRole 0.5 mg Oral TID   [MAR Hold] sodium chloride 10 mL Intravenous Q12H   [MAR Hold] Sodium Hypochlorite 0.0625 % (Dakin's 1/8th Strength) topical solution 946 mL Irrigation Q24H       Meds Infusions    sodium chloride 50 mL/hr Last Rate: 50 mL/hr (10/21/19 1247)       Meds PRN  •  acetaminophen **OR** acetaminophen  •  [MAR Hold] aluminum-magnesium hydroxide-simethicone  •  [MAR Hold] bisacodyl  •  [MAR Hold] dextrose  •  [MAR Hold] dextrose  •  flumazenil  •  [MAR Hold] glucagon (human recombinant)  •  HYDROcodone-acetaminophen  •  [MAR Hold] HYDROmorphone  •  [MAR Hold] influenza vaccine  •  ketorolac  •  [MAR Hold] magnesium hydroxide  •  [MAR Hold] melatonin  •  meperidine  •  morphine  •  naloxone  •  [MAR Hold] ondansetron **OR** [MAR Hold] ondansetron  •  ondansetron  •  [MAR Hold] oxyCODONE **OR** [MAR Hold] oxyCODONE  •  promethazine **OR** promethazine **OR** promethazine **OR** promethazine  •  [MAR Hold] sodium chloride  •  [MAR Hold] sodium chloride        Assessment / Plan    Active Hospital Problems    Diagnosis  POA   • **Abscess, gluteal, left [L02.31]  Yes   • Sepsis (CMS/HCC) [A41.9]  Yes   • 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   10/21  Patient is a 44-year-old male with history of CVA as well as type 2 diabetes and vascular disease developed fournieres gangrene now status post I&D going back to the OR for diverting colostomy today, appearing stable dynamically stable his pain control is adequate he has no dyspnea no chest pain no pulmonary issues he  is coherent awake alert labs and meds reviewed antibiotics management per ID sepsis is resolved     .10/20  -Pain control  -IV antibiotics  -Status post I&D, patient getting diverting colostomy  -General surgery following  -Off pressors  -Antibiotics per ID  -Wound cultures showing group F Streptococcus         Septic shock -as above  -No further pressor support     Acute kidney injury - improving, likely component of chronic renal insufficiency now at 1.38  -  Improving from 1.7-1.56  - continue IV fluid but at reduced rate  - monitor BMP     Leukocytosis -secondary to infection continues to be elevated  -Monitor daily    Anemia -mild likely some loss postoperatively and then degree of chronic inflammation currently stable  -Monitor daily    diabetes mellitus type 2 -uncertain degree of control at home, glucoses less than 200  - continue Lantus daily  - SSI   - accuchecks AC HS     Essential hypertension - chronic relatively controlled, hypotension resolved  -Resume meds once taking p.o. postop     Hyperlipidemia -stable  - continue home atorvastatin      Depression, chronic -appears controlled  - continue home wellbutrin      Seizure disorder -no seizure episodes noted  - continue home Keppra      Morbid obesity (BMI 44.13)  - encourage lifestyle modifications      VTE prophylaxis - bilateral SCDs    Disposition -inpatient        Jeremi Roy MD  10/21/19  4:11 PM

## 2019-10-21 NOTE — PLAN OF CARE
Problem: Fall Risk (Adult)  Goal: Identify Related Risk Factors and Signs and Symptoms  Outcome: Outcome(s) achieved Date Met: 10/21/19    Goal: Absence of Fall  Outcome: Ongoing (interventions implemented as appropriate)

## 2019-10-21 NOTE — PROGRESS NOTES
"Post-op Note  INCISION AND DRAINAGE WOUND  10/15/2019    Subjective   Lonnie Arora is a 44 y.o. male s/p incision and drainage with debridement of left gluteal wound.  Remains off pressors, afebrile.     Objective   /89   Pulse 97   Temp 98.4 °F (36.9 °C) (Temporal)   Resp 14   Ht 160 cm (63\")   Wt 119 kg (262 lb 2 oz)   SpO2 96%   BMI 46.43 kg/m²   Wound is dressed  Abdomen is soft, NT, no hernias    WBC   Date Value Ref Range Status   10/21/2019 17.50 (H) 3.40 - 10.80 10*3/mm3 Final     RBC   Date Value Ref Range Status   10/21/2019 3.57 (L) 4.14 - 5.80 10*6/mm3 Final     Hemoglobin   Date Value Ref Range Status   10/21/2019 10.6 (L) 13.0 - 17.7 g/dL Final     Hematocrit   Date Value Ref Range Status   10/21/2019 31.9 (L) 37.5 - 51.0 % Final     MCV   Date Value Ref Range Status   10/21/2019 89.3 79.0 - 97.0 fL Final     MCH   Date Value Ref Range Status   10/21/2019 29.6 26.6 - 33.0 pg Final     MCHC   Date Value Ref Range Status   10/21/2019 33.2 31.5 - 35.7 g/dL Final     RDW   Date Value Ref Range Status   10/21/2019 16.1 (H) 12.3 - 15.4 % Final     RDW-SD   Date Value Ref Range Status   10/21/2019 49.9 37.0 - 54.0 fl Final     MPV   Date Value Ref Range Status   10/21/2019 8.0 6.0 - 12.0 fL Final     Platelets   Date Value Ref Range Status   10/21/2019 164 140 - 450 10*3/mm3 Final     Neutrophils Absolute   Date Value Ref Range Status   10/21/2019 14.70 (H) 1.70 - 7.00 10*3/mm3 Final     Eosinophils Absolute   Date Value Ref Range Status   10/21/2019 0.35 0.00 - 0.40 10*3/mm3 Final       Assessment/Plan   43 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    Agree with PCU status  Continue antibiotics per ID - cxs show Group F strep  To OR today for diverting colostomy    Niraj Almanzar MD  10/21/2019  1:57 PM    "

## 2019-10-22 LAB
ALBUMIN SERPL-MCNC: 2.2 G/DL (ref 3.5–5.2)
ALBUMIN/GLOB SERPL: 0.7 G/DL
ALP SERPL-CCNC: 187 U/L (ref 39–117)
ALT SERPL W P-5'-P-CCNC: 40 U/L (ref 1–41)
ANION GAP SERPL CALCULATED.3IONS-SCNC: 12 MMOL/L (ref 5–15)
AST SERPL-CCNC: 37 U/L (ref 1–40)
BILIRUB SERPL-MCNC: 0.4 MG/DL (ref 0.2–1.2)
BUN BLD-MCNC: 19 MG/DL (ref 6–20)
BUN/CREAT SERPL: 16.5 (ref 7–25)
CALCIUM SPEC-SCNC: 7.7 MG/DL (ref 8.6–10.5)
CHLORIDE SERPL-SCNC: 104 MMOL/L (ref 98–107)
CO2 SERPL-SCNC: 24 MMOL/L (ref 22–29)
CREAT BLD-MCNC: 1.15 MG/DL (ref 0.76–1.27)
DEPRECATED RDW RBC AUTO: 51.2 FL (ref 37–54)
ERYTHROCYTE [DISTWIDTH] IN BLOOD BY AUTOMATED COUNT: 16.2 % (ref 12.3–15.4)
GFR SERPL CREATININE-BSD FRML MDRD: 69 ML/MIN/1.73
GLOBULIN UR ELPH-MCNC: 3.3 GM/DL
GLUCOSE BLD-MCNC: 101 MG/DL (ref 65–99)
GLUCOSE BLDC GLUCOMTR-MCNC: 114 MG/DL (ref 70–105)
GLUCOSE BLDC GLUCOMTR-MCNC: 124 MG/DL (ref 70–105)
GLUCOSE BLDC GLUCOMTR-MCNC: 88 MG/DL (ref 70–105)
GLUCOSE BLDC GLUCOMTR-MCNC: 94 MG/DL (ref 70–105)
HCT VFR BLD AUTO: 30 % (ref 37.5–51)
HGB BLD-MCNC: 10.1 G/DL (ref 13–17.7)
LYMPHOCYTES # BLD MANUAL: 1.35 10*3/MM3 (ref 0.7–3.1)
LYMPHOCYTES NFR BLD MANUAL: 4 % (ref 5–12)
LYMPHOCYTES NFR BLD MANUAL: 8 % (ref 19.6–45.3)
MCH RBC QN AUTO: 30.2 PG (ref 26.6–33)
MCHC RBC AUTO-ENTMCNC: 33.6 G/DL (ref 31.5–35.7)
MCV RBC AUTO: 90 FL (ref 79–97)
MONOCYTES # BLD AUTO: 0.68 10*3/MM3 (ref 0.1–0.9)
MRSA SPEC QL CULT: NORMAL
NEUTROPHILS # BLD AUTO: 14.87 10*3/MM3 (ref 1.7–7)
NEUTROPHILS NFR BLD MANUAL: 83 % (ref 42.7–76)
NEUTS BAND NFR BLD MANUAL: 5 % (ref 0–5)
PLAT MORPH BLD: NORMAL
PLATELET # BLD AUTO: 200 10*3/MM3 (ref 140–450)
PMV BLD AUTO: 8.2 FL (ref 6–12)
POLYCHROMASIA BLD QL SMEAR: ABNORMAL
POTASSIUM BLD-SCNC: 4.9 MMOL/L (ref 3.5–5.2)
PROT SERPL-MCNC: 5.5 G/DL (ref 6–8.5)
RBC # BLD AUTO: 3.33 10*6/MM3 (ref 4.14–5.8)
SCAN SLIDE: NORMAL
SODIUM BLD-SCNC: 140 MMOL/L (ref 136–145)
WBC MORPH BLD: NORMAL
WBC NRBC COR # BLD: 16.9 10*3/MM3 (ref 3.4–10.8)

## 2019-10-22 PROCEDURE — 99232 SBSQ HOSP IP/OBS MODERATE 35: CPT | Performed by: INTERNAL MEDICINE

## 2019-10-22 PROCEDURE — 63710000001 INSULIN GLARGINE PER 5 UNITS: Performed by: INTERNAL MEDICINE

## 2019-10-22 PROCEDURE — 97162 PT EVAL MOD COMPLEX 30 MIN: CPT

## 2019-10-22 PROCEDURE — 97530 THERAPEUTIC ACTIVITIES: CPT

## 2019-10-22 PROCEDURE — 85007 BL SMEAR W/DIFF WBC COUNT: CPT | Performed by: INTERNAL MEDICINE

## 2019-10-22 PROCEDURE — 94799 UNLISTED PULMONARY SVC/PX: CPT

## 2019-10-22 PROCEDURE — 25010000002 CEFTRIAXONE PER 250 MG: Performed by: INTERNAL MEDICINE

## 2019-10-22 PROCEDURE — 97166 OT EVAL MOD COMPLEX 45 MIN: CPT

## 2019-10-22 PROCEDURE — 82962 GLUCOSE BLOOD TEST: CPT

## 2019-10-22 PROCEDURE — 25010000002 HYDROMORPHONE PER 4 MG: Performed by: SURGERY

## 2019-10-22 PROCEDURE — 85025 COMPLETE CBC W/AUTO DIFF WBC: CPT | Performed by: INTERNAL MEDICINE

## 2019-10-22 PROCEDURE — 97112 NEUROMUSCULAR REEDUCATION: CPT

## 2019-10-22 PROCEDURE — 99024 POSTOP FOLLOW-UP VISIT: CPT | Performed by: SURGERY

## 2019-10-22 PROCEDURE — 80053 COMPREHEN METABOLIC PANEL: CPT | Performed by: INTERNAL MEDICINE

## 2019-10-22 RX ADMIN — BACITRACIN 1 APPLICATION: 500 OINTMENT TOPICAL at 21:58

## 2019-10-22 RX ADMIN — BACITRACIN: 500 OINTMENT TOPICAL at 09:04

## 2019-10-22 RX ADMIN — Medication 10 ML: at 09:01

## 2019-10-22 RX ADMIN — ROPINIROLE 0.5 MG: 0.25 TABLET, FILM COATED ORAL at 09:01

## 2019-10-22 RX ADMIN — BUPROPION HYDROCHLORIDE 300 MG: 150 TABLET, EXTENDED RELEASE ORAL at 09:01

## 2019-10-22 RX ADMIN — CLINDAMYCIN PHOSPHATE 900 MG: 900 INJECTION, SOLUTION INTRAVENOUS at 05:12

## 2019-10-22 RX ADMIN — IPRATROPIUM BROMIDE AND ALBUTEROL SULFATE 3 ML: .5; 3 SOLUTION RESPIRATORY (INHALATION) at 19:16

## 2019-10-22 RX ADMIN — SODIUM CHLORIDE 50 ML/HR: 900 INJECTION, SOLUTION INTRAVENOUS at 13:18

## 2019-10-22 RX ADMIN — IPRATROPIUM BROMIDE AND ALBUTEROL SULFATE 3 ML: .5; 3 SOLUTION RESPIRATORY (INHALATION) at 10:33

## 2019-10-22 RX ADMIN — Medication 10 ML: at 21:53

## 2019-10-22 RX ADMIN — HYDROMORPHONE HYDROCHLORIDE 0.5 MG: 2 INJECTION, SOLUTION INTRAMUSCULAR; INTRAVENOUS; SUBCUTANEOUS at 09:00

## 2019-10-22 RX ADMIN — HYDROMORPHONE HYDROCHLORIDE 0.5 MG: 2 INJECTION, SOLUTION INTRAMUSCULAR; INTRAVENOUS; SUBCUTANEOUS at 22:10

## 2019-10-22 RX ADMIN — IPRATROPIUM BROMIDE AND ALBUTEROL SULFATE 3 ML: .5; 3 SOLUTION RESPIRATORY (INHALATION) at 06:28

## 2019-10-22 RX ADMIN — BACLOFEN 10 MG: 10 TABLET ORAL at 09:01

## 2019-10-22 RX ADMIN — LEVETIRACETAM 500 MG: 500 TABLET, FILM COATED ORAL at 09:01

## 2019-10-22 RX ADMIN — CEFTRIAXONE SODIUM 2 G: 2 INJECTION, POWDER, FOR SOLUTION INTRAMUSCULAR; INTRAVENOUS at 15:32

## 2019-10-22 RX ADMIN — CLINDAMYCIN PHOSPHATE 900 MG: 900 INJECTION, SOLUTION INTRAVENOUS at 15:32

## 2019-10-22 RX ADMIN — LEVETIRACETAM 500 MG: 500 TABLET, FILM COATED ORAL at 21:53

## 2019-10-22 RX ADMIN — ROPINIROLE 0.5 MG: 0.25 TABLET, FILM COATED ORAL at 16:28

## 2019-10-22 RX ADMIN — CLINDAMYCIN PHOSPHATE 900 MG: 900 INJECTION, SOLUTION INTRAVENOUS at 21:54

## 2019-10-22 RX ADMIN — HYDROMORPHONE HYDROCHLORIDE 0.5 MG: 2 INJECTION, SOLUTION INTRAMUSCULAR; INTRAVENOUS; SUBCUTANEOUS at 16:28

## 2019-10-22 RX ADMIN — INSULIN GLARGINE 20 UNITS: 100 INJECTION, SOLUTION SUBCUTANEOUS at 06:49

## 2019-10-22 RX ADMIN — ROPINIROLE 0.5 MG: 0.25 TABLET, FILM COATED ORAL at 21:53

## 2019-10-22 RX ADMIN — ATORVASTATIN CALCIUM 40 MG: 40 TABLET, FILM COATED ORAL at 09:01

## 2019-10-22 RX ADMIN — HYDROMORPHONE HYDROCHLORIDE 0.5 MG: 2 INJECTION, SOLUTION INTRAMUSCULAR; INTRAVENOUS; SUBCUTANEOUS at 13:14

## 2019-10-22 NOTE — PROGRESS NOTES
Continued Stay Note   Sebastián     Patient Name: Lonnie Arora  MRN: [id number]  Today's Date: 10/22/2019    Admit Date: 10/15/2019    Discharge Plan     Row Name 10/22/19 1154       Plan    Plan  Spoke with pt about rehab . Pt's given a ecf list and  wants to talk to his wife about choices . Will follow up this afternoon .    Plan Comments  Pt remains on iv antibiotics. Packing wounds and will need additional surgeries . Pt had a colostomy placed yesterday .         Discharge Codes    No documentation.             Jojo Lopes RN

## 2019-10-22 NOTE — PLAN OF CARE
Problem: Patient Care Overview  Goal: Plan of Care Review  Outcome: Ongoing (interventions implemented as appropriate)   10/22/19 5391   Coping/Psychosocial   Plan of Care Reviewed With patient   OTHER   Outcome Summary Pt is a 44 year old male admitted with gluteal abscess. He also underwent diverting colostomy placement. He participates well and is able to get to EOB with min assist. Able to stand EOB for short amount of time. Unable to complete ADLs this date. He will reuqire  rehab at discharge.

## 2019-10-22 NOTE — PROGRESS NOTES
Infectious Diseases Progress Note      LOS: 7 days   Patient Care Team:  Sanjay Chance MD as PCP - General    Chief Complaint: Buttock pain    Subjective     The patient had no high-grade fever during the last 24 hours.  The patient remained off vasopressors.  The patient remained hemodynamically stable.  He is awake and alert.  He is currently in the chair and on 2 L of oxygen by nasal cannula.    Review of Systems:   Review of Systems   Constitutional: Negative.    HENT: Negative.    Eyes: Negative.    Respiratory: Negative.    Cardiovascular: Negative.    Gastrointestinal: Negative.    Genitourinary: Negative.    Musculoskeletal: Negative.    Skin: Positive for wound.   Neurological: Negative.    Hematological: Negative.    Psychiatric/Behavioral: Negative.         Objective     Vital Signs  Temp:  [97.2 °F (36.2 °C)-99 °F (37.2 °C)] 98.5 °F (36.9 °C)  Heart Rate:  [] 96  Resp:  [10-20] 20  BP: (126-154)/(76-88) 131/80    Physical Exam:  Physical Exam   Constitutional: He is oriented to person, place, and time. He appears well-developed and well-nourished.   HENT:   Head: Normocephalic and atraumatic.   Eyes: EOM are normal. Pupils are equal, round, and reactive to light.   Neck: Normal range of motion. Neck supple.   Cardiovascular: Normal rate, regular rhythm and normal heart sounds.   Pulmonary/Chest: Effort normal and breath sounds normal. No respiratory distress. He has no wheezes. He has no rales.   Abdominal: Soft. Bowel sounds are normal. He exhibits no distension and no mass. There is no tenderness. There is no rebound and no guarding.   Colostomy   Musculoskeletal: Normal range of motion. He exhibits no edema or deformity.   Neurological: He is alert and oriented to person, place, and time. No cranial nerve deficit.   Skin: Skin is warm. No rash noted. No erythema.   Large wound in the left buttock very close anus.  The wound is packed with no significant purulent or necrotic tissue    Psychiatric: He has a normal mood and affect.   Nursing note and vitals reviewed.       Results Review:    I have reviewed all clinical data, test, lab, and imaging results.     Radiology  No Radiology Exams Resulted Within Past 24 Hours    Cardiology    Laboratory  Results from last 7 days   Lab Units 10/22/19  0259   WBC 10*3/mm3 16.90*   HEMOGLOBIN g/dL 10.1*   HEMATOCRIT % 30.0*   PLATELETS 10*3/mm3 200     Results from last 7 days   Lab Units 10/22/19  0259   SODIUM mmol/L 140   POTASSIUM mmol/L 4.9   CHLORIDE mmol/L 104   CO2 mmol/L 24.0   BUN mg/dL 19   CREATININE mg/dL 1.15   GLUCOSE mg/dL 101*   CALCIUM mg/dL 7.7*     Results from last 7 days   Lab Units 10/22/19  0259   SODIUM mmol/L 140   POTASSIUM mmol/L 4.9   CHLORIDE mmol/L 104   CO2 mmol/L 24.0   BUN mg/dL 19   CREATININE mg/dL 1.15   GLUCOSE mg/dL 101*   CALCIUM mg/dL 7.7*                 Microbiology   Microbiology Results (last 10 days)     Procedure Component Value - Date/Time    MRSA Screen Culture - Swab, Nares [[id number]]  (Normal) Collected:  10/21/19 0554    Lab Status:  Final result Specimen:  Swab from Nares Updated:  10/22/19 1326     MRSA SCREEN CX No Methicillin Resistant Staphylococcus aureus isolated    Blood Culture - Blood, Arm, Right [[id number]] Collected:  10/16/19 1032    Lab Status:  Final result Specimen:  Blood from Arm, Right Updated:  10/21/19 1100     Blood Culture No growth at 5 days    Wound Culture - Wound, Buttock, Left [[id number]]  (Abnormal) Collected:  10/15/19 1347    Lab Status:  Final result Specimen:  Wound from Buttock, Left Updated:  10/18/19 1004     Wound Culture Moderate growth (3+) Streptococcus, Beta Hemolytic, Group F     Comment: This organism is considered to be universally susceptible to penicillin.  No further antibiotic testing will be performed.        STREP GROUPING F     Wound Culture Scant growth (1+) Normal Skin Chuyita     Gram Stain Many (4+) WBCs per low power field      Moderate (3+) Gram  positive cocci in chains      Mixed bacterial morphotypes seen on Gram Stain    Blood Culture - Blood, Arm, Left [[id number]] Collected:  10/15/19 1230    Lab Status:  Final result Specimen:  Blood from Arm, Left Updated:  10/20/19 1315     Blood Culture No growth at 5 days          Medication Review:       Schedule Meds    atorvastatin 40 mg Oral Daily   bacitracin  Topical Q12H   baclofen 10 mg Oral Daily   buPROPion  mg Oral Daily   ceftriaxone 2 g Intravenous Q24H   clindamycin 900 mg Intravenous Q8H   insulin glargine 20 Units Subcutaneous QAM   insulin lispro 0-24 Units Subcutaneous 4x Daily With Meals & Nightly   ipratropium-albuterol 3 mL Nebulization Q6H While Awake - RT   levETIRAcetam 500 mg Oral Q12H   rOPINIRole 0.5 mg Oral TID   sodium chloride 10 mL Intravenous Q12H   Sodium Hypochlorite 0.0625 % (Dakin's 1/8th Strength) topical solution 946 mL Irrigation Q24H       Infusion Meds    sodium chloride 50 mL/hr Last Rate: 50 mL/hr (10/22/19 1318)       PRN Meds  •  aluminum-magnesium hydroxide-simethicone  •  bisacodyl  •  dextrose  •  dextrose  •  glucagon (human recombinant)  •  HYDROmorphone  •  influenza vaccine  •  magnesium hydroxide  •  melatonin  •  ondansetron **OR** ondansetron  •  oxyCODONE **OR** oxyCODONE  •  sodium chloride  •  sodium chloride        Assessment/Plan       Antimicrobial Therapy   1.  IV clindamycin    Day 8  2.  IV Rocephin    Day 4  3.       day  4.      Day  5.      Day      Assessment     Left buttock abscess associated with skin and soft tissue necrosis.  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  Intraoperative cultures are pending.  This type of infection usually associated with polymicrobial infection.    Intraoperative culture grew group F Streptococcus  -10/21/2019-diverting colostomy     Diabetes mellitus with diabetic neuropathy     Mild septic shock was on low-dose of norepinephrine.  Currently off pressors     Reactive leukocytosis.   Improved     Plan     Continue IV clindamycin for now -discontinue tomorrow  Continue Rocephin 2 g IV daily for 2 weeks  Patient will need a midline before discharge-please remove when IV antibiotics are completed  Weekly labs CBC/creatinine/sed rate  Continue supportive care  Labs in melly.maurice Pires, APRN  10/22/19  5:29 PM    Note is dictated utilizing voice recognition software/Dragon

## 2019-10-22 NOTE — PROGRESS NOTES
"Post-op Note  INCISION AND DRAINAGE WOUND  10/15/2019    Subjective   Lonnie Arora is a 44 y.o. male s/p incision and drainage with debridement of left gluteal wound on 10/15/2019 and s/p laparoscopic diverting colostomy performed on 10/21/19.  Pain controlled. Tolerating CLD without N/V. WBC decreasing.     Objective   /81   Pulse 96   Temp 98.6 °F (37 °C)   Resp 16   Ht 160 cm (63\")   Wt 121 kg (265 lb 10.5 oz)   SpO2 94%   BMI 47.06 kg/m²   Wound is dressed  Abdomen is soft, obese, appropriately tender, incisions without erythema, induration or drainage. Ostomy is edematous, slightly prolapsed, but patent, pink, and productive of bowel sweat. No gas    WBC   Date Value Ref Range Status   10/22/2019 16.90 (H) 3.40 - 10.80 10*3/mm3 Final     RBC   Date Value Ref Range Status   10/22/2019 3.33 (L) 4.14 - 5.80 10*6/mm3 Final     Hemoglobin   Date Value Ref Range Status   10/22/2019 10.1 (L) 13.0 - 17.7 g/dL Final     Hematocrit   Date Value Ref Range Status   10/22/2019 30.0 (L) 37.5 - 51.0 % Final     MCV   Date Value Ref Range Status   10/22/2019 90.0 79.0 - 97.0 fL Final     MCH   Date Value Ref Range Status   10/22/2019 30.2 26.6 - 33.0 pg Final     MCHC   Date Value Ref Range Status   10/22/2019 33.6 31.5 - 35.7 g/dL Final     RDW   Date Value Ref Range Status   10/22/2019 16.2 (H) 12.3 - 15.4 % Final     RDW-SD   Date Value Ref Range Status   10/22/2019 51.2 37.0 - 54.0 fl Final     MPV   Date Value Ref Range Status   10/22/2019 8.2 6.0 - 12.0 fL Final     Platelets   Date Value Ref Range Status   10/22/2019 200 140 - 450 10*3/mm3 Final     Neutrophils Absolute   Date Value Ref Range Status   10/22/2019 14.87 (H) 1.70 - 7.00 10*3/mm3 Final     Eosinophils Absolute   Date Value Ref Range Status   10/21/2019 0.35 0.00 - 0.40 10*3/mm3 Final       Assessment/Plan   43 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    Continue antibiotics per ID - cxs show Group F strep  Continue local wound care, appreciate wound/ostomy RN assistance  Clears until improved output from colostomy    Niraj Almanzar MD  10/22/2019  1:03 PM

## 2019-10-22 NOTE — PLAN OF CARE
Problem: Skin Injury Risk (Adult)  Goal: Skin Health and Integrity  Outcome: Ongoing (interventions implemented as appropriate)   10/22/19 0456   Skin Injury Risk (Adult)   Skin Health and Integrity making progress toward outcome       Problem: Fall Risk (Adult)  Goal: Absence of Fall  Outcome: Ongoing (interventions implemented as appropriate)

## 2019-10-22 NOTE — PLAN OF CARE
Problem: Patient Care Overview  Goal: Plan of Care Review  Outcome: Ongoing (interventions implemented as appropriate)   10/22/19 9147   Coping/Psychosocial   Plan of Care Reviewed With patient   OTHER   Outcome Summary Pt is 43 yo male admitted for abscess in left buttocks region, sepsis. CT abdomen showing findings consistent with suspicion of carlos's gangrene. Pt s/p I& D left gluteal region 10/15/19. Pt now s/p diverting colostomy secondary to need to divert stool away from wound on 10/21/19. Pt has hx CVA with residual left side weakness. Pt requiring Marlene for bed mobility and Marlene for stand pivot transfer to chair. Pt hesitant with movement secondary to pain. Attempt ambulation at next tx session if appropriate. Pt appears far from functional mobility baseline and wife works full time. PT recommending IP rehab at this time to address deficits. PT will follow 3x/week while at [facility] and [facility] to assess d/c needs.

## 2019-10-22 NOTE — THERAPY EVALUATION
Acute Care - Physical Therapy Initial Evaluation  [facility]     Patient Name: Lonnie Arora  : 1975  MRN: [id number]  Today's Date: 10/22/2019                Admit Date: 10/15/2019    Visit Dx:     ICD-10-CM ICD-9-CM   1. Sepsis, due to unspecified organism, unspecified whether acute organ dysfunction present (CMS/[facility]) A41.9 038.9     995.91   2. Abscess, gluteal, left L02.31 682.5   3. 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 Type 2 diabetes mellitus with hyperglycemia, unspecified whether long term insulin use (CMS/[facility]) E11.65 250.00   5. Abscess and cellulitis of gluteal region L02.31 682.5    L03.317      Patient Active Problem List   Diagnosis   • Benign essential hypertension   • Deep venous insufficiency   • Diverticulosis   • Dysmetabolic syndrome   • GERD (gastroesophageal reflux disease)   • H/O umbilical hernia repair   • History of kidney stones   • Hyperlipidemia, mixed   • Major depression, chronic   • Male hypogonadism   • Obesity   • Seizure disorder (CMS/[facility])   • Stroke syndrome   • Type 2 diabetes mellitus (CMS/[facility])   • Sepsis (CMS/[facility])   • Abscess, gluteal, left   • Other specified diseases of gallbladder   • Primary insomnia   • S/P cholecystectomy   • 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     Past Medical History:   Diagnosis Date   • Diabetes (CMS/[facility])    • GERD (gastroesophageal reflux disease)    • Hypertension    • Seizures (CMS/[facility])      Past Surgical History:   Procedure Laterality Date   • INCISION AND DRAINAGE OF WOUND Left 10/15/2019    Procedure: INCISION AND DRAINAGE OF LEFT GLUTEAL REGION;  Surgeon: Niraj Almanzar MD;  Location: [facility] OR;  Service: General        PT ASSESSMENT (last 12 hours)      Physical Therapy Evaluation     Row Name 10/22/19 1253          PT Evaluation Time/Intention    Subjective Information  complains of;weakness;pain  -HD     Document Type  evaluation  -HD     Mode of Treatment  physical therapy  -HD     Comment  colostomy, incision left  glut   -HD     Row Name 10/22/19 1253          General Information    Patient Profile Reviewed?  yes  -HD     Prior Level of Function  independent:  -HD     Equipment Currently Used at Home  none  -HD     Pertinent History of Current Functional Problem  Pt is 43 yo male admitted for abscess in left buttocks region, sepsis.  CT abdomen showing findings consistent with suspicion of carlos's gangrene.  Pt s/p I& D left gluteal region 10/15/19.  Pt now s/p diverting colostomy secondary to need to divert stool away from wound on 10/21/19.  Pt has hx CVA with residual left side weakness.    -HD     Row Name 10/22/19 1253          Relationship/Environment    Lives With  spouse  -HD     Name(s) of Who Lives With Patient  home alone during day while wife works   -HD     Row Name 10/22/19 1253          Resource/Environmental Concerns    Current Living Arrangements  home/apartment/condo  -HD     Row Name 10/22/19 1253          Living Environment    Home Accessibility  stairs to enter home  -HD     Row Name 10/22/19 1253          Home Main Entrance    Number of Stairs, Main Entrance  one  -HD     Row Name 10/22/19 1253          Cognitive Assessment/Intervention- PT/OT    Orientation Status (Cognition)  oriented x 4  -HD     Follows Commands (Cognition)  WFL  -HD     Row Name 10/22/19 1253          Safety Issues, Functional Mobility    Impairments Affecting Function (Mobility)  strength;endurance/activity tolerance;balance  -HD     Row Name 10/22/19 1253          Bed Mobility Assessment/Treatment    Bed Mobility Assessment/Treatment  supine-sit  -HD     Supine-Sit [place] (Bed Mobility)  minimum assist (75% patient effort);contact guard  -HD     Row Name 10/22/19 1253          Transfer Assessment/Treatment    Transfer Assessment/Treatment  sit-stand transfer;stand-sit transfer  -HD     Sit-Stand [place] (Transfers)  minimum assist (75% patient effort)  -HD     Stand-Sit [place] (Transfers)  minimum assist (75%  patient effort)  -     Row Name 10/22/19 1253          Gait/Stairs Assessment/Training    [place] Level (Gait)  unable to assess  -     Row Name 10/22/19 1253          General ROM    GENERAL ROM COMMENTS  right UE WFL, left should limited 50% shoulder elevation, right LE WFL, left LE AAROM WFL  -[facility] Name 10/22/19 1253          MMT (Manual Muscle Testing)    General MMT Comments  right LE WFL, left LE grossly 3+/5 MMT within available ROM   -     Row Name 10/22/19 1253          Balance    Balance  static sitting balance  -[facility] Name 10/22/19 1253          Static Sitting Balance    Level of [place] (Unsupported Sitting, Static Balance)  contact guard assist  -     Sitting Position (Unsupported Sitting, Static Balance)  sitting on edge of bed  -HD     Time Able to Maintain Position (Unsupported Sitting, Static Balance)  3 to 4 minutes  -HD     Row Name 10/22/19 1253          Static Standing Balance    Level of [place] (Supported Standing, Static Balance)  minimal assist, 75% patient effort  -HD     Time Able to Maintain Position (Supported Standing, Static Balance)  2 to 3 minutes  -     Assistive Device Utilized (Supported Standing, Static Balance)  -- HHA   -[facility] 10/22/19 1253          Pain Assessment    Additional Documentation  Pain Scale: Numbers Pre/Post-Treatment (Group)  -HD     Row Name 10/22/19 1253          Pain Scale: Numbers Pre/Post-Treatment    Pain Scale: Numbers, Pretreatment  8/10  -HD     Pain Scale: Numbers, Post-Treatment  8/10  -HD     Pre/Post Treatment Pain Comment  left buttock region, colostomy site   -HD     Pain Intervention(s)  Medication (See MAR)  -     Row Name             Wound 10/15/19 1632 gluteal Incision    Wound - Properties Group Date first assessed: 10/15/19  -AL Time first assessed: 1632  -AL Location: gluteal  -AL Primary Wound Type: Incision  -AL    Row Name             Wound 10/20/19 1100 Left calf Skin Tear    Wound -  Properties Group Date first assessed: 10/20/19  -RG Time first assessed: 1100  -RG Present on Hospital Admission: Y  -RG, per pt he scraped it getting into his truck  Side: Left  -RG Location: calf  -RG Primary Wound Type: Skin tear  -RG    Row Name             Wound 10/20/19 0800 scrotum Skin Tear    Wound - Properties Group Date first assessed: 10/20/19  -RG Time first assessed: 0800  -RG Present on Hospital Admission: N  -RG Location: scrotum  -RG Primary Wound Type: Skin tear  -RG    Row Name             Wound 10/21/19 1648 Other (See comments) abdomen Incision    Wound - Properties Group Date first assessed: 10/21/19  -MC Time first assessed: 1648  -MC Side: Other (See comments)  -MC Location: abdomen  -MC Primary Wound Type: Incision  -MC    Row Name 10/22/19 1253          Plan of Care Review    Plan of Care Reviewed With  patient  -HD     Row Name 10/22/19 1253          Physical Therapy Clinical Impression    Patient/Family Goals Statement (PT Clinical Impression)  increase strength, get up and move  -HD     Criteria for Skilled Interventions Met (PT Clinical Impression)  yes;treatment indicated  -HD     Impairments Found (describe specific impairments)  aerobic capacity/endurance;gait, locomotion, and balance;muscle performance  -HD     Rehab Potential (PT Clinical Summary)  good, to achieve stated therapy goals  -HD     Predicted Duration of Therapy (PT)  2 weeks   -HD     Row Name 10/22/19 1253          Vital Signs    Pretreatment Heart Rate (beats/min)  98  -HD     Intratreatment Heart Rate (beats/min)  141  -HD     Posttreatment Heart Rate (beats/min)  112  -HD     Row Name 10/22/19 1253          Physical Therapy Goals    Bed Mobility Goal Selection (PT)  bed mobility, PT goal 1  -HD     Transfer Goal Selection (PT)  transfer, PT goal 1  -HD     Gait Training Goal Selection (PT)  gait training, PT goal 1  -HD     Row Name 10/22/19 1253          Bed Mobility Goal 1 (PT)    Activity/Assistive Device (Bed  Mobility Goal 1, PT)  bed mobility activities, all  -HD     [place] Level/Cues Needed (Bed Mobility Goal 1, PT)  standby assist  -HD     Time Frame (Bed Mobility Goal 1, PT)  2 weeks  -HD     Row Name 10/22/19 1253          Transfer Goal 1 (PT)    Activity/Assistive Device (Transfer Goal 1, PT)  transfers, all  -HD     [place] Level/Cues Needed (Transfer Goal 1, PT)  standby assist  -HD     Time Frame (Transfer Goal 1, PT)  2 weeks  -HD     Row Name 10/22/19 1253          Gait Training Goal 1 (PT)    Activity/Assistive Device (Gait Training Goal 1, PT)  gait (walking locomotion);assistive device use  -HD     [place] Level (Gait Training Goal 1, PT)  standby assist  -HD     Distance (Gait Goal 1, PT)  125 ft  -HD     Time Frame (Gait Training Goal 1, PT)  2 weeks  -HD     Row Name 10/22/19 1253          Positioning and Restraints    Pre-Treatment Position  in bed  -HD     Post Treatment Position  chair  -HD     In Chair  notified nsg;call light within reach;encouraged to call for assist  -HD       User Key  (r) = Recorded By, (t) = Taken By, (c) = Cosigned By    Initials Name Provider Type    HD Swati Bah, PT Physical Therapist    Chika Daniel RN Registered Nurse    Renae Nur RN Registered Nurse    Marline Read RN Registered Nurse        Physical Therapy Education     Title: PT OT SLP Therapies (In Progress)     Topic: Physical Therapy (Done)     Point: Mobility training (Done)     Learning Progress Summary           Patient Acceptance, E, VU by HD at 10/22/2019  1:00 PM                   Point: Precautions (Done)     Learning Progress Summary           Patient Acceptance, E, VU by HD at 10/22/2019  1:00 PM                               User Key     Initials Effective Dates Name Provider Type Discipline     03/01/19 -  Swati Bah, PT Physical Therapist PT              PT Recommendation and Plan  Anticipated Discharge Disposition (PT): inpatient rehabilitation  facility  Planned Therapy Interventions (PT Eval): balance training, bed mobility training, gait training, neuromuscular re-education, patient/family education, stair training, strengthening, transfer training, postural re-education  Therapy Frequency (PT Clinical Impression): 3 times/wk  Outcome Summary/Treatment Plan (PT)  Anticipated Discharge Disposition (PT): inpatient rehabilitation facility  Plan of Care Reviewed With: patient  Outcome Summary: Pt is 43 yo male admitted for abscess in left buttocks region, sepsis.  CT abdomen showing findings consistent with suspicion of carlos's gangrene.  Pt s/p I& D left gluteal region 10/15/19.  Pt now s/p diverting colostomy secondary to need to divert stool away from wound on 10/21/19.  Pt has hx CVA with residual left side weakness.  Pt requiring Marlene for bed mobility and Marlene for stand pivot transfer to chair.  Pt hesitant with movement secondary to pain.  Attempt ambulation at next tx session if appropriate.  Pt appears far from functional mobility baseline and wife works full time.  PT recommending IP rehab at this time to address deficits.  PT will follow 3x/week while at [facility] and [facility] to assess d/c needs.      Time Calculation:   PT Charges     Row Name 10/22/19 1338             Time Calculation    Start Time  1253  -HD      Stop Time  1326  -HD      Time Calculation (min)  33 min  -HD      PT Received On  10/22/19  -HD      PT - Next Appointment  10/24/19  -      PT Goal Re-Cert Due Date  11/05/19  -HD         Time Calculation- PT    Total Timed Code Minutes- PT  17 minute(s)  -HD        User Key  (r) = Recorded By, (t) = Taken By, (c) = Cosigned By    Initials Name Provider Type     Swati Bah, PT Physical Therapist        Therapy Charges for Today     Code Description Service Date Service Provider Modifiers Qty    [id number]  PT EVAL MOD COMPLEXITY 4 10/22/2019 Swati Bah, PT GP 1    [id number]  PT THERAPEUTIC ACT EA 15 MIN 10/22/2019  Swati Bah, PT GP 1    [id number] HC PT NEUROMUSC RE EDUCATION EA 15 MIN 10/22/2019 Swati Bah, PT GP 1                 Swati Bah, PT  10/22/2019

## 2019-10-22 NOTE — PROGRESS NOTES
"      [facility] Medicine Services Daily Progress Note      Hospitalist Team  LOS 7 days      Patient Care Team:  Sanjay Chance MD as PCP - General        Chief Complaint / Subjective  Chief Complaint   Patient presents with   • Abdominal Pain     Postoperative day 1 status post diverting colostomy doing doing well tolerating liquids denies pain  Brief Synopsis of Hospital Course/HPI    This is a 44-year-old  male with a past medical history of diabetes mellitus type 2, CVA, seizures, hypertension, hyperlipidemia, cholecystectomy, and kidney stones who presented to the ED on 10/15/2019 with complaints of buttock pain.  Patient stated 3 days ago he developed a bump on his rear end and has progressively gotten worse.  He states it hurts very badly to sit down and pain medication makes the pain pain better.  He thought he had been sitting in a recliner too long and this had caused a bump.  He states his pain is 8 out of 10.  He denies any recent nausea, vomiting, diarrhea, fever, chills.  Patient states when moving from one bed to the next bed in radiology, he felt a \"pop\"and his buttock started draining.  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  General surgery was consulted and the patient was taken to the OR for an I&D and debridement of devitalized tissue.       The patient was subsequently admitted to the PCU postoperatively, the patient became hypotensive and was unresponsive to IV fluids.  He developed septic shock and required Levophed.       General surgery advised the patient may need a diverting colostomy.  The patient will be placed NPO at midnight 10/17 for potential colostomy and/or further debridement of the wound.  This could occur later this weekend, as well.     Infectious disease has started the patient on IV vancomycin/clindamycin/meropenem, pending " "final culture results.       The hospitalists were consulted on 10/17 for medical management.  The patient complains of left buttock pain since general surgery re-packed the wound.  He states he is doing well otherwise.        Review of systems negative for all other 10 systems    Review of Systems   Constitution: Negative for chills and fever.   Cardiovascular: Negative for chest pain and cyanosis.   Respiratory: Negative for shortness of breath and wheezing.    Gastrointestinal: Negative for bowel incontinence and nausea.   Neurological: Negative for dizziness and light-headedness.   Psychiatric/Behavioral: Negative for altered mental status. The patient does not have insomnia.        Family History   Problem Relation Age of Onset   • Breast cancer Mother    • Cervical cancer Mother        Past Medical History:   Diagnosis Date   • Diabetes (CMS/HCC)    • GERD (gastroesophageal reflux disease)    • Hypertension    • Seizures (CMS/HCC)        Social History     Socioeconomic History   • Marital status:      Spouse name: Not on file   • Number of children: Not on file   • Years of education: Not on file   • Highest education level: Not on file   Tobacco Use   • Smoking status: Current Every Day Smoker     Types: Electronic Cigarette   Substance and Sexual Activity   • Alcohol use: No     Frequency: Never     Comment: Quit several years ago at time of his CVA   • Drug use: Yes     Types: Marijuana           Objective      Vital Signs  Temp:  [97.2 °F (36.2 °C)-99.4 °F (37.4 °C)] 98.6 °F (37 °C)  Heart Rate:  [] 96  Resp:  [10-20] 16  BP: (126-154)/(76-90) 131/81  Oxygen Therapy  SpO2: 94 %  Pulse Oximetry Type: Continuous  Device (Oxygen Therapy): nasal cannula  Flow (L/min): 2(nasal canula)  Flowsheet Rows      First Filed Value   Admission Height  160 cm (63\") Documented at 10/15/2019 0954   Admission Weight  106 kg (232 lb 12.9 oz) Documented at 10/15/2019 0954        Intake & Output (last 3 days)     "   10/19 0701 - 10/20 0700 10/20 0701 - 10/21 0700 10/21 0701 - 10/22 0700 10/22 0701 - 10/23 0700    P.O. 480 1200 750     I.V. (mL/kg) 1402 (11.7)  4438 (37)     IV Piggyback   50     Total Intake(mL/kg) 1882 (15.7) 1200 (10.1) 5238 (43.7)     Urine (mL/kg/hr) 2400 (0.8) 2750 (1) 2800 (1)     Stool 0 1 0     Total Output 2400 2751 2800     Net -518 -1551 +2438             Stool Unmeasured Occurrence 1 x 2 x 1 x         Lines, Drains & Airways    Active LDAs     Name:   Placement date:   Placement time:   Site:   Days:    CVC Triple Lumen 10/15/19 Right Internal jugular   10/15/19    2345    Internal jugular   2    Peripheral IV 10/15/19 1249 Left Antecubital   10/15/19    1249    Antecubital   3    Peripheral IV 10/15/19 1900 Right Wrist   10/15/19    1900 IV site present when patient arrived to PCU    Wrist   2    Urethral Catheter Non-latex 16 Fr.   10/15/19    1615     2                  Physical Exam:     General: Morbidly obese male lying in bed appearing comfortable, on supplemental oxygen via nasal cannula   HEENT: NC/AT, EOMI, mucosa moist  Heart: RRR. No murmur   Chest: Diminished lung sounds, no wheezes appreciated, normal respiratory effort  Abdominal: Soft. NT/ND. Bowel sounds present left-sided ostomy  Musculoskeletal: Normal ROM.  No edema. No calf tenderness.  Neurological: AAOx3, no focal deficits  Skin: Skin is warm and dry.  Gluteal wound dressed and not assessed  Psychiatric: Normal mood and affect.    Procedures:    Procedure(s):  INCISION AND DRAINAGE OF LEFT GLUTEAL REGION          Results Review:     I reviewed the patient's new clinical results.  reviewed    Results from last 7 days   Lab Units 10/22/19  0259 10/21/19  0251 10/20/19  0418   WBC 10*3/mm3 16.90* 17.50* 22.40*   HEMOGLOBIN g/dL 10.1* 10.6* 10.7*   HEMATOCRIT % 30.0* 31.9* 31.7*   PLATELETS 10*3/mm3 200 164 160   MONOCYTES % % 4.0* 7.0 5.0     Results from last 7 days   Lab Units 10/22/19  0259 10/21/19  0251 10/20/19  0418    SODIUM mmol/L 140 137 136   POTASSIUM mmol/L 4.9 4.2 4.6   CHLORIDE mmol/L 104 105 108*   CO2 mmol/L 24.0 25.0 23.0   BUN mg/dL 19 22* 27*   CREATININE mg/dL 1.15 1.20 1.38*   CALCIUM mg/dL 7.7* 7.9* 7.7*   BILIRUBIN mg/dL 0.4 0.4 0.3   ALK PHOS U/L 187* 150* 157*   ALT (SGPT) U/L 40 52* 60*   AST (SGOT) U/L 37 53* 76*   GLUCOSE mg/dL 101* 89 153*         Lab Results   Component Value Date    CALCIUM 7.7 (L) 10/22/2019     No results found for: HGBA1C  Results from last 7 days   Lab Units 10/21/19  0251   INR  0.97               Microbiology Results (last 10 days)     Procedure Component Value - Date/Time    MRSA Screen Culture - Swab, Nares [[id number]]  (Normal) Collected:  10/21/19 0554    Lab Status:  Final result Specimen:  Swab from Nares Updated:  10/22/19 1326     MRSA SCREEN CX No Methicillin Resistant Staphylococcus aureus isolated    Blood Culture - Blood, Arm, Right [[id number]] Collected:  10/16/19 1032    Lab Status:  Final result Specimen:  Blood from Arm, Right Updated:  10/21/19 1100     Blood Culture No growth at 5 days    Wound Culture - Wound, Buttock, Left [[id number]]  (Abnormal) Collected:  10/15/19 1347    Lab Status:  Final result Specimen:  Wound from Buttock, Left Updated:  10/18/19 1004     Wound Culture Moderate growth (3+) Streptococcus, Beta Hemolytic, Group F     Comment: This organism is considered to be universally susceptible to penicillin.  No further antibiotic testing will be performed.        STREP GROUPING F     Wound Culture Scant growth (1+) Normal Skin [place]     Gram Stain Many (4+) WBCs per low power field      Moderate (3+) Gram positive cocci in chains      Mixed bacterial morphotypes seen on Gram Stain    Blood Culture - Blood, Arm, Left [[id number]] Collected:  10/15/19 1230    Lab Status:  Final result Specimen:  Blood from Arm, Left Updated:  10/20/19 1315     Blood Culture No growth at 5 days          ECG/EMG Results (most recent)     Procedure Component Value Units  Date/Time    ECG 12 Lead [[id number]] Collected:  10/21/19 0750     Updated:  10/21/19 0751    Narrative:       HEART RATE= 84  bpm  RR Interval= 716  ms  CT Interval= 149  ms  P Horizontal Axis= 14  deg  P Front Axis= 41  deg  QRSD Interval= 90  ms  QT Interval= 380  ms  QRS Axis= -1  deg  T Wave Axis= 36  deg  - NORMAL ECG -  Sinus rhythm  Electronically Signed By:   Date and Time of Study: 2019-10-21 07:50:07                    Ct Abdomen Pelvis Without Contrast    Result Date: 10/15/2019   1. 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 Abnormal subcutaneous fat induration and superficial and deep air loculations extend from the left gluteal region, into the left ischial rectal fossa, and into the fat adjacent to the left lateral margin of the penile shaft. Of note, the perineum is not imaged in its entirety. 2. No drainable fluid collection or abscess is seen within the pelvis. 3. Nonobstructing left renal stone. 4. Chronic calcific pancreatitis.    Electronically Signed By-Dr. Rowena Carballo MD On:10/15/2019 1:38 PM This report was finalized on [id number] by Dr. Rowena Carballo MD.    Xr Chest 1 View    Result Date: 10/16/2019  1.Subsegmental atelectasis right lower lobe. 2.Cardiomegaly. 3.Central venous catheter tip has appropriate position.  Electronically Signed By-Winifred Simental On:10/16/2019 7:36 AM This report was finalized on [id number] by  Winifred Simental, .      Xrays, labs reviewed personally by physician.    Medication Review:   I have reviewed the patient's current medication list  reviewed    Scheduled Meds    atorvastatin 40 mg Oral Daily   bacitracin  Topical Q12H   baclofen 10 mg Oral Daily   buPROPion  mg Oral Daily   ceftriaxone 2 g Intravenous Q24H   clindamycin 900 mg Intravenous Q8H   insulin glargine 20 Units Subcutaneous QAM   insulin lispro 0-24 Units Subcutaneous 4x Daily With Meals & Nightly   ipratropium-albuterol 3 mL Nebulization Q6H While Awake - RT    levETIRAcetam 500 mg Oral Q12H   rOPINIRole 0.5 mg Oral TID   sodium chloride 10 mL Intravenous Q12H   Sodium Hypochlorite 0.0625 % (Dakin's 1/8th Strength) topical solution 946 mL Irrigation Q24H       Meds Infusions    sodium chloride 50 mL/hr Last Rate: 50 mL/hr (10/22/19 1318)       Meds PRN  •  aluminum-magnesium hydroxide-simethicone  •  bisacodyl  •  dextrose  •  dextrose  •  glucagon (human recombinant)  •  HYDROmorphone  •  influenza vaccine  •  magnesium hydroxide  •  melatonin  •  ondansetron **OR** ondansetron  •  oxyCODONE **OR** oxyCODONE  •  sodium chloride  •  sodium chloride        Assessment / Plan    Active Hospital Problems    Diagnosis  POA   • **Abscess, gluteal, left [L02.31]  Yes   • Sepsis (CMS/HCC) [A41.9]  Yes   • 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   10/22  Stable postoperative day 1 status post diverting colostomy tolerating liquids denies any pain issues continue supportive care continue IV antibiotics repeat labs in a.m.  Patient is a 44-year-old male with history of CVA as well as type 2 diabetes and vascular disease developed fournieres gangrene now status post I&Do  appearing stable dynamically stable his pain control is adequate he has no dyspnea no chest pain no pulmonary issues he is coherent awake alert labs and meds reviewed antibiotics management per ID sepsis is resolved     .10/20  -Pain control  -IV antibiotics  -Status post I&D, patient getting diverting colostomy  -General surgery following  -Off pressors  -Antibiotics per ID  -Wound cultures showing group F Streptococcus         Septic shock -as above  -No further pressor  support     Acute kidney injury - improving, likely component of chronic renal insufficiency now at 1.38  -  Improving from 1.7-1.56  - continue IV fluid but at reduced rate  - monitor BMP     Leukocytosis -secondary to infection continues to be elevated  -Monitor daily    Anemia -mild likely some loss postoperatively and then degree of chronic inflammation currently stable  -Monitor daily    diabetes mellitus type 2 -uncertain degree of control at home, glucoses less than 200  - continue Lantus daily  - SSI   - accuchecks AC HS     Essential hypertension - chronic relatively controlled, hypotension resolved  -Resume meds once taking p.o. postop     Hyperlipidemia -stable  - continue home atorvastatin      Depression, chronic -appears controlled  - continue home wellbutrin      Seizure disorder -no seizure episodes noted  - continue home Keppra      Morbid obesity (BMI 44.13)  - encourage lifestyle modifications      VTE prophylaxis - bilateral SCDs    Disposition -inpatient        Jeremi Roy MD  10/22/19  1:55 PM

## 2019-10-22 NOTE — CONSULTS
"Diabetes Education  Assessment/Teaching    Patient Name:  Lonnie Arora  YOB: 1975  MRN: [id number]  Admit Date:  10/15/2019      Assessment Date:  10/22/2019    Most Recent Value   General Information    Referral From:  A1c [Pt's A1c this adm 8.0%]   Height  160 cm (63\")   Height Method  Stated   Weight  121 kg (265 lb 10.5 oz)   Weight Method  Bed scale   Pregnancy Assessment   Diabetes History   What type of diabetes do you have?  Type 2   Length of Diabetes Diagnosis  10 + years   Current DM knowledge  fair   Have you had diabetes education/teaching in the past?  no   Do you test your blood sugar at home?  yes   Frequency of checks  3 times/day   Who performs the test?  self   Typical readings  150-200   Have you had high blood sugar? (>140mg/dl)  yes   How often do you have high blood sugar?  frequently   When was your last high blood sugar?  Adm bs 242 mg/dl   Education Preferences   What areas of diabetes would you like to learn about?  avoiding high blood sugar, diabetes complications, medications for diabetes, testing my blood sugar at home   Nutrition Information   Assessment Topics   Taking Medication - Assessment  Needs education   Problem Solving - Assessment  Needs education   Reducing Risk - Assessment  Needs education   Monitoring - Assessment  Needs education   DM Goals   Taking Medication - Goal  Today   Problem Solving - Goal  Today   Reducing Risk - Goal  Today   Monitoring - Goal  Today            Most Recent Value   DM Education Needs   Meter  Has own   Frequency of Testing  3 times a day   Blood Glucose Target Range  Discussed with pt his A1c result of 8.0%. Reviewed how this relates to bs levels. Discussed healthy bs range and healthy A1c target. Discussed importance of bs control in helping reduce risk of complications and risk for infections.   Medication  Insulin [Pt was supposed to be taking Lantus 35 units daily and Novolog 40 units premeals but due to cost, he " stopped taking the Lantus. Pt states his MD knows he is not taking the Lantus]   Problem Solving  Hyperglycemia, Signs, Symptoms, Treatment   Reducing Risks  A1C testing, Cardiovascular, Neuropathy   Motivation  Engaged   Teaching Method  Explanation, Discussion, Handouts   Patient Response  Verbalized understanding            Other Comments:  Discussed with pt the ReliOn brand insulin at [facility]. Explained there is an option to take a longer-acting insulin that would help control bs and would cost $25 per vial. Encouraged pt to discuss this option with MD. Discussed importance of recording bs and notifying MD if bs continue running outside the healthy target. Gave A1c info sheet. Pt states he does not need further education at this time.         Electronically signed by:  Brit Cole RN  10/22/19 4:17 PM

## 2019-10-22 NOTE — THERAPY EVALUATION
Acute Care - Occupational Therapy Initial Evaluation  [facility]     Patient Name: Lonnie Arora  : 1975  MRN: [id number]  Today's Date: 10/22/2019             Admit Date: 10/15/2019       ICD-10-CM ICD-9-CM   1. Sepsis, due to unspecified organism, unspecified whether acute organ dysfunction present (CMS/[facility]) A41.9 038.9     995.91   2. Abscess, gluteal, left L02.31 682.5   3. 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 Type 2 diabetes mellitus with hyperglycemia, unspecified whether long term insulin use (CMS/[facility]) E11.65 250.00   5. Abscess and cellulitis of gluteal region L02.31 682.5    L03.317      Patient Active Problem List   Diagnosis   • Benign essential hypertension   • Deep venous insufficiency   • Diverticulosis   • Dysmetabolic syndrome   • GERD (gastroesophageal reflux disease)   • H/O umbilical hernia repair   • History of kidney stones   • Hyperlipidemia, mixed   • Major depression, chronic   • Male hypogonadism   • Obesity   • Seizure disorder (CMS/[facility])   • Stroke syndrome   • Type 2 diabetes mellitus (CMS/[facility])   • Sepsis (CMS/[facility])   • Abscess, gluteal, left   • Other specified diseases of gallbladder   • Primary insomnia   • S/P cholecystectomy   • 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     Past Medical History:   Diagnosis Date   • Diabetes (CMS/[facility])    • GERD (gastroesophageal reflux disease)    • Hypertension    • Seizures (CMS/[facility])      Past Surgical History:   Procedure Laterality Date   • INCISION AND DRAINAGE OF WOUND Left 10/15/2019    Procedure: INCISION AND DRAINAGE OF LEFT GLUTEAL REGION;  Surgeon: Niraj Almanzar MD;  Location: [facility] OR;  Service: General          OT ASSESSMENT FLOWSHEET (last 12 hours)      Occupational Therapy Evaluation     Row Name 10/22/19 0855                   OT Evaluation Time/Intention    Subjective Information  no complaints  -SR        Document Type  evaluation  -SR        Mode of Treatment  concurrent therapy;occupational therapy  -SR         Comment  Pt reports he had a stroke causing L sided weakness 9 years ago.  States he is able to do all mobiltiy and self care with no AE.    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   -SR        Equipment Currently Used at Home  none;walker, rolling;cane, straight  -SR        Pertinent History of Current Functional Problem  Pt admitted with gluteal abscess with Dale's gangrene. Pt has undergone 2 I&D's as well as diverting colostomy for infection prevention due to proximity to anus.    -SR        Existing Precautions/Restrictions  -- Open wound on L glute.   -SR           Relationship/Environment    Lives With  spouse Spouse works during the day.   -SR           Resource/Environmental Concerns    Current Living Arrangements  home/apartment/condo  -SR           Cognitive Assessment/Intervention- PT/OT    Orientation Status (Cognition)  oriented x 4  -SR           Bed Mobility Assessment/Treatment    Bed Mobility Assessment/Treatment  supine-sit  -SR        Supine-Sit [place] (Bed Mobility)  minimum assist (75% patient effort)  -SR        Comment (Bed Mobility)  Once pt got to EOB, he was unable to sit on buttocks due to severe pain in L glute from sx.   -SR           Transfer Assessment/Treatment    Transfer Assessment/Treatment  sit-stand transfer  -SR        Comment (Transfers)  Pt able to stand from R leaning position due to severe pain in L buttock.   -SR           Sit-Stand Transfer    Sit-Stand [place] (Transfers)  contact guard  -SR           General ROM    GENERAL ROM COMMENTS  BUE WFL   -SR           MMT (Manual Muscle Testing)    General MMT Comments  RUE 4/5, LUE 4-/5  -SR           Motor Assessment/Interventions    Additional Documentation  Balance (Group)  -SR           Balance    Balance  static sitting balance;static standing balance;dynamic sitting balance  -SR            Static Sitting Balance    Level of [place] (Unsupported Sitting, Static Balance)  supervision  -SR           Static Standing Balance    Level of [place] (Supported Standing, Static Balance)  contact guard assist  -SR        Time Able to Maintain Position (Supported Standing, Static Balance)  15 to 30 seconds  -SR        Assistive Device Utilized (Supported Standing, Static Balance)  walker, rolling  -SR           Positioning and Restraints    Pre-Treatment Position  in bed  -SR        Post Treatment Position  bed  -SR        In Bed  side lying right  -SR           Pain Assessment    Additional Documentation  Pain Scale: Numbers Pre/Post-Treatment (Group)  -SR           Pain Scale: Numbers Pre/Post-Treatment    Pain Scale: Numbers, Pretreatment  6/10  -SR        Pre/Post Treatment Pain Comment  L buttock   -SR           Wound 10/15/19 1632 gluteal Incision    Wound - Properties Group Date first assessed: 10/15/19  -AL Time first assessed: 1632  -AL Location: gluteal  -AL Primary Wound Type: Incision  -AL       Wound 10/20/19 1100 Left calf Skin Tear    Wound - Properties Group Date first assessed: 10/20/19  -RG Time first assessed: 1100  -RG Present on Hospital Admission: Y  -RG, per pt he scraped it getting into his truck  Side: Left  -RG Location: calf  -RG Primary Wound Type: Skin tear  -RG       Wound 10/20/19 0800 scrotum Skin Tear    Wound - Properties Group Date first assessed: 10/20/19  -RG Time first assessed: 0800  -RG Present on Hospital Admission: N  -RG Location: scrotum  -RG Primary Wound Type: Skin tear  -RG       Wound 10/21/19 1648 Other (See comments) abdomen Incision    Wound - Properties Group Date first assessed: 10/21/19  -MC Time first assessed: 1648  -MC Side: Other (See comments)  -MC Location: abdomen  -MC Primary Wound Type: Incision  -MC       Coping    Observed Emotional State  accepting;calm;cooperative  -SR           Plan of Care Review    Plan of Care Reviewed With   patient  -SR           Clinical Impression (OT)    Criteria for Skilled Therapeutic Interventions Met (OT Eval)  yes;treatment indicated  -SR        Rehab Potential (OT Eval)  good, to achieve stated therapy goals  -SR        Therapy Frequency (OT Eval)  3 times/wk  -SR        Predicted Duration of Therapy Intervention (Therapy Eval)  Until dc  -SR        Anticipated Discharge Disposition (OT)  inpatient rehabilitation facility  -SR           Vital Signs    Pretreatment Heart Rate (beats/min)  100  -SR        Intratreatment Heart Rate (beats/min)  140  -SR        Posttreatment Heart Rate (beats/min)  98  -SR           Planned OT Interventions    Planned Therapy Interventions (OT Eval)  activity tolerance training;BADL retraining;functional balance retraining;transfer/mobility retraining;occupation/activity based interventions  -SR           OT Goals    Bed Mobility Goal Selection (OT)  bed mobility, OT goal 1  -SR        Bathing Goal Selection (OT)  bathing, OT goal 1  -SR           Bed Mobility Goal 1 (OT)    Activity/Assistive Device (Bed Mobility Goal 1, OT)  bed mobility activities, all  -SR        [place] Level/Cues Needed (Bed Mobility Goal 1, OT)  supervision required  -SR        Time Frame (Bed Mobility Goal 1, OT)  2 weeks  -SR           Bathing Goal 1 (OT)    Activity/Assistive Device (Bathing Goal 1, OT)  bathing skills, all  -SR        [place] Level/Cues Needed (Bathing Goal 1, OT)  minimum assist (75% or more patient effort)  -SR        Time Frame (Bathing Goal 1, OT)  2 weeks  -SR          User Key  (r) = Recorded By, (t) = Taken By, (c) = Cosigned By    Initials Name Effective Dates    SR Genesis Villagran OT 03/01/19 -     Chika Daniel RN 03/01/19 -     Renae Nur RN 03/01/19 -     Marline Read RN 02/12/19 -          Occupational Therapy Education     Title: PT OT SLP Therapies (In Progress)     Topic: Occupational Therapy (In Progress)     Point: Body mechanics  (In Progress)     Description: Instruct learner(s) on proper positioning and spine alignment during self-care, functional mobility activities and/or exercises.    Learning Progress Summary           Patient Acceptance, E,TB, NR by  at 10/22/2019 12:03 PM                               User Key     Initials Effective Dates Name Provider Type [facility] 03/01/19 -  Genesis Villagran OT Occupational Therapist OT                  OT Recommendation and Plan  Outcome Summary/Treatment Plan (OT)  Anticipated Discharge Disposition (OT): inpatient rehabilitation facility  Planned Therapy Interventions (OT Eval): activity tolerance training, BADL retraining, functional balance retraining, transfer/mobility retraining, occupation/activity based interventions  Therapy Frequency (OT Eval): 3 times/wk  Plan of Care Review  Plan of Care Reviewed With: patient  Plan of Care Reviewed With: patient  Outcome Summary: Pt is a 44 year old male admitted with gluteal abscess.  He also underwent diverting colostomy placement.  He participates well and is able to get to EOB with min assist.  Able to stand EOB for short amount of time.  Unable to complete ADLs this date.  He will reuqire  rehab at discharge.          Time Calculation:   Time Calculation- OT     Row Name 10/22/19 1203             Time Calculation- OT    OT Start Time  0855  -      OT Stop Time  0919  -      OT Time Calculation (min)  24 min  -SR      Total Timed Code Minutes- OT  15 minute(s)  -SR      OT Non-Billable Time (min)  9 min  -SR      OT Received On  10/22/19  -      OT - Next Appointment  10/24/19  -      OT Goal Re-Cert Due Date  11/05/19  -        User Key  (r) = Recorded By, (t) = Taken By, (c) = Cosigned By    Initials Name Provider Type     Genesis Villagran OT Occupational Therapist        Therapy Charges for Today     Code Description Service Date Service Provider Modifiers Qty    94616452177 HC OT EVAL MOD COMPLEXITY 4  10/22/2019 Genesis Villagran, OT GO 1    [id number]  OT THERAPEUTIC ACT EA 15 MIN 10/22/2019 Genesis Villagran, OT GO 1               Genesis Villagran, KRISTI  10/22/2019

## 2019-10-23 LAB
ANION GAP SERPL CALCULATED.3IONS-SCNC: 8 MMOL/L (ref 5–15)
BASOPHILS # BLD MANUAL: 0.18 10*3/MM3 (ref 0–0.2)
BASOPHILS NFR BLD AUTO: 1 % (ref 0–1.5)
BUN BLD-MCNC: 14 MG/DL (ref 6–20)
BUN/CREAT SERPL: 13.3 (ref 7–25)
CALCIUM SPEC-SCNC: 7.7 MG/DL (ref 8.6–10.5)
CHLORIDE SERPL-SCNC: 104 MMOL/L (ref 98–107)
CO2 SERPL-SCNC: 27 MMOL/L (ref 22–29)
CREAT BLD-MCNC: 1.05 MG/DL (ref 0.76–1.27)
DEPRECATED RDW RBC AUTO: 50.3 FL (ref 37–54)
EOSINOPHIL # BLD MANUAL: 0.53 10*3/MM3 (ref 0–0.4)
EOSINOPHIL NFR BLD MANUAL: 3 % (ref 0.3–6.2)
ERYTHROCYTE [DISTWIDTH] IN BLOOD BY AUTOMATED COUNT: 16.1 % (ref 12.3–15.4)
GFR SERPL CREATININE-BSD FRML MDRD: 77 ML/MIN/1.73
GLUCOSE BLD-MCNC: 90 MG/DL (ref 65–99)
GLUCOSE BLDC GLUCOMTR-MCNC: 126 MG/DL (ref 70–105)
GLUCOSE BLDC GLUCOMTR-MCNC: 135 MG/DL (ref 70–105)
GLUCOSE BLDC GLUCOMTR-MCNC: 77 MG/DL (ref 70–105)
GLUCOSE BLDC GLUCOMTR-MCNC: 78 MG/DL (ref 70–105)
HCT VFR BLD AUTO: 31.1 % (ref 37.5–51)
HGB BLD-MCNC: 10.5 G/DL (ref 13–17.7)
LYMPHOCYTES # BLD MANUAL: 1.58 10*3/MM3 (ref 0.7–3.1)
LYMPHOCYTES NFR BLD MANUAL: 6 % (ref 5–12)
LYMPHOCYTES NFR BLD MANUAL: 9 % (ref 19.6–45.3)
MCH RBC QN AUTO: 30.2 PG (ref 26.6–33)
MCHC RBC AUTO-ENTMCNC: 33.6 G/DL (ref 31.5–35.7)
MCV RBC AUTO: 89.8 FL (ref 79–97)
METAMYELOCYTES NFR BLD MANUAL: 3 % (ref 0–0)
MONOCYTES # BLD AUTO: 1.06 10*3/MM3 (ref 0.1–0.9)
NEUTROPHILS # BLD AUTO: 13.73 10*3/MM3 (ref 1.7–7)
NEUTROPHILS NFR BLD MANUAL: 69 % (ref 42.7–76)
NEUTS BAND NFR BLD MANUAL: 9 % (ref 0–5)
PLAT MORPH BLD: NORMAL
PLATELET # BLD AUTO: 250 10*3/MM3 (ref 140–450)
PMV BLD AUTO: 7.7 FL (ref 6–12)
POTASSIUM BLD-SCNC: 4.1 MMOL/L (ref 3.5–5.2)
RBC # BLD AUTO: 3.47 10*6/MM3 (ref 4.14–5.8)
RBC MORPH BLD: NORMAL
SCAN SLIDE: NORMAL
SODIUM BLD-SCNC: 139 MMOL/L (ref 136–145)
WBC MORPH BLD: NORMAL
WBC NRBC COR # BLD: 17.6 10*3/MM3 (ref 3.4–10.8)

## 2019-10-23 PROCEDURE — 80048 BASIC METABOLIC PNL TOTAL CA: CPT | Performed by: INTERNAL MEDICINE

## 2019-10-23 PROCEDURE — 94799 UNLISTED PULMONARY SVC/PX: CPT

## 2019-10-23 PROCEDURE — 25010000002 HYDROMORPHONE PER 4 MG: Performed by: SURGERY

## 2019-10-23 PROCEDURE — 63710000001 INSULIN GLARGINE PER 5 UNITS: Performed by: INTERNAL MEDICINE

## 2019-10-23 PROCEDURE — 85007 BL SMEAR W/DIFF WBC COUNT: CPT | Performed by: INTERNAL MEDICINE

## 2019-10-23 PROCEDURE — 99024 POSTOP FOLLOW-UP VISIT: CPT | Performed by: SURGERY

## 2019-10-23 PROCEDURE — 99232 SBSQ HOSP IP/OBS MODERATE 35: CPT | Performed by: INTERNAL MEDICINE

## 2019-10-23 PROCEDURE — 85025 COMPLETE CBC W/AUTO DIFF WBC: CPT | Performed by: INTERNAL MEDICINE

## 2019-10-23 PROCEDURE — 25010000002 CEFTRIAXONE PER 250 MG: Performed by: INTERNAL MEDICINE

## 2019-10-23 PROCEDURE — 82962 GLUCOSE BLOOD TEST: CPT

## 2019-10-23 RX ADMIN — ROPINIROLE 0.5 MG: 0.25 TABLET, FILM COATED ORAL at 16:07

## 2019-10-23 RX ADMIN — CLINDAMYCIN PHOSPHATE 900 MG: 900 INJECTION, SOLUTION INTRAVENOUS at 05:49

## 2019-10-23 RX ADMIN — BACITRACIN: 500 OINTMENT TOPICAL at 09:16

## 2019-10-23 RX ADMIN — BACITRACIN: 500 OINTMENT TOPICAL at 22:12

## 2019-10-23 RX ADMIN — ROPINIROLE 0.5 MG: 0.25 TABLET, FILM COATED ORAL at 09:13

## 2019-10-23 RX ADMIN — BUPROPION HYDROCHLORIDE 300 MG: 150 TABLET, EXTENDED RELEASE ORAL at 09:13

## 2019-10-23 RX ADMIN — INSULIN GLARGINE 20 UNITS: 100 INJECTION, SOLUTION SUBCUTANEOUS at 09:13

## 2019-10-23 RX ADMIN — LEVETIRACETAM 500 MG: 500 TABLET, FILM COATED ORAL at 22:13

## 2019-10-23 RX ADMIN — CEFTRIAXONE SODIUM 2 G: 2 INJECTION, POWDER, FOR SOLUTION INTRAMUSCULAR; INTRAVENOUS at 16:07

## 2019-10-23 RX ADMIN — ROPINIROLE 0.5 MG: 0.25 TABLET, FILM COATED ORAL at 22:13

## 2019-10-23 RX ADMIN — BACLOFEN 10 MG: 10 TABLET ORAL at 09:13

## 2019-10-23 RX ADMIN — Medication 10 ML: at 22:14

## 2019-10-23 RX ADMIN — OXYCODONE HYDROCHLORIDE 10 MG: 5 TABLET ORAL at 19:14

## 2019-10-23 RX ADMIN — HYDROMORPHONE HYDROCHLORIDE 0.5 MG: 2 INJECTION, SOLUTION INTRAMUSCULAR; INTRAVENOUS; SUBCUTANEOUS at 13:00

## 2019-10-23 RX ADMIN — IPRATROPIUM BROMIDE AND ALBUTEROL SULFATE 3 ML: .5; 3 SOLUTION RESPIRATORY (INHALATION) at 11:09

## 2019-10-23 RX ADMIN — HYDROMORPHONE HYDROCHLORIDE 0.5 MG: 2 INJECTION, SOLUTION INTRAMUSCULAR; INTRAVENOUS; SUBCUTANEOUS at 22:28

## 2019-10-23 RX ADMIN — IPRATROPIUM BROMIDE AND ALBUTEROL SULFATE 3 ML: .5; 3 SOLUTION RESPIRATORY (INHALATION) at 19:01

## 2019-10-23 RX ADMIN — ATORVASTATIN CALCIUM 40 MG: 40 TABLET, FILM COATED ORAL at 09:13

## 2019-10-23 RX ADMIN — IPRATROPIUM BROMIDE AND ALBUTEROL SULFATE 3 ML: .5; 3 SOLUTION RESPIRATORY (INHALATION) at 06:33

## 2019-10-23 RX ADMIN — LEVETIRACETAM 500 MG: 500 TABLET, FILM COATED ORAL at 09:13

## 2019-10-23 RX ADMIN — CLINDAMYCIN PHOSPHATE 900 MG: 900 INJECTION, SOLUTION INTRAVENOUS at 13:01

## 2019-10-23 NOTE — PLAN OF CARE
Problem: Patient Care Overview  Goal: Plan of Care Review  Outcome: Ongoing (interventions implemented as appropriate)   10/23/19 0511   Coping/Psychosocial   Plan of Care Reviewed With patient   Plan of Care Review   Progress improving   OTHER   Outcome Summary Patient dressing changed to perineal area. Patient tolerated well. Colostomy continues to drain serosangenous drainage, stoma pink. C/O pain in perineal region.       Problem: Skin Injury Risk (Adult)  Goal: Skin Health and Integrity  Outcome: Ongoing (interventions implemented as appropriate)      Problem: Fall Risk (Adult)  Goal: Absence of Fall  Outcome: Ongoing (interventions implemented as appropriate)

## 2019-10-23 NOTE — DISCHARGE PLACEMENT REQUEST
"Lonnie Linton (44 y.o. Male)     Date of Birth Social Security Number Address Home Phone MRN    1975  [address] [place] [address] [phone] [id number]    [employer] Marital Status          None        Admission Date Admission Type Admitting Provider Attending Provider Department, Room/Bed    10/15/19 Emergency Jeremi Roy MD Nasir, Imran, MD [facility], 2109/1    Discharge Date Discharge Disposition Discharge Destination                       Attending Provider:  Jeremi Roy MD    Allergies:  No Known Allergies    Isolation:  None   Infection:  None   Code Status:  CPR    Ht:  160 cm (63\")   Wt:  120 kg (264 lb 15.9 oz)    Admission Cmt:  None   Principal Problem:  Abscess, gluteal, left [L02.31] More...                 Active Insurance as of 10/15/2019     Primary Coverage     Payor Plan Insurance Group Employer/Plan Group    MEDICARE MEDICARE A & B      Payor Plan Address Payor Plan Phone Number Payor Plan Fax Number Effective Dates    [address] [phone]  10/1/2012 - None Entered    [place] [address]       Subscriber Name Subscriber Birth Date Member ID       LONNIE LINTON 1975 [id number]                 Emergency Contacts      (Rel.) Home Phone Work Phone Mobile Phone    DAMIÁN LINTON (Spouse) -- -- [phone]              "

## 2019-10-23 NOTE — PROGRESS NOTES
"      [facility] Medicine Services Daily Progress Note      Hospitalist Team  LOS 8 days      Patient Care Team:  Sanjay Chance MD as PCP - General        Chief Complaint / Subjective  Chief Complaint   Patient presents with   • Abdominal Pain     Postoperative day 1 status post diverting colostomy doing doing well tolerating liquids denies pain  Brief Synopsis of Hospital Course/HPI    This is a 44-year-old  male with a past medical history of diabetes mellitus type 2, CVA, seizures, hypertension, hyperlipidemia, cholecystectomy, and kidney stones who presented to the ED on 10/15/2019 with complaints of buttock pain.  Patient stated 3 days ago he developed a bump on his rear end and has progressively gotten worse.  He states it hurts very badly to sit down and pain medication makes the pain pain better.  He thought he had been sitting in a recliner too long and this had caused a bump.  He states his pain is 8 out of 10.  He denies any recent nausea, vomiting, diarrhea, fever, chills.  Patient states when moving from one bed to the next bed in radiology, he felt a \"pop\"and his buttock started draining.  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  General surgery was consulted and the patient was taken to the OR for an I&D and debridement of devitalized tissue.       The patient was subsequently admitted to the PCU postoperatively, the patient became hypotensive and was unresponsive to IV fluids.  He developed septic shock and required Levophed.       General surgery advised the patient may need a diverting colostomy.  The patient will be placed NPO at midnight 10/17 for potential colostomy and/or further debridement of the wound.  This could occur later this weekend, as well.     Infectious disease has started the patient on IV vancomycin/clindamycin/meropenem, pending " "final culture results.       The hospitalists were consulted on 10/17 for medical management.  The patient complains of left buttock pain since general surgery re-packed the wound.  He states he is doing well otherwise.        Review of systems negative for all other 10 systems    Review of Systems   Constitution: Negative for chills and fever.   Cardiovascular: Negative for chest pain and cyanosis.   Respiratory: Negative for shortness of breath and wheezing.    Gastrointestinal: Negative for bowel incontinence and nausea.   Neurological: Negative for dizziness and light-headedness.   Psychiatric/Behavioral: Negative for altered mental status. The patient does not have insomnia.        Family History   Problem Relation Age of Onset   • Breast cancer Mother    • Cervical cancer Mother        Past Medical History:   Diagnosis Date   • Diabetes (CMS/HCC)    • GERD (gastroesophageal reflux disease)    • Hypertension    • Seizures (CMS/HCC)        Social History     Socioeconomic History   • Marital status:      Spouse name: Not on file   • Number of children: Not on file   • Years of education: Not on file   • Highest education level: Not on file   Tobacco Use   • Smoking status: Current Every Day Smoker     Types: Electronic Cigarette   Substance and Sexual Activity   • Alcohol use: No     Frequency: Never     Comment: Quit several years ago at time of his CVA   • Drug use: Yes     Types: Marijuana           Objective      Vital Signs  Temp:  [98.2 °F (36.8 °C)-99 °F (37.2 °C)] 99 °F (37.2 °C)  Heart Rate:  [] 103  Resp:  [16-20] 18  BP: (130-155)/(78-92) 136/79  Oxygen Therapy  SpO2: 96 %  Pulse Oximetry Type: Continuous  Device (Oxygen Therapy): room air  Flow (L/min): 2  Flowsheet Rows      First Filed Value   Admission Height  160 cm (63\") Documented at 10/15/2019 0954   Admission Weight  106 kg (232 lb 12.9 oz) Documented at 10/15/2019 0954        Intake & Output (last 3 days)       10/20 0701 - " 10/21 0700 10/21 0701 - 10/22 0700 10/22 0701 - 10/23 0700 10/23 0701 - 10/24 0700    P.O. 1200 750      I.V. (mL/kg)  4438 (37)      IV Piggyback  50      Total Intake(mL/kg) 1200 (10.1) 5238 (43.7)      Urine (mL/kg/hr) 2750 (1) 2800 (1) 3000 (1) 2500 (3)    Stool 1 0 750     Total Output 2751 2800 3750 2500    Net -1551 +2438 -3750 -2500            Stool Unmeasured Occurrence 2 x 1 x          Lines, Drains & Airways    Active LDAs     Name:   Placement date:   Placement time:   Site:   Days:    CVC Triple Lumen 10/15/19 Right Internal jugular   10/15/19    2345    Internal jugular   2    Peripheral IV 10/15/19 1249 Left Antecubital   10/15/19    1249    Antecubital   3    Peripheral IV 10/15/19 1900 Right Wrist   10/15/19    1900 IV site present when patient arrived to PCU    Wrist   2    Urethral Catheter Non-latex 16 Fr.   10/15/19    1615     2                  Physical Exam:     General: Morbidly obese male lying in bed appearing comfortable, on supplemental oxygen via nasal cannula   HEENT: NC/AT, EOMI, mucosa moist  Heart: RRR. No murmur   Chest: Diminished lung sounds, no wheezes appreciated, normal respiratory effort  Abdominal: Soft. NT/ND. Bowel sounds present left-sided ostomy  Musculoskeletal: Normal ROM.  No edema. No calf tenderness.  Neurological: AAOx3, no focal deficits  Skin: Skin is warm and dry.  Gluteal wound dressed and not assessed  Psychiatric: Normal mood and affect.    Procedures:    Procedure(s):  INCISION AND DRAINAGE OF LEFT GLUTEAL REGION          Results Review:     I reviewed the patient's new clinical results.  reviewed    Results from last 7 days   Lab Units 10/23/19  0554 10/22/19  0259 10/21/19  0251   WBC 10*3/mm3 17.60* 16.90* 17.50*   HEMOGLOBIN g/dL 10.5* 10.1* 10.6*   HEMATOCRIT % 31.1* 30.0* 31.9*   PLATELETS 10*3/mm3 250 200 164   MONOCYTES % % 6.0 4.0* 7.0     Results from last 7 days   Lab Units 10/23/19  0554 10/22/19  0259 10/21/19  0251 10/20/19  0418   SODIUM  mmol/L 139 140 137 136   POTASSIUM mmol/L 4.1 4.9 4.2 4.6   CHLORIDE mmol/L 104 104 105 108*   CO2 mmol/L 27.0 24.0 25.0 23.0   BUN mg/dL 14 19 22* 27*   CREATININE mg/dL 1.05 1.15 1.20 1.38*   CALCIUM mg/dL 7.7* 7.7* 7.9* 7.7*   BILIRUBIN mg/dL  --  0.4 0.4 0.3   ALK PHOS U/L  --  187* 150* 157*   ALT (SGPT) U/L  --  40 52* 60*   AST (SGOT) U/L  --  37 53* 76*   GLUCOSE mg/dL 90 101* 89 153*         Lab Results   Component Value Date    CALCIUM 7.7 (L) 10/23/2019     No results found for: HGBA1C  Results from last 7 days   Lab Units 10/21/19  0251   INR  0.97               Microbiology Results (last 10 days)     Procedure Component Value - Date/Time    MRSA Screen Culture - Swab, Nares [[id number]]  (Normal) Collected:  10/21/19 0554    Lab Status:  Final result Specimen:  Swab from Nares Updated:  10/22/19 1326     MRSA SCREEN CX No Methicillin Resistant Staphylococcus aureus isolated    Blood Culture - Blood, Arm, Right [[id number]] Collected:  10/16/19 1032    Lab Status:  Final result Specimen:  Blood from Arm, Right Updated:  10/21/19 1100     Blood Culture No growth at 5 days    Wound Culture - Wound, Buttock, Left [[id number]]  (Abnormal) Collected:  10/15/19 1347    Lab Status:  Final result Specimen:  Wound from Buttock, Left Updated:  10/18/19 1004     Wound Culture Moderate growth (3+) Streptococcus, Beta Hemolytic, Group F     Comment: This organism is considered to be universally susceptible to penicillin.  No further antibiotic testing will be performed.        STREP GROUPING F     Wound Culture Scant growth (1+) Normal Skin Chuyita     Gram Stain Many (4+) WBCs per low power field      Moderate (3+) Gram positive cocci in chains      Mixed bacterial morphotypes seen on Gram Stain    Blood Culture - Blood, Arm, Left [[id number]] Collected:  10/15/19 1230    Lab Status:  Final result Specimen:  Blood from Arm, Left Updated:  10/20/19 1315     Blood Culture No growth at 5 days          ECG/EMG Results (most  recent)     Procedure Component Value Units Date/Time    ECG 12 Lead [[id number]] Collected:  10/21/19 0750     Updated:  10/21/19 0751    Narrative:       HEART RATE= 84  bpm  RR Interval= 716  ms  NC Interval= 149  ms  P Horizontal Axis= 14  deg  P Front Axis= 41  deg  QRSD Interval= 90  ms  QT Interval= 380  ms  QRS Axis= -1  deg  T Wave Axis= 36  deg  - NORMAL ECG -  Sinus rhythm  Electronically Signed By:   Date and Time of Study: 2019-10-21 07:50:07                    Xr Chest 1 View    Result Date: 10/16/2019  1.Subsegmental atelectasis right lower lobe. 2.Cardiomegaly. 3.Central venous catheter tip has appropriate position.  Electronically Signed By-Winifred Simental On:10/16/2019 7:36 AM This report was finalized on [id number] by  Winifred Simental, .      Xrays, labs reviewed personally by physician.    Medication Review:   I have reviewed the patient's current medication list  reviewed    Scheduled Meds    atorvastatin 40 mg Oral Daily   bacitracin  Topical Q12H   baclofen 10 mg Oral Daily   buPROPion  mg Oral Daily   ceftriaxone 2 g Intravenous Q24H   clindamycin 900 mg Intravenous Q8H   insulin glargine 20 Units Subcutaneous QAM   insulin lispro 0-24 Units Subcutaneous 4x Daily With Meals & Nightly   ipratropium-albuterol 3 mL Nebulization Q6H While Awake - RT   levETIRAcetam 500 mg Oral Q12H   rOPINIRole 0.5 mg Oral TID   sodium chloride 10 mL Intravenous Q12H   Sodium Hypochlorite 0.0625 % (Dakin's 1/8th Strength) topical solution 946 mL Irrigation Q24H       Meds Infusions    sodium chloride 50 mL/hr Last Rate: 50 mL/hr (10/22/19 1318)       Meds PRN  •  aluminum-magnesium hydroxide-simethicone  •  bisacodyl  •  dextrose  •  dextrose  •  glucagon (human recombinant)  •  HYDROmorphone  •  influenza vaccine  •  magnesium hydroxide  •  melatonin  •  ondansetron **OR** ondansetron  •  oxyCODONE **OR** oxyCODONE  •  sodium chloride  •  sodium chloride        Assessment / Plan    Active Hospital Problems     Diagnosis  POA   • **Abscess, gluteal, left [L02.31]  Yes   • Sepsis (CMS/[facility]) [A41.9]  Yes   • 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   10/23  Stable postoperative day 2 status post diverting colostomy tolerating liquids denies any pain issues continue supportive care continue IV antibiotics repeat labs in a.m. ostomy is moving no nausea vomiting  Patient is a 44-year-old male with history of CVA as well as type 2 diabetes and vascular disease developed fournieres gangrene now status post I&Do  appearing stable dynamically stable his pain control is adequate he has no dyspnea no chest pain no pulmonary issues he is coherent awake alert labs and meds reviewed antibiotics management per ID sepsis is resolved  Management working on placement into an LTAC which I think is reasonable for this patient due to extensive wounds  .10/20  -Pain control  -IV antibiotics  -Status post I&D, patient getting diverting colostomy  -General surgery following  -Off pressors  -Antibiotics per ID  -Wound cultures showing group F Streptococcus         Septic shock -as above  -No further pressor support     Acute kidney injury - improving, likely component of chronic renal insufficiency now at 1.38  -  Improving from 1.7-1.56  - continue IV fluid but at reduced rate  - monitor BMP     Leukocytosis -secondary to infection continues to be elevated  -Monitor daily    Anemia -mild likely some loss postoperatively and then degree of chronic inflammation currently stable  -Monitor daily    diabetes mellitus type 2 -uncertain degree of control at home, glucoses less than 200  - continue Lantus daily  -  SSI   - vasquez FARRAR HS     Essential hypertension - chronic relatively controlled, hypotension resolved  -Resume meds once taking p.o. postop     Hyperlipidemia -stable  - continue home atorvastatin      Depression, chronic -appears controlled  - continue home wellbutrin      Seizure disorder -no seizure episodes noted  - continue home Keppra      Morbid obesity (BMI 44.13)  - encourage lifestyle modifications      VTE prophylaxis - bilateral SCDs    Disposition -inpatient        Jeremi Roy MD  10/23/19  2:01 PM

## 2019-10-23 NOTE — PROGRESS NOTES
Infectious Diseases Progress Note      LOS: 8 days   Patient Care Team:  Sanjay Chance MD as PCP - General    Chief Complaint: Buttock pain    Subjective     The patient had no high-grade fever during the last 24 hours.  The patient remained off vasopressors.  The patient remained hemodynamically stable.  He is awake and alert.  He is on room air.      Review of Systems:   Review of Systems   Constitutional: Negative.    HENT: Negative.    Eyes: Negative.    Respiratory: Negative.    Cardiovascular: Negative.    Gastrointestinal: Negative.    Genitourinary: Negative.    Musculoskeletal: Negative.    Skin: Positive for wound.   Neurological: Negative.    Hematological: Negative.    Psychiatric/Behavioral: Negative.         Objective     Vital Signs  Temp:  [98.2 °F (36.8 °C)-99 °F (37.2 °C)] 99 °F (37.2 °C)  Heart Rate:  [] 110  Resp:  [16-20] 17  BP: (130-155)/(78-92) 136/79    Physical Exam:  Physical Exam   Constitutional: He is oriented to person, place, and time. He appears well-developed and well-nourished.   HENT:   Head: Normocephalic and atraumatic.   Eyes: EOM are normal. Pupils are equal, round, and reactive to light.   Neck: Normal range of motion. Neck supple.   Cardiovascular: Normal rate, regular rhythm and normal heart sounds.   Pulmonary/Chest: Effort normal and breath sounds normal. No respiratory distress. He has no wheezes. He has no rales.   Abdominal: Soft. Bowel sounds are normal. He exhibits no distension and no mass. There is no tenderness. There is no rebound and no guarding.   Colostomy   Musculoskeletal: Normal range of motion. He exhibits no edema or deformity.   Neurological: He is alert and oriented to person, place, and time. No cranial nerve deficit.   Skin: Skin is warm. No rash noted. No erythema.   Large wound in the left buttock very close anus.  The wound is packed with no significant purulent or necrotic tissue   Psychiatric: He has a normal mood and affect.   Nursing  note and vitals reviewed.       Results Review:    I have reviewed all clinical data, test, lab, and imaging results.     Radiology  No Radiology Exams Resulted Within Past 24 Hours    Cardiology    Laboratory  Results from last 7 days   Lab Units 10/23/19  0554   WBC 10*3/mm3 17.60*   HEMOGLOBIN g/dL 10.5*   HEMATOCRIT % 31.1*   PLATELETS 10*3/mm3 250     Results from last 7 days   Lab Units 10/23/19  0554   SODIUM mmol/L 139   POTASSIUM mmol/L 4.1   CHLORIDE mmol/L 104   CO2 mmol/L 27.0   BUN mg/dL 14   CREATININE mg/dL 1.05   GLUCOSE mg/dL 90   CALCIUM mg/dL 7.7*     Results from last 7 days   Lab Units 10/23/19  0554   SODIUM mmol/L 139   POTASSIUM mmol/L 4.1   CHLORIDE mmol/L 104   CO2 mmol/L 27.0   BUN mg/dL 14   CREATININE mg/dL 1.05   GLUCOSE mg/dL 90   CALCIUM mg/dL 7.7*                 Microbiology   Microbiology Results (last 10 days)     Procedure Component Value - Date/Time    MRSA Screen Culture - Swab, Nares [[id number]]  (Normal) Collected:  10/21/19 0554    Lab Status:  Final result Specimen:  Swab from Nares Updated:  10/22/19 1326     MRSA SCREEN CX No Methicillin Resistant Staphylococcus aureus isolated    Blood Culture - Blood, Arm, Right [[id number]] Collected:  10/16/19 1032    Lab Status:  Final result Specimen:  Blood from Arm, Right Updated:  10/21/19 1100     Blood Culture No growth at 5 days    Wound Culture - Wound, Buttock, Left [[id number]]  (Abnormal) Collected:  10/15/19 1347    Lab Status:  Final result Specimen:  Wound from Buttock, Left Updated:  10/18/19 1004     Wound Culture Moderate growth (3+) Streptococcus, Beta Hemolytic, Group F     Comment: This organism is considered to be universally susceptible to penicillin.  No further antibiotic testing will be performed.        STREP GROUPING F     Wound Culture Scant growth (1+) Normal Skin Chuyita     Gram Stain Many (4+) WBCs per low power field      Moderate (3+) Gram positive cocci in chains      Mixed bacterial morphotypes seen on  Gram Stain    Blood Culture - Blood, Arm, Left [[id number]] Collected:  10/15/19 1230    Lab Status:  Final result Specimen:  Blood from Arm, Left Updated:  10/20/19 1315     Blood Culture No growth at 5 days          Medication Review:       Schedule Meds    atorvastatin 40 mg Oral Daily   bacitracin  Topical Q12H   baclofen 10 mg Oral Daily   buPROPion  mg Oral Daily   ceftriaxone 2 g Intravenous Q24H   clindamycin 900 mg Intravenous Q8H   insulin glargine 20 Units Subcutaneous QAM   insulin lispro 0-24 Units Subcutaneous 4x Daily With Meals & Nightly   ipratropium-albuterol 3 mL Nebulization Q6H While Awake - RT   levETIRAcetam 500 mg Oral Q12H   rOPINIRole 0.5 mg Oral TID   sodium chloride 10 mL Intravenous Q12H   Sodium Hypochlorite 0.0625 % (Dakin's 1/8th Strength) topical solution 946 mL Irrigation Q24H       Infusion Meds    sodium chloride 50 mL/hr Last Rate: 50 mL/hr (10/22/19 8929)       PRN Meds  •  aluminum-magnesium hydroxide-simethicone  •  bisacodyl  •  dextrose  •  dextrose  •  glucagon (human recombinant)  •  HYDROmorphone  •  influenza vaccine  •  magnesium hydroxide  •  melatonin  •  ondansetron **OR** ondansetron  •  oxyCODONE **OR** oxyCODONE  •  sodium chloride  •  sodium chloride        Assessment/Plan       Antimicrobial Therapy   1.  IV clindamycin    Day 9  2.  IV Rocephin    Day 5  3.       day  4.      Day  5.      Day      Assessment     Left buttock abscess associated with skin and soft tissue necrosis.  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  Intraoperative cultures are pending.  This type of infection usually associated with polymicrobial infection.    Intraoperative culture grew group F Streptococcus  -10/21/2019-diverting colostomy     Diabetes mellitus with diabetic neuropathy     Mild septic shock was on low-dose of norepinephrine.  Currently off pressors     Reactive leukocytosis.  Improved     Plan     Discontinue IV clindamycin   Continue Rocephin 2 g IV daily for 2  weeks  Patient will need a midline before discharge-please remove when IV antibiotics are completed  Weekly labs CBC/creatinine/sed rate  Continue supportive care  Labs in a.m.          Rhea Pires, APRN  10/23/19  11:09 AM    Note is dictated utilizing voice recognition software/Dragon

## 2019-10-23 NOTE — PROGRESS NOTES
Continued Stay Note   Sebastián     Patient Name: Lonnie Arora  MRN: [id number]  Today's Date: 10/23/2019    Admit Date: 10/15/2019    Discharge Plan     Row Name 10/23/19 1616       Plan    Plan  Spoke with pt and pt's wife about going to ltac for his wounds and need for further surgery . Both pt and spouse agreeable . Both Dr. Roy and Dr. Carter both agreeable with plan also . Pt has been accepted to [facility]  and plan d/c in the am     Patient/Family in Agreement with Plan  yes        Discharge Codes    No documentation.             Jojo Lopes RN

## 2019-10-23 NOTE — PLAN OF CARE
Problem: Patient Care Overview  Goal: Plan of Care Review  Outcome: Ongoing (interventions implemented as appropriate)  Colostomy with drainage and diet advanced. Dressing changes tolerated.

## 2019-10-23 NOTE — PROGRESS NOTES
"Post-op Note  INCISION AND DRAINAGE WOUND  10/15/2019    Subjective   Lonnie Arora is a 44 y.o. male s/p incision and drainage with debridement of left gluteal wound on 10/15/2019 and s/p laparoscopic diverting colostomy performed on 10/21/19.  Pain controlled. Tolerating CLD without N/V. Ostomy has begun functioning    Objective   /79   Pulse 103   Temp 99 °F (37.2 °C)   Resp 18   Ht 160 cm (63\")   Wt 120 kg (264 lb 15.9 oz)   SpO2 96%   BMI 46.94 kg/m²   Wound is dressed  Abdomen is soft, obese, appropriately tender, incisions without erythema, induration or drainage. Ostomy is patent and pink and productive of gas and liquid stool    WBC   Date Value Ref Range Status   10/23/2019 17.60 (H) 3.40 - 10.80 10*3/mm3 Final     RBC   Date Value Ref Range Status   10/23/2019 3.47 (L) 4.14 - 5.80 10*6/mm3 Final     Hemoglobin   Date Value Ref Range Status   10/23/2019 10.5 (L) 13.0 - 17.7 g/dL Final     Hematocrit   Date Value Ref Range Status   10/23/2019 31.1 (L) 37.5 - 51.0 % Final     MCV   Date Value Ref Range Status   10/23/2019 89.8 79.0 - 97.0 fL Final     MCH   Date Value Ref Range Status   10/23/2019 30.2 26.6 - 33.0 pg Final     MCHC   Date Value Ref Range Status   10/23/2019 33.6 31.5 - 35.7 g/dL Final     RDW   Date Value Ref Range Status   10/23/2019 16.1 (H) 12.3 - 15.4 % Final     RDW-SD   Date Value Ref Range Status   10/23/2019 50.3 37.0 - 54.0 fl Final     MPV   Date Value Ref Range Status   10/23/2019 7.7 6.0 - 12.0 fL Final     Platelets   Date Value Ref Range Status   10/23/2019 250 140 - 450 10*3/mm3 Final     Neutrophils Absolute   Date Value Ref Range Status   10/23/2019 13.73 (H) 1.70 - 7.00 10*3/mm3 Final     Eosinophils Absolute   Date Value Ref Range Status   10/23/2019 0.53 (H) 0.00 - 0.40 10*3/mm3 Final     Basophils Absolute   Date Value Ref Range Status   10/23/2019 0.18 0.00 - 0.20 10*3/mm3 Final       Assessment/Plan   43 yo gentleman with Dale's gangrene of the " left gluteal region s/p incision and drainage with debridement on 10/15/2019 and laparoscopic diverting colostomy on 10/21/2019.    Continue antibiotics per ID - cxs show Group F strep  Continue local wound care, appreciate wound/ostomy RN assistance  Consistent carbohydrate diet with Glucerna shakes to boost protein intake and promote wound healing  Ok with D/C when cleared by other services    Niraj Almanzar MD  10/23/2019  2:19 PM

## 2019-10-24 VITALS
DIASTOLIC BLOOD PRESSURE: 74 MMHG | HEIGHT: 63 IN | WEIGHT: 261.69 LBS | HEART RATE: 99 BPM | TEMPERATURE: 98.9 F | SYSTOLIC BLOOD PRESSURE: 121 MMHG | RESPIRATION RATE: 17 BRPM | BODY MASS INDEX: 46.37 KG/M2 | OXYGEN SATURATION: 98 %

## 2019-10-24 LAB
ANION GAP SERPL CALCULATED.3IONS-SCNC: 7 MMOL/L (ref 5–15)
BUN BLD-MCNC: 10 MG/DL (ref 6–20)
BUN/CREAT SERPL: 9.1 (ref 7–25)
CALCIUM SPEC-SCNC: 7.7 MG/DL (ref 8.6–10.5)
CHLORIDE SERPL-SCNC: 103 MMOL/L (ref 98–107)
CO2 SERPL-SCNC: 28 MMOL/L (ref 22–29)
CREAT BLD-MCNC: 1.1 MG/DL (ref 0.76–1.27)
DEPRECATED RDW RBC AUTO: 51.6 FL (ref 37–54)
EOSINOPHIL # BLD MANUAL: 0.56 10*3/MM3 (ref 0–0.4)
EOSINOPHIL NFR BLD MANUAL: 3 % (ref 0.3–6.2)
ERYTHROCYTE [DISTWIDTH] IN BLOOD BY AUTOMATED COUNT: 16.4 % (ref 12.3–15.4)
GFR SERPL CREATININE-BSD FRML MDRD: 73 ML/MIN/1.73
GLUCOSE BLD-MCNC: 98 MG/DL (ref 65–99)
GLUCOSE BLDC GLUCOMTR-MCNC: 135 MG/DL (ref 70–105)
GLUCOSE BLDC GLUCOMTR-MCNC: 91 MG/DL (ref 70–105)
HCT VFR BLD AUTO: 31.4 % (ref 37.5–51)
HGB BLD-MCNC: 10.2 G/DL (ref 13–17.7)
LYMPHOCYTES # BLD MANUAL: 1.49 10*3/MM3 (ref 0.7–3.1)
LYMPHOCYTES NFR BLD MANUAL: 3 % (ref 5–12)
LYMPHOCYTES NFR BLD MANUAL: 8 % (ref 19.6–45.3)
MCH RBC QN AUTO: 29.1 PG (ref 26.6–33)
MCHC RBC AUTO-ENTMCNC: 32.3 G/DL (ref 31.5–35.7)
MCV RBC AUTO: 90.2 FL (ref 79–97)
MONOCYTES # BLD AUTO: 0.56 10*3/MM3 (ref 0.1–0.9)
NEUTROPHILS # BLD AUTO: 16 10*3/MM3 (ref 1.7–7)
NEUTROPHILS NFR BLD MANUAL: 80 % (ref 42.7–76)
NEUTS BAND NFR BLD MANUAL: 6 % (ref 0–5)
PLAT MORPH BLD: NORMAL
PLATELET # BLD AUTO: 290 10*3/MM3 (ref 140–450)
PMV BLD AUTO: 8.4 FL (ref 6–12)
POTASSIUM BLD-SCNC: 4 MMOL/L (ref 3.5–5.2)
RBC # BLD AUTO: 3.49 10*6/MM3 (ref 4.14–5.8)
RBC MORPH BLD: NORMAL
SCAN SLIDE: NORMAL
SODIUM BLD-SCNC: 138 MMOL/L (ref 136–145)
TOXIC GRANULATION: ABNORMAL
WBC NRBC COR # BLD: 18.6 10*3/MM3 (ref 3.4–10.8)

## 2019-10-24 PROCEDURE — C1751 CATH, INF, PER/CENT/MIDLINE: HCPCS

## 2019-10-24 PROCEDURE — 80048 BASIC METABOLIC PNL TOTAL CA: CPT | Performed by: INTERNAL MEDICINE

## 2019-10-24 PROCEDURE — 99239 HOSP IP/OBS DSCHRG MGMT >30: CPT | Performed by: INTERNAL MEDICINE

## 2019-10-24 PROCEDURE — 82962 GLUCOSE BLOOD TEST: CPT

## 2019-10-24 PROCEDURE — 85025 COMPLETE CBC W/AUTO DIFF WBC: CPT | Performed by: INTERNAL MEDICINE

## 2019-10-24 PROCEDURE — 94799 UNLISTED PULMONARY SVC/PX: CPT

## 2019-10-24 PROCEDURE — 85007 BL SMEAR W/DIFF WBC COUNT: CPT | Performed by: INTERNAL MEDICINE

## 2019-10-24 PROCEDURE — 36410 VNPNXR 3YR/> PHY/QHP DX/THER: CPT

## 2019-10-24 PROCEDURE — 63710000001 INSULIN GLARGINE PER 5 UNITS: Performed by: INTERNAL MEDICINE

## 2019-10-24 RX ORDER — SODIUM CHLORIDE 0.9 % (FLUSH) 0.9 %
10 SYRINGE (ML) INJECTION EVERY 12 HOURS SCHEDULED
Status: DISCONTINUED | OUTPATIENT
Start: 2019-10-24 | End: 2019-10-24 | Stop reason: HOSPADM

## 2019-10-24 RX ORDER — SODIUM CHLORIDE 0.9 % (FLUSH) 0.9 %
20 SYRINGE (ML) INJECTION AS NEEDED
Status: DISCONTINUED | OUTPATIENT
Start: 2019-10-24 | End: 2019-10-24 | Stop reason: HOSPADM

## 2019-10-24 RX ORDER — SODIUM CHLORIDE 0.9 % (FLUSH) 0.9 %
10 SYRINGE (ML) INJECTION AS NEEDED
Status: DISCONTINUED | OUTPATIENT
Start: 2019-10-24 | End: 2019-10-24 | Stop reason: HOSPADM

## 2019-10-24 RX ORDER — HYDROMORPHONE HCL 110MG/55ML
0.5 PATIENT CONTROLLED ANALGESIA SYRINGE INTRAVENOUS EVERY 4 HOURS PRN
Start: 2019-10-24 | End: 2019-10-25

## 2019-10-24 RX ORDER — ONDANSETRON 4 MG/1
4 TABLET, FILM COATED ORAL EVERY 6 HOURS PRN
Start: 2019-10-24 | End: 2020-10-05 | Stop reason: RX

## 2019-10-24 RX ORDER — OXYCODONE HYDROCHLORIDE 10 MG/1
10 TABLET ORAL EVERY 4 HOURS PRN
Qty: 15 TABLET | Refills: 0 | Status: SHIPPED | OUTPATIENT
Start: 2019-10-24 | End: 2019-10-25

## 2019-10-24 RX ORDER — INSULIN GLARGINE 100 [IU]/ML
20 INJECTION, SOLUTION SUBCUTANEOUS EVERY MORNING
Refills: 12
Start: 2019-10-25 | End: 2020-01-13 | Stop reason: ALTCHOICE

## 2019-10-24 RX ADMIN — SODIUM CHLORIDE 50 ML/HR: 900 INJECTION, SOLUTION INTRAVENOUS at 05:49

## 2019-10-24 RX ADMIN — BACLOFEN 10 MG: 10 TABLET ORAL at 08:47

## 2019-10-24 RX ADMIN — IPRATROPIUM BROMIDE AND ALBUTEROL SULFATE 3 ML: .5; 3 SOLUTION RESPIRATORY (INHALATION) at 06:40

## 2019-10-24 RX ADMIN — ATORVASTATIN CALCIUM 40 MG: 40 TABLET, FILM COATED ORAL at 08:47

## 2019-10-24 RX ADMIN — OXYCODONE HYDROCHLORIDE 10 MG: 5 TABLET ORAL at 11:41

## 2019-10-24 RX ADMIN — Medication 10 ML: at 08:57

## 2019-10-24 RX ADMIN — INSULIN GLARGINE 20 UNITS: 100 INJECTION, SOLUTION SUBCUTANEOUS at 08:47

## 2019-10-24 RX ADMIN — BACITRACIN 1 APPLICATION: 500 OINTMENT TOPICAL at 08:45

## 2019-10-24 RX ADMIN — LEVETIRACETAM 500 MG: 500 TABLET, FILM COATED ORAL at 11:41

## 2019-10-24 RX ADMIN — BUPROPION HYDROCHLORIDE 300 MG: 150 TABLET, EXTENDED RELEASE ORAL at 08:47

## 2019-10-24 RX ADMIN — ROPINIROLE 0.5 MG: 0.25 TABLET, FILM COATED ORAL at 08:47

## 2019-10-24 NOTE — PLAN OF CARE
Problem: Patient Care Overview  Goal: Interprofessional Rounds/Family Conf  Outcome: Outcome(s) achieved Date Met: 10/24/19   10/24/19 1210   Interdisciplinary Rounds/Family Conf   Participants ;dietitian/nutrition services;nursing;physician;ostomy/continence specialist       Problem: Skin Injury Risk (Adult)  Goal: Skin Health and Integrity  Outcome: Ongoing (interventions implemented as appropriate)   10/24/19 0671   Skin Injury Risk (Adult)   Skin Health and Integrity making progress toward outcome

## 2019-10-24 NOTE — DISCHARGE SUMMARY
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        Hospital Course  This is 44-year-old male with past medical history significant for diabetes mellitus.  Patient noticed to have boil in his left buttock 3 days ago.  Things got worse over time and required to come to the hospital yesterday with severe pain.  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  Patient was found to have large abscess.  The abscess was very close to the anus.   Patient was seen by surgery and infectious disease he subsequently had a diverting colostomy doneInitially he was placed on broad-spectrum antibiotics which were subsequently de-escalated after his intraoperative cultures grew Streptococcus it was recommended that he takes Rocephin 2 g IV for 2 weeks due to his extensive wound which was requiring dressing and packing surgeon and case management recommended patient going to an LTAC which was approved and he was going to have a midline and then we did discharge to LTAC for his wound care and completion of antibiotics  Past Medical History:     Past Medical History:   Diagnosis Date   • Diabetes (CMS/HCC)    • GERD (gastroesophageal reflux disease)    • Hypertension    • Seizures (CMS/HCC)        Past Surgical History:     Past Surgical History:   Procedure Laterality Date   •  COLOSTOMY Left 10/21/2019    Procedure: Laparoscopic diverting colostomy;  Surgeon: Niraj Almanzar MD;  Location: [facility] MAIN OR;  Service: General   • INCISION AND DRAINAGE OF WOUND Left 10/15/2019    Procedure: INCISION AND DRAINAGE OF LEFT GLUTEAL REGION;  Surgeon: Niraj Almanzar MD;  Location: [facility] MAIN OR;  Service: General         Procedures Performed    Procedure(s):  Laparoscopic diverting colostomy  -------------------    Procedure(s):  Laparoscopic diverting colostomy  -------------------       Consults:   Consults     Date and Time Order Name Status Description    10/16/2019 0316 Inpatient Intensivist Consult Completed     10/15/2019 2043 Inpatient Infectious Diseases Consult Completed     10/15/2019 1413 Surgery (on-call MD unless specified) Completed     10/15/2019 1413 Hospitalist (on-call MD unless specified) Completed           Pertinent Test Results:     Lab Results (last 24 hours)     Procedure Component Value Units Date/Time    POC Glucose Once [[id number]]  (Abnormal) Collected:  10/24/19 1116    Specimen:  Blood Updated:  10/24/19 1120     Glucose 135 mg/dL      Comment: Serial Number: [id number]:  [id number]       POC Glucose Once [[id number]]  (Normal) Collected:  10/24/19 0729    Specimen:  Blood Updated:  10/24/19 0732     Glucose 91 mg/dL      Comment: Serial Number: [id number]:  [id number]       Basic Metabolic Panel [[id number]]  (Abnormal) Collected:  10/24/19 0552    Specimen:  Blood Updated:  10/24/19 0731     Glucose 98 mg/dL      BUN 10 mg/dL      Creatinine 1.10 mg/dL      Sodium 138 mmol/L      Potassium 4.0 mmol/L      Chloride 103 mmol/L      CO2 28.0 mmol/L      Calcium 7.7 mg/dL      eGFR Non African Amer 73 mL/min/1.73      BUN/Creatinine Ratio 9.1     Anion Gap 7.0 mmol/L     Narrative:       GFR Normal >60  Chronic Kidney Disease <60  Kidney Failure <15    CBC & Differential [[id number]] Collected:  10/24/19 0552    Specimen:  Blood Updated:   10/24/19 0700    Narrative:       The following orders were created for panel order CBC & Differential.  Procedure                               Abnormality         Status                     ---------                               -----------         ------                     CBC Auto Differential[[id number]]        Abnormal            Final result                 Please view results for these tests on the individual orders.    CBC Auto Differential [[id number]]  (Abnormal) Collected:  10/24/19 0552    Specimen:  Blood Updated:  10/24/19 0700     WBC 18.60 10*3/mm3      RBC 3.49 10*6/mm3      Hemoglobin 10.2 g/dL      Hematocrit 31.4 %      MCV 90.2 fL      MCH 29.1 pg      MCHC 32.3 g/dL      RDW 16.4 %      RDW-SD 51.6 fl      MPV 8.4 fL      Platelets 290 10*3/mm3     Scan Slide [[id number]] Collected:  10/24/19 0552    Specimen:  Blood Updated:  10/24/19 0700     Scan Slide --     Comment: See Manual Differential Results       Manual Differential [[id number]]  (Abnormal) Collected:  10/24/19 0552    Specimen:  Blood Updated:  10/24/19 0700     Neutrophil % 80.0 %      Lymphocyte % 8.0 %      Monocyte % 3.0 %      Eosinophil % 3.0 %      Bands %  6.0 %      Neutrophils Absolute 16.00 10*3/mm3      Lymphocytes Absolute 1.49 10*3/mm3      Monocytes Absolute 0.56 10*3/mm3      Eosinophils Absolute 0.56 10*3/mm3      RBC Morphology Normal     Toxic Granulation Slight/1+     Platelet Morphology Normal    POC Glucose Once [[id number]]  (Abnormal) Collected:  10/23/19 2022    Specimen:  Blood Updated:  10/23/19 2024     Glucose 135 mg/dL      Comment: Serial Number: [id number]:  [id number]       POC Glucose Once [[id number]]  (Abnormal) Collected:  10/23/19 1642    Specimen:  Blood Updated:  10/23/19 1645     Glucose 126 mg/dL      Comment: Serial Number: [id number]:  [id number]                             Vital Signs  Temp:  [97.9 °F (36.6 °C)-98.9 °F (37.2 °C)] 98.9 °F (37.2 °C)  Heart Rate:  []  99  Resp:  [16-18] 17  BP: (121-159)/(74-88) 121/74      Discharge Disposition  Rehab Facility or Unit (DC - External)    Discharge Medications     Discharge Medications      New Medications      Instructions Start Date   cefTRIAXone 2 g in sodium chloride 0.9 % 100 mL IVPB   2 g, Intravenous, Every 24 Hours      HYDROmorphone 2 MG/ML injection  Commonly known as:  DILAUDID   0.5 mg, Intravenous, Every 4 Hours PRN      insulin glargine 100 UNIT/ML injection  Commonly known as:  LANTUS   20 Units, Subcutaneous, Every Morning   Start Date:  10/25/2019     insulin lispro 100 UNIT/ML injection  Commonly known as:  humaLOG   0-24 Units, Subcutaneous, 4 Times Daily With Meals & Nightly      ondansetron 4 MG tablet  Commonly known as:  ZOFRAN   4 mg, Oral, Every 6 Hours PRN      oxyCODONE 10 MG tablet  Commonly known as:  ROXICODONE   10 mg, Oral, Every 4 Hours PRN         Continue These Medications      Instructions Start Date   amLODIPine 10 MG tablet  Commonly known as:  NORVASC   10 mg, Oral, Daily      atorvastatin 40 MG tablet  Commonly known as:  LIPITOR   40 mg, Oral, Daily      baclofen 10 MG tablet  Commonly known as:  LIORESAL   10 mg, Oral, Daily      buPROPion  MG 24 hr tablet  Commonly known as:  WELLBUTRIN XL   300 mg, Oral, Daily      levETIRAcetam 500 MG tablet  Commonly known as:  KEPPRA   500 mg, Oral, 2 Times Daily      lisinopril 40 MG tablet  Commonly known as:  PRINIVIL,ZESTRIL   40 mg, Oral, Daily      metoprolol tartrate 100 MG tablet  Commonly known as:  LOPRESSOR   100 mg, Oral, 2 Times Daily      rOPINIRole 0.5 MG tablet  Commonly known as:  REQUIP   0.5 mg, Oral, 3 Times Daily, Take 1 hour before bedtime.         Stop These Medications    insulin aspart 100 UNIT/ML injection  Commonly known as:  novoLOG            Discharge Diet:     Activity at Discharge:     Follow-up Appointments  No future appointments.  Additional Instructions for the Follow-ups that You Need to Schedule      Discharge Follow-up with Specialty: declan; 1 Month   As directed      Specialty:  declan    Follow Up:  1 Month               Test Results Pending at Discharge    Jeremi Roy MD  10/24/19  3:14 PM     Time spent more than 30 minutes spent on discharge

## 2019-10-24 NOTE — PLAN OF CARE
Problem: Patient Care Overview  Goal: Plan of Care Review  Outcome: Ongoing (interventions implemented as appropriate)   10/24/19 6572   Coping/Psychosocial   Plan of Care Reviewed With patient   Plan of Care Review   Progress improving   OTHER   Outcome Summary Patient dressing changed to buttocks. small amount of blood from site. Wound be pink. Colostomy draining dark brown liquid stool.       Problem: Skin Injury Risk (Adult)  Goal: Skin Health and Integrity  Outcome: Ongoing (interventions implemented as appropriate)      Problem: Fall Risk (Adult)  Goal: Absence of Fall  Outcome: Ongoing (interventions implemented as appropriate)

## 2019-10-24 NOTE — PLAN OF CARE
Problem: Fall Risk (Adult)  Intervention: Monitor/Assist with Self Care   10/24/19 1209   Activity   Activity Assistance Provided assistance, 1 person   Daily Care Interventions   Self-Care Promotion independence encouraged     Intervention: Reduce Risk/Promote Restraint Free Environment   10/24/19 0740 10/24/19 1058   Safety Management   Environmental Safety Modification clutter free environment maintained;assistive device/personal items within reach;room organization consistent --    Safety Promotion/Fall Prevention --  safety round/check completed;nonskid shoes/slippers when out of bed;activity supervised       Goal: Absence of Fall  Outcome: Outcome(s) achieved Date Met: 10/24/19

## 2019-10-24 NOTE — PLAN OF CARE
Problem: Patient Care Overview  Goal: Plan of Care Review  Outcome: Outcome(s) achieved Date Met: 10/24/19   10/24/19 0421 10/24/19 1213   Coping/Psychosocial   Plan of Care Reviewed With --  spouse;patient   Plan of Care Review   Progress improving --    OTHER   Outcome Summary Patient dressing changed to buttocks. small amount of blood from site. Wound be pink. Colostomy draining dark brown liquid stool. --      Goal: Individualization and Mutuality  Outcome: Outcome(s) achieved Date Met: 10/24/19

## 2019-10-24 NOTE — CONSULTS
SL PGML placed to right basilic vein via US guidance. Pt tolerated well. RN notified ok to use at this time.

## 2019-10-28 PROCEDURE — 93010 ELECTROCARDIOGRAM REPORT: CPT | Performed by: INTERNAL MEDICINE

## 2019-12-15 NOTE — PROGRESS NOTES
Case Management Discharge Note    Final Note: [facility] main                  Final Discharge Disposition Code: 63 - [facility]   Negative

## 2019-12-31 ENCOUNTER — HOSPITAL ENCOUNTER (EMERGENCY)
Facility: HOSPITAL | Age: 44
Discharge: HOME | End: 2019-12-31
Attending: EMERGENCY MEDICINE | Admitting: EMERGENCY MEDICINE

## 2019-12-31 ENCOUNTER — APPOINTMENT (OUTPATIENT)
Dept: CT IMAGING | Facility: HOSPITAL | Age: 44
End: 2019-12-31

## 2019-12-31 VITALS
HEIGHT: 63 IN | TEMPERATURE: 97.8 F | WEIGHT: 231.7 LBS | RESPIRATION RATE: 16 BRPM | HEART RATE: 78 BPM | OXYGEN SATURATION: 95 % | DIASTOLIC BLOOD PRESSURE: 75 MMHG | BODY MASS INDEX: 41.05 KG/M2 | SYSTOLIC BLOOD PRESSURE: 111 MMHG

## 2019-12-31 DIAGNOSIS — R51.9 ACUTE NONINTRACTABLE HEADACHE, UNSPECIFIED HEADACHE TYPE: Primary | ICD-10-CM

## 2019-12-31 LAB
ANION GAP SERPL CALCULATED.3IONS-SCNC: 11 MMOL/L (ref 5–15)
BASOPHILS # BLD AUTO: 0.1 10*3/MM3 (ref 0–0.2)
BASOPHILS NFR BLD AUTO: 1 % (ref 0–1.5)
BUN BLD-MCNC: 17 MG/DL (ref 6–20)
BUN/CREAT SERPL: 12 (ref 7–25)
CALCIUM SPEC-SCNC: 9.1 MG/DL (ref 8.6–10.5)
CHLORIDE SERPL-SCNC: 106 MMOL/L (ref 98–107)
CO2 SERPL-SCNC: 25 MMOL/L (ref 22–29)
CREAT BLD-MCNC: 1.42 MG/DL (ref 0.76–1.27)
DEPRECATED RDW RBC AUTO: 50.3 FL (ref 37–54)
EOSINOPHIL # BLD AUTO: 0.2 10*3/MM3 (ref 0–0.4)
EOSINOPHIL NFR BLD AUTO: 2.3 % (ref 0.3–6.2)
ERYTHROCYTE [DISTWIDTH] IN BLOOD BY AUTOMATED COUNT: 15.7 % (ref 12.3–15.4)
GFR SERPL CREATININE-BSD FRML MDRD: 54 ML/MIN/1.73
GLUCOSE BLD-MCNC: 180 MG/DL (ref 65–99)
HCT VFR BLD AUTO: 45.7 % (ref 37.5–51)
HGB BLD-MCNC: 14.7 G/DL (ref 13–17.7)
LYMPHOCYTES # BLD AUTO: 1.4 10*3/MM3 (ref 0.7–3.1)
LYMPHOCYTES NFR BLD AUTO: 13.4 % (ref 19.6–45.3)
MCH RBC QN AUTO: 29.3 PG (ref 26.6–33)
MCHC RBC AUTO-ENTMCNC: 32.2 G/DL (ref 31.5–35.7)
MCV RBC AUTO: 90.9 FL (ref 79–97)
MONOCYTES # BLD AUTO: 0.8 10*3/MM3 (ref 0.1–0.9)
MONOCYTES NFR BLD AUTO: 7.1 % (ref 5–12)
NEUTROPHILS # BLD AUTO: 8.2 10*3/MM3 (ref 1.7–7)
NEUTROPHILS NFR BLD AUTO: 76.2 % (ref 42.7–76)
NRBC BLD AUTO-RTO: 0 /100 WBC (ref 0–0.2)
PLATELET # BLD AUTO: 225 10*3/MM3 (ref 140–450)
PMV BLD AUTO: 7.5 FL (ref 6–12)
POTASSIUM BLD-SCNC: 4.1 MMOL/L (ref 3.5–5.2)
RBC # BLD AUTO: 5.02 10*6/MM3 (ref 4.14–5.8)
SODIUM BLD-SCNC: 142 MMOL/L (ref 136–145)
WBC NRBC COR # BLD: 10.7 10*3/MM3 (ref 3.4–10.8)

## 2019-12-31 PROCEDURE — 85025 COMPLETE CBC W/AUTO DIFF WBC: CPT | Performed by: EMERGENCY MEDICINE

## 2019-12-31 PROCEDURE — 96375 TX/PRO/DX INJ NEW DRUG ADDON: CPT

## 2019-12-31 PROCEDURE — 99284 EMERGENCY DEPT VISIT MOD MDM: CPT

## 2019-12-31 PROCEDURE — 96376 TX/PRO/DX INJ SAME DRUG ADON: CPT

## 2019-12-31 PROCEDURE — 25010000002 HYDROMORPHONE PER 4 MG: Performed by: EMERGENCY MEDICINE

## 2019-12-31 PROCEDURE — 25010000002 LORAZEPAM PER 2 MG: Performed by: EMERGENCY MEDICINE

## 2019-12-31 PROCEDURE — 96374 THER/PROPH/DIAG INJ IV PUSH: CPT

## 2019-12-31 PROCEDURE — 70450 CT HEAD/BRAIN W/O DYE: CPT

## 2019-12-31 PROCEDURE — 80048 BASIC METABOLIC PNL TOTAL CA: CPT | Performed by: EMERGENCY MEDICINE

## 2019-12-31 PROCEDURE — 25010000002 ONDANSETRON PER 1 MG: Performed by: EMERGENCY MEDICINE

## 2019-12-31 RX ORDER — SODIUM CHLORIDE 0.9 % (FLUSH) 0.9 %
10 SYRINGE (ML) INJECTION AS NEEDED
Status: DISCONTINUED | OUTPATIENT
Start: 2019-12-31 | End: 2019-12-31 | Stop reason: HOSPADM

## 2019-12-31 RX ORDER — ONDANSETRON 2 MG/ML
8 INJECTION INTRAMUSCULAR; INTRAVENOUS ONCE
Status: COMPLETED | OUTPATIENT
Start: 2019-12-31 | End: 2019-12-31

## 2019-12-31 RX ORDER — LORAZEPAM 2 MG/ML
1 INJECTION INTRAMUSCULAR ONCE
Status: COMPLETED | OUTPATIENT
Start: 2019-12-31 | End: 2019-12-31

## 2019-12-31 RX ORDER — HYDROMORPHONE HCL 110MG/55ML
1 PATIENT CONTROLLED ANALGESIA SYRINGE INTRAVENOUS ONCE
Status: COMPLETED | OUTPATIENT
Start: 2019-12-31 | End: 2019-12-31

## 2019-12-31 RX ORDER — HYDROMORPHONE HCL 110MG/55ML
0.5 PATIENT CONTROLLED ANALGESIA SYRINGE INTRAVENOUS ONCE
Status: COMPLETED | OUTPATIENT
Start: 2019-12-31 | End: 2019-12-31

## 2019-12-31 RX ADMIN — LORAZEPAM 1 MG: 2 INJECTION INTRAMUSCULAR; INTRAVENOUS at 06:04

## 2019-12-31 RX ADMIN — ONDANSETRON 8 MG: 2 INJECTION INTRAMUSCULAR; INTRAVENOUS at 04:39

## 2019-12-31 RX ADMIN — HYDROMORPHONE HYDROCHLORIDE 0.5 MG: 2 INJECTION, SOLUTION INTRAMUSCULAR; INTRAVENOUS; SUBCUTANEOUS at 06:04

## 2019-12-31 RX ADMIN — SODIUM CHLORIDE 500 ML: 900 INJECTION, SOLUTION INTRAVENOUS at 04:37

## 2019-12-31 RX ADMIN — HYDROMORPHONE HYDROCHLORIDE 1 MG: 2 INJECTION, SOLUTION INTRAMUSCULAR; INTRAVENOUS; SUBCUTANEOUS at 04:40

## 2020-01-13 ENCOUNTER — OFFICE VISIT (OUTPATIENT)
Dept: SURGERY | Facility: CLINIC | Age: 45
End: 2020-01-13

## 2020-01-13 VITALS
WEIGHT: 238 LBS | BODY MASS INDEX: 42.16 KG/M2 | TEMPERATURE: 97.8 F | SYSTOLIC BLOOD PRESSURE: 128 MMHG | OXYGEN SATURATION: 94 % | HEART RATE: 80 BPM | DIASTOLIC BLOOD PRESSURE: 78 MMHG

## 2020-01-13 DIAGNOSIS — Z43.3 ATTENTION TO COLOSTOMY (HCC): Primary | ICD-10-CM

## 2020-01-13 PROCEDURE — 99024 POSTOP FOLLOW-UP VISIT: CPT | Performed by: SURGERY

## 2020-01-13 RX ORDER — MECOBALAMIN 5000 MCG
1 TABLET,DISINTEGRATING ORAL NIGHTLY PRN
COMMUNITY
Start: 2019-12-23

## 2020-01-13 RX ORDER — OXYCODONE HYDROCHLORIDE 5 MG/1
5 TABLET ORAL
COMMUNITY
Start: 2019-12-14 | End: 2020-09-17 | Stop reason: HOSPADM

## 2020-01-13 RX ORDER — BUPROPION HYDROCHLORIDE 300 MG/1
300 TABLET ORAL EVERY MORNING
COMMUNITY
Start: 2019-12-23

## 2020-01-13 NOTE — PROGRESS NOTES
GENERAL SURGERY PROGRESS NOTE    1/13/2020    Patient Care Team:  Sanjay Chance MD as PCP - General    Chief Complaint: h/o Dale's gangrene, attention to colostomy    Subjective   HPI: Patient is a 44-year-old gentleman known to me as I performed an incision and drainage and debridement of his perineal region for Dale's gangrene in October 2019.  He required a diverting colostomy to divert the flow of stool away from his wound which was in relative close proximity to his anal canal.  On 10/21/2019 the patient underwent an uncomplicated laparoscopic diverting loop colostomy.  Overall he has done well, and his perineal wound has completely healed.  He returns to my office today to discuss possible ostomy reversal.    Interval History:   Patient's ostomy is functioning well without any issues.  He is afebrile, and has minimal abdominal pain.  The Dale's gangrene has resolved, and the wound next to his anal canal has completely healed as well.  He reports that he has not passed any bowel movements or gas through his anus since his diversion.  He is still smoking, although less than he was at the time when he was admitted for Dale's gangrene    Objective     Vital Signs  Temp:  [97.8 °F (36.6 °C)] 97.8 °F (36.6 °C)  Heart Rate:  [80] 80  BP: (128)/(78) 128/78    Physical Exam   Constitutional: He is oriented to person, place, and time. He appears well-developed.   Obese   HENT:   Head: Normocephalic and atraumatic.   Cardiovascular: Normal rate and regular rhythm.   Pulmonary/Chest: Effort normal and breath sounds normal.   Abdominal: Soft. He exhibits no distension. There is no tenderness. There is no guarding.   Left mid abdominal colostomy with pedunculated polyp in bag, patent, pink, productive of soft yellow/brown stool.   Genitourinary:   Genitourinary Comments: Area of Dale's gangrene debridement completely healed without any surrounding erythema, induration, or drainage.   Musculoskeletal:  Normal range of motion. He exhibits no edema or deformity.   Neurological: He is alert and oriented to person, place, and time.   Skin: Skin is warm and dry. No rash noted.   Psychiatric: He has a normal mood and affect. His behavior is normal.   Vitals reviewed.       Results Review:    Lab Results (last 24 hours)     ** No results found for the last 24 hours. **        Imaging Results (Last 24 Hours)     ** No results found for the last 24 hours. **           I have reviewed the above results and noted them below    Medication Review:    Current Outpatient Medications:   •  amLODIPine (NORVASC) 10 MG tablet, Take 10 mg by mouth Daily., Disp: , Rfl:   •  atorvastatin (LIPITOR) 40 MG tablet, Take 40 mg by mouth Daily., Disp: , Rfl:   •  baclofen (LIORESAL) 10 MG tablet, Take 10 mg by mouth Daily., Disp: , Rfl:   •  buPROPion XL (WELLBUTRIN XL) 300 MG 24 hr tablet, Take 300 mg by mouth Every Morning., Disp: , Rfl:   •  insulin lispro (humaLOG) 100 UNIT/ML injection, Inject 0-24 Units under the skin into the appropriate area as directed 4 (Four) Times a Day With Meals & at Bedtime., Disp: , Rfl: 12  •  levETIRAcetam (KEPPRA) 500 MG tablet, Take 500 mg by mouth 2 (Two) Times a Day., Disp: , Rfl:   •  lisinopril (PRINIVIL,ZESTRIL) 40 MG tablet, Take 40 mg by mouth Daily., Disp: , Rfl:   •  Melatonin 5 MG tablet dispersible, Take 1 tablet by mouth every night at bedtime., Disp: , Rfl:   •  metoprolol tartrate (LOPRESSOR) 100 MG tablet, Take 100 mg by mouth 2 (Two) Times a Day., Disp: , Rfl:   •  ondansetron (ZOFRAN) 4 MG tablet, Take 1 tablet by mouth Every 6 (Six) Hours As Needed for Nausea or Vomiting., Disp: , Rfl:   •  oxyCODONE (ROXICODONE) 5 MG immediate release tablet, Take 5 mg by mouth., Disp: , Rfl:   •  rOPINIRole (REQUIP) 0.5 MG tablet, Take 0.5 mg by mouth 3 (Three) Times a Day. Take 1 hour before bedtime., Disp: , Rfl:     Assessment/Plan     Active Problems:  History of Dale's gangrene  Attention to  colostomy    Discussed with patient that I would be willing to reverse his colostomy, but not before 4 months.  He will also need to stop smoking completely as this will impact his postoperative course.  Prior to reversing his colostomy, the patient should have a barium enema performed to ensure that he does not have significant stricturing of his anal canal versus distal rectum given the extent of his Dale's gangrene.  I would also like for the patient to have a colonoscopy performed to rule out additional colonic pathology prior to reversal.  The patient understands, and will have his barium enema and colonoscopy performed and then return to my office to discuss the specifics regarding his surgery.    Niraj Almanzar MD  01/13/20  11:29 AM

## 2020-01-22 ENCOUNTER — HOSPITAL ENCOUNTER (OUTPATIENT)
Dept: GENERAL RADIOLOGY | Facility: HOSPITAL | Age: 45
Discharge: HOME | End: 2020-01-22
Admitting: SURGERY

## 2020-01-22 DIAGNOSIS — Z43.3 ATTENTION TO COLOSTOMY (HCC): ICD-10-CM

## 2020-01-22 PROCEDURE — 74270 X-RAY XM COLON 1CNTRST STD: CPT

## 2020-02-05 ENCOUNTER — ON CAMPUS - OUTPATIENT (OUTPATIENT)
Dept: URBAN - METROPOLITAN AREA HOSPITAL 85 | Facility: HOSPITAL | Age: 45
End: 2020-02-05
Payer: MEDICARE

## 2020-02-05 DIAGNOSIS — K64.8 OTHER HEMORRHOIDS: ICD-10-CM

## 2020-02-05 DIAGNOSIS — K62.5 HEMORRHAGE OF ANUS AND RECTUM: ICD-10-CM

## 2020-02-05 DIAGNOSIS — D12.4 BENIGN NEOPLASM OF DESCENDING COLON: ICD-10-CM

## 2020-02-05 DIAGNOSIS — D12.3 BENIGN NEOPLASM OF TRANSVERSE COLON: ICD-10-CM

## 2020-02-05 DIAGNOSIS — D12.2 BENIGN NEOPLASM OF ASCENDING COLON: ICD-10-CM

## 2020-02-05 DIAGNOSIS — D12.0 BENIGN NEOPLASM OF CECUM: ICD-10-CM

## 2020-02-05 PROCEDURE — 45385 COLONOSCOPY W/LESION REMOVAL: CPT | Performed by: INTERNAL MEDICINE

## 2020-03-02 ENCOUNTER — OFFICE VISIT (OUTPATIENT)
Dept: SURGERY | Facility: CLINIC | Age: 45
End: 2020-03-02

## 2020-03-02 VITALS
WEIGHT: 247.2 LBS | SYSTOLIC BLOOD PRESSURE: 123 MMHG | HEART RATE: 87 BPM | OXYGEN SATURATION: 97 % | BODY MASS INDEX: 43.8 KG/M2 | TEMPERATURE: 97.1 F | HEIGHT: 63 IN | DIASTOLIC BLOOD PRESSURE: 80 MMHG

## 2020-03-02 DIAGNOSIS — Z43.3 ATTENTION TO COLOSTOMY (HCC): Primary | ICD-10-CM

## 2020-03-02 PROBLEM — I82.412 ACUTE DEEP VEIN THROMBOSIS (DVT) OF FEMORAL VEIN OF LEFT LOWER EXTREMITY (HCC): Status: ACTIVE | Noted: 2020-02-27

## 2020-03-02 PROBLEM — Z98.890 S/P COLONOSCOPY WITH POLYPECTOMY: Status: ACTIVE | Noted: 2020-02-08

## 2020-03-02 PROBLEM — D12.6 TUBULAR ADENOMA OF COLON: Status: ACTIVE | Noted: 2020-02-08

## 2020-03-02 PROBLEM — E66.01 MORBID OBESITY WITH BMI OF 40.0-44.9, ADULT (HCC): Status: ACTIVE | Noted: 2020-02-27

## 2020-03-02 PROBLEM — I82.409 DVT (DEEP VENOUS THROMBOSIS): Status: ACTIVE | Noted: 2020-02-27

## 2020-03-02 PROCEDURE — 99213 OFFICE O/P EST LOW 20 MIN: CPT | Performed by: SURGERY

## 2020-03-03 NOTE — PROGRESS NOTES
GENERAL SURGERY PROGRESS NOTE    3/3/2020    Patient Care Team:  Sanjay Chance MD as PCP - General    Chief Complaint: h/o Dale's gangrene, attention to colostomy    Subjective   HPI: Patient is a 44-year-old gentleman known to me as I performed an incision and drainage and debridement of his perineal region for Dale's gangrene in October 2019.  He required a diverting colostomy to divert the flow of stool away from his wound which was in relative close proximity to his anal canal.  On 10/21/2019 the patient underwent an uncomplicated laparoscopic diverting loop colostomy.  Overall he has done well, and his perineal wound has completely healed.  He returns to my office today to discuss possible ostomy reversal.  He has undergone a colonoscopy which was unremarkable and a barium enema which also demonstrated no evidence of stricturing.    Interval History:   The patient states that he was recently discharged from the hospital where he was admitted to [facility] with a left lower extremity DVT.  Upon reviewing the discharge summary, the patient was felt to have a DVT from sitting for long periods of time while playing video games.  He was admitted and started on heparin before having to undergo thrombectomy by IR.  The patient was given TPA and started on Eliquis.    Objective     Vital Signs       Physical Exam   Constitutional: He is oriented to person, place, and time. He appears well-developed.   Obese   HENT:   Head: Normocephalic and atraumatic.   Cardiovascular: Normal rate and regular rhythm.   Pulmonary/Chest: Effort normal and breath sounds normal.   Abdominal: Soft. He exhibits no distension. There is no tenderness. There is no guarding.   Left mid abdominal colostomy with pedunculated polyp in bag, patent, pink, productive of soft yellow/brown stool.   Genitourinary:   Genitourinary Comments: Area of Dale's gangrene debridement completely healed without any surrounding  erythema, induration, or drainage.   Musculoskeletal: Normal range of motion. He exhibits no edema or deformity.   Neurological: He is alert and oriented to person, place, and time.   Skin: Skin is warm and dry. No rash noted.   Psychiatric: He has a normal mood and affect. His behavior is normal.   Vitals reviewed.       Results Review:    Lab Results (last 24 hours)     ** No results found for the last 24 hours. **        Imaging Results (Last 24 Hours)     ** No results found for the last 24 hours. **           I have reviewed the above results and noted them below    Medication Review:    Current Outpatient Medications:   •  amLODIPine (NORVASC) 10 MG tablet, Take 10 mg by mouth Daily., Disp: , Rfl:   •  apixaban (ELIQUIS) 5 MG tablet tablet, take 2 tablets by mouth twice daily x 7 days, then 1 tablet by mouth twice daily thereafter, Disp: , Rfl:   •  atorvastatin (LIPITOR) 40 MG tablet, Take 40 mg by mouth Daily., Disp: , Rfl:   •  baclofen (LIORESAL) 10 MG tablet, Take 10 mg by mouth Daily., Disp: , Rfl:   •  buPROPion XL (WELLBUTRIN XL) 300 MG 24 hr tablet, Take 300 mg by mouth Every Morning., Disp: , Rfl:   •  insulin lispro (humaLOG) 100 UNIT/ML injection, Inject 0-24 Units under the skin into the appropriate area as directed 4 (Four) Times a Day With Meals & at Bedtime., Disp: , Rfl: 12  •  levETIRAcetam (KEPPRA) 500 MG tablet, Take 500 mg by mouth 2 (Two) Times a Day., Disp: , Rfl:   •  lisinopril (PRINIVIL,ZESTRIL) 40 MG tablet, Take 40 mg by mouth Daily., Disp: , Rfl:   •  Melatonin 5 MG tablet dispersible, Take 1 tablet by mouth every night at bedtime., Disp: , Rfl:   •  metoprolol tartrate (LOPRESSOR) 100 MG tablet, Take 100 mg by mouth 2 (Two) Times a Day., Disp: , Rfl:   •  ondansetron (ZOFRAN) 4 MG tablet, Take 1 tablet by mouth Every 6 (Six) Hours As Needed for Nausea or Vomiting., Disp: , Rfl:   •  oxyCODONE (ROXICODONE) 5 MG immediate release tablet, Take 5 mg by mouth., Disp: , Rfl:   •   rOPINIRole (REQUIP) 0.5 MG tablet, Take 0.5 mg by mouth 3 (Three) Times a Day. Take 1 hour before bedtime., Disp: , Rfl:     Assessment/Plan     Active Problems:  History of Dale's gangrene  Attention to colostomy  History of DVT  On oral anticoagulation    I discussed with the patient that this certainly is a setback in the course of him having his colostomy reversed.  Certainly, proceeding with anticoagulation for the DVT takes precedent over this elective surgery to reverse his colostomy.  I would recommend that the patient follow-up with the physician who will be managing his anticoagulation going forward and discuss when would be an appropriate time to come off of his anticoagulation.  In my opinion, with an unprovoked DVT, the patient warrants at least 6 months of continuous anticoagulation.  Therefore I would recommend that the surgery to reverse his colostomy not happen before August 2020.  If the patient requires anticoagulation for longer periods of time, then we can discuss when would be an appropriate time to hold his anticoagulation, and then resume it postoperatively.  We did discuss that since he has had a barium enema without any evidence of stricturing and a colonoscopy this year, he will not require additional colonoscopy or barium enema prior to proceeding the operating room for reversal of his colostomy.  I will see him back in my office in a few months to review the surgery and what this will entail.    Niraj Almanzar MD  03/03/20  2:10 PM

## 2020-05-01 ENCOUNTER — TELEPHONE (OUTPATIENT)
Dept: SURGERY | Facility: CLINIC | Age: 45
End: 2020-05-01

## 2020-05-01 NOTE — TELEPHONE ENCOUNTER
Pt had an laparoscopic diverting colostomy done on 10/21/2020 and he is calling today stating that he is having a lot of stinging and redness around his colostomy. He states that he was using a prep pad that made this area more sensitive and irritated. He tried a new prep pad that helps the irritation . He asked if he could have an rx for pain medication. I let him know that he can alternate tylenol and ibuprofen.

## 2020-05-08 DIAGNOSIS — Z43.2 ATTENTION TO ILEOSTOMY (HCC): Primary | ICD-10-CM

## 2020-05-11 NOTE — TELEPHONE ENCOUNTER
RABIA Pizano [facility] today to schedule an appt for pt.    Pt has been scheduled w/ Shannon on Friday 5/22/2020 @ 10am. Pt was given asppt information and location

## 2020-05-22 ENCOUNTER — HOSPITAL ENCOUNTER (OUTPATIENT)
Dept: WOUND CARE | Facility: HOSPITAL | Age: 45
Discharge: HOME | End: 2020-05-22

## 2020-05-22 NOTE — PROGRESS NOTES
Ostomy Note  LILLIAN OWENS     Patient Name: Lonnie Arora  : 1975  MRN: [id number]  Today's Date: 2020 Room Number: Room/bed info not found      Admit Date: 2020  Attending: No att. providers found    Consult Requested By: Ostomy clinic    Reason For Consult: Lorenza stomal irritation and frequent leakage    Chief Complaint: Patient underwent formation of colostomy to left lower quadrant in October.  Per patient and patient's wife things have went well until about the last month caregiver states she believes that the stoma is larger in size and that now she is having to change the pouch every other day.  Patient is complaining of pain and tenderness in between pouch changes    Visit Dx:  No diagnosis found.  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   • Acute deep vein thrombosis (DVT) of femoral vein of left lower extremity (CMS/HCC)   • DVT (deep venous thrombosis) (CMS/HCC)   • S/P colonoscopy with polypectomy   • Tubular adenoma of colon   • Morbid obesity with BMI of 40.0-44.9, adult (CMS/HCC)   • Type 2 diabetes mellitus with stage 3 chronic kidney disease, with long-term current use of insulin (CMS/HCC)       History:   Past Medical History:   Diagnosis Date   • Diabetes (CMS/HCC)    • GERD (gastroesophageal reflux disease)    • Hypertension    • Seizures (CMS/HCC)      Past Surgical History:   Procedure Laterality Date   • COLOSTOMY Left  10/21/2019    Procedure: Laparoscopic diverting colostomy;  Surgeon: Niraj Almanzar MD;  Location: [facility] MAIN OR;  Service: General   • INCISION AND DRAINAGE OF WOUND Left 10/15/2019    Procedure: INCISION AND DRAINAGE OF LEFT GLUTEAL REGION;  Surgeon: Niraj Almanzar MD;  Location: [facility] MAIN OR;  Service: General     Social History     Socioeconomic History   • Marital status:      Spouse name: Not on file   • Number of children: Not on file   • Years of education: Not on file   • Highest education level: Not on file   Tobacco Use   • Smoking status: Current Some Day Smoker     Types: Cigars   • Smokeless tobacco: Never Used   Substance and Sexual Activity   • Alcohol use: No     Frequency: Never     Comment: Quit several years ago at time of his CVA   • Drug use: Yes     Types: Marijuana   • Sexual activity: Defer       Allergies:  No Known Allergies    Medications:    Current Outpatient Medications:   •  amLODIPine (NORVASC) 10 MG tablet, Take 10 mg by mouth Daily., Disp: , Rfl:   •  atorvastatin (LIPITOR) 40 MG tablet, Take 40 mg by mouth Daily., Disp: , Rfl:   •  baclofen (LIORESAL) 10 MG tablet, Take 10 mg by mouth Daily., Disp: , Rfl:   •  buPROPion XL (WELLBUTRIN XL) 300 MG 24 hr tablet, Take 300 mg by mouth Every Morning., Disp: , Rfl:   •  insulin lispro (humaLOG) 100 UNIT/ML injection, Inject 0-24 Units under the skin into the appropriate area as directed 4 (Four) Times a Day With Meals & at Bedtime., Disp: , Rfl: 12  •  levETIRAcetam (KEPPRA) 500 MG tablet, Take 500 mg by mouth 2 (Two) Times a Day., Disp: , Rfl:   •  lisinopril (PRINIVIL,ZESTRIL) 40 MG tablet, Take 40 mg by mouth Daily., Disp: , Rfl:   •  Melatonin 5 MG tablet dispersible, Take 1 tablet by mouth every night at bedtime., Disp: , Rfl:   •  metoprolol tartrate (LOPRESSOR) 100 MG tablet, Take 100 mg by mouth 2 (Two) Times a Day., Disp: , Rfl:   •  ondansetron (ZOFRAN) 4 MG tablet, Take 1 tablet by mouth Every 6  (Six) Hours As Needed for Nausea or Vomiting., Disp: , Rfl:   •  oxyCODONE (ROXICODONE) 5 MG immediate release tablet, Take 5 mg by mouth., Disp: , Rfl:   •  rOPINIRole (REQUIP) 0.5 MG tablet, Take 0.5 mg by mouth 3 (Three) Times a Day. Take 1 hour before bedtime., Disp: , Rfl:     Results Review:  Lab Results (last 48 hours)     ** No results found for the last 48 hours. **        Imaging Results (Last 72 Hours)     ** No results found for the last 72 hours. **          Review of Systems:  Review of Systems   Respiratory: Negative for cough and shortness of breath.    Cardiovascular: Negative for chest pain.   Gastrointestinal: Negative for abdominal pain and diarrhea.   Genitourinary: Negative for dysuria.   Skin: Negative.    Psychiatric/Behavioral: Negative.        Physical Assessment:  Left lower quadrant colostomy: The stoma is pink moist and viable however it is retracted is below skin level.  There are multiple areas mucosal transplants with hyperplasia these areas are raised and very tender to touch.  Patient's old pouching system is been a [place] convex cut to fit 1 piece.  The pouch is been on for less than 24 hours and it is lifting with stool under the flange.  Also noted is at the size of the opening is smaller than the actual opening of the ostomy.    Peristomal skin was cleansed well and dried well.  I applied silver nitrate sticks to the areas of hyper plasia.  Patient tolerated this well.  Cut the 1 piece convexed pouch to 30 mm also applied an adapt ring.    Instructed patient and caregiver on correct sizing of pouch and that because the stoma is retracted will need to make the opening larger also would recommend and instructed caregiver and patient on the use of an adapt ring and/or paste to help create a tighter seal to keep stool off of the skin             Recommendation and Plan  Left lower quadrant colostomy with frequent leakage and mucosal transplants.  Applied silver nitrate to the  mucosal transcranial this should help with discomfort and create a better seal to the pouch also will add an adapt ring and increase the size of the pouch to accommodate and fit patient and caregiver    MORA Cárdenas   5/22/2020   10:40

## 2020-06-08 ENCOUNTER — TELEPHONE (OUTPATIENT)
Dept: SURGERY | Facility: CLINIC | Age: 45
End: 2020-06-08

## 2020-07-20 ENCOUNTER — OFFICE VISIT (OUTPATIENT)
Dept: SURGERY | Facility: CLINIC | Age: 45
End: 2020-07-20

## 2020-07-20 VITALS
BODY MASS INDEX: 47.2 KG/M2 | TEMPERATURE: 97 F | SYSTOLIC BLOOD PRESSURE: 117 MMHG | HEART RATE: 62 BPM | WEIGHT: 240.4 LBS | OXYGEN SATURATION: 96 % | HEIGHT: 60 IN | DIASTOLIC BLOOD PRESSURE: 80 MMHG

## 2020-07-20 DIAGNOSIS — N49.3 FOURNIER'S GANGRENE IN MALE: ICD-10-CM

## 2020-07-20 DIAGNOSIS — Z43.3 ATTENTION TO COLOSTOMY (HCC): Primary | ICD-10-CM

## 2020-07-20 PROBLEM — Z86.718 HISTORY OF DVT (DEEP VEIN THROMBOSIS): Status: ACTIVE | Noted: 2020-02-27

## 2020-07-20 PROBLEM — I87.2 STASIS DERMATITIS OF BOTH LEGS: Status: ACTIVE | Noted: 2020-03-13

## 2020-07-20 PROCEDURE — 99213 OFFICE O/P EST LOW 20 MIN: CPT | Performed by: SURGERY

## 2020-07-20 RX ORDER — CHLORHEXIDINE GLUCONATE 0.12 MG/ML
15 RINSE ORAL EVERY 12 HOURS SCHEDULED
Status: CANCELLED | OUTPATIENT
Start: 2020-07-20

## 2020-07-20 RX ORDER — SODIUM CHLORIDE 9 MG/ML
100 INJECTION, SOLUTION INTRAVENOUS CONTINUOUS
Status: CANCELLED | OUTPATIENT
Start: 2020-07-20

## 2020-07-20 NOTE — PROGRESS NOTES
GENERAL SURGERY PROGRESS NOTE    7/20/2020    Patient Care Team:  Sanjay Chance MD as PCP - General    Chief Complaint: Attention to colostomy    Subjective   HPI: Patient is a 44-year-old gentleman known to me as I performed an incision and drainage and debridement of his perineal region for Dale's gangrene in October 2019.  He required a diverting colostomy to divert the flow of stool away from his wound which was in relative close proximity to his anal canal.  On 10/21/2019 the patient underwent an uncomplicated laparoscopic diverting loop colostomy.  Overall he has done well, and his perineal wound has completely healed.  He returns to my office today to discuss possible ostomy reversal.  He has undergone a colonoscopy which was unremarkable and a barium enema which also demonstrated no evidence of stricturing.  Unfortunately, in March 2020, while the patient was awaiting reversal of his colostomy, he was diagnosed with a DVT which was treated with TPA and the patient was placed on oral anticoagulation therapy.  He has since completed oral anticoagulation therapy is no longer on blood thinners.  Also complicating matters was the COVID-19 pandemic which put elective surgery is on hold.  The patient has waited, is now off of blood thinners and is ready to proceed with reversal of his diverting loop colostomy.    Interval History:   Afebrile, colostomy is functioning.  Patient feels the urge to pass bowel movements per rectum but is not.    Objective     Vital Signs  Temp:  [97 °F (36.1 °C)] 97 °F (36.1 °C)  Heart Rate:  [62] 62  BP: (117)/(80) 117/80    Physical Exam   Constitutional: He is oriented to person, place, and time. He appears well-developed and well-nourished.   Obese   HENT:   Head: Normocephalic and atraumatic.   Eyes: Pupils are equal, round, and reactive to light. EOM are normal.   Cardiovascular: Normal rate and regular rhythm.   Pulmonary/Chest: Effort normal and breath sounds normal.    Abdominal: Soft.   Obese.  Left lower quadrant colostomy is patent, pink, and productive of stool.   Musculoskeletal: Normal range of motion. He exhibits no edema.   Neurological: He is alert and oriented to person, place, and time.   Skin: Skin is warm and dry.   Psychiatric: He has a normal mood and affect. His behavior is normal.   Vitals reviewed.       Results Review:    Lab Results (last 24 hours)     ** No results found for the last 24 hours. **        Imaging Results (Last 24 Hours)     ** No results found for the last 24 hours. **           I have reviewed the above results and noted them below    Medication Review:    Current Outpatient Medications:   •  amLODIPine (NORVASC) 10 MG tablet, Take 10 mg by mouth Daily., Disp: , Rfl:   •  atorvastatin (LIPITOR) 40 MG tablet, Take 40 mg by mouth Daily., Disp: , Rfl:   •  buPROPion XL (WELLBUTRIN XL) 300 MG 24 hr tablet, Take 300 mg by mouth Every Morning., Disp: , Rfl:   •  insulin lispro (humaLOG) 100 UNIT/ML injection, Inject 0-24 Units under the skin into the appropriate area as directed 4 (Four) Times a Day With Meals & at Bedtime., Disp: , Rfl: 12  •  levETIRAcetam (KEPPRA) 500 MG tablet, Take 500 mg by mouth 2 (Two) Times a Day., Disp: , Rfl:   •  lisinopril (PRINIVIL,ZESTRIL) 40 MG tablet, Take 40 mg by mouth Daily., Disp: , Rfl:   •  Melatonin 5 MG tablet dispersible, Take 1 tablet by mouth every night at bedtime., Disp: , Rfl:   •  metoprolol tartrate (LOPRESSOR) 100 MG tablet, Take 100 mg by mouth 2 (Two) Times a Day., Disp: , Rfl:   •  rOPINIRole (REQUIP) 0.5 MG tablet, Take 0.5 mg by mouth 3 (Three) Times a Day. Take 1 hour before bedtime., Disp: , Rfl:   •  baclofen (LIORESAL) 10 MG tablet, Take 10 mg by mouth Daily., Disp: , Rfl:   •  ondansetron (ZOFRAN) 4 MG tablet, Take 1 tablet by mouth Every 6 (Six) Hours As Needed for Nausea or Vomiting., Disp: , Rfl:   •  oxyCODONE (ROXICODONE) 5 MG immediate release tablet, Take 5 mg by mouth., Disp: ,  Rfl:     Assessment/Plan     Active Problems:  Attention to colostomy    I reviewed with the patient the operative strategy for reversal of his colostomy.  Given the fact that it is a loop colostomy, it is possible that we may be able to reverse the colostomy without having to create a large vertical midline incision.  However, if there is scar tissue, he may require opening of the abdomen to facilitate reversal of his colostomy.  The risks regarding colostomy reversal include bleeding, infection, leak from the colostomy repair, and possible need for surgery to correct this area.  Left lower quadrant hernia is also a concern following closure of the colostomy aperture.  The patient understands, but wishes to proceed with colostomy takedown.  He understands that he will need to have a bowel prep the day prior to his surgery.   He understands that he will likely have to remain in the hospital for a few days following surgery until he has a bowel movement.  He will be no heavy lifting for 6 weeks after surgery.    Niraj Almanzar MD  07/20/20  13:26

## 2020-08-03 RX ORDER — ERYTHROMYCIN 500 MG/1
TABLET, COATED ORAL
Qty: 6 TABLET | Refills: 0 | Status: SHIPPED | OUTPATIENT
Start: 2020-08-03 | End: 2020-08-04

## 2020-08-03 RX ORDER — NEOMYCIN SULFATE 500 MG/1
TABLET ORAL
Qty: 6 TABLET | Refills: 0 | Status: ON HOLD | OUTPATIENT
Start: 2020-08-03 | End: 2020-09-10

## 2020-08-04 ENCOUNTER — HOSPITAL ENCOUNTER (OUTPATIENT)
Dept: CARDIOLOGY | Facility: HOSPITAL | Age: 45
Discharge: HOME | End: 2020-08-04

## 2020-08-04 ENCOUNTER — HOSPITAL ENCOUNTER (OUTPATIENT)
Dept: GENERAL RADIOLOGY | Facility: HOSPITAL | Age: 45
Discharge: HOME | End: 2020-08-04
Admitting: SURGERY

## 2020-08-04 ENCOUNTER — LAB (OUTPATIENT)
Dept: LAB | Facility: HOSPITAL | Age: 45
End: 2020-08-04

## 2020-08-04 DIAGNOSIS — Z43.3 ATTENTION TO COLOSTOMY (HCC): ICD-10-CM

## 2020-08-04 DIAGNOSIS — N49.3 FOURNIER'S GANGRENE IN MALE: ICD-10-CM

## 2020-08-04 LAB
ALBUMIN SERPL-MCNC: 3.7 G/DL (ref 3.5–5.2)
ALBUMIN/GLOB SERPL: 1.1 G/DL
ALP SERPL-CCNC: 167 U/L (ref 39–117)
ALT SERPL W P-5'-P-CCNC: 40 U/L (ref 1–41)
ANION GAP SERPL CALCULATED.3IONS-SCNC: 10.1 MMOL/L (ref 5–15)
AST SERPL-CCNC: 37 U/L (ref 1–40)
BASOPHILS # BLD AUTO: 0.05 10*3/MM3 (ref 0–0.2)
BASOPHILS NFR BLD AUTO: 0.5 % (ref 0–1.5)
BILIRUB SERPL-MCNC: 0.9 MG/DL (ref 0–1.2)
BUN SERPL-MCNC: 20 MG/DL (ref 6–20)
BUN/CREAT SERPL: 11.6 (ref 7–25)
CALCIUM SPEC-SCNC: 9.4 MG/DL (ref 8.6–10.5)
CHLORIDE SERPL-SCNC: 105 MMOL/L (ref 98–107)
CO2 SERPL-SCNC: 23.9 MMOL/L (ref 22–29)
CREAT SERPL-MCNC: 1.72 MG/DL (ref 0.76–1.27)
DEPRECATED RDW RBC AUTO: 47.1 FL (ref 37–54)
EOSINOPHIL # BLD AUTO: 0.19 10*3/MM3 (ref 0–0.4)
EOSINOPHIL NFR BLD AUTO: 1.8 % (ref 0.3–6.2)
ERYTHROCYTE [DISTWIDTH] IN BLOOD BY AUTOMATED COUNT: 13.2 % (ref 12.3–15.4)
GFR SERPL CREATININE-BSD FRML MDRD: 43 ML/MIN/1.73
GLOBULIN UR ELPH-MCNC: 3.5 GM/DL
GLUCOSE SERPL-MCNC: 309 MG/DL (ref 65–99)
HCT VFR BLD AUTO: 48.6 % (ref 37.5–51)
HGB BLD-MCNC: 15.2 G/DL (ref 13–17.7)
IMM GRANULOCYTES # BLD AUTO: 0.05 10*3/MM3 (ref 0–0.05)
IMM GRANULOCYTES NFR BLD AUTO: 0.5 % (ref 0–0.5)
LYMPHOCYTES # BLD AUTO: 1.6 10*3/MM3 (ref 0.7–3.1)
LYMPHOCYTES NFR BLD AUTO: 15.5 % (ref 19.6–45.3)
MCH RBC QN AUTO: 30.1 PG (ref 26.6–33)
MCHC RBC AUTO-ENTMCNC: 31.3 G/DL (ref 31.5–35.7)
MCV RBC AUTO: 96.2 FL (ref 79–97)
MONOCYTES # BLD AUTO: 0.85 10*3/MM3 (ref 0.1–0.9)
MONOCYTES NFR BLD AUTO: 8.2 % (ref 5–12)
NEUTROPHILS NFR BLD AUTO: 7.58 10*3/MM3 (ref 1.7–7)
NEUTROPHILS NFR BLD AUTO: 73.5 % (ref 42.7–76)
NRBC BLD AUTO-RTO: 0 /100 WBC (ref 0–0.2)
PLATELET # BLD AUTO: 196 10*3/MM3 (ref 140–450)
PMV BLD AUTO: 10.9 FL (ref 6–12)
POTASSIUM SERPL-SCNC: 4.5 MMOL/L (ref 3.5–5.2)
PROT SERPL-MCNC: 7.2 G/DL (ref 6–8.5)
RBC # BLD AUTO: 5.05 10*6/MM3 (ref 4.14–5.8)
SODIUM SERPL-SCNC: 139 MMOL/L (ref 136–145)
WBC # BLD AUTO: 10.32 10*3/MM3 (ref 3.4–10.8)

## 2020-08-04 PROCEDURE — 85025 COMPLETE CBC W/AUTO DIFF WBC: CPT

## 2020-08-04 PROCEDURE — U0004 COV-19 TEST NON-CDC HGH THRU: HCPCS

## 2020-08-04 PROCEDURE — U0002 COVID-19 LAB TEST NON-CDC: HCPCS

## 2020-08-04 PROCEDURE — 80053 COMPREHEN METABOLIC PANEL: CPT

## 2020-08-04 PROCEDURE — C9803 HOPD COVID-19 SPEC COLLECT: HCPCS

## 2020-08-04 PROCEDURE — 71046 X-RAY EXAM CHEST 2 VIEWS: CPT

## 2020-08-04 PROCEDURE — 93005 ELECTROCARDIOGRAM TRACING: CPT | Performed by: SURGERY

## 2020-08-04 PROCEDURE — 36415 COLL VENOUS BLD VENIPUNCTURE: CPT

## 2020-08-04 RX ORDER — LEVOFLOXACIN 750 MG/1
TABLET ORAL
Qty: 1 TABLET | Refills: 0 | Status: SHIPPED | OUTPATIENT
Start: 2020-08-04 | End: 2020-08-31

## 2020-08-05 ENCOUNTER — TELEPHONE (OUTPATIENT)
Dept: SURGERY | Facility: CLINIC | Age: 45
End: 2020-08-05

## 2020-08-05 DIAGNOSIS — Z43.3 ATTENTION TO COLOSTOMY (HCC): Primary | ICD-10-CM

## 2020-08-05 DIAGNOSIS — R94.31 ABNORMAL EKG: ICD-10-CM

## 2020-08-05 LAB
REF LAB TEST METHOD: NORMAL
SARS-COV-2 RNA RESP QL NAA+PROBE: NOT DETECTED

## 2020-08-05 NOTE — TELEPHONE ENCOUNTER
Called patient to notify him that we needed to postpone surgery for 8/6/20/ we received a call from Fabiana in [facility] stating EKG performed on patient was abnormal. Fabiana stated patient will need to have a cardiac clearance done before surg. I advised becki that he will need to see a cardiologist asap so we can move forward with procedure. Patient stated he will call his primary care doctor and get a referral for cardiologist and notify us of updates.

## 2020-08-19 ENCOUNTER — TELEPHONE (OUTPATIENT)
Dept: SURGERY | Facility: CLINIC | Age: 45
End: 2020-08-19

## 2020-08-19 NOTE — TELEPHONE ENCOUNTER
Patient called to let us know that he Saw cardio Dr. Pineda today and he is scheduled for a stress test 9-1-20.

## 2020-08-21 PROCEDURE — 93010 ELECTROCARDIOGRAM REPORT: CPT | Performed by: INTERNAL MEDICINE

## 2020-08-25 ENCOUNTER — TELEPHONE (OUTPATIENT)
Dept: SURGERY | Facility: CLINIC | Age: 45
End: 2020-08-25

## 2020-08-25 NOTE — TELEPHONE ENCOUNTER
PATIENT CALLED STATING HE WENT TO THE EMERGENCY ROOM AT [facility] AND HAD ECHO AND STRESS TEST DONE AND WANTS TO GET BACK ON THE SURGERY SCHEDULE AND NEEDS HIS OTHER TESTING CANCELLED THAT WAS SCHEDULED ON 9/1/20. THE TESTING WAS SCHEDULED BY HIS CARDIOLOGIST AND I INFORMED PATIENT TO CALL  HIS CARDIOLOGIST AND LET THEM KNOW THAT HE HAD HIS TESTING DONE SO THEY CAN REVIEW IT AND CLEAR HIM FOR SURGERY.

## 2020-08-31 RX ORDER — ISOSORBIDE MONONITRATE 30 MG/1
30 TABLET, EXTENDED RELEASE ORAL DAILY
COMMUNITY

## 2020-09-08 ENCOUNTER — LAB (OUTPATIENT)
Dept: LAB | Facility: HOSPITAL | Age: 45
End: 2020-09-08

## 2020-09-08 LAB
ALBUMIN SERPL-MCNC: 3.9 G/DL (ref 3.5–5.2)
ALBUMIN/GLOB SERPL: 1.5 G/DL
ALP SERPL-CCNC: 133 U/L (ref 39–117)
ALT SERPL W P-5'-P-CCNC: 42 U/L (ref 1–41)
ANION GAP SERPL CALCULATED.3IONS-SCNC: 10.7 MMOL/L (ref 5–15)
AST SERPL-CCNC: 27 U/L (ref 1–40)
BILIRUB SERPL-MCNC: 1.3 MG/DL (ref 0–1.2)
BUN SERPL-MCNC: 16 MG/DL (ref 6–20)
BUN/CREAT SERPL: 9.1 (ref 7–25)
CALCIUM SPEC-SCNC: 8.6 MG/DL (ref 8.6–10.5)
CHLORIDE SERPL-SCNC: 108 MMOL/L (ref 98–107)
CO2 SERPL-SCNC: 24.3 MMOL/L (ref 22–29)
CREAT SERPL-MCNC: 1.75 MG/DL (ref 0.76–1.27)
DEPRECATED RDW RBC AUTO: 45.8 FL (ref 37–54)
ERYTHROCYTE [DISTWIDTH] IN BLOOD BY AUTOMATED COUNT: 13.5 % (ref 12.3–15.4)
GFR SERPL CREATININE-BSD FRML MDRD: 43 ML/MIN/1.73
GLOBULIN UR ELPH-MCNC: 2.6 GM/DL
GLUCOSE SERPL-MCNC: 170 MG/DL (ref 65–99)
HCT VFR BLD AUTO: 44.8 % (ref 37.5–51)
HGB BLD-MCNC: 14.9 G/DL (ref 13–17.7)
MCH RBC QN AUTO: 30.7 PG (ref 26.6–33)
MCHC RBC AUTO-ENTMCNC: 33.3 G/DL (ref 31.5–35.7)
MCV RBC AUTO: 92.4 FL (ref 79–97)
PLATELET # BLD AUTO: 189 10*3/MM3 (ref 140–450)
PMV BLD AUTO: 11 FL (ref 6–12)
POTASSIUM SERPL-SCNC: 3.5 MMOL/L (ref 3.5–5.2)
PROT SERPL-MCNC: 6.5 G/DL (ref 6–8.5)
RBC # BLD AUTO: 4.85 10*6/MM3 (ref 4.14–5.8)
SODIUM SERPL-SCNC: 143 MMOL/L (ref 136–145)
WBC # BLD AUTO: 9 10*3/MM3 (ref 3.4–10.8)

## 2020-09-08 PROCEDURE — 80053 COMPREHEN METABOLIC PANEL: CPT

## 2020-09-08 PROCEDURE — C9803 HOPD COVID-19 SPEC COLLECT: HCPCS

## 2020-09-08 PROCEDURE — U0004 COV-19 TEST NON-CDC HGH THRU: HCPCS

## 2020-09-08 PROCEDURE — 85027 COMPLETE CBC AUTOMATED: CPT

## 2020-09-09 ENCOUNTER — ANESTHESIA EVENT (OUTPATIENT)
Dept: PERIOP | Facility: HOSPITAL | Age: 45
End: 2020-09-09

## 2020-09-09 LAB — SARS-COV-2 RNA NOSE QL NAA+PROBE: NOT DETECTED

## 2020-09-10 ENCOUNTER — HOSPITAL ENCOUNTER (INPATIENT)
Facility: HOSPITAL | Age: 45
LOS: 7 days | Discharge: HOME | End: 2020-09-17
Attending: SURGERY | Admitting: SURGERY

## 2020-09-10 ENCOUNTER — ANESTHESIA (OUTPATIENT)
Dept: PERIOP | Facility: HOSPITAL | Age: 45
End: 2020-09-10

## 2020-09-10 DIAGNOSIS — Z43.3 ATTENTION TO COLOSTOMY (HCC): ICD-10-CM

## 2020-09-10 DIAGNOSIS — N49.3 FOURNIER'S GANGRENE IN MALE: ICD-10-CM

## 2020-09-10 LAB
ABO GROUP BLD: NORMAL
BLD GP AB SCN SERPL QL: NEGATIVE
GLUCOSE BLDC GLUCOMTR-MCNC: 134 MG/DL (ref 70–105)
GLUCOSE BLDC GLUCOMTR-MCNC: 138 MG/DL (ref 70–105)
GLUCOSE BLDC GLUCOMTR-MCNC: 138 MG/DL (ref 70–105)
GLUCOSE BLDC GLUCOMTR-MCNC: 170 MG/DL (ref 70–105)
RH BLD: NEGATIVE
T&S EXPIRATION DATE: NORMAL

## 2020-09-10 PROCEDURE — 88304 TISSUE EXAM BY PATHOLOGIST: CPT | Performed by: SURGERY

## 2020-09-10 PROCEDURE — S0260 H&P FOR SURGERY: HCPCS | Performed by: SURGERY

## 2020-09-10 PROCEDURE — 86901 BLOOD TYPING SEROLOGIC RH(D): CPT | Performed by: SURGERY

## 2020-09-10 PROCEDURE — 86850 RBC ANTIBODY SCREEN: CPT | Performed by: SURGERY

## 2020-09-10 PROCEDURE — 25010000002 PROPOFOL 200 MG/20ML EMULSION: Performed by: ANESTHESIOLOGIST ASSISTANT

## 2020-09-10 PROCEDURE — 63710000001 INSULIN LISPRO (HUMAN) PER 5 UNITS: Performed by: SURGERY

## 2020-09-10 PROCEDURE — 25010000002 FENTANYL CITRATE (PF) 250 MCG/5ML SOLUTION: Performed by: ANESTHESIOLOGIST ASSISTANT

## 2020-09-10 PROCEDURE — 25010000002 MIDAZOLAM PER 1 MG: Performed by: ANESTHESIOLOGIST ASSISTANT

## 2020-09-10 PROCEDURE — 25010000002 PHENYLEPHRINE 10 MG/ML SOLUTION: Performed by: ANESTHESIOLOGIST ASSISTANT

## 2020-09-10 PROCEDURE — 86900 BLOOD TYPING SEROLOGIC ABO: CPT | Performed by: SURGERY

## 2020-09-10 PROCEDURE — 86900 BLOOD TYPING SEROLOGIC ABO: CPT

## 2020-09-10 PROCEDURE — 44620 REPAIR BOWEL OPENING: CPT | Performed by: SURGERY

## 2020-09-10 PROCEDURE — 25010000002 ONDANSETRON PER 1 MG: Performed by: ANESTHESIOLOGIST ASSISTANT

## 2020-09-10 PROCEDURE — 86901 BLOOD TYPING SEROLOGIC RH(D): CPT

## 2020-09-10 PROCEDURE — 0DQE0ZZ REPAIR LARGE INTESTINE, OPEN APPROACH: ICD-10-PCS | Performed by: SURGERY

## 2020-09-10 PROCEDURE — 25010000002 HYDROMORPHONE PER 4 MG: Performed by: ANESTHESIOLOGIST ASSISTANT

## 2020-09-10 PROCEDURE — 82962 GLUCOSE BLOOD TEST: CPT

## 2020-09-10 DEVICE — PROXIMATE LINEAR CUTTER RELOAD, BLUE, 75MM
Type: IMPLANTABLE DEVICE | Site: ABDOMEN | Status: FUNCTIONAL
Brand: PROXIMATE

## 2020-09-10 DEVICE — PROXIMATE RELOADABLE LINEAR CUTTER WITH SAFETY LOCK-OUT, 75MM
Type: IMPLANTABLE DEVICE | Site: ABDOMEN | Status: FUNCTIONAL
Brand: PROXIMATE

## 2020-09-10 DEVICE — PROXIMATE RELOADABLE LINEAR STAPLER
Type: IMPLANTABLE DEVICE | Site: ABDOMEN | Status: FUNCTIONAL
Brand: PROXIMATE

## 2020-09-10 RX ORDER — MORPHINE SULFATE 4 MG/ML
4 INJECTION, SOLUTION INTRAMUSCULAR; INTRAVENOUS EVERY 4 HOURS PRN
Status: DISPENSED | OUTPATIENT
Start: 2020-09-10 | End: 2020-09-17

## 2020-09-10 RX ORDER — ONDANSETRON 2 MG/ML
INJECTION INTRAMUSCULAR; INTRAVENOUS AS NEEDED
Status: DISCONTINUED | OUTPATIENT
Start: 2020-09-10 | End: 2020-09-10 | Stop reason: SURG

## 2020-09-10 RX ORDER — SODIUM CHLORIDE 0.9 % (FLUSH) 0.9 %
10 SYRINGE (ML) INJECTION AS NEEDED
Status: DISCONTINUED | OUTPATIENT
Start: 2020-09-10 | End: 2020-09-10 | Stop reason: HOSPADM

## 2020-09-10 RX ORDER — ACETAMINOPHEN 500 MG
1000 TABLET ORAL 3 TIMES DAILY
Status: DISCONTINUED | OUTPATIENT
Start: 2020-09-10 | End: 2020-09-17 | Stop reason: HOSPADM

## 2020-09-10 RX ORDER — ACETAMINOPHEN 650 MG
TABLET, EXTENDED RELEASE ORAL AS NEEDED
Status: DISCONTINUED | OUTPATIENT
Start: 2020-09-10 | End: 2020-09-10 | Stop reason: HOSPADM

## 2020-09-10 RX ORDER — OXYCODONE HYDROCHLORIDE 5 MG/1
10 TABLET ORAL EVERY 4 HOURS PRN
Status: DISCONTINUED | OUTPATIENT
Start: 2020-09-10 | End: 2020-09-17 | Stop reason: HOSPADM

## 2020-09-10 RX ORDER — PROPOFOL 10 MG/ML
INJECTION, EMULSION INTRAVENOUS AS NEEDED
Status: DISCONTINUED | OUTPATIENT
Start: 2020-09-10 | End: 2020-09-10 | Stop reason: SURG

## 2020-09-10 RX ORDER — SODIUM CHLORIDE 9 MG/ML
100 INJECTION, SOLUTION INTRAVENOUS CONTINUOUS
Status: DISCONTINUED | OUTPATIENT
Start: 2020-09-10 | End: 2020-09-17 | Stop reason: HOSPADM

## 2020-09-10 RX ORDER — ROCURONIUM BROMIDE 10 MG/ML
INJECTION, SOLUTION INTRAVENOUS AS NEEDED
Status: DISCONTINUED | OUTPATIENT
Start: 2020-09-10 | End: 2020-09-10 | Stop reason: SURG

## 2020-09-10 RX ORDER — DIPHENHYDRAMINE HCL 25 MG
25 CAPSULE ORAL
Status: DISCONTINUED | OUTPATIENT
Start: 2020-09-10 | End: 2020-09-10 | Stop reason: HOSPADM

## 2020-09-10 RX ORDER — LABETALOL HYDROCHLORIDE 5 MG/ML
5 INJECTION, SOLUTION INTRAVENOUS
Status: DISCONTINUED | OUTPATIENT
Start: 2020-09-10 | End: 2020-09-10 | Stop reason: HOSPADM

## 2020-09-10 RX ORDER — LISINOPRIL 20 MG/1
40 TABLET ORAL DAILY
Status: DISCONTINUED | OUTPATIENT
Start: 2020-09-11 | End: 2020-09-16

## 2020-09-10 RX ORDER — BACLOFEN 10 MG/1
10 TABLET ORAL NIGHTLY
Status: DISCONTINUED | OUTPATIENT
Start: 2020-09-10 | End: 2020-09-17 | Stop reason: HOSPADM

## 2020-09-10 RX ORDER — MIDAZOLAM HYDROCHLORIDE 1 MG/ML
INJECTION INTRAMUSCULAR; INTRAVENOUS AS NEEDED
Status: DISCONTINUED | OUTPATIENT
Start: 2020-09-10 | End: 2020-09-10 | Stop reason: SURG

## 2020-09-10 RX ORDER — NEOSTIGMINE METHYLSULFATE 5 MG/5 ML
SYRINGE (ML) INTRAVENOUS AS NEEDED
Status: DISCONTINUED | OUTPATIENT
Start: 2020-09-10 | End: 2020-09-10 | Stop reason: SURG

## 2020-09-10 RX ORDER — ROPINIROLE 0.25 MG/1
0.5 TABLET, FILM COATED ORAL 3 TIMES DAILY
Status: DISCONTINUED | OUTPATIENT
Start: 2020-09-10 | End: 2020-09-17 | Stop reason: HOSPADM

## 2020-09-10 RX ORDER — PHENYLEPHRINE HYDROCHLORIDE 10 MG/ML
INJECTION INTRAVENOUS AS NEEDED
Status: DISCONTINUED | OUTPATIENT
Start: 2020-09-10 | End: 2020-09-10 | Stop reason: SURG

## 2020-09-10 RX ORDER — SODIUM CHLORIDE, SODIUM LACTATE, POTASSIUM CHLORIDE, CALCIUM CHLORIDE 600; 310; 30; 20 MG/100ML; MG/100ML; MG/100ML; MG/100ML
9 INJECTION, SOLUTION INTRAVENOUS CONTINUOUS PRN
Status: DISCONTINUED | OUTPATIENT
Start: 2020-09-10 | End: 2020-09-10 | Stop reason: HOSPADM

## 2020-09-10 RX ORDER — ALBUTEROL SULFATE 2.5 MG/3ML
2.5 SOLUTION RESPIRATORY (INHALATION) ONCE AS NEEDED
Status: DISCONTINUED | OUTPATIENT
Start: 2020-09-10 | End: 2020-09-10 | Stop reason: HOSPADM

## 2020-09-10 RX ORDER — BUPROPION HYDROCHLORIDE 150 MG/1
300 TABLET ORAL DAILY
Status: DISCONTINUED | OUTPATIENT
Start: 2020-09-10 | End: 2020-09-17 | Stop reason: HOSPADM

## 2020-09-10 RX ORDER — DEXTROSE MONOHYDRATE 25 G/50ML
25 INJECTION, SOLUTION INTRAVENOUS
Status: DISCONTINUED | OUTPATIENT
Start: 2020-09-10 | End: 2020-09-17 | Stop reason: HOSPADM

## 2020-09-10 RX ORDER — ONDANSETRON 2 MG/ML
4 INJECTION INTRAMUSCULAR; INTRAVENOUS ONCE AS NEEDED
Status: DISCONTINUED | OUTPATIENT
Start: 2020-09-10 | End: 2020-09-10 | Stop reason: HOSPADM

## 2020-09-10 RX ORDER — OXYCODONE HYDROCHLORIDE 5 MG/1
5 TABLET ORAL EVERY 4 HOURS PRN
Status: DISCONTINUED | OUTPATIENT
Start: 2020-09-10 | End: 2020-09-17 | Stop reason: HOSPADM

## 2020-09-10 RX ORDER — HYDROMORPHONE HCL 110MG/55ML
0.5 PATIENT CONTROLLED ANALGESIA SYRINGE INTRAVENOUS
Status: COMPLETED | OUTPATIENT
Start: 2020-09-10 | End: 2020-09-10

## 2020-09-10 RX ORDER — NICOTINE POLACRILEX 4 MG
15 LOZENGE BUCCAL
Status: DISCONTINUED | OUTPATIENT
Start: 2020-09-10 | End: 2020-09-17 | Stop reason: HOSPADM

## 2020-09-10 RX ORDER — CHLORHEXIDINE GLUCONATE 0.12 MG/ML
15 RINSE ORAL EVERY 12 HOURS SCHEDULED
Status: DISCONTINUED | OUTPATIENT
Start: 2020-09-10 | End: 2020-09-10 | Stop reason: HOSPADM

## 2020-09-10 RX ORDER — AMLODIPINE BESYLATE 5 MG/1
10 TABLET ORAL DAILY
Status: DISCONTINUED | OUTPATIENT
Start: 2020-09-10 | End: 2020-09-16

## 2020-09-10 RX ORDER — LIDOCAINE HYDROCHLORIDE 20 MG/ML
INJECTION, SOLUTION EPIDURAL; INFILTRATION; INTRACAUDAL; PERINEURAL AS NEEDED
Status: DISCONTINUED | OUTPATIENT
Start: 2020-09-10 | End: 2020-09-10 | Stop reason: SURG

## 2020-09-10 RX ORDER — LEVETIRACETAM 500 MG/1
500 TABLET ORAL 2 TIMES DAILY
Status: DISCONTINUED | OUTPATIENT
Start: 2020-09-10 | End: 2020-09-17 | Stop reason: HOSPADM

## 2020-09-10 RX ORDER — FENTANYL CITRATE 50 UG/ML
INJECTION, SOLUTION INTRAMUSCULAR; INTRAVENOUS AS NEEDED
Status: DISCONTINUED | OUTPATIENT
Start: 2020-09-10 | End: 2020-09-10 | Stop reason: SURG

## 2020-09-10 RX ORDER — PANTOPRAZOLE SODIUM 40 MG/1
40 TABLET, DELAYED RELEASE ORAL
Status: DISCONTINUED | OUTPATIENT
Start: 2020-09-11 | End: 2020-09-17 | Stop reason: HOSPADM

## 2020-09-10 RX ORDER — ATORVASTATIN CALCIUM 40 MG/1
40 TABLET, FILM COATED ORAL NIGHTLY
Status: DISCONTINUED | OUTPATIENT
Start: 2020-09-10 | End: 2020-09-17 | Stop reason: HOSPADM

## 2020-09-10 RX ORDER — FENTANYL CITRATE 50 UG/ML
50 INJECTION, SOLUTION INTRAMUSCULAR; INTRAVENOUS
Status: DISCONTINUED | OUTPATIENT
Start: 2020-09-10 | End: 2020-09-10 | Stop reason: HOSPADM

## 2020-09-10 RX ORDER — SODIUM CHLORIDE 0.9 % (FLUSH) 0.9 %
10 SYRINGE (ML) INJECTION EVERY 12 HOURS SCHEDULED
Status: DISCONTINUED | OUTPATIENT
Start: 2020-09-10 | End: 2020-09-10 | Stop reason: HOSPADM

## 2020-09-10 RX ORDER — ISOSORBIDE MONONITRATE 30 MG/1
30 TABLET, EXTENDED RELEASE ORAL DAILY
Status: DISCONTINUED | OUTPATIENT
Start: 2020-09-10 | End: 2020-09-17 | Stop reason: HOSPADM

## 2020-09-10 RX ADMIN — ROPINIROLE 0.5 MG: 0.25 TABLET, FILM COATED ORAL at 20:49

## 2020-09-10 RX ADMIN — METOPROLOL TARTRATE 100 MG: 25 TABLET, FILM COATED ORAL at 16:36

## 2020-09-10 RX ADMIN — FENTANYL CITRATE 50 MCG: 50 INJECTION, SOLUTION INTRAMUSCULAR; INTRAVENOUS at 10:43

## 2020-09-10 RX ADMIN — LEVETIRACETAM 500 MG: 500 TABLET ORAL at 20:49

## 2020-09-10 RX ADMIN — CEFAZOLIN SODIUM 2 G: 1 INJECTION, POWDER, FOR SOLUTION INTRAMUSCULAR; INTRAVENOUS at 09:28

## 2020-09-10 RX ADMIN — BACLOFEN 10 MG: 10 TABLET ORAL at 20:49

## 2020-09-10 RX ADMIN — ISOSORBIDE MONONITRATE 30 MG: 30 TABLET, EXTENDED RELEASE ORAL at 16:37

## 2020-09-10 RX ADMIN — AMLODIPINE BESYLATE 10 MG: 5 TABLET ORAL at 16:36

## 2020-09-10 RX ADMIN — ROPINIROLE 0.5 MG: 0.25 TABLET, FILM COATED ORAL at 16:36

## 2020-09-10 RX ADMIN — ROCURONIUM BROMIDE 50 MG: 10 INJECTION, SOLUTION INTRAVENOUS at 09:16

## 2020-09-10 RX ADMIN — SODIUM CHLORIDE 100 ML/HR: 900 INJECTION, SOLUTION INTRAVENOUS at 14:19

## 2020-09-10 RX ADMIN — OXYCODONE 10 MG: 5 TABLET ORAL at 20:48

## 2020-09-10 RX ADMIN — FENTANYL CITRATE 100 MCG: 50 INJECTION, SOLUTION INTRAMUSCULAR; INTRAVENOUS at 09:15

## 2020-09-10 RX ADMIN — PROPOFOL 150 MG: 10 INJECTION, EMULSION INTRAVENOUS at 09:15

## 2020-09-10 RX ADMIN — LIDOCAINE HYDROCHLORIDE 80 MG: 20 INJECTION, SOLUTION EPIDURAL; INFILTRATION; INTRACAUDAL; PERINEURAL at 09:15

## 2020-09-10 RX ADMIN — OXYCODONE 10 MG: 5 TABLET ORAL at 16:37

## 2020-09-10 RX ADMIN — ONDANSETRON 4 MG: 2 INJECTION INTRAMUSCULAR; INTRAVENOUS at 11:37

## 2020-09-10 RX ADMIN — BUPROPION HYDROCHLORIDE 300 MG: 150 TABLET, EXTENDED RELEASE ORAL at 16:46

## 2020-09-10 RX ADMIN — SODIUM CHLORIDE, SODIUM LACTATE, POTASSIUM CHLORIDE, AND CALCIUM CHLORIDE 9 ML/HR: 600; 310; 30; 20 INJECTION, SOLUTION INTRAVENOUS at 07:59

## 2020-09-10 RX ADMIN — PHENYLEPHRINE HYDROCHLORIDE 0.15 MG: 10 INJECTION INTRAVENOUS at 09:23

## 2020-09-10 RX ADMIN — MIDAZOLAM 2 MG: 1 INJECTION INTRAMUSCULAR; INTRAVENOUS at 09:15

## 2020-09-10 RX ADMIN — HYDROMORPHONE HYDROCHLORIDE 1 MG: 2 INJECTION, SOLUTION INTRAMUSCULAR; INTRAVENOUS; SUBCUTANEOUS at 12:23

## 2020-09-10 RX ADMIN — FENTANYL CITRATE 50 MCG: 50 INJECTION, SOLUTION INTRAMUSCULAR; INTRAVENOUS at 09:48

## 2020-09-10 RX ADMIN — GLYCOPYRROLATE 0.8 MG: 0.2 INJECTION, SOLUTION INTRAMUSCULAR; INTRAVITREAL at 12:00

## 2020-09-10 RX ADMIN — SODIUM CHLORIDE, SODIUM LACTATE, POTASSIUM CHLORIDE, AND CALCIUM CHLORIDE: 600; 310; 30; 20 INJECTION, SOLUTION INTRAVENOUS at 11:27

## 2020-09-10 RX ADMIN — HYDROMORPHONE HYDROCHLORIDE 0.5 MG: 2 INJECTION, SOLUTION INTRAMUSCULAR; INTRAVENOUS; SUBCUTANEOUS at 12:44

## 2020-09-10 RX ADMIN — INSULIN LISPRO 3 UNITS: 100 INJECTION, SOLUTION INTRAVENOUS; SUBCUTANEOUS at 17:49

## 2020-09-10 RX ADMIN — Medication 4 MG: at 12:00

## 2020-09-10 RX ADMIN — ATORVASTATIN CALCIUM 40 MG: 40 TABLET, FILM COATED ORAL at 20:49

## 2020-09-10 RX ADMIN — ACETAMINOPHEN 1000 MG: 500 TABLET ORAL at 20:48

## 2020-09-10 RX ADMIN — ACETAMINOPHEN 1000 MG: 500 TABLET ORAL at 16:37

## 2020-09-10 NOTE — OP NOTE
COLOSTOMY  Operative Note    Patient Name:  Lonnie Arora  YOB: 1975    Date of Surgery:  9/10/2020     Indications: Patient is a 45-year-old gentleman who had a diverting loop colostomy performed so that he could heal his incision and drainage site from his Dale's gangrene.  We discussed the risk, benefits, and alternatives to colostomy reversal, and the patient agreed to proceed to the operating room for this.    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 Item Serial No.  Lot No. LRB No. Used   STPLR LNR CUT PROX 75MM XI [id number] - [id number] Implant STPLR LNR CUT PROX 75MM XI [id number]  [employer]  DIV OF J AND J [id number] N/A 1   RELOAD STPLR LNR CUT PROX 75MM XI TCR75 - [id number] Implant RELOAD STPLR LNR CUT PROX 75MM XI [id number]  [employer]  DIV OF J AND J [id number] N/A 1   STPLR LNR CUT 60MM XI REG [id number] - [id number] Implant STPLR LNR CUT 60MM XI REG [id number]  [employer]  DIV OF J AND J [id number] N/A 1       Specimen:          Specimens     ID Source Type Tests Collected By Collected At Frozen?      A Ostomy Tissue · TISSUE PATHOLOGY EXAM   Niraj Almanzar MD 9/10/20 1142 No     Description: colostomy tissue          Findings: Colostomy in place in left lower quadrant    Complications: None, immediately    Description of Procedure: After obtaining informed consent in the preop holding area the patient was brought the operating room placed in the supine position.  SCDs were applied, preoperative antibiotics were  administered.  The patient then underwent uncomplicated induction of general endotracheal anesthesia.  A Milner catheter was placed.  He was then converted into the lithotomy position, and the abdomen and perineum were prepped and draped in the usual sterile fashion.  After a brief timeout the procedure began.  I began by making an incision around the mucocutaneous junction of the patient's colostomy.  I then used electrocautery to dissect through the subcutaneous fat layer down to the level of the fascia where I was able to circumferentially free the colostomy down into the peritoneum.  Once the colostomy and colon were freed completely I divided the loop colostomy using sharp scissors.  Hemostasis was obtained.  After doing this I was able to look down into the loop and see that the 2 walls of the colostomy were opposing one another.  I then fired a 75 TEMITOPE stapler down through the loop forming a common channel of approximately 40 mm.  I then used a 60 mm TA stapler to close the common channel.  Once this was done, I then oversewed the TA staple line using interrupted 2-0 silk suture in a Lembert fashion.  I then dropped the colon back through the aperture of the colostomy into the abdomen.  The fascia of the colostomy was then closed using interrupted #1 Ethibond suture in a vest overpants fashion and closed in 2 layers.  Following doing this I used a 3-0 Vicryl suture to narrow the aperture of the colostomy at the level of the skin in a pursestring fashion.  The wound was packed with Betadine soaked gauze.  The wound was covered with gauze, ABD pads and tape.  The patient tolerated the procedure well, was extubated, and taken to PACU in satisfactory condition.    Niraj Almanzar MD     Date: 9/10/2020  Time: 12:01

## 2020-09-10 NOTE — PLAN OF CARE
Admit from PACU post ostomy takedown. Bowel sounds are hypoactive. Patient has limited use of LUE from prior CVA.

## 2020-09-10 NOTE — ANESTHESIA POSTPROCEDURE EVALUATION
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        Post Anesthesia Care and Evaluation    Patient location during evaluation: PACU  Patient participation: complete - patient participated  Level of consciousness: awake  Pain scale: See nurse's notes for pain score.  Pain management: adequate  Airway patency: patent  Anesthetic complications: No anesthetic complications  PONV Status: none  Cardiovascular status: acceptable  Respiratory status: acceptable  Hydration status: acceptable    Comments: Patient seen and examined postoperatively; vital signs stable; SpO2 greater than or equal to 90%; cardiopulmonary status stable; nausea/vomiting adequately controlled; pain adequately controlled; no apparent anesthesia complications; patient discharged from anesthesia care when discharge criteria were met

## 2020-09-10 NOTE — H&P
GENERAL SURGERY PROGRESS NOTE    9/10/2020    Patient Care Team:  Sanjay Chance MD as PCP - General    Chief Complaint: Attention to colostomy    Subjective   HPI: Patient is a 44-year-old gentleman known to me as I performed an incision and drainage and debridement of his perineal region for Dale's gangrene in October 2019.  He required a diverting colostomy to divert the flow of stool away from his wound which was in relative close proximity to his anal canal.  On 10/21/2019 the patient underwent an uncomplicated laparoscopic diverting loop colostomy.  Overall he has done well, and his perineal wound has completely healed.  He returns to my office today to discuss possible ostomy reversal.  He has undergone a colonoscopy which was unremarkable and a barium enema which also demonstrated no evidence of stricturing.  Unfortunately, in March 2020, while the patient was awaiting reversal of his colostomy, he was diagnosed with a DVT which was treated with TPA and the patient was placed on oral anticoagulation therapy.  He has since completed oral anticoagulation therapy is no longer on blood thinners.  Also complicating matters was the COVID-19 pandemic which put elective surgery is on hold.  The patient has waited, is now off of blood thinners and is ready to proceed with reversal of his diverting loop colostomy.    Interval History:   Afebrile, colostomy is functioning.  Patient feels the urge to pass bowel movements per rectum but is not.    Review of Systems - General ROS: negative for - chills, fatigue or fever  Psychological ROS: positive for - anxiety and depression  Allergy and Immunology ROS: negative for - hives, nasal congestion or postnasal drip  Hematological and Lymphatic ROS: negative for - bleeding problems, blood transfusions or swollen lymph nodes  Respiratory ROS: no cough, shortness of breath, or wheezing  Cardiovascular ROS: no chest pain or dyspnea on exertion  Gastrointestinal ROS: no  abdominal pain, change in bowel habits, or black or bloody stools  Genito-Urinary ROS: no dysuria, trouble voiding, or hematuria  Musculoskeletal ROS: negative for - joint stiffness or muscle pain  Neurological ROS: no TIA or stroke symptoms  Dermatological ROS: negative for rash and skin lesion changes    Objective     Vital Signs  Temp:  [98.3 °F (36.8 °C)] 98.3 °F (36.8 °C)  Heart Rate:  [81] 81  Resp:  [12] 12  BP: (119)/(75) 119/75    Physical Exam   Constitutional: He is oriented to person, place, and time. He appears well-developed and well-nourished.   Obese   HENT:   Head: Normocephalic and atraumatic.   Eyes: Pupils are equal, round, and reactive to light. EOM are normal.   Cardiovascular: Normal rate and regular rhythm.   Pulmonary/Chest: Effort normal and breath sounds normal.   Abdominal: Soft.   Obese.  Left lower quadrant colostomy is patent, pink, and productive of stool.   Musculoskeletal: Normal range of motion. He exhibits no edema.   Neurological: He is alert and oriented to person, place, and time.   Skin: Skin is warm and dry.   Psychiatric: He has a normal mood and affect. His behavior is normal.   Vitals reviewed.       Results Review:    Lab Results (last 24 hours)     ** No results found for the last 24 hours. **        Imaging Results (Last 24 Hours)     ** No results found for the last 24 hours. **           I have reviewed the above results and noted them below    Medication Review:  No current facility-administered medications for this encounter.     Assessment/Plan     Active Problems:  Attention to colostomy    I reviewed with the patient the operative strategy for reversal of his colostomy.  Given the fact that it is a loop colostomy, it is possible that we may be able to reverse the colostomy without having to create a large vertical midline incision.  However, if there is scar tissue, he may require opening of the abdomen to facilitate reversal of his colostomy.  The risks  regarding colostomy reversal include bleeding, infection, leak from the colostomy repair, and possible need for surgery to correct this area.  Left lower quadrant hernia is also a concern following closure of the colostomy aperture.  The patient understands, but wishes to proceed with colostomy takedown.  He understands that he will need to have a bowel prep the day prior to his surgery.   He understands that he will likely have to remain in the hospital for a few days following surgery until he has a bowel movement.  He will be no heavy lifting for 6 weeks after surgery.    Niraj Almanzar MD  09/10/20  07:37

## 2020-09-10 NOTE — ANESTHESIA PREPROCEDURE EVALUATION
Anesthesia Evaluation     Patient summary reviewed and Nursing notes reviewed   no history of anesthetic complications:  NPO Solid Status: > 6 hours  NPO Liquid Status: > 6 hours           Airway   Mallampati: III  TM distance: >3 FB  Neck ROM: full  No difficulty expected  Dental      Pulmonary    (+) a smoker Former,   Cardiovascular     ECG reviewed  Rhythm: regular  Rate: normal    (+) hypertension, DVT resolved, hyperlipidemia,       Neuro/Psych  (+) seizures well controlled, CVA residual symptoms, psychiatric history Depression,     GI/Hepatic/Renal/Endo    (+) obesity, morbid obesity, GERD,  renal disease CRI, diabetes mellitus type 2,     Musculoskeletal (-) negative ROS    Abdominal   (+) obese,    Substance History - negative use     OB/GYN negative ob/gyn ROS         Other - negative ROS                         Anesthesia Plan    ASA 3     general     intravenous induction     Anesthetic plan, all risks, benefits, and alternatives have been provided, discussed and informed consent has been obtained with: patient.  Use of blood products discussed with patient .   Plan discussed with CAA.

## 2020-09-10 NOTE — ANESTHESIA PROCEDURE NOTES
Airway  Urgency: elective    Date/Time: 9/10/2020 9:17 AM  End Time:9/10/2020 9:18 AM  Airway not difficult    General Information and Staff    Patient location during procedure: OR  Anesthesiologist: Nils Nelson MD  CRNA: Bill Myers AA    Indications and Patient Condition  Indications for airway management: airway protection    Preoxygenated: yes  MILS maintained throughout  Mask difficulty assessment: 1 - vent by mask    Final Airway Details  Final airway type: endotracheal airway      Successful airway: ETT  Cuffed: yes   Successful intubation technique: direct laryngoscopy  Facilitating devices/methods: intubating stylet  Endotracheal tube insertion site: oral  Blade: Stuart  Blade size: 4  ETT size (mm): 7.5  Cormack-Lehane Classification: grade I - full view of glottis  Placement verified by: chest auscultation and capnometry   Measured from: lips  ETT/EBT  to lips (cm): 21  Number of attempts at approach: 1  Assessment: lips, teeth, and gum same as pre-op and atraumatic intubation

## 2020-09-11 LAB
ANION GAP SERPL CALCULATED.3IONS-SCNC: 11 MMOL/L (ref 5–15)
BASOPHILS # BLD AUTO: 0 10*3/MM3 (ref 0–0.2)
BASOPHILS NFR BLD AUTO: 0.1 % (ref 0–1.5)
BUN SERPL-MCNC: 12 MG/DL (ref 6–20)
BUN SERPL-MCNC: ABNORMAL MG/DL
BUN/CREAT SERPL: ABNORMAL
CALCIUM SPEC-SCNC: 7.5 MG/DL (ref 8.6–10.5)
CHLORIDE SERPL-SCNC: 109 MMOL/L (ref 98–107)
CO2 SERPL-SCNC: 21 MMOL/L (ref 22–29)
CREAT SERPL-MCNC: 1.23 MG/DL (ref 0.76–1.27)
DEPRECATED RDW RBC AUTO: 49 FL (ref 37–54)
EOSINOPHIL # BLD AUTO: 0 10*3/MM3 (ref 0–0.4)
EOSINOPHIL NFR BLD AUTO: 0.3 % (ref 0.3–6.2)
ERYTHROCYTE [DISTWIDTH] IN BLOOD BY AUTOMATED COUNT: 15 % (ref 12.3–15.4)
GFR SERPL CREATININE-BSD FRML MDRD: 64 ML/MIN/1.73
GLUCOSE BLDC GLUCOMTR-MCNC: 106 MG/DL (ref 70–105)
GLUCOSE BLDC GLUCOMTR-MCNC: 123 MG/DL (ref 70–105)
GLUCOSE BLDC GLUCOMTR-MCNC: 142 MG/DL (ref 70–105)
GLUCOSE BLDC GLUCOMTR-MCNC: 173 MG/DL (ref 70–105)
GLUCOSE SERPL-MCNC: 131 MG/DL (ref 65–99)
HCT VFR BLD AUTO: 39.9 % (ref 37.5–51)
HGB BLD-MCNC: 13.2 G/DL (ref 13–17.7)
LAB AP CASE REPORT: NORMAL
LYMPHOCYTES # BLD AUTO: 0.8 10*3/MM3 (ref 0.7–3.1)
LYMPHOCYTES NFR BLD AUTO: 7 % (ref 19.6–45.3)
MCH RBC QN AUTO: 31.3 PG (ref 26.6–33)
MCHC RBC AUTO-ENTMCNC: 33.1 G/DL (ref 31.5–35.7)
MCV RBC AUTO: 94.6 FL (ref 79–97)
MONOCYTES # BLD AUTO: 1.2 10*3/MM3 (ref 0.1–0.9)
MONOCYTES NFR BLD AUTO: 10.6 % (ref 5–12)
NEUTROPHILS NFR BLD AUTO: 82 % (ref 42.7–76)
NEUTROPHILS NFR BLD AUTO: 9.2 10*3/MM3 (ref 1.7–7)
NRBC BLD AUTO-RTO: 0.1 /100 WBC (ref 0–0.2)
PATH REPORT.FINAL DX SPEC: NORMAL
PATH REPORT.GROSS SPEC: NORMAL
PLATELET # BLD AUTO: 165 10*3/MM3 (ref 140–450)
PMV BLD AUTO: 8.7 FL (ref 6–12)
POTASSIUM SERPL-SCNC: 3.3 MMOL/L (ref 3.5–5.2)
RBC # BLD AUTO: 4.21 10*6/MM3 (ref 4.14–5.8)
SODIUM SERPL-SCNC: 141 MMOL/L (ref 136–145)
WBC # BLD AUTO: 11.2 10*3/MM3 (ref 3.4–10.8)

## 2020-09-11 PROCEDURE — 99024 POSTOP FOLLOW-UP VISIT: CPT | Performed by: SURGERY

## 2020-09-11 PROCEDURE — 82962 GLUCOSE BLOOD TEST: CPT

## 2020-09-11 PROCEDURE — 80048 BASIC METABOLIC PNL TOTAL CA: CPT | Performed by: SURGERY

## 2020-09-11 PROCEDURE — 25010000002 ENOXAPARIN PER 10 MG: Performed by: SURGERY

## 2020-09-11 PROCEDURE — 25010000002 MORPHINE PER 10 MG: Performed by: SURGERY

## 2020-09-11 PROCEDURE — 85025 COMPLETE CBC W/AUTO DIFF WBC: CPT | Performed by: SURGERY

## 2020-09-11 PROCEDURE — 44620 REPAIR BOWEL OPENING: CPT | Performed by: NURSE PRACTITIONER

## 2020-09-11 PROCEDURE — 63710000001 INSULIN LISPRO (HUMAN) PER 5 UNITS: Performed by: SURGERY

## 2020-09-11 RX ADMIN — ACETAMINOPHEN 1000 MG: 500 TABLET ORAL at 17:26

## 2020-09-11 RX ADMIN — AMLODIPINE BESYLATE 10 MG: 5 TABLET ORAL at 08:50

## 2020-09-11 RX ADMIN — ACETAMINOPHEN 1000 MG: 500 TABLET ORAL at 20:32

## 2020-09-11 RX ADMIN — LEVETIRACETAM 500 MG: 500 TABLET ORAL at 08:50

## 2020-09-11 RX ADMIN — INSULIN LISPRO 3 UNITS: 100 INJECTION, SOLUTION INTRAVENOUS; SUBCUTANEOUS at 17:26

## 2020-09-11 RX ADMIN — ROPINIROLE 0.5 MG: 0.25 TABLET, FILM COATED ORAL at 20:32

## 2020-09-11 RX ADMIN — ROPINIROLE 0.5 MG: 0.25 TABLET, FILM COATED ORAL at 08:50

## 2020-09-11 RX ADMIN — PANTOPRAZOLE SODIUM 40 MG: 40 TABLET, DELAYED RELEASE ORAL at 08:50

## 2020-09-11 RX ADMIN — ISOSORBIDE MONONITRATE 30 MG: 30 TABLET, EXTENDED RELEASE ORAL at 08:49

## 2020-09-11 RX ADMIN — ROPINIROLE 0.5 MG: 0.25 TABLET, FILM COATED ORAL at 17:26

## 2020-09-11 RX ADMIN — MORPHINE SULFATE 4 MG: 4 INJECTION INTRAVENOUS at 12:12

## 2020-09-11 RX ADMIN — ENOXAPARIN SODIUM 40 MG: 40 INJECTION SUBCUTANEOUS at 20:33

## 2020-09-11 RX ADMIN — SODIUM CHLORIDE 100 ML/HR: 900 INJECTION, SOLUTION INTRAVENOUS at 20:45

## 2020-09-11 RX ADMIN — BENZOCAINE AND MENTHOL 1 LOZENGE: 15; 3.6 LOZENGE ORAL at 13:37

## 2020-09-11 RX ADMIN — BACLOFEN 10 MG: 10 TABLET ORAL at 20:32

## 2020-09-11 RX ADMIN — BUPROPION HYDROCHLORIDE 300 MG: 150 TABLET, EXTENDED RELEASE ORAL at 08:50

## 2020-09-11 RX ADMIN — ATORVASTATIN CALCIUM 40 MG: 40 TABLET, FILM COATED ORAL at 20:32

## 2020-09-11 RX ADMIN — LEVETIRACETAM 500 MG: 500 TABLET ORAL at 20:32

## 2020-09-11 RX ADMIN — OXYCODONE 10 MG: 5 TABLET ORAL at 20:44

## 2020-09-11 RX ADMIN — ACETAMINOPHEN 1000 MG: 500 TABLET ORAL at 08:50

## 2020-09-11 RX ADMIN — METOPROLOL TARTRATE 100 MG: 25 TABLET, FILM COATED ORAL at 20:32

## 2020-09-11 NOTE — PROGRESS NOTES
Discharge Planning Assessment  [facility]     Patient Name: Lonnie Arora  MRN: [id number]  Today's Date: 9/11/2020    Admit Date: 9/10/2020                                               Discharge Plan     Row Name 09/11/20 1520       Plan    Plan  Home with [facility] (accepted and order in Epic verified with Dayanara from [facility]).     Patient/Family in Agreement with Plan  yes    Plan Comments  Barriers to discharge: Waiting on bowel function.            Home Medical Care      Service Provider Request Status Selected Services Address Phone Number Fax Number    [facility] Selected Home Health Services [address] [facility] [facility] [place] [address] [phone] --             Demographic Summary     Row Name 09/11/20 1232       General Information    Admission Type  inpatient    Arrived From  home    Required Notices Provided  Important Message from Medicare    Referral Source  admission list;physician    Reason for Consult  discharge planning    Preferred Language  English     Used During This Interaction  no       Contact Information    Permission Granted to Share Info With                 Patient Forms     Row Name 09/11/20 1240       Patient Forms    Important Message from Medicare ([facility])  Delivered Patient received on 9/10              Anna [place] [address]   [phone]  office  [phone]  fax  [email or web address]

## 2020-09-11 NOTE — PROGRESS NOTES
Discharge Planning Assessment  [facility]     Patient Name: Lonnie Arora  MRN: [id number]  Today's Date: 9/11/2020    Admit Date: 9/10/2020    Discharge Needs Assessment     Row Name 09/11/20 1233       Living Environment    Lives With  child(shruthi), dependent    Current Living Arrangements  home/apartment/condo    Primary Care Provided by  self;spouse/significant other    Provides Primary Care For  no one    Family Caregiver if Needed  none    Quality of Family Relationships  unable to assess    Able to Return to Prior Arrangements  yes       Resource/Environmental Concerns    Resource/Environmental Concerns  none    Transportation Concerns  car, none       Transition Planning    Patient/Family Anticipates Transition to  home with family    Patient/Family Anticipated Services at Transition      Transportation Anticipated  family or friend will provide       Discharge Needs Assessment    Readmission Within the Last 30 Days  no previous admission in last 30 days    Concerns to be Addressed  -- Home health    Equipment Currently Used at Home  none    Anticipated Changes Related to Illness  none    Equipment Needed After Discharge  none    Provided Post Acute Provider List?  Yes    Post Acute Provider List  Home Health    Delivered To  Patient;Support Person    Method of Delivery  In person;Telephone    Patient's Choice of Community Agency(s)  Patient had caretenders in the past and would like them again.     Discharge Coordination/Progress  New referral to [facility].         Discharge Plan     Row Name 09/11/20 1237       Plan    Plan  New referral to [facility] (placed in [facility] and notified Dayanara of referral in epic).    Patient/Family in Agreement with Plan  yes    Plan Comments  Spoke with patient from doorway, greater than 6 feet with mask and goggles in place for less than 10 minutes. Verified with Patient his PCP and pharmacy. Patient denies any DME usage at this time.  Barriers to discharge: Bowel function.            Home Medical Care      Service Provider Request Status Selected Services Address Phone Number Fax Number    [facility] - Request Sent N/A [address] [place] [address] [phone] --             Demographic Summary     Row Name 09/11/20 1232       General Information    Admission Type  inpatient    Arrived From  home    Required Notices Provided  Important Message from Medicare    Referral Source  admission list;physician    Reason for Consult  discharge planning    Preferred Language  English     Used During This Interaction  no       Contact Information    Permission Granted to Share Info With               Patient Forms     Row Name 09/11/20 1240       Patient Forms    Important Message from Medicare ([facility])  Delivered Patient received on 9/10              Anna Nae[place] [address]   [phone]  office  [phone]  fax  [email or web address]  [email or web address]

## 2020-09-11 NOTE — PLAN OF CARE
Pt doing well today, minimal pain only increased with drsg change this afternoon. Encouraged to ambulate today.

## 2020-09-11 NOTE — CONSULTS
"Diabetes Education  Assessment/Teaching    Patient Name:  Lonnie Arora  YOB: 1975  MRN: [id number]  Admit Date:  9/10/2020      Assessment Date:  9/11/2020    Most Recent Value   General Information    Referral From:  A1c [On 2/28/2020 A1c was 9.7%.]   Height  160 cm (63\")   Height Method  Stated   Weight  102 kg (224 lb 13.9 oz)   Weight Method  Stated   Pregnancy Assessment   Diabetes History   What type of diabetes do you have?  Type 2   Current DM knowledge  fair   Do you test your blood sugar at home?  yes   Frequency of checks  every morning per doctor's instructions   Meter type  Accu-chek about 2-3 years old   Who performs the test?  patient   Typical readings  <150   Have you had low blood sugar? (<70mg/dl)  no   Have you had high blood sugar? (>140mg/dl)  no   Do you have any diabetes complications?  circulation problems, nephropathy   Education Preferences   What areas of diabetes would you like to learn about?  diabetes complications, diet information, avoiding high blood sugar   Nutrition Information   Assessment Topics   Healthy Eating - Assessment  Needs education   Problem Solving - Assessment  Needs education   Reducing Risk - Assessment  Needs education   DM Goals   Healthy Eating - Goal  Today   Problem Solving - Goal  Today   Reducing Risk - Goal  Today            Most Recent Value   DM Education Needs   Meter  Has own   Meter Type  Accuchek   Frequency of Testing  Daily   Medication  Insulin [Patient stated he takes Soliqua 15 units every morning at home.]   Problem Solving  Hyperglycemia, Signs, Symptoms, Treatment   Reducing Risks  A1C testing [On 2/28/2020 A1c was 9.7%. A1c info sheet given with discussion on A1c target and healthy blood sugar range.]   Healthy Eating  Other (comment) [Patient stated that he does drink sweetened beverages and doesn't watch what he eats.]   Discharge Plan  Home   Motivation  Moderate, Engaged   Teaching Method  Discussion, Handouts "   Patient Response  Verbalized understanding            Other Comments:  Patient stated that he used to drink 5-6 sodas a day but has decreased his soda intake. Discussed with patient about choosing one thing to change in his diet and working on changes slowly.  Patient not receptive to changing diet. Patient has no further questions or concerns related to diabetes at this time.        Electronically signed by:  Jazmyn Quezada RN  09/11/20 18:27

## 2020-09-11 NOTE — PLAN OF CARE
Pt's pain is controlled on oral pain medication. BS present and hypoactive. Pt was able to sit up in the chair before he went to bed. Tolerating clear liquid diet.       Problem: Patient Care Overview  Goal: Plan of Care Review  Outcome: Ongoing (interventions implemented as appropriate)  Goal: Individualization and Mutuality  Outcome: Ongoing (interventions implemented as appropriate)  Goal: Discharge Needs Assessment  Outcome: Ongoing (interventions implemented as appropriate)  Goal: Interprofessional Rounds/Family Conf  Outcome: Ongoing (interventions implemented as appropriate)

## 2020-09-11 NOTE — PROGRESS NOTES
"Post-op Note  COLOSTOMY  9/10/2020    Subjective   Lonnie Arora is a 45 y.o. male POD#1 s/p colostomy reversal. Some abdominal pain with movement. Not walking much. Tolerating clears without N/V. Reports no flatus or BM. Voiding spontaneously.      Objective   /71 (BP Location: Left arm, Patient Position: Lying)   Pulse 84   Temp 97.9 °F (36.6 °C) (Oral)   Resp 16   Ht 160 cm (63\")   Wt 102 kg (224 lb 13.9 oz)   SpO2 91%   BMI 39.83 kg/m²   Abdomen is obese and appropriately tender, no rebound/gaurding. LLQ ostomy site is packed with betadine soaked kerlix, removed and repacked. No surrounding erythema to suggest infection.       Assessment/Plan   46 yo gentleman pod #1 s/p colostomy reversal    Advance to full liquids today, may advance to low residue if tolerated  Ambulation encouraged. Needs IS.  Will begin lovenox  No need for further abx  Wound care: will continue to pack with betadine soaked gauze through the weekend, and then switch to saline soaked wet to dry dressing changes daily. Will likely need [facility] as patient has poor support at home.  Patient will be discharged once he has bowel function and no signs of infection  I will be absent this weekend, and my partner, Dr. Niraj Chaudhary will be seeing him in my absence.    Niraj Almanzar MD  9/11/2020  12:22    "

## 2020-09-11 NOTE — DISCHARGE PLACEMENT REQUEST
"Lonnie Linton (45 y.o. Male)     Date of Birth Social Security Number Address Home Phone MRN    1975  [address] [address] [phone] [id number]    Taoism Marital Status          None        Admission Date Admission Type Admitting Provider Attending Provider Department, Room/Bed    9/10/20 Elective Niraj Almanzar MD McCormick, John Patrick, MD [facility] SURGICAL INPATIENT, 4116/1    Discharge Date Discharge Disposition Discharge Destination                       Attending Provider:  Niraj Almanzar MD    Allergies:  No Known Allergies    Isolation:  None   Infection:  None   Code Status:  CPR    Ht:  160 cm (63\")   Wt:  102 kg (224 lb 13.9 oz)    Admission Cmt:  None   Principal Problem:  None                Active Insurance as of 9/10/2020     Primary Coverage     Payor Plan Insurance Group Employer/Plan Group    HUMANA MEDICARE REPLACEMENT HUMANA MEDICARE REPLACEMENT [id number]     Payor Plan Address Payor Plan Phone Number Payor Plan Fax Number Effective Dates    [address] [address] [phone]  1/1/2020 - None Entered    [facility] [id number]       Subscriber Name Subscriber Birth Date Member ID       LONNIE LINTON 1975 [id number]                 Emergency Contacts      (Rel.) Home Phone Work Phone Mobile Phone    DAMIÁN LINTON (Spouse) -- -- [phone]              "

## 2020-09-12 LAB
GLUCOSE BLDC GLUCOMTR-MCNC: 106 MG/DL (ref 70–105)
GLUCOSE BLDC GLUCOMTR-MCNC: 112 MG/DL (ref 70–105)
GLUCOSE BLDC GLUCOMTR-MCNC: 135 MG/DL (ref 70–105)
GLUCOSE BLDC GLUCOMTR-MCNC: 95 MG/DL (ref 70–105)

## 2020-09-12 PROCEDURE — 25010000002 PROMETHAZINE PER 50 MG: Performed by: SURGERY

## 2020-09-12 PROCEDURE — 25010000002 ENOXAPARIN PER 10 MG: Performed by: SURGERY

## 2020-09-12 PROCEDURE — 82962 GLUCOSE BLOOD TEST: CPT

## 2020-09-12 RX ADMIN — ACETAMINOPHEN 1000 MG: 500 TABLET ORAL at 09:46

## 2020-09-12 RX ADMIN — ATORVASTATIN CALCIUM 40 MG: 40 TABLET, FILM COATED ORAL at 20:53

## 2020-09-12 RX ADMIN — BUPROPION HYDROCHLORIDE 300 MG: 150 TABLET, EXTENDED RELEASE ORAL at 09:46

## 2020-09-12 RX ADMIN — METOPROLOL TARTRATE 100 MG: 25 TABLET, FILM COATED ORAL at 20:53

## 2020-09-12 RX ADMIN — LEVETIRACETAM 500 MG: 500 TABLET ORAL at 20:53

## 2020-09-12 RX ADMIN — ACETAMINOPHEN 1000 MG: 500 TABLET ORAL at 16:03

## 2020-09-12 RX ADMIN — LEVETIRACETAM 500 MG: 500 TABLET ORAL at 09:47

## 2020-09-12 RX ADMIN — ISOSORBIDE MONONITRATE 30 MG: 30 TABLET, EXTENDED RELEASE ORAL at 09:47

## 2020-09-12 RX ADMIN — SODIUM CHLORIDE 12.5 MG: 900 INJECTION, SOLUTION INTRAVENOUS at 11:10

## 2020-09-12 RX ADMIN — AMLODIPINE BESYLATE 10 MG: 5 TABLET ORAL at 09:46

## 2020-09-12 RX ADMIN — METOPROLOL TARTRATE 100 MG: 25 TABLET, FILM COATED ORAL at 09:46

## 2020-09-12 RX ADMIN — BACLOFEN 10 MG: 10 TABLET ORAL at 20:53

## 2020-09-12 RX ADMIN — ROPINIROLE 0.5 MG: 0.25 TABLET, FILM COATED ORAL at 20:53

## 2020-09-12 RX ADMIN — ACETAMINOPHEN 1000 MG: 500 TABLET ORAL at 20:53

## 2020-09-12 RX ADMIN — ROPINIROLE 0.5 MG: 0.25 TABLET, FILM COATED ORAL at 16:03

## 2020-09-12 RX ADMIN — SODIUM CHLORIDE 100 ML/HR: 900 INJECTION, SOLUTION INTRAVENOUS at 18:14

## 2020-09-12 RX ADMIN — ROPINIROLE 0.5 MG: 0.25 TABLET, FILM COATED ORAL at 09:46

## 2020-09-12 RX ADMIN — ENOXAPARIN SODIUM 40 MG: 40 INJECTION SUBCUTANEOUS at 20:52

## 2020-09-12 RX ADMIN — SODIUM CHLORIDE 100 ML/HR: 900 INJECTION, SOLUTION INTRAVENOUS at 06:40

## 2020-09-12 RX ADMIN — PANTOPRAZOLE SODIUM 40 MG: 40 TABLET, DELAYED RELEASE ORAL at 05:59

## 2020-09-12 NOTE — PLAN OF CARE
Goal Outcome Evaluation:  Plan of Care Reviewed With: patient  Progress: improving     Patient ambulating more this afternoon. Pain managed. Passing flatus. Will continue to  monitor.

## 2020-09-12 NOTE — PROGRESS NOTES
LOS: 2 days   Patient Care Team:  Sanjay Chance MD as PCP - General    Reason for follow-up: Post op    Subjective   Patient seen and examined.  Main complaint today is nausea.  Has been up in the chair.    Objective   Dressing is dry and intact    Vital Signs  Vitals:    09/11/20 1311 09/11/20 1700 09/11/20 2023 09/12/20 0500   BP: 108/71 99/63 100/66 99/64   BP Location: Left arm Left arm Left arm Left arm   Patient Position: Lying Lying Lying Lying   Pulse: 88 83 87 77   Resp: 15 15 16 15   Temp: 98.5 °F (36.9 °C) 98.6 °F (37 °C) 98.9 °F (37.2 °C) 98.6 °F (37 °C)   TempSrc: Oral Oral Oral Oral   SpO2: 90% 90% 92% 92%   Weight:       Height:             Results Review:       Lab Results (last 24 hours)     Procedure Component Value Units Date/Time    POC Glucose Once [[id number]]  (Abnormal) Collected: 09/12/20 0759    Specimen: Blood Updated: 09/12/20 0800     Glucose 135 mg/dL      Comment: Serial Number: [id number]:  [id number]       POC Glucose Once [[id number]]  (Abnormal) Collected: 09/11/20 2024    Specimen: Blood Updated: 09/11/20 2035     Glucose 142 mg/dL      Comment: Serial Number: [id number]:  [id number]       POC Glucose Once [[id number]]  (Abnormal) Collected: 09/11/20 1642    Specimen: Blood Updated: 09/11/20 1648     Glucose 173 mg/dL      Comment: Serial Number: [id number]:  [id number]       POC Glucose Once [[id number]]  (Abnormal) Collected: 09/11/20 1151    Specimen: Blood Updated: 09/11/20 1205     Glucose 123 mg/dL      Comment: Serial Number: [id number]:  [id number]       Tissue Pathology Exam [[id number]] Collected: 09/10/20 1142    Specimen: Tissue from Ostomy Updated: 09/11/20 1131     Case Report --     Surgical Pathology Report                         Case: [id number]                                  Authorizing Provider:  Niraj Almanzar,   Collected:           09/10/2020 11:42 AM                                 MD                                       "                                     Ordering Location:     [facility] MAIN  Received:            09/10/2020 01:37 PM                                 OR                                                                           Pathologist:           Darnell Purcell MD                                                            Specimen:    Ostomy, colostomy tissue                                                                    Final Diagnosis --     Colostomy, takedown:    Squamous epithelium in continuity with colonic glandular mucosa consistent with ostomy    No dysplasia or malignancy identified    CHRISTIANO/sms        Gross Description --     Received in formalin designated \"Ostomy\" is an ostomy takedown specimen varying from tan to yellowish brown and measures 5.5 x 3.4 x 2.8 cm. A rim of tan skin is present in the central portion. Sectioning reveals pink mucosal surfaces, no masses are identified. Representative sections are submitted in one cassette.     CHRISTIANO/sms               Imaging Results (Last 24 Hours)     ** No results found for the last 24 hours. **          Medication Review:   Current Facility-Administered Medications:   •  acetaminophen (TYLENOL) tablet 1,000 mg, 1,000 mg, Oral, TID, Niraj Almanzar MD, 1,000 mg at 09/12/20 0946  •  amLODIPine (NORVASC) tablet 10 mg, 10 mg, Oral, Daily, Niraj Almanzar MD, 10 mg at 09/12/20 0946  •  atorvastatin (LIPITOR) tablet 40 mg, 40 mg, Oral, Nightly, Niraj Almanzar MD, 40 mg at 09/11/20 2032  •  baclofen (LIORESAL) tablet 10 mg, 10 mg, Oral, Nightly, Niraj Almanzar MD, 10 mg at 09/11/20 2032  •  benzocaine-menthol (CEPACOL) lozenge 1 lozenge, 1 lozenge, Mouth/Throat, Q4H PRN, Niraj Almanzar MD, 1 lozenge at 09/11/20 1337  •  buPROPion XL (WELLBUTRIN XL) 24 hr tablet 300 mg, 300 mg, Oral, Daily, Niraj Almanzar MD, 300 mg at 09/12/20 0946  •  dextrose (D50W) 25 g/ 50mL Intravenous Solution 25 g, 25 " g, Intravenous, Q15 Min PRN, Niraj Almanzar MD  •  dextrose (GLUTOSE) oral gel 15 g, 15 g, Oral, Q15 Min PRN, Niraj Almanzar MD  •  enoxaparin (LOVENOX) syringe 40 mg, 40 mg, Subcutaneous, Nightly, Niraj Almanzar MD, 40 mg at 09/11/20 2033  •  glucagon (human recombinant) (GLUCAGEN DIAGNOSTIC) injection 1 mg, 1 mg, Subcutaneous, Q15 Min PRN, Niraj Almanzar MD  •  insulin lispro (humaLOG) injection 0-14 Units, 0-14 Units, Subcutaneous, TID AC, 3 Units at 09/11/20 1726 **AND** insulin lispro (humaLOG) injection 0-14 Units, 0-14 Units, Subcutaneous, PRN, Niraj Almaznar MD  •  isosorbide mononitrate (IMDUR) 24 hr tablet 30 mg, 30 mg, Oral, Daily, Niraj Almanzar MD, 30 mg at 09/12/20 0947  •  levETIRAcetam (KEPPRA) tablet 500 mg, 500 mg, Oral, BID, Niraj Almanzar MD, 500 mg at 09/12/20 0947  •  lisinopril (PRINIVIL,ZESTRIL) tablet 40 mg, 40 mg, Oral, Daily, Niraj Almanzar MD  •  metoprolol tartrate (LOPRESSOR) tablet 100 mg, 100 mg, Oral, Q12H, Niraj Almanzar MD, 100 mg at 09/12/20 0946  •  Morphine sulfate (PF) injection 4 mg, 4 mg, Intravenous, Q4H PRN, Niraj Almanzar MD, 4 mg at 09/11/20 1212  •  oxyCODONE (ROXICODONE) immediate release tablet 5 mg, 5 mg, Oral, Q4H PRN **OR** oxyCODONE (ROXICODONE) immediate release tablet 10 mg, 10 mg, Oral, Q4H PRN, Niraj Almanzar MD, 10 mg at 09/11/20 2044  •  pantoprazole (PROTONIX) EC tablet 40 mg, 40 mg, Oral, Q AM, Niraj Almanzar MD, 40 mg at 09/12/20 0559  •  rOPINIRole (REQUIP) tablet 0.5 mg, 0.5 mg, Oral, TID, Niraj Almanzar MD, 0.5 mg at 09/12/20 0946  •  sodium chloride 0.9 % infusion, 100 mL/hr, Intravenous, Continuous, Niraj Almanzar MD, Last Rate: 100 mL/hr at 09/12/20 0640, 100 mL/hr at 09/12/20 0640    Assessment/Plan         * No active hospital problems. *    Impression: Postop day #1 closure of loop colostomy    Plan: Due to  the nausea would keep just on liquids for now.  Changes nausea medication as well.  Encouraged him to use spirometer and ambulate          Niraj Chaudhary,   09/12/20  10:32 EDT

## 2020-09-13 LAB
GLUCOSE BLDC GLUCOMTR-MCNC: 113 MG/DL (ref 70–105)
GLUCOSE BLDC GLUCOMTR-MCNC: 74 MG/DL (ref 70–105)
GLUCOSE BLDC GLUCOMTR-MCNC: 98 MG/DL (ref 70–105)
GLUCOSE BLDC GLUCOMTR-MCNC: 98 MG/DL (ref 70–105)

## 2020-09-13 PROCEDURE — 25010000002 PROMETHAZINE PER 50 MG: Performed by: SURGERY

## 2020-09-13 PROCEDURE — 25010000002 ENOXAPARIN PER 10 MG: Performed by: SURGERY

## 2020-09-13 PROCEDURE — 82962 GLUCOSE BLOOD TEST: CPT

## 2020-09-13 RX ADMIN — SODIUM CHLORIDE 12.5 MG: 900 INJECTION, SOLUTION INTRAVENOUS at 05:01

## 2020-09-13 RX ADMIN — BACLOFEN 10 MG: 10 TABLET ORAL at 20:33

## 2020-09-13 RX ADMIN — ACETAMINOPHEN 1000 MG: 500 TABLET ORAL at 20:33

## 2020-09-13 RX ADMIN — ROPINIROLE 0.5 MG: 0.25 TABLET, FILM COATED ORAL at 08:38

## 2020-09-13 RX ADMIN — AMLODIPINE BESYLATE 10 MG: 5 TABLET ORAL at 08:38

## 2020-09-13 RX ADMIN — OXYCODONE 10 MG: 5 TABLET ORAL at 13:11

## 2020-09-13 RX ADMIN — SODIUM CHLORIDE 12.5 MG: 900 INJECTION, SOLUTION INTRAVENOUS at 09:04

## 2020-09-13 RX ADMIN — LEVETIRACETAM 500 MG: 500 TABLET ORAL at 08:39

## 2020-09-13 RX ADMIN — ATORVASTATIN CALCIUM 40 MG: 40 TABLET, FILM COATED ORAL at 20:33

## 2020-09-13 RX ADMIN — ENOXAPARIN SODIUM 40 MG: 40 INJECTION SUBCUTANEOUS at 20:33

## 2020-09-13 RX ADMIN — BUPROPION HYDROCHLORIDE 300 MG: 150 TABLET, EXTENDED RELEASE ORAL at 08:39

## 2020-09-13 RX ADMIN — ACETAMINOPHEN 1000 MG: 500 TABLET ORAL at 17:48

## 2020-09-13 RX ADMIN — OXYCODONE 10 MG: 5 TABLET ORAL at 08:49

## 2020-09-13 RX ADMIN — SODIUM CHLORIDE 100 ML/HR: 900 INJECTION, SOLUTION INTRAVENOUS at 15:31

## 2020-09-13 RX ADMIN — ISOSORBIDE MONONITRATE 30 MG: 30 TABLET, EXTENDED RELEASE ORAL at 08:39

## 2020-09-13 RX ADMIN — ROPINIROLE 0.5 MG: 0.25 TABLET, FILM COATED ORAL at 17:48

## 2020-09-13 RX ADMIN — ACETAMINOPHEN 1000 MG: 500 TABLET ORAL at 08:39

## 2020-09-13 RX ADMIN — LEVETIRACETAM 500 MG: 500 TABLET ORAL at 20:33

## 2020-09-13 RX ADMIN — ROPINIROLE 0.5 MG: 0.25 TABLET, FILM COATED ORAL at 20:33

## 2020-09-13 RX ADMIN — METOPROLOL TARTRATE 100 MG: 25 TABLET, FILM COATED ORAL at 08:39

## 2020-09-13 NOTE — PLAN OF CARE
Goal Outcome Evaluation:  Plan of Care Reviewed With: patient  Progress: improving   Patient markedly better than  yesterday. Pain improved.  Ambulating more. Able to have bowel movements. Will continue to monitor.

## 2020-09-13 NOTE — PROGRESS NOTES
LOS: 3 days   Patient Care Team:  Sanjay Chance MD as PCP - General    Reason for follow-up: Postop    Subjective   Patient seen and examined.  Feels a little better but still with a little nausea    Objective   Incision packing is removed.  Wife will be shown how to repack it when she is here.  Vital Signs  Vitals:    09/12/20 0500 09/12/20 1155 09/12/20 2051 09/13/20 0505   BP: 99/64 108/69 105/69 113/72   BP Location: Left arm Right arm Right arm Right arm   Patient Position: Lying Lying Lying Lying   Pulse: 77 84 87    Resp: 15 15 14 18   Temp: 98.6 °F (37 °C) 98.7 °F (37.1 °C) 98.5 °F (36.9 °C) 97.8 °F (36.6 °C)   TempSrc: Oral Oral Oral Oral   SpO2: 92% 94% 94% 94%   Weight:       Height:             Results Review:       Lab Results (last 24 hours)     Procedure Component Value Units Date/Time    POC Glucose Once [[id number]]  (Normal) Collected: 09/13/20 1146    Specimen: Blood Updated: 09/13/20 1152     Glucose 74 mg/dL      Comment: Serial Number: [id number]:  [id number]       POC Glucose Once [[id number]]  (Normal) Collected: 09/13/20 0737    Specimen: Blood Updated: 09/13/20 0738     Glucose 98 mg/dL      Comment: Serial Number: [id number]:  [id number]       POC Glucose Once [[id number]]  (Abnormal) Collected: 09/12/20 2040    Specimen: Blood Updated: 09/12/20 2042     Glucose 112 mg/dL      Comment: Serial Number: [id number]:  [id number]       POC Glucose Once [[id number]]  (Normal) Collected: 09/12/20 1805    Specimen: Blood Updated: 09/12/20 1806     Glucose 95 mg/dL      Comment: Serial Number: [id number]:  [id number]       POC Glucose Once [[id number]]  (Abnormal) Collected: 09/12/20 1150    Specimen: Blood Updated: 09/12/20 1158     Glucose 106 mg/dL      Comment: Serial Number: [id number]:  [id number]              Imaging Results (Last 24 Hours)     ** No results found for the last 24 hours. **          Medication Review:   Current Facility-Administered Medications:    •  acetaminophen (TYLENOL) tablet 1,000 mg, 1,000 mg, Oral, TID, Niraj Almanzar MD, 1,000 mg at 09/13/20 0839  •  amLODIPine (NORVASC) tablet 10 mg, 10 mg, Oral, Daily, Niraj Almanzar MD, 10 mg at 09/13/20 0838  •  atorvastatin (LIPITOR) tablet 40 mg, 40 mg, Oral, Nightly, Niraj Almanzar MD, 40 mg at 09/12/20 2053  •  baclofen (LIORESAL) tablet 10 mg, 10 mg, Oral, Nightly, Niraj Almanzar MD, 10 mg at 09/12/20 2053  •  benzocaine-menthol (CEPACOL) lozenge 1 lozenge, 1 lozenge, Mouth/Throat, Q4H PRN, Niraj Almanzar MD, 1 lozenge at 09/11/20 1337  •  buPROPion XL (WELLBUTRIN XL) 24 hr tablet 300 mg, 300 mg, Oral, Daily, Niraj Almanzar MD, 300 mg at 09/13/20 0839  •  dextrose (D50W) 25 g/ 50mL Intravenous Solution 25 g, 25 g, Intravenous, Q15 Min PRN, Niraj Almanzar MD  •  dextrose (GLUTOSE) oral gel 15 g, 15 g, Oral, Q15 Min PRN, Niraj Almanzar MD  •  enoxaparin (LOVENOX) syringe 40 mg, 40 mg, Subcutaneous, Nightly, Niraj Almanzar MD, 40 mg at 09/12/20 2052  •  glucagon (human recombinant) (GLUCAGEN DIAGNOSTIC) injection 1 mg, 1 mg, Subcutaneous, Q15 Min PRN, Niraj Almanzar MD  •  insulin lispro (humaLOG) injection 0-14 Units, 0-14 Units, Subcutaneous, TID AC, 3 Units at 09/11/20 1726 **AND** insulin lispro (humaLOG) injection 0-14 Units, 0-14 Units, Subcutaneous, PRN, Niraj Almanzar MD  •  isosorbide mononitrate (IMDUR) 24 hr tablet 30 mg, 30 mg, Oral, Daily, Niraj Almanzar MD, 30 mg at 09/13/20 0839  •  levETIRAcetam (KEPPRA) tablet 500 mg, 500 mg, Oral, BID, Niraj Almanzar MD, 500 mg at 09/13/20 0839  •  lisinopril (PRINIVIL,ZESTRIL) tablet 40 mg, 40 mg, Oral, Daily, Niraj Almanzar MD  •  metoprolol tartrate (LOPRESSOR) tablet 100 mg, 100 mg, Oral, Q12H, Niraj Almanzar MD, 100 mg at 09/13/20 0839  •  Morphine sulfate (PF) injection 4 mg, 4 mg, Intravenous, Q4H PRN,  Niraj Almanzar MD, 4 mg at 09/11/20 1212  •  oxyCODONE (ROXICODONE) immediate release tablet 5 mg, 5 mg, Oral, Q4H PRN **OR** oxyCODONE (ROXICODONE) immediate release tablet 10 mg, 10 mg, Oral, Q4H PRN, Niraj Almanzar MD, 10 mg at 09/13/20 0849  •  pantoprazole (PROTONIX) EC tablet 40 mg, 40 mg, Oral, Q AM, Niraj Almanzar MD, 40 mg at 09/12/20 0559  •  promethazine (PHENERGAN) IVPB 12.5 mg, 12.5 mg, Intravenous, Q4H PRN, Niraj Chaudhary DO, 12.5 mg at 09/13/20 0904  •  rOPINIRole (REQUIP) tablet 0.5 mg, 0.5 mg, Oral, TID, Niraj Almanzar MD, 0.5 mg at 09/13/20 0838  •  sodium chloride 0.9 % infusion, 100 mL/hr, Intravenous, Continuous, Niraj Almanzar MD, Last Rate: 100 mL/hr at 09/12/20 1814, 100 mL/hr at 09/12/20 1814    Assessment/Plan         * No active hospital problems. *    Seems to be tolerating his liquid diet though not hungry.  Did have several small bowel movements    Impression: Postop day #2 closure of loop colostomy    Plan: Do not think he is ready to go home today.  Probably discharge tomorrow          Niraj Chaudhary DO  09/13/20  11:52 EDT

## 2020-09-13 NOTE — PLAN OF CARE
Goal Outcome Evaluation:  Plan of Care Reviewed With: patient  Progress: improving       C/O nausea this morning, med given. Had passed some flatus and had a small bowel movement through  my shift.

## 2020-09-14 ENCOUNTER — APPOINTMENT (OUTPATIENT)
Dept: GENERAL RADIOLOGY | Facility: HOSPITAL | Age: 45
End: 2020-09-14

## 2020-09-14 LAB
ANION GAP SERPL CALCULATED.3IONS-SCNC: 12 MMOL/L (ref 5–15)
BASOPHILS # BLD AUTO: 0.1 10*3/MM3 (ref 0–0.2)
BASOPHILS NFR BLD AUTO: 0.7 % (ref 0–1.5)
BUN SERPL-MCNC: 10 MG/DL (ref 6–20)
BUN SERPL-MCNC: ABNORMAL MG/DL
BUN/CREAT SERPL: ABNORMAL
CALCIUM SPEC-SCNC: 7.5 MG/DL (ref 8.6–10.5)
CHLORIDE SERPL-SCNC: 111 MMOL/L (ref 98–107)
CO2 SERPL-SCNC: 20 MMOL/L (ref 22–29)
CREAT SERPL-MCNC: 1.2 MG/DL (ref 0.76–1.27)
DEPRECATED RDW RBC AUTO: 51.2 FL (ref 37–54)
EOSINOPHIL # BLD AUTO: 0.5 10*3/MM3 (ref 0–0.4)
EOSINOPHIL NFR BLD AUTO: 6.3 % (ref 0.3–6.2)
ERYTHROCYTE [DISTWIDTH] IN BLOOD BY AUTOMATED COUNT: 15.3 % (ref 12.3–15.4)
GFR SERPL CREATININE-BSD FRML MDRD: 66 ML/MIN/1.73
GLUCOSE BLDC GLUCOMTR-MCNC: 79 MG/DL (ref 70–105)
GLUCOSE BLDC GLUCOMTR-MCNC: 79 MG/DL (ref 70–105)
GLUCOSE BLDC GLUCOMTR-MCNC: 80 MG/DL (ref 70–105)
GLUCOSE BLDC GLUCOMTR-MCNC: 85 MG/DL (ref 70–105)
GLUCOSE SERPL-MCNC: 87 MG/DL (ref 65–99)
HCT VFR BLD AUTO: 40.9 % (ref 37.5–51)
HGB BLD-MCNC: 13.1 G/DL (ref 13–17.7)
LYMPHOCYTES # BLD AUTO: 0.7 10*3/MM3 (ref 0.7–3.1)
LYMPHOCYTES NFR BLD AUTO: 9.2 % (ref 19.6–45.3)
MCH RBC QN AUTO: 30.7 PG (ref 26.6–33)
MCHC RBC AUTO-ENTMCNC: 32 G/DL (ref 31.5–35.7)
MCV RBC AUTO: 96.1 FL (ref 79–97)
MONOCYTES # BLD AUTO: 0.7 10*3/MM3 (ref 0.1–0.9)
MONOCYTES NFR BLD AUTO: 9.5 % (ref 5–12)
NEUTROPHILS NFR BLD AUTO: 5.7 10*3/MM3 (ref 1.7–7)
NEUTROPHILS NFR BLD AUTO: 74.3 % (ref 42.7–76)
NRBC BLD AUTO-RTO: 0.1 /100 WBC (ref 0–0.2)
PLATELET # BLD AUTO: 193 10*3/MM3 (ref 140–450)
PMV BLD AUTO: 8 FL (ref 6–12)
POTASSIUM SERPL-SCNC: 3.1 MMOL/L (ref 3.5–5.2)
RBC # BLD AUTO: 4.25 10*6/MM3 (ref 4.14–5.8)
SODIUM SERPL-SCNC: 143 MMOL/L (ref 136–145)
WBC # BLD AUTO: 7.6 10*3/MM3 (ref 3.4–10.8)

## 2020-09-14 PROCEDURE — 80048 BASIC METABOLIC PNL TOTAL CA: CPT | Performed by: SURGERY

## 2020-09-14 PROCEDURE — 25010000002 PROMETHAZINE PER 50 MG: Performed by: SURGERY

## 2020-09-14 PROCEDURE — 82962 GLUCOSE BLOOD TEST: CPT

## 2020-09-14 PROCEDURE — 85025 COMPLETE CBC W/AUTO DIFF WBC: CPT | Performed by: SURGERY

## 2020-09-14 PROCEDURE — 74018 RADEX ABDOMEN 1 VIEW: CPT

## 2020-09-14 PROCEDURE — 25010000002 ENOXAPARIN PER 10 MG: Performed by: SURGERY

## 2020-09-14 PROCEDURE — 99024 POSTOP FOLLOW-UP VISIT: CPT | Performed by: SURGERY

## 2020-09-14 RX ORDER — ONDANSETRON 2 MG/ML
4 INJECTION INTRAMUSCULAR; INTRAVENOUS EVERY 4 HOURS PRN
Status: DISCONTINUED | OUTPATIENT
Start: 2020-09-14 | End: 2020-09-17 | Stop reason: HOSPADM

## 2020-09-14 RX ORDER — POTASSIUM CHLORIDE 7.45 MG/ML
10 INJECTION INTRAVENOUS
Status: DISCONTINUED | OUTPATIENT
Start: 2020-09-14 | End: 2020-09-17 | Stop reason: HOSPADM

## 2020-09-14 RX ORDER — POTASSIUM CHLORIDE 20 MEQ/1
40 TABLET, EXTENDED RELEASE ORAL AS NEEDED
Status: DISCONTINUED | OUTPATIENT
Start: 2020-09-14 | End: 2020-09-17 | Stop reason: HOSPADM

## 2020-09-14 RX ORDER — POTASSIUM CHLORIDE 1.5 G/1.77G
40 POWDER, FOR SOLUTION ORAL AS NEEDED
Status: DISCONTINUED | OUTPATIENT
Start: 2020-09-14 | End: 2020-09-17 | Stop reason: HOSPADM

## 2020-09-14 RX ADMIN — ACETAMINOPHEN 1000 MG: 500 TABLET ORAL at 16:17

## 2020-09-14 RX ADMIN — ENOXAPARIN SODIUM 40 MG: 40 INJECTION SUBCUTANEOUS at 21:57

## 2020-09-14 RX ADMIN — BUPROPION HYDROCHLORIDE 300 MG: 150 TABLET, EXTENDED RELEASE ORAL at 08:08

## 2020-09-14 RX ADMIN — LEVETIRACETAM 500 MG: 500 TABLET ORAL at 08:08

## 2020-09-14 RX ADMIN — ROPINIROLE 0.5 MG: 0.25 TABLET, FILM COATED ORAL at 08:07

## 2020-09-14 RX ADMIN — ACETAMINOPHEN 1000 MG: 500 TABLET ORAL at 08:08

## 2020-09-14 RX ADMIN — SODIUM CHLORIDE 12.5 MG: 900 INJECTION, SOLUTION INTRAVENOUS at 19:38

## 2020-09-14 RX ADMIN — ROPINIROLE 0.5 MG: 0.25 TABLET, FILM COATED ORAL at 16:17

## 2020-09-14 NOTE — PLAN OF CARE
Goal Outcome Evaluation:  Plan of Care Reviewed With: patient  Progress: improving   Pt doing well, however, Pt needs encouragement to continue to walk in the hallway. Pt tolerating solid food without nausea.

## 2020-09-14 NOTE — PROGRESS NOTES
GENERAL SURGERY PROGRESS NOTE    9/14/2020   LOS: 4 days   Patient Care Team:  Sanjay Chance MD as PCP - General    COLOSTOMY  9/10/2020  Chief Complaint: attention to colostomy  Subjective   HPI: Patient is a 45-year-old gentleman who was admitted to the hospital following a colostomy reversal on 9/10/2020.    Interval History:   Reports persistent nausea and fatigue. Unable to eat more than a small bite of food for breakfast this AM. Minimal PO intake. Still requiring IV phenergan and zofran. Nausea not associated with pain medication administration. Passing flatus and having bowel movements.      Objective     Vital Signs  Temp:  [98.3 °F (36.8 °C)-98.8 °F (37.1 °C)] 98.3 °F (36.8 °C)  Heart Rate:  [74-90] 90  Resp:  [15-16] 15  BP: ()/(56-74) 97/71    Physical Exam  Vitals signs reviewed.   Constitutional:       General: He is not in acute distress.  Pulmonary:      Effort: Pulmonary effort is normal.      Breath sounds: Normal breath sounds.   Abdominal:      General: There is no distension.      Palpations: Abdomen is soft.      Tenderness: There is no abdominal tenderness.          Results Review:    Lab Results (last 24 hours)     Procedure Component Value Units Date/Time    POC Glucose Once [[id number]]  (Normal) Collected: 09/14/20 1108    Specimen: Blood Updated: 09/14/20 1111     Glucose 80 mg/dL      Comment: Serial Number: [id number]:  [id number]       POC Glucose Once [[id number]]  (Normal) Collected: 09/14/20 0709    Specimen: Blood Updated: 09/14/20 0711     Glucose 85 mg/dL      Comment: Serial Number: [id number]:  [id number]       POC Glucose Once [[id number]]  (Abnormal) Collected: 09/13/20 2036    Specimen: Blood Updated: 09/13/20 2038     Glucose 113 mg/dL      Comment: Serial Number: [id number]:  [id number]       POC Glucose Once [[id number]]  (Normal) Collected: 09/13/20 1657    Specimen: Blood Updated: 09/13/20 1658     Glucose 98 mg/dL      Comment: Serial Number:  [id number]:  [id number]           Imaging Results (Last 24 Hours)     ** No results found for the last 24 hours. **           I have reviewed the above results and noted them below    Medication Review:    Current Facility-Administered Medications:   •  acetaminophen (TYLENOL) tablet 1,000 mg, 1,000 mg, Oral, TID, Niraj Almanzar MD, 1,000 mg at 09/14/20 0808  •  amLODIPine (NORVASC) tablet 10 mg, 10 mg, Oral, Daily, Niraj Almanzar MD, 10 mg at 09/13/20 0838  •  atorvastatin (LIPITOR) tablet 40 mg, 40 mg, Oral, Nightly, Niraj Almanzar MD, 40 mg at 09/13/20 2033  •  baclofen (LIORESAL) tablet 10 mg, 10 mg, Oral, Nightly, Niraj Almanzar MD, 10 mg at 09/13/20 2033  •  benzocaine-menthol (CEPACOL) lozenge 1 lozenge, 1 lozenge, Mouth/Throat, Q4H PRN, Niraj Almanzar MD, 1 lozenge at 09/11/20 1337  •  buPROPion XL (WELLBUTRIN XL) 24 hr tablet 300 mg, 300 mg, Oral, Daily, Niraj Almanzar MD, 300 mg at 09/14/20 0808  •  dextrose (D50W) 25 g/ 50mL Intravenous Solution 25 g, 25 g, Intravenous, Q15 Min PRN, Niraj Almanzar MD  •  dextrose (GLUTOSE) oral gel 15 g, 15 g, Oral, Q15 Min PRN, Niraj Almanzar MD  •  enoxaparin (LOVENOX) syringe 40 mg, 40 mg, Subcutaneous, Nightly, Niraj Almanzar MD, 40 mg at 09/13/20 2033  •  glucagon (human recombinant) (GLUCAGEN DIAGNOSTIC) injection 1 mg, 1 mg, Subcutaneous, Q15 Min PRN, Niraj Almanzar MD  •  insulin lispro (humaLOG) injection 0-14 Units, 0-14 Units, Subcutaneous, TID AC, 3 Units at 09/11/20 1726 **AND** insulin lispro (humaLOG) injection 0-14 Units, 0-14 Units, Subcutaneous, PRN, Niraj Almanzar MD  •  isosorbide mononitrate (IMDUR) 24 hr tablet 30 mg, 30 mg, Oral, Daily, Niraj Almanzar MD, 30 mg at 09/13/20 0839  •  levETIRAcetam (KEPPRA) tablet 500 mg, 500 mg, Oral, BID, Niraj Almanzar MD, 500 mg at 09/14/20 0808  •  lisinopril (PRINIVIL,ZESTRIL)  tablet 40 mg, 40 mg, Oral, Daily, Niraj Almanzar MD  •  metoprolol tartrate (LOPRESSOR) tablet 100 mg, 100 mg, Oral, Q12H, Niraj Almanzar MD, 100 mg at 09/13/20 0839  •  Morphine sulfate (PF) injection 4 mg, 4 mg, Intravenous, Q4H PRN, Niraj Almanzar MD, 4 mg at 09/11/20 1212  •  oxyCODONE (ROXICODONE) immediate release tablet 5 mg, 5 mg, Oral, Q4H PRN **OR** oxyCODONE (ROXICODONE) immediate release tablet 10 mg, 10 mg, Oral, Q4H PRN, Niraj Almanzar MD, 10 mg at 09/13/20 1311  •  pantoprazole (PROTONIX) EC tablet 40 mg, 40 mg, Oral, Q AM, Niraj Almanzar MD, 40 mg at 09/12/20 0559  •  promethazine (PHENERGAN) IVPB 12.5 mg, 12.5 mg, Intravenous, Q4H PRN, Niraj Chaudhary DO, 12.5 mg at 09/13/20 0904  •  rOPINIRole (REQUIP) tablet 0.5 mg, 0.5 mg, Oral, TID, Niraj Almanzar MD, 0.5 mg at 09/14/20 0807  •  sodium chloride 0.9 % infusion, 100 mL/hr, Intravenous, Continuous, Niraj Almanzar MD, Last Rate: 100 mL/hr at 09/13/20 1531, 100 mL/hr at 09/13/20 1531    Assessment/Plan     Active Problems:  Attention to colostomy    Plan for KUB and CBC this afternoon to ensure no evidence of obstruction and/or abscess.  Continue local wound care with packing of the left lower quadrant colostomy incision with saline soaked wet-to-dry dressings.  We will DC IV fluids and encourage p.o. intake  Encouraged ambulation.  SCDs and Lovenox for DVT prophylaxis    Plan for disposition: Hopefully home tomorrow if able to start taking p.o.    Niraj Almanzar MD  09/14/20  15:19 EDT

## 2020-09-14 NOTE — PLAN OF CARE
Goal Outcome Evaluation:  Plan of Care Reviewed With: patient  Progress: improving  Outcome Summary: pt is complaining of not having much of an appetite. KUB and labs were taken to make sure no complications were going on after surgery. Hes very down and isnt moving around a lot.

## 2020-09-14 NOTE — PROGRESS NOTES
Continued Stay Note  [facility]     Patient Name: Lonnie Arora  MRN: [id number]  Today's Date: 9/14/2020    Admit Date: 9/10/2020    Discharge Plan     Row Name 09/14/20 1358       Plan    Plan  Home with [facility] (accepted and order in Epic verified with Dayanara from [facility]).    Plan Comments  Anticipate discharge today.          Expected Discharge Date and Time     Expected Discharge Date Expected Discharge Time    Sep 14, 2020               Anna Naegele RN Case Manager  [facility]  [address] [place] [address]   [phone]  office  [phone]  fax  [email or web address]

## 2020-09-15 LAB
GLUCOSE BLDC GLUCOMTR-MCNC: 116 MG/DL (ref 70–105)
GLUCOSE BLDC GLUCOMTR-MCNC: 75 MG/DL (ref 70–105)
GLUCOSE BLDC GLUCOMTR-MCNC: 86 MG/DL (ref 70–105)
GLUCOSE BLDC GLUCOMTR-MCNC: 93 MG/DL (ref 70–105)
HOLD SPECIMEN: NORMAL
POTASSIUM SERPL-SCNC: 3.4 MMOL/L (ref 3.5–5.2)

## 2020-09-15 PROCEDURE — 82962 GLUCOSE BLOOD TEST: CPT

## 2020-09-15 PROCEDURE — 99024 POSTOP FOLLOW-UP VISIT: CPT | Performed by: SURGERY

## 2020-09-15 PROCEDURE — 84132 ASSAY OF SERUM POTASSIUM: CPT | Performed by: SURGERY

## 2020-09-15 PROCEDURE — 25010000002 ENOXAPARIN PER 10 MG: Performed by: SURGERY

## 2020-09-15 PROCEDURE — 25010000002 ONDANSETRON PER 1 MG: Performed by: SURGERY

## 2020-09-15 PROCEDURE — 25010000003 POTASSIUM CHLORIDE 10 MEQ/100ML SOLUTION: Performed by: SURGERY

## 2020-09-15 PROCEDURE — 25010000002 PROMETHAZINE PER 50 MG: Performed by: SURGERY

## 2020-09-15 RX ADMIN — POTASSIUM CHLORIDE 10 MEQ: 7.46 INJECTION, SOLUTION INTRAVENOUS at 08:39

## 2020-09-15 RX ADMIN — POTASSIUM CHLORIDE 40 MEQ: 1500 TABLET, EXTENDED RELEASE ORAL at 18:39

## 2020-09-15 RX ADMIN — ACETAMINOPHEN 1000 MG: 500 TABLET ORAL at 21:11

## 2020-09-15 RX ADMIN — METOPROLOL TARTRATE 100 MG: 25 TABLET, FILM COATED ORAL at 21:10

## 2020-09-15 RX ADMIN — ROPINIROLE 0.5 MG: 0.25 TABLET, FILM COATED ORAL at 21:10

## 2020-09-15 RX ADMIN — POTASSIUM CHLORIDE 10 MEQ: 7.46 INJECTION, SOLUTION INTRAVENOUS at 02:35

## 2020-09-15 RX ADMIN — POTASSIUM CHLORIDE 40 MEQ: 1500 TABLET, EXTENDED RELEASE ORAL at 21:10

## 2020-09-15 RX ADMIN — ENOXAPARIN SODIUM 40 MG: 40 INJECTION SUBCUTANEOUS at 21:09

## 2020-09-15 RX ADMIN — OXYCODONE 10 MG: 5 TABLET ORAL at 17:14

## 2020-09-15 RX ADMIN — ATORVASTATIN CALCIUM 40 MG: 40 TABLET, FILM COATED ORAL at 21:10

## 2020-09-15 RX ADMIN — POTASSIUM CHLORIDE 10 MEQ: 7.46 INJECTION, SOLUTION INTRAVENOUS at 00:33

## 2020-09-15 RX ADMIN — ACETAMINOPHEN 1000 MG: 500 TABLET ORAL at 17:10

## 2020-09-15 RX ADMIN — SODIUM CHLORIDE 12.5 MG: 900 INJECTION, SOLUTION INTRAVENOUS at 00:04

## 2020-09-15 RX ADMIN — POTASSIUM CHLORIDE 10 MEQ: 7.46 INJECTION, SOLUTION INTRAVENOUS at 11:13

## 2020-09-15 RX ADMIN — LEVETIRACETAM 500 MG: 500 TABLET ORAL at 00:30

## 2020-09-15 RX ADMIN — POTASSIUM CHLORIDE 10 MEQ: 7.46 INJECTION, SOLUTION INTRAVENOUS at 05:56

## 2020-09-15 RX ADMIN — ROPINIROLE 0.5 MG: 0.25 TABLET, FILM COATED ORAL at 17:10

## 2020-09-15 RX ADMIN — ONDANSETRON 4 MG: 2 INJECTION INTRAMUSCULAR; INTRAVENOUS at 17:14

## 2020-09-15 RX ADMIN — LEVETIRACETAM 500 MG: 500 TABLET ORAL at 21:10

## 2020-09-15 RX ADMIN — POTASSIUM CHLORIDE 10 MEQ: 7.46 INJECTION, SOLUTION INTRAVENOUS at 04:07

## 2020-09-15 RX ADMIN — BACLOFEN 10 MG: 10 TABLET ORAL at 21:10

## 2020-09-15 NOTE — PLAN OF CARE
Goal Outcome Evaluation:  Plan of Care Reviewed With: patient  Progress: no change  Resting in bed, no vomitting this shift. Nausea controlled with Zofran.

## 2020-09-15 NOTE — PROGRESS NOTES
Continued Stay Note  [facility]     Patient Name: Lonnie Arora  MRN: [id number]  Today's Date: 9/15/2020    Admit Date: 9/10/2020    Discharge Plan     Row Name 09/15/20 1245       Plan    Plan  Anticipate routine home with [facility] , order placed and accepted.    Plan Comments  DC barriers: full liquid diet, IVF            Soumya Duque RN

## 2020-09-15 NOTE — PROGRESS NOTES
GENERAL SURGERY PROGRESS NOTE    9/15/2020   LOS: 5 days   Patient Care Team:  Sanjay Chance MD as PCP - General    COLOSTOMY  9/10/2020  Chief Complaint: attention to colostomy  Subjective   HPI: Patient is a 45-year-old gentleman who was admitted to the hospital following a colostomy reversal on 9/10/2020.    Interval History:   Intractable nausea overnight. Made NPO. This AM, nausea slightly better but nauseated when brushing teeth. Labs unremarkable yesterday. KUB with mild ileus. Passing flatus and having BMs.    Objective     Vital Signs  Temp:  [98.1 °F (36.7 °C)-98.8 °F (37.1 °C)] 98.8 °F (37.1 °C)  Heart Rate:  [] 101  Resp:  [13-16] 16  BP: (110-120)/(75-90) 120/90    Physical Exam  Vitals signs reviewed.   Constitutional:       General: He is not in acute distress.  Pulmonary:      Effort: Pulmonary effort is normal.      Breath sounds: Normal breath sounds.   Abdominal:      General: There is no distension.      Palpations: Abdomen is soft.      Tenderness: There is no abdominal tenderness.          Results Review:    Lab Results (last 24 hours)     Procedure Component Value Units Date/Time    POC Glucose Once [[id number]]  (Normal) Collected: 09/15/20 1115    Specimen: Blood Updated: 09/15/20 1120     Glucose 93 mg/dL      Comment: Serial Number: [id number]:  [id number]       POC Glucose Once [[id number]]  (Normal) Collected: 09/15/20 0713    Specimen: Blood Updated: 09/15/20 0714     Glucose 86 mg/dL      Comment: Serial Number: [id number]:  [id number]       POC Glucose Once [[id number]]  (Normal) Collected: 09/14/20 2101    Specimen: Blood Updated: 09/14/20 2104     Glucose 79 mg/dL      Comment: Serial Number: [id number]:  [id number]       BUN [[id number]]  (Normal) Collected: 09/14/20 1605    Specimen: Blood Updated: 09/14/20 2049     BUN 10 mg/dL     Basic Metabolic Panel [[id number]]  (Abnormal) Collected: 09/14/20 1605    Specimen: Blood Updated: 09/14/20 1656     Glucose  87 mg/dL      BUN --     Comment: Testing performed by alternate method        Creatinine 1.20 mg/dL      Sodium 143 mmol/L      Potassium 3.1 mmol/L      Chloride 111 mmol/L      CO2 20.0 mmol/L      Calcium 7.5 mg/dL      eGFR Non African Amer 66 mL/min/1.73      BUN/Creatinine Ratio --     Comment: Testing not performed        Anion Gap 12.0 mmol/L     Narrative:      GFR Normal >60  Chronic Kidney Disease <60  Kidney Failure <15      CBC & Differential [[id number]]  (Abnormal) Collected: 09/14/20 1605    Specimen: Blood Updated: 09/14/20 1622    Narrative:      The following orders were created for panel order CBC & Differential.  Procedure                               Abnormality         Status                     ---------                               -----------         ------                     CBC Auto Differential[[id number]]        Abnormal            Final result                 Please view results for these tests on the individual orders.    CBC Auto Differential [[id number]]  (Abnormal) Collected: 09/14/20 1605    Specimen: Blood Updated: 09/14/20 1622     WBC 7.60 10*3/mm3      RBC 4.25 10*6/mm3      Hemoglobin 13.1 g/dL      Hematocrit 40.9 %      MCV 96.1 fL      MCH 30.7 pg      MCHC 32.0 g/dL      RDW 15.3 %      RDW-SD 51.2 fl      MPV 8.0 fL      Platelets 193 10*3/mm3      Neutrophil % 74.3 %      Lymphocyte % 9.2 %      Monocyte % 9.5 %      Eosinophil % 6.3 %      Basophil % 0.7 %      Neutrophils, Absolute 5.70 10*3/mm3      Lymphocytes, Absolute 0.70 10*3/mm3      Monocytes, Absolute 0.70 10*3/mm3      Eosinophils, Absolute 0.50 10*3/mm3      Basophils, Absolute 0.10 10*3/mm3      nRBC 0.1 /100 WBC     POC Glucose Once [[id number]]  (Normal) Collected: 09/14/20 1615    Specimen: Blood Updated: 09/14/20 1618     Glucose 79 mg/dL      Comment: Serial Number: [id number]:  [id number]           Imaging Results (Last 24 Hours)     Procedure Component Value Units Date/Time    XR Abdomen KUB  [[id number]] Collected: 09/14/20 1619     Updated: 09/14/20 1623    Narrative:      DATE OF EXAM:  9/14/2020 3:58 PM     PROCEDURE:  XR ABDOMEN KUB-     INDICATIONS:  Status post colostomy reversal, abdominal pain, nausea.     COMPARISON:  10/31/2013.     TECHNIQUE:   Single radiographic view of the abdomen was obtained.        FINDINGS:  There is increased gas in the colon and a few loops of nondilated small  bowel felt to represent an ileus. There is no pneumatosis or free air.  There are surgical clips in the right upper quadrant indicating a prior  cholecystectomy. There are mild degenerative changes of the  thoracolumbar spine.        Impression:      Mild ileus.     Electronically Signed By-Clem Rawls On:9/14/2020 4:21 PM  This report was finalized on [id number] by  Clem Rawls, .           I have reviewed the above results and noted them below    Medication Review:    Current Facility-Administered Medications:   •  acetaminophen (TYLENOL) tablet 1,000 mg, 1,000 mg, Oral, TID, Niraj Almanzar MD, 1,000 mg at 09/14/20 1617  •  amLODIPine (NORVASC) tablet 10 mg, 10 mg, Oral, Daily, Niraj Almanzar MD, 10 mg at 09/13/20 0838  •  atorvastatin (LIPITOR) tablet 40 mg, 40 mg, Oral, Nightly, Niraj Almanzar MD, 40 mg at 09/13/20 2033  •  baclofen (LIORESAL) tablet 10 mg, 10 mg, Oral, Nightly, Niraj Almanzar MD, 10 mg at 09/13/20 2033  •  benzocaine-menthol (CEPACOL) lozenge 1 lozenge, 1 lozenge, Mouth/Throat, Q4H PRN, Niraj Almanzar MD, 1 lozenge at 09/11/20 1337  •  buPROPion XL (WELLBUTRIN XL) 24 hr tablet 300 mg, 300 mg, Oral, Daily, Niraj Almanzar MD, 300 mg at 09/14/20 0808  •  dextrose (D50W) 25 g/ 50mL Intravenous Solution 25 g, 25 g, Intravenous, Q15 Min PRN, Niraj Almanzar MD  •  dextrose (GLUTOSE) oral gel 15 g, 15 g, Oral, Q15 Min PRN, Niraj Almanzar MD  •  enoxaparin (LOVENOX) syringe 40 mg, 40 mg, Subcutaneous, Nightly,  Niraj Almanzar MD, 40 mg at 09/14/20 2157  •  glucagon (human recombinant) (GLUCAGEN DIAGNOSTIC) injection 1 mg, 1 mg, Subcutaneous, Q15 Min PRN, Niraj Almanzar MD  •  insulin lispro (humaLOG) injection 0-14 Units, 0-14 Units, Subcutaneous, TID AC, 3 Units at 09/11/20 1726 **AND** insulin lispro (humaLOG) injection 0-14 Units, 0-14 Units, Subcutaneous, PRN, Niraj Almanzar MD  •  isosorbide mononitrate (IMDUR) 24 hr tablet 30 mg, 30 mg, Oral, Daily, Niraj Almanzar MD, 30 mg at 09/13/20 0839  •  levETIRAcetam (KEPPRA) tablet 500 mg, 500 mg, Oral, BID, Niraj Almanzar MD, 500 mg at 09/15/20 0030  •  lisinopril (PRINIVIL,ZESTRIL) tablet 40 mg, 40 mg, Oral, Daily, Niraj Almanzar MD  •  metoprolol tartrate (LOPRESSOR) tablet 100 mg, 100 mg, Oral, Q12H, Niraj Almanzar MD, 100 mg at 09/13/20 0839  •  Morphine sulfate (PF) injection 4 mg, 4 mg, Intravenous, Q4H PRN, Niraj Almanzar MD, 4 mg at 09/11/20 1212  •  ondansetron (ZOFRAN) injection 4 mg, 4 mg, Intravenous, Q4H PRN, Sarah Moreno MD  •  oxyCODONE (ROXICODONE) immediate release tablet 5 mg, 5 mg, Oral, Q4H PRN **OR** oxyCODONE (ROXICODONE) immediate release tablet 10 mg, 10 mg, Oral, Q4H PRN, Niraj Almanzar MD, 10 mg at 09/13/20 1311  •  pantoprazole (PROTONIX) EC tablet 40 mg, 40 mg, Oral, Q AM, Niraj Almanzar MD, 40 mg at 09/12/20 0559  •  potassium chloride (K-DUR,KLOR-CON) CR tablet 40 mEq, 40 mEq, Oral, PRN **OR** potassium chloride (KLOR-CON) packet 40 mEq, 40 mEq, Oral, PRN **OR** potassium chloride 10 mEq in 100 mL IVPB, 10 mEq, Intravenous, Q1H PRN, Sarah Moreno MD, Last Rate: 100 mL/hr at 09/15/20 1113, 10 mEq at 09/15/20 1113  •  promethazine (PHENERGAN) IVPB 12.5 mg, 12.5 mg, Intravenous, Q4H PRN, Niraj Chaudhary DO, 12.5 mg at 09/15/20 0004  •  rOPINIRole (REQUIP) tablet 0.5 mg, 0.5 mg, Oral, TID, Niraj Almanzar MD, 0.5 mg at 09/14/20  1617  •  sodium chloride 0.9 % infusion, 100 mL/hr, Intravenous, Continuous, Niraj Almanzar MD, Last Rate: 100 mL/hr at 09/13/20 1531, 100 mL/hr at 09/13/20 1531    Assessment/Plan     Active Problems:  Attention to colostomy    Continue local wound care with packing of the left lower quadrant colostomy incision with saline soaked wet-to-dry dressings.  Fulls today, if tolerates home tomorrow  Encouraged ambulation.  SCDs and Lovenox for DVT prophylaxis    Plan for disposition: Hopefully home tomorrow if able to start taking p.o.    Niraj Almanzar MD  09/15/20  13:13 EDT

## 2020-09-15 NOTE — PLAN OF CARE
Pt resting well. Pt complaining of nausea and dry heaving, treated per MAR. Pt refusing to ambulate in [place]. Pt states that phenergan and zofran are not helping much with nausea. Will continue to monitor.     Problem: Patient Care Overview  Goal: Plan of Care Review  Outcome: Ongoing, Progressing  Flowsheets (Taken 9/15/2020 0219)  Progress: no change  Plan of Care Reviewed With: patient

## 2020-09-16 ENCOUNTER — APPOINTMENT (OUTPATIENT)
Dept: CT IMAGING | Facility: HOSPITAL | Age: 45
End: 2020-09-16

## 2020-09-16 LAB
GLUCOSE BLDC GLUCOMTR-MCNC: 101 MG/DL (ref 70–105)
GLUCOSE BLDC GLUCOMTR-MCNC: 108 MG/DL (ref 70–105)
GLUCOSE BLDC GLUCOMTR-MCNC: 93 MG/DL (ref 70–105)
GLUCOSE BLDC GLUCOMTR-MCNC: 99 MG/DL (ref 70–105)
POTASSIUM SERPL-SCNC: 4.3 MMOL/L (ref 3.5–5.2)

## 2020-09-16 PROCEDURE — 25010000002 ONDANSETRON PER 1 MG: Performed by: SURGERY

## 2020-09-16 PROCEDURE — 82962 GLUCOSE BLOOD TEST: CPT

## 2020-09-16 PROCEDURE — 74177 CT ABD & PELVIS W/CONTRAST: CPT

## 2020-09-16 PROCEDURE — 84132 ASSAY OF SERUM POTASSIUM: CPT | Performed by: SURGERY

## 2020-09-16 PROCEDURE — 0 IOPAMIDOL PER 1 ML: Performed by: SURGERY

## 2020-09-16 PROCEDURE — 25010000002 METOCLOPRAMIDE PER 10 MG: Performed by: SURGERY

## 2020-09-16 PROCEDURE — 99024 POSTOP FOLLOW-UP VISIT: CPT | Performed by: SURGERY

## 2020-09-16 PROCEDURE — 25010000002 ENOXAPARIN PER 10 MG: Performed by: SURGERY

## 2020-09-16 RX ORDER — AMLODIPINE BESYLATE 5 MG/1
10 TABLET ORAL DAILY
Status: DISCONTINUED | OUTPATIENT
Start: 2020-09-17 | End: 2020-09-17 | Stop reason: HOSPADM

## 2020-09-16 RX ORDER — LISINOPRIL 20 MG/1
40 TABLET ORAL DAILY
Status: DISCONTINUED | OUTPATIENT
Start: 2020-09-17 | End: 2020-09-17 | Stop reason: HOSPADM

## 2020-09-16 RX ORDER — METOCLOPRAMIDE HYDROCHLORIDE 5 MG/ML
10 INJECTION INTRAMUSCULAR; INTRAVENOUS EVERY 6 HOURS
Status: DISCONTINUED | OUTPATIENT
Start: 2020-09-16 | End: 2020-09-17 | Stop reason: HOSPADM

## 2020-09-16 RX ADMIN — ROPINIROLE 0.5 MG: 0.25 TABLET, FILM COATED ORAL at 21:11

## 2020-09-16 RX ADMIN — LEVETIRACETAM 500 MG: 500 TABLET ORAL at 21:11

## 2020-09-16 RX ADMIN — ATORVASTATIN CALCIUM 40 MG: 40 TABLET, FILM COATED ORAL at 21:11

## 2020-09-16 RX ADMIN — IOPAMIDOL 100 ML: 755 INJECTION, SOLUTION INTRAVENOUS at 20:15

## 2020-09-16 RX ADMIN — METOCLOPRAMIDE 10 MG: 5 INJECTION, SOLUTION INTRAMUSCULAR; INTRAVENOUS at 15:31

## 2020-09-16 RX ADMIN — ONDANSETRON 4 MG: 2 INJECTION INTRAMUSCULAR; INTRAVENOUS at 08:45

## 2020-09-16 RX ADMIN — ENOXAPARIN SODIUM 40 MG: 40 INJECTION SUBCUTANEOUS at 21:11

## 2020-09-16 RX ADMIN — ACETAMINOPHEN 1000 MG: 500 TABLET ORAL at 09:19

## 2020-09-16 RX ADMIN — ACETAMINOPHEN 1000 MG: 500 TABLET ORAL at 15:26

## 2020-09-16 RX ADMIN — METOCLOPRAMIDE 10 MG: 5 INJECTION, SOLUTION INTRAMUSCULAR; INTRAVENOUS at 21:15

## 2020-09-16 RX ADMIN — ROPINIROLE 0.5 MG: 0.25 TABLET, FILM COATED ORAL at 15:26

## 2020-09-16 RX ADMIN — ACETAMINOPHEN 1000 MG: 500 TABLET ORAL at 21:11

## 2020-09-16 RX ADMIN — PANTOPRAZOLE SODIUM 40 MG: 40 TABLET, DELAYED RELEASE ORAL at 05:40

## 2020-09-16 RX ADMIN — BUPROPION HYDROCHLORIDE 300 MG: 150 TABLET, EXTENDED RELEASE ORAL at 09:19

## 2020-09-16 RX ADMIN — BACLOFEN 10 MG: 10 TABLET ORAL at 21:11

## 2020-09-16 RX ADMIN — LEVETIRACETAM 500 MG: 500 TABLET ORAL at 09:19

## 2020-09-16 RX ADMIN — ROPINIROLE 0.5 MG: 0.25 TABLET, FILM COATED ORAL at 09:19

## 2020-09-16 NOTE — PLAN OF CARE
Goal Outcome Evaluation:  Plan of Care Reviewed With: patient  Progress: no change  Outcome Summary: patient still complaints about nausea with food intake. medication provided. hopeful to go home today or tomorrow.

## 2020-09-16 NOTE — PROGRESS NOTES
GENERAL SURGERY PROGRESS NOTE    9/16/2020   LOS: 6 days   Patient Care Team:  Sanjay Chance MD as PCP - General    COLOSTOMY  9/10/2020  Chief Complaint: attention to colostomy    Subjective   HPI: Patient is a 45-year-old gentleman who was admitted to the hospital following a colostomy reversal on 9/10/2020.    Interval History:   Continues to report nausea and inability to tolerate regular diet.  Drinking some liquids without vomiting.  Afebrile.  Having multiple loose bowel movements.  Denies abdominal pain.  Patient noted to be slightly more hypotensive this morning and today, antihypertensives held.    Objective     Vital Signs  Temp:  [98 °F (36.7 °C)-98.3 °F (36.8 °C)] 98.3 °F (36.8 °C)  Heart Rate:  [76-98] 82  Resp:  [14-18] 14  BP: (104-116)/(70-80) 104/74    Physical Exam  Vitals signs reviewed.   Constitutional:       General: He is not in acute distress.  Pulmonary:      Effort: Pulmonary effort is normal.      Breath sounds: Normal breath sounds.   Abdominal:      General: There is no distension.      Palpations: Abdomen is soft.      Tenderness: There is no abdominal tenderness.          Results Review:    Lab Results (last 24 hours)     Procedure Component Value Units Date/Time    POC Glucose Once [[id number]]  (Normal) Collected: 09/16/20 1137    Specimen: Blood Updated: 09/16/20 1145     Glucose 101 mg/dL      Comment: Serial Number: [id number]:  [id number]       POC Glucose Once [[id number]]  (Normal) Collected: 09/16/20 0726    Specimen: Blood Updated: 09/16/20 0727     Glucose 99 mg/dL      Comment: Serial Number: [id number]:  [id number]       Potassium [[id number]]  (Normal) Collected: 09/16/20 0504    Specimen: Blood Updated: 09/16/20 0618     Potassium 4.3 mmol/L      Comment: Result checked        POC Glucose Once [[id number]]  (Abnormal) Collected: 09/15/20 2103    Specimen: Blood Updated: 09/15/20 2104     Glucose 116 mg/dL      Comment: Serial Number: [id number]:   [id number]       Extra Tubes [[id number]] Collected: 09/15/20 1822    Specimen: Blood, Venous Line Updated: 09/15/20 1930    Narrative:      The following orders were created for panel order Extra Tubes.  Procedure                               Abnormality         Status                     ---------                               -----------         ------                     Gold Top - SST[[id number]]                                   Final result                 Please view results for these tests on the individual orders.    Gold Top - SST [[id number]] Collected: 09/15/20 1822    Specimen: Blood Updated: 09/15/20 1930     Extra Tube Hold for add-ons.     Comment: Auto resulted.       Potassium [[id number]]  (Abnormal) Collected: 09/15/20 1822    Specimen: Blood Updated: 09/15/20 1844     Potassium 3.4 mmol/L      Comment: Slight hemolysis detected by analyzer. Results may be affected.       POC Glucose Once [[id number]]  (Normal) Collected: 09/15/20 1552    Specimen: Blood Updated: 09/15/20 1552     Glucose 75 mg/dL      Comment: Serial Number: [id number]:  [id number]           Imaging Results (Last 24 Hours)     ** No results found for the last 24 hours. **           I have reviewed the above results and noted them below    Medication Review:    Current Facility-Administered Medications:   •  acetaminophen (TYLENOL) tablet 1,000 mg, 1,000 mg, Oral, TID, Niraj Almanzar MD, 1,000 mg at 09/16/20 0919  •  [START ON 9/17/2020] amLODIPine (NORVASC) tablet 10 mg, 10 mg, Oral, Daily, Niraj Almanzar MD  •  atorvastatin (LIPITOR) tablet 40 mg, 40 mg, Oral, Nightly, Niraj Almanzar MD, 40 mg at 09/15/20 2110  •  baclofen (LIORESAL) tablet 10 mg, 10 mg, Oral, Nightly, Niraj Almanzar MD, 10 mg at 09/15/20 2110  •  benzocaine-menthol (CEPACOL) lozenge 1 lozenge, 1 lozenge, Mouth/Throat, Q4H PRN, Niraj Almanzar MD, 1 lozenge at 09/11/20 1337  •  buPROPion XL (WELLBUTRIN XL) 24 hr  tablet 300 mg, 300 mg, Oral, Daily, Niraj Almanzar MD, 300 mg at 09/16/20 0919  •  dextrose (D50W) 25 g/ 50mL Intravenous Solution 25 g, 25 g, Intravenous, Q15 Min PRN, Niraj Almanzar MD  •  dextrose (GLUTOSE) oral gel 15 g, 15 g, Oral, Q15 Min PRN, Niraj Almanzar MD  •  enoxaparin (LOVENOX) syringe 40 mg, 40 mg, Subcutaneous, Nightly, Niraj Almanzar MD, 40 mg at 09/15/20 2109  •  glucagon (human recombinant) (GLUCAGEN DIAGNOSTIC) injection 1 mg, 1 mg, Subcutaneous, Q15 Min PRN, Niraj Almanzar MD  •  insulin lispro (humaLOG) injection 0-14 Units, 0-14 Units, Subcutaneous, TID AC, 3 Units at 09/11/20 1726 **AND** insulin lispro (humaLOG) injection 0-14 Units, 0-14 Units, Subcutaneous, PRN, Niraj Almanzar MD  •  isosorbide mononitrate (IMDUR) 24 hr tablet 30 mg, 30 mg, Oral, Daily, Niraj Almanzar MD, 30 mg at 09/13/20 0839  •  levETIRAcetam (KEPPRA) tablet 500 mg, 500 mg, Oral, BID, Niraj Almanzar MD, 500 mg at 09/16/20 0919  •  [START ON 9/17/2020] lisinopril (PRINIVIL,ZESTRIL) tablet 40 mg, 40 mg, Oral, Daily, Niraj Almanzar MD  •  metoclopramide (REGLAN) injection 10 mg, 10 mg, Intravenous, Q6H, Niraj Almanzar MD  •  metoprolol tartrate (LOPRESSOR) tablet 100 mg, 100 mg, Oral, Q12H, Niraj Almanzar MD, 100 mg at 09/15/20 2110  •  Morphine sulfate (PF) injection 4 mg, 4 mg, Intravenous, Q4H PRN, Niraj Almanzar MD, 4 mg at 09/11/20 1212  •  ondansetron (ZOFRAN) injection 4 mg, 4 mg, Intravenous, Q4H PRN, Sarah Moreno MD, 4 mg at 09/16/20 0845  •  oxyCODONE (ROXICODONE) immediate release tablet 5 mg, 5 mg, Oral, Q4H PRN **OR** oxyCODONE (ROXICODONE) immediate release tablet 10 mg, 10 mg, Oral, Q4H PRN, Niraj Almanzar MD, 10 mg at 09/15/20 1714  •  pantoprazole (PROTONIX) EC tablet 40 mg, 40 mg, Oral, Q AM, Niraj Almanzar MD, 40 mg at 09/16/20 6094  •  potassium chloride  (K-DUR,KLOR-CON) CR tablet 40 mEq, 40 mEq, Oral, PRN, 40 mEq at 09/15/20 2110 **OR** potassium chloride (KLOR-CON) packet 40 mEq, 40 mEq, Oral, PRN **OR** potassium chloride 10 mEq in 100 mL IVPB, 10 mEq, Intravenous, Q1H PRN, Sarah Moreno MD, Last Rate: 100 mL/hr at 09/15/20 1113, 10 mEq at 09/15/20 1113  •  promethazine (PHENERGAN) IVPB 12.5 mg, 12.5 mg, Intravenous, Q4H PRN, Niraj Chaudhary DO, 12.5 mg at 09/15/20 0004  •  rOPINIRole (REQUIP) tablet 0.5 mg, 0.5 mg, Oral, TID, Niraj Almanzar MD, 0.5 mg at 09/16/20 0919  •  sodium chloride 0.9 % infusion, 100 mL/hr, Intravenous, Continuous, Niraj Almanzar MD, Last Rate: 100 mL/hr at 09/13/20 1531, 100 mL/hr at 09/13/20 1531    Assessment/Plan     Active Problems:  Attention to colostomy    Regular diet as tolerated  We will add Reglan  Plan for CT scan of the abdomen and pelvis with oral contrast to rule out obstruction or infection  Emphasized again the need for ambulation    Plan for disposition: If CT scan is otherwise normal, will likely discharge patient home with medications for nausea tomorrow    Niraj Almanzar MD  09/16/20  15:23 EDT

## 2020-09-17 VITALS
TEMPERATURE: 98.6 F | BODY MASS INDEX: 39.84 KG/M2 | WEIGHT: 224.87 LBS | DIASTOLIC BLOOD PRESSURE: 62 MMHG | RESPIRATION RATE: 16 BRPM | SYSTOLIC BLOOD PRESSURE: 92 MMHG | HEART RATE: 84 BPM | OXYGEN SATURATION: 94 % | HEIGHT: 63 IN

## 2020-09-17 LAB
GLUCOSE BLDC GLUCOMTR-MCNC: 87 MG/DL (ref 70–105)
GLUCOSE BLDC GLUCOMTR-MCNC: 94 MG/DL (ref 70–105)

## 2020-09-17 PROCEDURE — 25010000002 METOCLOPRAMIDE PER 10 MG: Performed by: SURGERY

## 2020-09-17 PROCEDURE — 82962 GLUCOSE BLOOD TEST: CPT

## 2020-09-17 PROCEDURE — 99024 POSTOP FOLLOW-UP VISIT: CPT | Performed by: SURGERY

## 2020-09-17 RX ORDER — OXYCODONE HYDROCHLORIDE 5 MG/1
5 TABLET ORAL EVERY 4 HOURS PRN
Qty: 30 TABLET | Refills: 0 | Status: SHIPPED | OUTPATIENT
Start: 2020-09-17 | End: 2020-09-20

## 2020-09-17 RX ORDER — OXYCODONE HYDROCHLORIDE 5 MG/1
5 TABLET ORAL EVERY 4 HOURS PRN
Qty: 30 TABLET | Refills: 0 | Status: SHIPPED | OUTPATIENT
Start: 2020-09-17 | End: 2020-09-17

## 2020-09-17 RX ORDER — METOCLOPRAMIDE 5 MG/1
5 TABLET ORAL 3 TIMES DAILY PRN
Qty: 30 TABLET | Refills: 0 | Status: SHIPPED | OUTPATIENT
Start: 2020-09-17 | End: 2020-09-27

## 2020-09-17 RX ADMIN — METOCLOPRAMIDE 10 MG: 5 INJECTION, SOLUTION INTRAMUSCULAR; INTRAVENOUS at 05:35

## 2020-09-17 RX ADMIN — METOPROLOL TARTRATE 100 MG: 25 TABLET, FILM COATED ORAL at 09:50

## 2020-09-17 RX ADMIN — BUPROPION HYDROCHLORIDE 300 MG: 150 TABLET, EXTENDED RELEASE ORAL at 09:50

## 2020-09-17 RX ADMIN — LEVETIRACETAM 500 MG: 500 TABLET ORAL at 09:50

## 2020-09-17 RX ADMIN — PANTOPRAZOLE SODIUM 40 MG: 40 TABLET, DELAYED RELEASE ORAL at 05:35

## 2020-09-17 RX ADMIN — METOCLOPRAMIDE 10 MG: 5 INJECTION, SOLUTION INTRAMUSCULAR; INTRAVENOUS at 09:50

## 2020-09-17 RX ADMIN — ROPINIROLE 0.5 MG: 0.25 TABLET, FILM COATED ORAL at 09:50

## 2020-09-17 RX ADMIN — ISOSORBIDE MONONITRATE 30 MG: 30 TABLET, EXTENDED RELEASE ORAL at 09:50

## 2020-09-17 RX ADMIN — AMLODIPINE BESYLATE 10 MG: 5 TABLET ORAL at 09:50

## 2020-09-17 RX ADMIN — ACETAMINOPHEN 1000 MG: 500 TABLET ORAL at 09:50

## 2020-09-17 NOTE — DISCHARGE INSTRUCTIONS
Ok for discharge  Follow-up with me (Dr. Almanzar) in 2 weeks  Regular diet as tolerated  Ok to shower starting tomorrow. No tub baths, pools, lakes, or streams for 1 week.  No heavy lifting (greater than 20 lbs) for 6 weeks after surgery  Call my office or present to the ED with: fevers greater than 101.5, redness around the incisions, drainage from the incisions, intractable nausea/vomiting, or pain that is getting worse instead of better      Low-Fiber Eating Plan  Fiber is found in fruits, vegetables, whole grains, and beans. Eating a diet low in fiber helps to reduce how often you have bowel movements and how much you produce during a bowel movement. A low-fiber eating plan may help your digestive system heal if:  · You have certain conditions, such as Crohn's disease or diverticulitis.  · You recently had radiation therapy on your pelvis or bowel.  · You recently had intestinal surgery.  · You have a new surgical opening in your abdomen (colostomy or ileostomy).  · Your intestine is narrowed (stricture).  Your health care provider will determine how long you need to stay on this diet. Your health care provider may recommend that you work with a diet and nutrition specialist (dietitian).  What are tips for following this plan?  General guidelines  · Follow recommendations from your dietitian about how much fiber you should have each day.  · Most people on this eating plan should try to eat less than 10 grams (g) of fiber each day. Your daily fiber goal is _________________ g.  · Take vitamin and mineral supplements as told by your health care provider or dietitian. Chewable or liquid forms are best when on this eating plan.  Reading food labels  · Check food labels for the amount of dietary fiber.  · Choose foods that have less than 2 grams of fiber in one serving.  Cooking  · Use white flour and other allowed grains for baking and cooking.  · Cook meat using methods that keep it tender, such as braising or  poaching.  · Cook eggs until the yolk is completely solid.  · Cook with healthy oils, such as olive oil or canola oil.  Meal planning    · Eat 5-6 small meals throughout the day instead of 3 large meals.  · If you are lactose intolerant:  ? Choose low-lactose dairy foods.  ? Do not eat dairy foods, if told by your dietitian.  · Limit fat and oils to less than 8 teaspoons a day.  · Eat small portions of desserts.  What foods are allowed?  The items listed below may not be a complete list. Talk with your dietitian about what dietary choices are best for you.  Grains  All bread and crackers made with white flour. Waffles, pancakes, and [place] toast. Bagels. Pretzels. [place] toast, zwieback, and matzoh. Cooked and dried cereals that do not contain whole grains, added fiber, seeds, or dried fruit. Cornmeal. [place]. Hot and cold cereals made with refined corn, wheat, rice, or oats. Plain pasta and noodles. White rice.  Vegetables  Well-cooked or canned vegetables without skin, seeds, or stems. Cooked potatoes without skins. Vegetable juice.  Fruits  Soft-cooked or canned fruits without skin and seeds. Peeled ripe banana. Applesauce. Fruit juice without pulp.  Meats and other protein foods  Ground meat. Tender cuts of meat or poultry. Eggs. Fish, seafood, and shellfish. Smooth nut butters. Tofu.  Dairy  All milk products and drinks. Lactose-free milks, including rice, soy, and almond milks. Yogurt without fruit, nuts, chocolate, or granola mix-ins. Sour cream. Cottage cheese. Cheese.  Beverages  Decaf coffee. Fruit and vegetable juices or smoothies (in small amounts, with no pulp or skins, and with fruits from allowed list). Sports drinks. Herbal tea.  Fats and oils  Olive oil, canola oil, sunflower oil, flaxseed oil, and grapeseed oil. Mayonnaise. Cream cheese. Margarine. Butter.  Sweets and desserts  Plain cakes and cookies. Cream pies and pies made with allowed fruits. Pudding. Custard. Fruit gelatin. Sherbet. Popsicles.  Ice cream without nuts. Plain hard candy. Honey. Jelly. Molasses. Syrups, including chocolate syrup. Chocolate. Marshmallows. Gumdrops.  Seasoning and other foods  Bouillon. Broth. Cream soups made from allowed foods. Strained soup. Casseroles made with allowed foods. Ketchup. Mild mustard. Mild salad dressings. Plain gravies. Vinegar. Spices in moderation. Salt. Sugar.  What foods are not allowed?  The items listed below may not be a complete list. Talk with your dietitian about what dietary choices are best for you.  Grains  Whole wheat and whole grain breads and crackers. Multigrain breads and crackers. Rye bread. Whole grain or multigrain cereals. Cereals with nuts, raisins, or coconut. Bran. Coarse wheat cereals. Granola. High-fiber cereals. Cornmeal or corn bread. Whole grain pasta. Wild or brown rice. Quinoa. Popcorn. Buckwheat. Wheat germ.  Vegetables  Potato skins. Raw or undercooked vegetables. All beans and bean sprouts. Cooked greens. Corn. Peas. Cabbage. Beets. Broccoli. [place] sprouts. Cauliflower. Mushrooms. Onions. Peppers. Parsnips. Okra. Sauerkraut.  Fruit  Raw or dried fruit. Berries. Fruit juice with pulp. Prune juice.  Meats and other protein foods  Tough, fibrous meats with gristle. Fatty meat. Poultry with skin. Fried meat, poultry, or fish. Deli or lunch meats. Sausage, landry, and hot dogs. Nuts and chunky nut butter. Dried peas, beans, and lentils.  Dairy  Yogurt with fruit, nuts, chocolate, or granola mix-ins.  Beverages  Caffeinated coffee and teas.  Fats and oils  Avocado. Coconut.  Sweets and desserts  Desserts, cookies, or candies that contain nuts or coconut. Dried fruit. Jams and preserves with seeds. Marmalade. Any dessert made with fruits or grains that are not allowed.  Seasoning and other foods  Corn tortilla chips. Soups made with vegetables or grains that are not allowed. Relish. Horseradish. Pickles. Olives.  Summary  · Most people on a low-fiber eating plan should eat less  than 10 grams of fiber a day. Follow recommendations from your dietitian about how much fiber you should have each day.  · Always check food labels to see the dietary fiber content of packaged foods. In general, a low-fiber food will have fewer than 2 grams of fiber per serving.  · In general, try to avoid whole grains, raw fruits and vegetables, dried fruit, tough cuts of meat, nuts, and seeds.  · Take a vitamin and mineral supplement as told by your health care provider or dietitian.  This information is not intended to replace advice given to you by your health care provider. Make sure you discuss any questions you have with your health care provider.  Document Released: 06/09/2003 Document Revised: 04/10/2020 Document Reviewed: 02/20/2018  [employer] Patient Education © 2020 [employer]      Wound Infection  A wound infection happens when tiny organisms (microorganisms) start to grow in a wound. A wound infection is most often caused by bacteria. Infection can cause the wound to break open or worsen. Wound infection needs treatment. If a wound infection is left untreated, complications can occur. Untreated wound infections may lead to an infection in the bloodstream (septicemia) or a bone infection (osteomyelitis).  What are the causes?  This condition is most often caused by bacteria growing in a wound. Other microorganisms, like yeast and fungi, can also cause wound infections.  What increases the risk?  The following factors may make you more likely to develop this condition:  · Having a weak body defense system (immune system).  · Having diabetes.  · Taking steroid medicines for a long time (chronic use).  · Smoking.  · Being an older person.  · Being overweight.  · Taking chemotherapy medicines.  What are the signs or symptoms?  Symptoms of this condition include:  · Having more redness, swelling, or pain at the wound site.  · Having more blood or fluid at the wound site.  · A bad smell coming from a wound  or bandage (dressing).  · Having a fever.  · Feeling tired or fatigued.  · Having warmth at or around the wound.  · Having pus at the wound site.  How is this diagnosed?  This condition is diagnosed with a medical history and physical exam. You may also have a wound culture or blood tests or both.  How is this treated?  This condition is usually treated with an antibiotic medicine.  · The infection should improve 24-48 hours after you start antibiotics.  · After 24-48 hours, redness around the wound should stop spreading, and the wound should be less painful.  Follow these instructions at home:  Medicines  · Take or apply over-the-counter and prescription medicines only as told by your health care provider.  · If you were prescribed an antibiotic medicine, take or apply it as told by your health care provider. Do not stop using the antibiotic even if you start to feel better.  Wound care    · Clean the wound each day, or as told by your health care provider.  ? Wash the wound with mild soap and water.  ? Rinse the wound with water to remove all soap.  ? Pat the wound dry with a clean towel. Do not rub it.  · Follow instructions from your health care provider about how to take care of your wound. Make sure you:  ? Wash your hands with soap and water before and after you change your dressing. If soap and water are not available, use hand .  ? Change your dressing as told by your health care provider.  ? Leave stitches (sutures), skin glue, or adhesive strips in place if your wound has been closed. These skin closures may need to stay in place for 2 weeks or longer. If adhesive strip edges start to loosen and curl up, you may trim the loose edges. Do not remove adhesive strips completely unless your health care provider tells you to do that. Some wounds are left open to heal on their own.  · Check your wound every day for signs of infection. Watch for:  ? More redness, swelling, or pain.  ? More fluid or  blood.  ? Warmth.  ? Pus or a bad smell.  General instructions  · Keep the dressing dry until your health care provider says it can be removed.  · Do not take baths, swim, or use a hot tub until your health care provider approves. Ask your health care provider if you may take showers. You may only be allowed to take sponge baths.  · Raise (elevate) the injured area above the level of your heart while you are sitting or lying down.  · Do not scratch or pick at the wound.  · Keep all follow-up visits as told by your health care provider. This is important.  Contact a health care provider if:  · Your pain is not controlled with medicine.  · You have more redness, swelling, or pain around your wound.  · You have more fluid or blood coming from your wound.  · Your wound feels warm to the touch.  · You have pus coming from your wound.  · You continue to notice a bad smell coming from your wound or your dressing.  · Your wound that was closed breaks open.  Get help right away if:  · You have a red streak going away from your wound.  · You have a fever.  Summary  · A wound infection happens when tiny organisms (microorganisms) start to grow in a wound.  · This condition is usually treated with an antibiotic medicine.  · Follow instructions from your health care provider about how to take care of your wound.  · Contact a health care provider if your wound infection does not begin to improve in 24-48 hours, or your symptoms worsen.  · Keep all follow-up visits as told by your health care provider. This is important.  This information is not intended to replace advice given to you by your health care provider. Make sure you discuss any questions you have with your health care provider.  Document Released: 09/15/2004 Document Revised: 07/30/2019 Document Reviewed: 07/30/2019  [employer]vier Patient Education © 2020 [employer]

## 2020-09-17 NOTE — PLAN OF CARE
Goal Outcome Evaluation:  Plan of Care Reviewed With: patient  Progress: improving  Outcome Summary: Pt states his nausea is better now then it has been since surgery. Pt is up ad keyur by himself. No complaints of oain,    Patient is discharging home with [facility].

## 2020-09-17 NOTE — PLAN OF CARE
Goal Outcome Evaluation:  Plan of Care Reviewed With: patient  Progress: improving  Outcome Summary: Pt states his nausea is better now then it has been since surgery. Pt is up ad keyur by himself. No complaints of oain,

## 2020-09-17 NOTE — DISCHARGE SUMMARY
GENERAL SURGERY DISCHARGE SUMMARY    Date of Discharge:  9/17/2020    Discharge Diagnosis: Attention to colostomy    Presenting Problem/History of Present Illness  Active Hospital Problems   No active problems to display.      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 male presented with the hospital in the morning of admission for a colostomy reversal.  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  The patient underwent an uncomplicated colostomy reversal and was transferred to the floor postoperatively.  There he continued to recover, however over the next few days the patient had persistent nausea with decreased oral intake.  A number of antiemetics were used in an attempt to treat his nausea, and finally the patient was able to receive some relief when Reglan was attempted.  A KUB shot early in the patient's hospital stay demonstrated a possible postoperative ileus which was not unexpected given his recent surgery.  A CT scan performed the day prior to discharge demonstrated postoperative changes in the left anterior abdominal wall consistent with his colostomy reversal site as well as no evidence of leak around the area where his colostomy was reversed.    Procedures Performed    Procedure(s):  COLOSTOMY REVERSAL  -------------------       Consults:   Consults     No orders found from 8/12/2020 to 9/11/2020.          Pertinent Test Results: None    Condition on Discharge: Improved    Vital Signs  Temp:  [97.7 °F (36.5 °C)-98.3 °F (36.8 °C)] 97.7 °F (36.5 °C)  Heart Rate:  [82-86] 86  Resp:  [12-16] 16  BP: ()/(59-74) 95/59    Physical  Exam:  Physical Exam  Vitals signs reviewed.   Constitutional:       General: He is not in acute distress.     Appearance: He is well-developed.   HENT:      Head: Normocephalic and atraumatic.   Eyes:      Extraocular Movements: EOM normal.      Pupils: Pupils are equal, round, and reactive to light.   Cardiovascular:      Rate and Rhythm: Normal rate and regular rhythm.   Pulmonary:      Effort: Respiratory distress present.   Abdominal:      General: There is no distension.      Palpations: Abdomen is soft.      Tenderness: There is no abdominal tenderness.      Comments: Left lower quadrant colostomy site packed with gauze, no significant erythema, induration, or drainage.   Musculoskeletal: Normal range of motion.         General: No edema.   Skin:     General: Skin is warm and dry.   Neurological:      Mental Status: He is alert and oriented to person, place, and time.   Psychiatric:         Mood and Affect: Mood and affect normal.         Behavior: Behavior normal.         Thought Content: Thought content normal.         Discharge Disposition  Home or Self Care    Discharge Medications     Discharge Medications      New Medications      Instructions Start Date   metoclopramide 5 MG tablet  Commonly known as: Reglan   5 mg, Oral, 3 Times Daily PRN         Changes to Medications      Instructions Start Date   insulin lispro 100 UNIT/ML injection  Commonly known as: humaLOG  What changed: additional instructions   0-24 Units, Subcutaneous, 4 Times Daily With Meals & Nightly      oxyCODONE 5 MG immediate release tablet  Commonly known as: ROXICODONE  What changed:   · when to take this  · reasons to take this   5 mg, Oral, Every 4 Hours PRN         Continue These Medications      Instructions Start Date   amLODIPine 10 MG tablet  Commonly known as: NORVASC   10 mg, Oral, Daily, Take preop      atorvastatin 40 MG tablet  Commonly known as: LIPITOR   40 mg, Oral, Daily, Take preop      baclofen 10 MG  tablet  Commonly known as: LIORESAL   10 mg, Oral, Daily, Do not take preop      buPROPion  MG 24 hr tablet  Commonly known as: WELLBUTRIN XL   300 mg, Oral, Every Morning, Take preop      isosorbide mononitrate 30 MG 24 hr tablet  Commonly known as: IMDUR   30 mg, Oral, Daily, Take preop       levETIRAcetam 500 MG tablet  Commonly known as: KEPPRA   500 mg, Oral, 2 Times Daily, Take preop      lisinopril 40 MG tablet  Commonly known as: PRINIVIL,ZESTRIL   40 mg, Oral, Daily, Do not take 24 hours before surgery      Melatonin 5 MG tablet dispersible   1 tablet, Oral, Every Night at Bedtime      metoprolol tartrate 100 MG tablet  Commonly known as: LOPRESSOR   100 mg, Oral, 2 Times Daily, Take preop      ondansetron 4 MG tablet  Commonly known as: ZOFRAN   4 mg, Oral, Every 6 Hours PRN      rOPINIRole 0.5 MG tablet  Commonly known as: REQUIP   0.5 mg, Oral, 3 Times Daily, Take preop      Soliqua 100-33 UNT-MCG/ML solution pen-injector injection  Generic drug: Insulin Glargine-Lixisenatide   15-30 Units, Subcutaneous, Daily             Discharge Diet:   Diet Instructions     Diet:      Diet Texture / Consistency: Regular    Common Modifiers:  Low Fiber / Low Residue  Consistent Carbohydrate             Activity at Discharge:   Activity Instructions     Discharge Activity      1) No driving while taking narcotics.   2) Return to school / work in light duty once off narcotics.  3) May shower tomorrow.  4) Do not lift / push / pull more then 20 lbs.          Follow-up Appointments  No future appointments.  Additional Instructions for the Follow-ups that You Need to Schedule     Ambulatory Referral to Home Health   As directed      Face to Face Visit Date: 9/11/2020    Follow-up provider for Plan of Care?: I will be treating the patient on an ongoing basis.  Please send me the Plan of Care for signature.    Follow-up provider: CHETNA ZAPATA [[id number]]    Reason/Clinical Findings: wound care    Describe  mobility limitations that make leaving home difficult: wound care    Nursing/Therapeutic Services Requested: Other ([facility] to eval and treat as indicated.)    Frequency: 1 Week 1         Discharge Follow-up with Specified Provider: Jenelle; 2 Weeks   As directed      To: Jenelle    Follow Up: 2 Weeks         Notify Physician or Go To The ED For the Following Conditions   As directed      For redness around the incisions, drainage from the incisions, or fevers greater than 101.5    Order Comments: For redness around the incisions, drainage from the incisions, or fevers greater than 101.5                Test Results Pending at Discharge       Niraj Almanzar MD  09/17/20  09:13 EDT

## 2020-09-18 ENCOUNTER — READMISSION MANAGEMENT (OUTPATIENT)
Dept: CALL CENTER | Facility: HOSPITAL | Age: 45
End: 2020-09-18

## 2020-09-18 NOTE — OUTREACH NOTE
Prep Survey      Responses   [facility] patient discharged from?  [facility]   Is LACE score < 7 ?  No   Eligibility  Readm Mgmt   Discharge diagnosis  sp colostomy reversal   COVID-19 Test Status  Negative   Does the patient have one of the following disease processes/diagnoses(primary or secondary)?  General Surgery   Does the patient have Home health ordered?  Yes   What is the Home health agency?   [facility]   Is there a DME ordered?  No   Prep survey completed?  Yes          Fay Hanley RN

## 2020-09-18 NOTE — PROGRESS NOTES
Case Management Discharge Note      Final Note: routine home with [facility] , order placed and accepted    Provided Post Acute Provider List?: Yes  Post Acute Provider List: Home Health  Delivered To: Patient, Support Person  Method of Delivery: In person, Telephone    Selected Continued Care - Discharged on 9/17/2020 Admission date: 9/10/2020 - Discharge disposition: Home or Self Care        Home Medical Care     Service Provider Selected Services Address Phone Fax    [facility]  [facility] [address] [place] [address], [place] [address] [phone] --                       Final Discharge Disposition Code: 06 - home with home health care

## 2020-09-22 ENCOUNTER — READMISSION MANAGEMENT (OUTPATIENT)
Dept: CALL CENTER | Facility: HOSPITAL | Age: 45
End: 2020-09-22

## 2020-09-22 NOTE — OUTREACH NOTE
General Surgery Week 1 Survey      Responses   [facility] patient discharged from?  [facility]   Does the patient have one of the following disease processes/diagnoses(primary or secondary)?  General Surgery   Is there a successful TCM telephone encounter documented?  No   Week 1 attempt successful?  Yes   Call start time  1739   Call end time  1742   Medication alerts for this patient   reviewed medication with the patient   Meds reviewed with patient/caregiver?  Yes   Is the patient having any side effects they believe may be caused by any medication additions or changes?  No   Does the patient have all medications related to this admission filled (includes all antibiotics, pain medications, etc.)  Yes   Does the patient have a follow up appointment scheduled with their surgeon?  Yes   Has the patient kept scheduled appointments due by today?  N/A   Comments  appointment 9/30 with the surgeron   Has home health visited the patient within 72 hours of discharge?  Yes   Nursing interventions  Reviewed instructions with patient   What is the patient's perception of their health status since discharge?  Improving   Is the patient /caregiver able to teach back basic post-op care?  Continue use of incentive spirometry at least 1 week post discharge, Practice 'cough and deep breath', Drive as instructed by MD in discharge instructions, Take showers only when approved by MD-sponge bathe until then, No tub bath, swimming, or hot tub until instructed by MD, Keep incision areas clean,dry and protected, Do not remove steri-strips, Lifting as instructed by MD in discharge instructions [reviewed with the patient]   Is the patient/caregiver able to teach back signs and symptoms of incisional infection?  Increased redness, swelling or pain at the incisonal site, Increased drainage or bleeding, Incisional warmth, Pus or odor from incision, Fever [Reviewed with the patient]   Is the patient/caregiver able to teach back steps to  recovery at home?  Rest and rebuild strength, gradually increase activity, Eat a well-balance diet [reviewed with the paient]   Week 1 call completed?  Yes   Wrap up additional comments  able to eat and has no further nausea          Silvia Metzger RN

## 2020-09-29 ENCOUNTER — READMISSION MANAGEMENT (OUTPATIENT)
Dept: CALL CENTER | Facility: HOSPITAL | Age: 45
End: 2020-09-29

## 2020-09-29 NOTE — OUTREACH NOTE
General Surgery Week 2 Survey      Responses   [facility] patient discharged from?  [facility]   Does the patient have one of the following disease processes/diagnoses(primary or secondary)?  General Surgery   Week 2 attempt successful?  No   Unsuccessful attempts  Attempt 1          Shannon Christopher RN

## 2020-09-30 ENCOUNTER — OFFICE VISIT (OUTPATIENT)
Dept: SURGERY | Facility: CLINIC | Age: 45
End: 2020-09-30

## 2020-09-30 VITALS
TEMPERATURE: 97.5 F | HEART RATE: 86 BPM | SYSTOLIC BLOOD PRESSURE: 133 MMHG | OXYGEN SATURATION: 97 % | HEIGHT: 63 IN | WEIGHT: 226 LBS | DIASTOLIC BLOOD PRESSURE: 88 MMHG | BODY MASS INDEX: 40.04 KG/M2

## 2020-09-30 DIAGNOSIS — Z43.3 ATTENTION TO COLOSTOMY (HCC): Primary | ICD-10-CM

## 2020-09-30 PROCEDURE — 99024 POSTOP FOLLOW-UP VISIT: CPT | Performed by: SURGERY

## 2020-09-30 NOTE — PROGRESS NOTES
"Post-op Note    Subjective   Lonnie Arora is a 45 y.o. male status post open colostomy reversal performed on 9/10/2020.  Since surgery, the patient has done well.  He is tolerating a regular diet without nausea or vomiting.  He is having regular bowel movements without difficulties.  His ostomy site is healing, however he was somewhat distraught as the home health care nurse told him that the size of the wound had increased from 3.1 cm to 4.1 cm at his last evaluation.  He is afebrile.      Objective   /88   Pulse 86   Temp 97.5 °F (36.4 °C) (Temporal)   Ht 160 cm (63\")   Wt 103 kg (226 lb)   SpO2 97%   BMI 40.03 kg/m²   Abdomen is obese, soft, nontender.  There is a healing pinpoint ostomy site in the left lower quadrant which does have healthy appearing pink granulation tissue and minimal drainage coming from this.  On my measurement, this area measured approximately 3.5 cm in depth, the opening aperture is approximately 1 cm wide.      Assessment/Plan   Patient is a 45-year-old gentleman status post open colostomy reversal performed on 10/9/2020.    I reassured the patient that these measurements are used as a guide to ensure proper healing.  Right now there is no evidence of any infection, and the area appears to be healing well.  There will certainly be discrepancies on 1 practitioner from another as far as measurements especially given the angle at which this area needs to be measured.  Continue regular diet as tolerated  Follow-up with me in 4 weeks if not fully healed.    Niraj Almanzar MD  9/30/2020  10:50 EDT  "

## 2020-10-02 ENCOUNTER — READMISSION MANAGEMENT (OUTPATIENT)
Dept: CALL CENTER | Facility: HOSPITAL | Age: 45
End: 2020-10-02

## 2020-10-02 NOTE — OUTREACH NOTE
General Surgery Week 2 Survey      Responses   [facility] patient discharged from?  [facility]   Does the patient have one of the following disease processes/diagnoses(primary or secondary)?  General Surgery   Week 2 attempt successful?  Yes   Call start time  1619   Call end time  1621   Discharge diagnosis  sp colostomy reversal   Meds reviewed with patient/caregiver?  Yes   Is the patient taking all medications as directed (includes completed medication regime)?  Yes   Does the patient have a follow up appointment scheduled with their surgeon?  Yes [9/30/20]   Has the patient kept scheduled appointments due by today?  Yes   Psychosocial issues?  No   What is the patient's perception of their health status since discharge?  Improving   If the patient is a current smoker, are they able to teach back resources for cessation?  Not a smoker   Is the patient/caregiver able to teach back the hierarchy of who to call/visit for symptoms/problems? PCP, Specialist, Home health nurse, Urgent Care, ED, 911  Yes   Week 2 call completed?  Yes          Elma Kc RN

## 2020-10-05 ENCOUNTER — APPOINTMENT (OUTPATIENT)
Dept: GENERAL RADIOLOGY | Facility: HOSPITAL | Age: 45
End: 2020-10-05

## 2020-10-05 ENCOUNTER — APPOINTMENT (OUTPATIENT)
Dept: RESPIRATORY THERAPY | Facility: HOSPITAL | Age: 45
End: 2020-10-05

## 2020-10-05 ENCOUNTER — APPOINTMENT (OUTPATIENT)
Dept: CT IMAGING | Facility: HOSPITAL | Age: 45
End: 2020-10-05

## 2020-10-05 ENCOUNTER — HOSPITAL ENCOUNTER (OUTPATIENT)
Facility: HOSPITAL | Age: 45
Setting detail: OBSERVATION
Discharge: HOME HEALTH | End: 2020-10-06
Attending: INTERNAL MEDICINE | Admitting: INTERNAL MEDICINE

## 2020-10-05 DIAGNOSIS — R55 SYNCOPE, UNSPECIFIED SYNCOPE TYPE: ICD-10-CM

## 2020-10-05 DIAGNOSIS — S42.225A CLOSED 2-PART NONDISPLACED FRACTURE OF SURGICAL NECK OF LEFT HUMERUS, INITIAL ENCOUNTER: Primary | ICD-10-CM

## 2020-10-05 DIAGNOSIS — S42.292A OTHER CLOSED DISPLACED FRACTURE OF PROXIMAL END OF LEFT HUMERUS, INITIAL ENCOUNTER: ICD-10-CM

## 2020-10-05 DIAGNOSIS — R94.31 ABNORMAL EKG: ICD-10-CM

## 2020-10-05 PROBLEM — S42.309A HUMERAL FRACTURE: Status: ACTIVE | Noted: 2020-10-05

## 2020-10-05 PROBLEM — N18.30 CKD (CHRONIC KIDNEY DISEASE), STAGE III (HCC): Chronic | Status: ACTIVE | Noted: 2020-10-05

## 2020-10-05 PROBLEM — N17.9 AKI (ACUTE KIDNEY INJURY) (HCC): Status: ACTIVE | Noted: 2020-10-05

## 2020-10-05 PROBLEM — G25.81 RESTLESS LEG SYNDROME: Chronic | Status: ACTIVE | Noted: 2020-10-05

## 2020-10-05 PROBLEM — I25.10 CAD (CORONARY ARTERY DISEASE): Chronic | Status: ACTIVE | Noted: 2020-10-05

## 2020-10-05 PROBLEM — Z86.73 HISTORY OF CVA (CEREBROVASCULAR ACCIDENT): Chronic | Status: ACTIVE | Noted: 2020-10-05

## 2020-10-05 LAB
ANION GAP SERPL CALCULATED.3IONS-SCNC: 13 MMOL/L (ref 5–15)
APTT PPP: 21.4 SECONDS (ref 24–31)
BASOPHILS # BLD AUTO: 0 10*3/MM3 (ref 0–0.2)
BASOPHILS NFR BLD AUTO: 0.5 % (ref 0–1.5)
BUN SERPL-MCNC: 11 MG/DL (ref 6–20)
BUN SERPL-MCNC: ABNORMAL MG/DL
BUN/CREAT SERPL: ABNORMAL
CALCIUM SPEC-SCNC: 8.4 MG/DL (ref 8.6–10.5)
CHLORIDE SERPL-SCNC: 101 MMOL/L (ref 98–107)
CO2 SERPL-SCNC: 27 MMOL/L (ref 22–29)
CREAT SERPL-MCNC: 1.53 MG/DL (ref 0.76–1.27)
DEPRECATED RDW RBC AUTO: 52.1 FL (ref 37–54)
EOSINOPHIL # BLD AUTO: 0.2 10*3/MM3 (ref 0–0.4)
EOSINOPHIL NFR BLD AUTO: 1.8 % (ref 0.3–6.2)
ERYTHROCYTE [DISTWIDTH] IN BLOOD BY AUTOMATED COUNT: 15.6 % (ref 12.3–15.4)
GFR SERPL CREATININE-BSD FRML MDRD: 50 ML/MIN/1.73
GLUCOSE BLDC GLUCOMTR-MCNC: 138 MG/DL (ref 70–105)
GLUCOSE SERPL-MCNC: 179 MG/DL (ref 65–99)
HCT VFR BLD AUTO: 47.6 % (ref 37.5–51)
HGB BLD-MCNC: 15.4 G/DL (ref 13–17.7)
HOLD SPECIMEN: NORMAL
INR PPP: 0.97 (ref 0.93–1.1)
LYMPHOCYTES # BLD AUTO: 1.3 10*3/MM3 (ref 0.7–3.1)
LYMPHOCYTES NFR BLD AUTO: 13.1 % (ref 19.6–45.3)
MAGNESIUM SERPL-MCNC: 1.8 MG/DL (ref 1.6–2.6)
MCH RBC QN AUTO: 30.6 PG (ref 26.6–33)
MCHC RBC AUTO-ENTMCNC: 32.4 G/DL (ref 31.5–35.7)
MCV RBC AUTO: 94.3 FL (ref 79–97)
MONOCYTES # BLD AUTO: 0.7 10*3/MM3 (ref 0.1–0.9)
MONOCYTES NFR BLD AUTO: 7.3 % (ref 5–12)
NEUTROPHILS NFR BLD AUTO: 7.8 10*3/MM3 (ref 1.7–7)
NEUTROPHILS NFR BLD AUTO: 77.3 % (ref 42.7–76)
NRBC BLD AUTO-RTO: 0 /100 WBC (ref 0–0.2)
PLATELET # BLD AUTO: 260 10*3/MM3 (ref 140–450)
PMV BLD AUTO: 8.4 FL (ref 6–12)
POTASSIUM SERPL-SCNC: 3.6 MMOL/L (ref 3.5–5.2)
PROTHROMBIN TIME: 10.7 SECONDS (ref 9.6–11.7)
RBC # BLD AUTO: 5.05 10*6/MM3 (ref 4.14–5.8)
SODIUM SERPL-SCNC: 141 MMOL/L (ref 136–145)
TROPONIN T SERPL-MCNC: <0.01 NG/ML (ref 0–0.03)
TROPONIN T SERPL-MCNC: <0.01 NG/ML (ref 0–0.03)
WBC # BLD AUTO: 10.1 10*3/MM3 (ref 3.4–10.8)

## 2020-10-05 PROCEDURE — 85025 COMPLETE CBC W/AUTO DIFF WBC: CPT | Performed by: NURSE PRACTITIONER

## 2020-10-05 PROCEDURE — G0378 HOSPITAL OBSERVATION PER HR: HCPCS

## 2020-10-05 PROCEDURE — 99204 OFFICE O/P NEW MOD 45 MIN: CPT | Performed by: INTERNAL MEDICINE

## 2020-10-05 PROCEDURE — 99220 PR INITIAL OBSERVATION CARE/DAY 70 MINUTES: CPT | Performed by: INTERNAL MEDICINE

## 2020-10-05 PROCEDURE — 99285 EMERGENCY DEPT VISIT HI MDM: CPT

## 2020-10-05 PROCEDURE — 93005 ELECTROCARDIOGRAM TRACING: CPT | Performed by: INTERNAL MEDICINE

## 2020-10-05 PROCEDURE — 71045 X-RAY EXAM CHEST 1 VIEW: CPT

## 2020-10-05 PROCEDURE — 84484 ASSAY OF TROPONIN QUANT: CPT | Performed by: PHYSICIAN ASSISTANT

## 2020-10-05 PROCEDURE — 82962 GLUCOSE BLOOD TEST: CPT

## 2020-10-05 PROCEDURE — 93005 ELECTROCARDIOGRAM TRACING: CPT | Performed by: NURSE PRACTITIONER

## 2020-10-05 PROCEDURE — 83036 HEMOGLOBIN GLYCOSYLATED A1C: CPT | Performed by: PHYSICIAN ASSISTANT

## 2020-10-05 PROCEDURE — 80048 BASIC METABOLIC PNL TOTAL CA: CPT | Performed by: NURSE PRACTITIONER

## 2020-10-05 PROCEDURE — 85730 THROMBOPLASTIN TIME PARTIAL: CPT | Performed by: NURSE PRACTITIONER

## 2020-10-05 PROCEDURE — 84484 ASSAY OF TROPONIN QUANT: CPT | Performed by: NURSE PRACTITIONER

## 2020-10-05 PROCEDURE — 73030 X-RAY EXAM OF SHOULDER: CPT

## 2020-10-05 PROCEDURE — 93225 XTRNL ECG REC<48 HRS REC: CPT

## 2020-10-05 PROCEDURE — 25010000002 ONDANSETRON PER 1 MG: Performed by: NURSE PRACTITIONER

## 2020-10-05 PROCEDURE — 83735 ASSAY OF MAGNESIUM: CPT | Performed by: NURSE PRACTITIONER

## 2020-10-05 PROCEDURE — 96374 THER/PROPH/DIAG INJ IV PUSH: CPT

## 2020-10-05 PROCEDURE — 25010000002 MORPHINE PER 10 MG: Performed by: NURSE PRACTITIONER

## 2020-10-05 PROCEDURE — 85610 PROTHROMBIN TIME: CPT | Performed by: NURSE PRACTITIONER

## 2020-10-05 PROCEDURE — 70450 CT HEAD/BRAIN W/O DYE: CPT

## 2020-10-05 PROCEDURE — 96375 TX/PRO/DX INJ NEW DRUG ADDON: CPT

## 2020-10-05 PROCEDURE — 93005 ELECTROCARDIOGRAM TRACING: CPT

## 2020-10-05 RX ORDER — NITROGLYCERIN 0.4 MG/1
0.4 TABLET SUBLINGUAL
Status: DISCONTINUED | OUTPATIENT
Start: 2020-10-05 | End: 2020-10-06 | Stop reason: HOSPADM

## 2020-10-05 RX ORDER — NICOTINE POLACRILEX 4 MG
15 LOZENGE BUCCAL
Status: DISCONTINUED | OUTPATIENT
Start: 2020-10-05 | End: 2020-10-06 | Stop reason: HOSPADM

## 2020-10-05 RX ORDER — ACETAMINOPHEN 160 MG/5ML
650 SOLUTION ORAL EVERY 4 HOURS PRN
Status: DISCONTINUED | OUTPATIENT
Start: 2020-10-05 | End: 2020-10-06 | Stop reason: HOSPADM

## 2020-10-05 RX ORDER — MORPHINE SULFATE 4 MG/ML
4 INJECTION, SOLUTION INTRAMUSCULAR; INTRAVENOUS ONCE
Status: COMPLETED | OUTPATIENT
Start: 2020-10-05 | End: 2020-10-05

## 2020-10-05 RX ORDER — POTASSIUM CHLORIDE 20 MEQ/1
40 TABLET, EXTENDED RELEASE ORAL AS NEEDED
Status: DISCONTINUED | OUTPATIENT
Start: 2020-10-05 | End: 2020-10-06 | Stop reason: HOSPADM

## 2020-10-05 RX ORDER — ONDANSETRON 2 MG/ML
4 INJECTION INTRAMUSCULAR; INTRAVENOUS EVERY 6 HOURS PRN
Status: DISCONTINUED | OUTPATIENT
Start: 2020-10-05 | End: 2020-10-06 | Stop reason: HOSPADM

## 2020-10-05 RX ORDER — CHOLECALCIFEROL (VITAMIN D3) 125 MCG
5 CAPSULE ORAL NIGHTLY PRN
Status: DISCONTINUED | OUTPATIENT
Start: 2020-10-05 | End: 2020-10-06 | Stop reason: HOSPADM

## 2020-10-05 RX ORDER — ALUMINA, MAGNESIA, AND SIMETHICONE 2400; 2400; 240 MG/30ML; MG/30ML; MG/30ML
15 SUSPENSION ORAL EVERY 6 HOURS PRN
Status: DISCONTINUED | OUTPATIENT
Start: 2020-10-05 | End: 2020-10-06 | Stop reason: HOSPADM

## 2020-10-05 RX ORDER — AMLODIPINE BESYLATE 5 MG/1
10 TABLET ORAL DAILY
Status: DISCONTINUED | OUTPATIENT
Start: 2020-10-06 | End: 2020-10-06 | Stop reason: HOSPADM

## 2020-10-05 RX ORDER — ISOSORBIDE MONONITRATE 30 MG/1
30 TABLET, EXTENDED RELEASE ORAL DAILY
Status: DISCONTINUED | OUTPATIENT
Start: 2020-10-06 | End: 2020-10-06 | Stop reason: HOSPADM

## 2020-10-05 RX ORDER — ACETAMINOPHEN 650 MG/1
650 SUPPOSITORY RECTAL EVERY 4 HOURS PRN
Status: DISCONTINUED | OUTPATIENT
Start: 2020-10-05 | End: 2020-10-06 | Stop reason: HOSPADM

## 2020-10-05 RX ORDER — BISACODYL 10 MG
10 SUPPOSITORY, RECTAL RECTAL DAILY PRN
Status: DISCONTINUED | OUTPATIENT
Start: 2020-10-05 | End: 2020-10-06 | Stop reason: HOSPADM

## 2020-10-05 RX ORDER — MAGNESIUM SULFATE HEPTAHYDRATE 40 MG/ML
2 INJECTION, SOLUTION INTRAVENOUS AS NEEDED
Status: DISCONTINUED | OUTPATIENT
Start: 2020-10-05 | End: 2020-10-06 | Stop reason: HOSPADM

## 2020-10-05 RX ORDER — ATORVASTATIN CALCIUM 40 MG/1
40 TABLET, FILM COATED ORAL DAILY
Status: DISCONTINUED | OUTPATIENT
Start: 2020-10-06 | End: 2020-10-06 | Stop reason: HOSPADM

## 2020-10-05 RX ORDER — SODIUM CHLORIDE 0.9 % (FLUSH) 0.9 %
10 SYRINGE (ML) INJECTION AS NEEDED
Status: DISCONTINUED | OUTPATIENT
Start: 2020-10-05 | End: 2020-10-06 | Stop reason: HOSPADM

## 2020-10-05 RX ORDER — ROPINIROLE 0.25 MG/1
0.5 TABLET, FILM COATED ORAL 3 TIMES DAILY
Status: DISCONTINUED | OUTPATIENT
Start: 2020-10-05 | End: 2020-10-06 | Stop reason: HOSPADM

## 2020-10-05 RX ORDER — ONDANSETRON 2 MG/ML
4 INJECTION INTRAMUSCULAR; INTRAVENOUS ONCE
Status: COMPLETED | OUTPATIENT
Start: 2020-10-05 | End: 2020-10-05

## 2020-10-05 RX ORDER — ACETAMINOPHEN 325 MG/1
650 TABLET ORAL EVERY 4 HOURS PRN
Status: DISCONTINUED | OUTPATIENT
Start: 2020-10-05 | End: 2020-10-06 | Stop reason: HOSPADM

## 2020-10-05 RX ORDER — BACLOFEN 10 MG/1
10 TABLET ORAL DAILY
Status: DISCONTINUED | OUTPATIENT
Start: 2020-10-06 | End: 2020-10-06 | Stop reason: HOSPADM

## 2020-10-05 RX ORDER — SODIUM CHLORIDE 0.9 % (FLUSH) 0.9 %
10 SYRINGE (ML) INJECTION EVERY 12 HOURS SCHEDULED
Status: DISCONTINUED | OUTPATIENT
Start: 2020-10-05 | End: 2020-10-06 | Stop reason: HOSPADM

## 2020-10-05 RX ORDER — DEXTROSE MONOHYDRATE 25 G/50ML
25 INJECTION, SOLUTION INTRAVENOUS
Status: DISCONTINUED | OUTPATIENT
Start: 2020-10-05 | End: 2020-10-06 | Stop reason: HOSPADM

## 2020-10-05 RX ORDER — MAGNESIUM SULFATE 1 G/100ML
1 INJECTION INTRAVENOUS AS NEEDED
Status: DISCONTINUED | OUTPATIENT
Start: 2020-10-05 | End: 2020-10-06 | Stop reason: HOSPADM

## 2020-10-05 RX ORDER — HYDROCODONE BITARTRATE AND ACETAMINOPHEN 5; 325 MG/1; MG/1
1 TABLET ORAL EVERY 6 HOURS PRN
Status: DISCONTINUED | OUTPATIENT
Start: 2020-10-05 | End: 2020-10-06 | Stop reason: HOSPADM

## 2020-10-05 RX ORDER — ONDANSETRON 4 MG/1
4 TABLET, FILM COATED ORAL EVERY 6 HOURS PRN
Status: DISCONTINUED | OUTPATIENT
Start: 2020-10-05 | End: 2020-10-06 | Stop reason: HOSPADM

## 2020-10-05 RX ORDER — LEVETIRACETAM 500 MG/1
500 TABLET ORAL EVERY 12 HOURS SCHEDULED
Status: DISCONTINUED | OUTPATIENT
Start: 2020-10-05 | End: 2020-10-06 | Stop reason: HOSPADM

## 2020-10-05 RX ORDER — BUPROPION HYDROCHLORIDE 150 MG/1
300 TABLET ORAL EVERY MORNING
Status: DISCONTINUED | OUTPATIENT
Start: 2020-10-06 | End: 2020-10-06 | Stop reason: HOSPADM

## 2020-10-05 RX ORDER — ACETAMINOPHEN 500 MG
1000 TABLET ORAL ONCE
Status: COMPLETED | OUTPATIENT
Start: 2020-10-05 | End: 2020-10-05

## 2020-10-05 RX ORDER — METOPROLOL TARTRATE 50 MG/1
100 TABLET, FILM COATED ORAL EVERY 12 HOURS SCHEDULED
Status: DISCONTINUED | OUTPATIENT
Start: 2020-10-05 | End: 2020-10-06 | Stop reason: HOSPADM

## 2020-10-05 RX ADMIN — ROPINIROLE 0.5 MG: 0.25 TABLET, FILM COATED ORAL at 21:51

## 2020-10-05 RX ADMIN — SODIUM CHLORIDE 1000 ML: 900 INJECTION, SOLUTION INTRAVENOUS at 19:04

## 2020-10-05 RX ADMIN — ACETAMINOPHEN 1000 MG: 500 TABLET, FILM COATED ORAL at 15:29

## 2020-10-05 RX ADMIN — ONDANSETRON 4 MG: 2 INJECTION INTRAMUSCULAR; INTRAVENOUS at 15:45

## 2020-10-05 RX ADMIN — MORPHINE SULFATE 4 MG: 4 INJECTION INTRAVENOUS at 15:45

## 2020-10-05 RX ADMIN — HYDROCODONE BITARTRATE AND ACETAMINOPHEN 1 TABLET: 5; 325 TABLET ORAL at 18:35

## 2020-10-05 RX ADMIN — LEVETIRACETAM 500 MG: 500 TABLET ORAL at 21:54

## 2020-10-05 RX ADMIN — METOPROLOL TARTRATE 100 MG: 50 TABLET, FILM COATED ORAL at 21:54

## 2020-10-05 NOTE — PROGRESS NOTES
Discharge Planning Assessment  [facility]     Patient Name: Lonnie Arora  MRN: [id number]  Today's Date: 10/5/2020    Admit Date: 10/5/2020    Discharge Needs Assessment     Row Name 10/05/20 1519       Discharge Needs Assessment    Current Outpatient/Agency/Support Group  -- Per review of previous dc notes,[facility] was arranged at that time.            Demographic Summary     Row Name 10/05/20 1518       General Information    Arrived From  home;other (see comments) [facility] - had syncopal episode in parking lot    Referral Source  high risk screening    Reason for Consult  discharge planning     Used During This Interaction  other (see comments)    General Information Comments  Due to Covid 19 precautions, this  is working remotely.I attempted to call patient on his ER telephone as well as his cell phone ([phone]).He didn't answer either phone number          Unable to reach patient by ER phone or his personal cellphone.    I did confirm with Asif at [facility] that patient remains current with them for services.    It appears patient followed-up with Dr. Almanzar on 10/28/2020.    Potential new needs at [place]    Meri Mendoza RN, [facility]  Office: [phone]  Fax: [phone]  [email or web address] Mendoza RN, [facility]  Office: [phone]  Fax: [phone]  [email or web address]      Meri Mendoza RN

## 2020-10-05 NOTE — DISCHARGE PLACEMENT REQUEST
"Lonnie Linton (45 y.o. Male)     Date of Birth Social Security Number Address Home Phone MRN    1975  [id number] [facility] [address] [phone] [id number]    Judaism Marital Status          None        Admission Date Admission Type Admitting Provider Attending Provider Department, Room/Bed    10/5/20 Emergency   [facility] EMERGENCY DEPARTMENT, 02/02    Discharge Date Discharge Disposition Discharge Destination                       Attending Provider: (none)   Allergies: No Known Allergies    Isolation: None   Infection: None   Code Status: Prior    Ht: 162.6 cm (64\")   Wt: 103 kg (227 lb 1.2 oz)    Admission Cmt: None   Principal Problem: None                Active Insurance as of 10/5/2020     Primary Coverage     Payor Plan Insurance Group Employer/Plan Group    HUMANA MEDICARE REPLACEMENT HUMANA MEDICARE REPLACEMENT [id number]     Payor Plan Address Payor Plan Phone Number Payor Plan Fax Number Effective Dates    [address] [address] [phone]  1/1/2020 - None Entered    [facility] [id number]       Subscriber Name Subscriber Birth Date Member ID       LONNIE LINTON 1975 [id number]                 Emergency Contacts      (Rel.) Home Phone Work Phone Mobile Phone    DAMIÁN LINTON (Spouse) -- -- [phone]          "

## 2020-10-05 NOTE — ED NOTES
Pt reports he went to the store to get gloves, was nauseous in line so he went outside to vomit and passed out when he stood back up. Pt reports left shoulder pain with obvious deformity.        Dayanara Dunbar, RN  10/05/20 7582

## 2020-10-05 NOTE — H&P
[facility]      Patient Name: Lonnie Arora  : 1975  MRN: [id number]  Primary Care Physician: Sanjay Chance MD  Date of admission: 10/5/2020    Patient Care Team:  Sanjay Chance MD as PCP - General          Subjective   History Present Illness     Chief Complaint:   Chief Complaint   Patient presents with   • Syncope         Mr. Arora is a 45 y.o. male presents to [facility] ER on 10/5/2020 complaining of syncopal episode just prior to arrival today.  Patient states he was in a minor [employer] when he started to feel nauseous so he went out to his car and dry heaves a few times but did not actually vomit.  Patient then remembers EMS taking him to the hospital.  By standards reported to EMS that they did not see any seizure-like activity and the patient had just passed out for about a minute.  EMS did not report any incontinence or postictal state.  Patient denies any episodes like this before or recent illness.  Patient denies fever, chills, cough, chest pain, shortness of breath, abdominal pain, diarrhea, urinary symptoms or swelling in his legs bilaterally.  Patient reports that he had very little to eat or drink today as well made his stomach upset.  Patient was seen by Dr. Snowden with cardiology in which he had a echo and stress test done that came back normal about a month ago.  He needed cardiology clearance for surgery done 10 September for colostomy reversal.  Patient otherwise does not follow regularly with cardiology for any reason.  Patient had follow-up with Dr. Almanzar about 2 weeks ago in which she was told everything was normal.  Patient has a history of seizures.  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    In the ED, initial troponin negative, serum creatinine 1.53, last serum  creatinine 1.2 about 2 weeks ago.  BUN of 11, GFR 50.  Magnesium normal 1.8, CBC largely unremarkable.  X-ray of left shoulders shows slightly displaced transverse impaction fracture of the left femoral neck.  Chest x-ray shows low lung volumes without definite acute cardiopulmonary abnormality.  CT head showed no acute intracranial findings.  Encephalomalacia consistent with chronic right MCA territory infarct, similar to prior exam.  EKG shows sinus rhythm 79 bpm, nonspecific repolarization abnormality in diffuse leads, no ST elevation apparent.  Patient afebrile, pulse in the 80s, on room air oxygen 100% SPO2 and blood pressure normotensive.  Patient given Tylenol, morphine, Zofran in ED.  Patient placed on Holter monitor for ventricular arrhythmia.  UA pending.    Review of Systems   Constitution: Negative. Negative for chills and fever.   HENT: Negative.    Cardiovascular: Positive for syncope. Negative for chest pain, leg swelling, orthopnea and palpitations.   Respiratory: Negative.  Negative for cough and shortness of breath.    Skin: Negative.    Musculoskeletal: Negative.    Gastrointestinal: Positive for nausea and vomiting (dry heaving). Negative for abdominal pain and diarrhea.   Genitourinary: Negative.    Neurological: Positive for light-headedness. Negative for headaches and weakness.        History of residual left-sided weakness.   Psychiatric/Behavioral: Negative.            Personal History     Past Medical History:   Past Medical History:   Diagnosis Date   • Diabetes (CMS/HCC)    • GERD (gastroesophageal reflux disease)    • Hypertension    • Seizures (CMS/HCC)    • Stroke (CMS/HCC) 2010       Surgical History:      Past Surgical History:   Procedure Laterality Date   • COLOSTOMY Left 10/21/2019    Procedure: Laparoscopic diverting colostomy;  Surgeon: Niraj Almanzar MD;  Location: [facility] OR;  Service: General   • COLOSTOMY N/A 9/10/2020    Procedure: COLOSTOMY REVERSAL;   Surgeon: Niraj Almanzar MD;  Location: [facility] MAIN OR;  Service: General;  Laterality: N/A;   • FRACTURE SURGERY      right wrist    • HERNIA REPAIR      umbilical    • INCISION AND DRAINAGE OF WOUND Left 10/15/2019    Procedure: INCISION AND DRAINAGE OF LEFT GLUTEAL REGION;  Surgeon: Niraj Almanzar MD;  Location: [facility] MAIN OR;  Service: General   • URETERAL STENT INSERTION      kidney stone stents            Family History: family history includes Breast cancer in his mother; Cervical cancer in his mother. Otherwise pertinent FHx was reviewed and unremarkable.     Social History:  reports that he quit smoking about a year ago. His smoking use included cigars. He has never used smokeless tobacco. He reports previous alcohol use. He reports previous drug use. Drug: Marijuana.      Medications:  Prior to Admission medications    Medication Sig Start Date End Date Taking? Authorizing Provider   amLODIPine (NORVASC) 10 MG tablet Take 10 mg by mouth Daily. Take preop    Zunilda Olmedo MD   atorvastatin (LIPITOR) 40 MG tablet Take 40 mg by mouth Daily. Take preop    Zunilda Olmedo MD   baclofen (LIORESAL) 10 MG tablet Take 10 mg by mouth Daily. Do not take preop    Zunilda Olmedo MD   buPROPion XL (WELLBUTRIN XL) 300 MG 24 hr tablet Take 300 mg by mouth Every Morning. Take preop 12/23/19   Zunilda Olmedo MD   Insulin Glargine-Lixisenatide (Soliqua) 100-33 UNT-MCG/ML solution pen-injector injection Inject 15-30 Units under the skin into the appropriate area as directed Daily. 9/3/20   Zunilda Olmedo MD   insulin lispro (humaLOG) 100 UNIT/ML injection Inject 0-24 Units under the skin into the appropriate area as directed 4 (Four) Times a Day With Meals & at Bedtime.  Patient taking differently: Inject 0-24 Units under the skin into the appropriate area as directed 4 (Four) Times a Day With Meals & at Bedtime. Pt not taking at this time due to expense dont take preop  not taking currently 10/24/19   Jeremi Roy MD   isosorbide mononitrate (IMDUR) 30 MG 24 hr tablet Take 30 mg by mouth Daily. Take preop    Zunilda Olmedo MD   levETIRAcetam (KEPPRA) 500 MG tablet Take 500 mg by mouth 2 (Two) Times a Day. Take preop    Zunilda Olmedo MD   lisinopril (PRINIVIL,ZESTRIL) 40 MG tablet Take 40 mg by mouth Daily. Do not take 24 hours before surgery    Zunilda Olmedo MD   Melatonin 5 MG tablet dispersible Take 1 tablet by mouth every night at bedtime. 12/23/19   Zunilda Olmedo MD   metoprolol tartrate (LOPRESSOR) 100 MG tablet Take 100 mg by mouth 2 (Two) Times a Day. Take preop    Zunilda Olmedo MD   ondansetron (ZOFRAN) 4 MG tablet Take 1 tablet by mouth Every 6 (Six) Hours As Needed for Nausea or Vomiting. 10/24/19   Jeremi Roy MD   rOPINIRole (REQUIP) 0.5 MG tablet Take 0.5 mg by mouth 3 (Three) Times a Day. Take preop    Zunilda Olmedo MD       Allergies:  No Known Allergies    Objective   Objective     Vital Signs  Temp:  [98.2 °F (36.8 °C)] 98.2 °F (36.8 °C)  Heart Rate:  [80-94] 87  Resp:  [16] 16  BP: (111-142)/(74-98) 111/95  SpO2:  [93 %-100 %] 93 %  on   ;   Device (Oxygen Therapy): room air  Body mass index is 38.98 kg/m².    Physical Exam  Vitals signs and nursing note reviewed.   Constitutional:       General: He is not in acute distress.     Appearance: He is well-developed. He is obese. He is not diaphoretic.   HENT:      Head: Normocephalic and atraumatic.      Nose: Nose normal.      Mouth/Throat:      Pharynx: No oropharyngeal exudate.   Eyes:      Extraocular Movements: Extraocular movements intact.      Conjunctiva/sclera: Conjunctivae normal.      Pupils: Pupils are equal, round, and reactive to light.   Neck:      Musculoskeletal: Normal range of motion.   Cardiovascular:      Rate and Rhythm: Normal rate and regular rhythm.      Heart sounds: Normal heart sounds.      Comments: S1, S2 audible.  Pulmonary:      Effort:  Pulmonary effort is normal. No respiratory distress.      Breath sounds: Normal breath sounds. No wheezing or rales.      Comments: On room air.  Abdominal:      General: Bowel sounds are normal. There is no distension.      Palpations: Abdomen is soft.      Tenderness: There is no abdominal tenderness. There is no guarding or rebound.      Hernia: No hernia is present.      Comments: Dressings in place in the left lower abdomen.  No drainage apparent.   Musculoskeletal: Normal range of motion.         General: No tenderness or deformity.      Comments: Patient in sling on left arm.  Patient neurovascular intact in upper extremities bilaterally.   Skin:     General: Skin is warm.      Findings: No erythema or rash.   Neurological:      Mental Status: He is alert and oriented to person, place, and time. Mental status is at baseline.      Cranial Nerves: No cranial nerve deficit.      Sensory: No sensory deficit.      Motor: Weakness (left sided weakness apparent, consistant with known CVA in 2010) present.      Coordination: Coordination normal.   Psychiatric:         Mood and Affect: Mood normal.         Behavior: Behavior normal.         Results Review:  I have personally reviewed most recent cardiac tracings, lab results and radiology images and interpretations and agree with findings.    Results from last 7 days   Lab Units 10/05/20  1345   WBC 10*3/mm3 10.10   HEMOGLOBIN g/dL 15.4   HEMATOCRIT % 47.6   PLATELETS 10*3/mm3 260   INR  0.97     Results from last 7 days   Lab Units 10/05/20  1345   SODIUM mmol/L 141   POTASSIUM mmol/L 3.6   CHLORIDE mmol/L 101   CO2 mmol/L 27.0   BUN  11   CREATININE mg/dL 1.53*   GLUCOSE mg/dL 179*   CALCIUM mg/dL 8.4*   TROPONIN T ng/mL <0.010     Estimated Creatinine Clearance: 66.8 mL/min (A) (by C-G formula based on SCr of 1.53 mg/dL (H)).  Brief Urine Lab Results  (Last result in the past 365 days)      Color   Clarity   Blood   Leuk Est   Nitrite   Protein   CREAT   Urine HCG         10/15/19 1444 Dark Yellow Turbid Negative Trace Negative 30 mg/dL (1+)               Microbiology Results (last 10 days)     ** No results found for the last 240 hours. **          ECG/EMG Results (most recent)     Procedure Component Value Units Date/Time    ECG 12 Lead [[id number]] Collected: 10/05/20 1333     Updated: 10/05/20 1336    Narrative:      HEART RATE= 79  bpm  RR Interval= 760  ms  AK Interval= 156  ms  P Horizontal Axis= 24  deg  P Front Axis= 17  deg  QRSD Interval= 98  ms  QT Interval= 384  ms  QRS Axis= -5  deg  T Wave Axis= 24  deg  - ABNORMAL ECG -  Sinus rhythm  Nonspecific repol abnormality, diffuse leads  When compared with ECG of 04-Aug-2020 14:10:00,  Significant repolarization change  Electronically Signed By:   Date and Time of Study: 2020-10-05 13:33:50    ECG 12 Lead [[id number]] Resulted: 10/05/20 1551     Updated: 10/05/20 1551                    Xr Shoulder 2+ View Left    Result Date: 10/5/2020  Slightly displaced transverse impaction fracture of the left femoral neck.  Electronically Signed By-Clem Rawls On:10/5/2020 2:52 PM This report was finalized on [id number] by  Clem Rawls, .    Ct Head Without Contrast    Result Date: 10/5/2020   1. No acute intracranial findings. 2. Encephalomalacia consistent with chronic right MCA territory infarct, similar to prior exam.  Electronically Signed By-Dr. Rowena Carballo MD On:10/5/2020 3:16 PM This report was finalized on [id number] by Dr. Rowena Carballo MD.    Xr Chest 1 View    Result Date: 10/5/2020  Low lung volumes without definite acute cardiopulmonary abnormality.  Suspected proximal left humerus fracture. Recommend dedicated shoulder radiographs.  Electronically Signed By-Lisa Madison On:10/5/2020 2:09 PM This report was finalized on [id number] by  Lisa Madison, .        Estimated Creatinine Clearance: 66.8 mL/min (A) (by C-G formula based on SCr of 1.53 mg/dL (H)).    Assessment/Plan   Assessment/Plan       Active  Hospital Problems    Diagnosis  POA   • **Syncope [R55]  Yes   • CAD (coronary artery disease) [I25.10]  Yes   • EDIL (acute kidney injury) (CMS/[facility]) [N17.9]  Yes   • CKD (chronic kidney disease), stage III [N18.30]  Yes   • Restless leg syndrome [G25.81]  Yes   • History of CVA (cerebrovascular accident) [Z86.73]  Not Applicable   • Humeral fracture [S42.309A]  Yes   • Type 2 diabetes mellitus (CMS/[facility]) [E11.9]  Yes   • Seizure disorder (CMS/[facility]) [G40.909]  Yes   • Major depression, chronic [F32.9]  Yes   • Hyperlipidemia, mixed [E78.2]  Yes   • GERD (gastroesophageal reflux disease) [K21.9]  Yes   • Benign essential hypertension [I10]  Yes      Resolved Hospital Problems   No resolved problems to display.       Syncope  -Likely vasovagal response, however arrhythmia found in ED and will rule out cardiac causes  -initial troponin negative,   -Magnesium normal 1.8, CBC largely unremarkable.    -X-ray of left shoulders shows slightly displaced transverse impaction fracture of the left humeral neck.    -Chest x-ray shows low lung volumes without definite acute cardiopulmonary abnormality.    -CT head showed no acute intracranial findings.  Encephalomalacia consistent with chronic right MCA territory infarct, similar to prior exam.    -EKG shows sinus rhythm 79 bpm, nonspecific repolarization abnormality in diffuse leads, no ST elevation apparent.    -Patient afebrile, pulse in the 80s, on room air oxygen 100% SPO2 and blood pressure normotensive.    -Patient given Tylenol, morphine, Zofran in ED.  Patient placed on Holter monitor for ventricular arrhythmia.    -UA pending.  -Cardiology consult  -Check echo, bilateral carotid Dopplers, TSH, serial troponins, continue Holter monitor, check orthostatics  -IV fluids 1 L normal saline ordered  -Cardiac monitoring  Heart healthy diet until n.p.o. midnight    EDIL with CKD stage III  -Likely secondary to prerenal azotemia, DM  -serum creatinine 1.53, last serum creatinine 1.2  about 2 weeks ago.  BUN of 11, GFR 50.    -1 L normal saline ordered  -Monitor BMP    Left humeral neck fracture  -X-ray of left shoulders shows slightly displaced transverse impaction fracture of the left humeral neck.  -sling in place  -consider ortho consult    CAD  -Continue home Imdur, Lopressor    Essential hypertension, chronic, controlled  -Continue home Norvasc, Lopressor    Hyperlipidemia  -Continue home statin    Depression  -Continue home Wellbutrin    Seizure disorder  -Continue home Keppra    Diabetes mellitus type 2  -SSI, Accu-Cheks 3 times daily before meals  -Check A1c  -Hold home soliqua    Restless leg syndrome/muscle spasms  -Continue home Requip, baclofen    History of CVA in 2010 with residual left-sided weakness      VTE Prophylaxis - SCDs  Mechanical Order History:     None      Pharmalogical Order History:     None          CODE STATUS:    Code Status and Medical Interventions:   Ordered at: 10/05/20 1627     Code Status:    CPR     Medical Interventions (Level of Support Prior to Arrest):    Full       This patient has been examined wearing appropriate Personal Protective Equipment and discussed with hospital infection control department. 10/05/20      I discussed the patient's findings and my recommendations with patient.        Electronically signed by BRIGETTE Pleitez, 10/05/20, 3:49 PM EDT.  [facility] Hospitalist Team

## 2020-10-06 ENCOUNTER — READMISSION MANAGEMENT (OUTPATIENT)
Dept: CALL CENTER | Facility: HOSPITAL | Age: 45
End: 2020-10-06

## 2020-10-06 ENCOUNTER — APPOINTMENT (OUTPATIENT)
Dept: RESPIRATORY THERAPY | Facility: HOSPITAL | Age: 45
End: 2020-10-06

## 2020-10-06 ENCOUNTER — APPOINTMENT (OUTPATIENT)
Dept: CARDIOLOGY | Facility: HOSPITAL | Age: 45
End: 2020-10-06

## 2020-10-06 VITALS
SYSTOLIC BLOOD PRESSURE: 122 MMHG | DIASTOLIC BLOOD PRESSURE: 89 MMHG | HEIGHT: 64 IN | OXYGEN SATURATION: 97 % | RESPIRATION RATE: 16 BRPM | HEART RATE: 85 BPM | BODY MASS INDEX: 38.77 KG/M2 | TEMPERATURE: 96.3 F | WEIGHT: 227.07 LBS

## 2020-10-06 PROBLEM — R55 SYNCOPE: Status: RESOLVED | Noted: 2020-10-05 | Resolved: 2020-10-06

## 2020-10-06 LAB
AMPHET+METHAMPHET UR QL: POSITIVE
ANION GAP SERPL CALCULATED.3IONS-SCNC: 9 MMOL/L (ref 5–15)
BARBITURATES UR QL SCN: NEGATIVE
BASOPHILS # BLD AUTO: 0.1 10*3/MM3 (ref 0–0.2)
BASOPHILS NFR BLD AUTO: 1 % (ref 0–1.5)
BENZODIAZ UR QL SCN: NEGATIVE
BH CV XLRA MEAS LEFT CCA RATIO VEL: 88.9 CM/SEC
BH CV XLRA MEAS LEFT DIST CCA EDV: -24.1 CM/SEC
BH CV XLRA MEAS LEFT DIST CCA PSV: -86.2 CM/SEC
BH CV XLRA MEAS LEFT DIST ICA EDV: -24.5 CM/SEC
BH CV XLRA MEAS LEFT DIST ICA PSV: -64.1 CM/SEC
BH CV XLRA MEAS LEFT ICA RATIO VEL: -64.1 CM/SEC
BH CV XLRA MEAS LEFT ICA/CCA RATIO: -0.72
BH CV XLRA MEAS LEFT PROX CCA EDV: 20.9 CM/SEC
BH CV XLRA MEAS LEFT PROX CCA PSV: 88.9 CM/SEC
BH CV XLRA MEAS LEFT PROX ECA PSV: -96.6 CM/SEC
BH CV XLRA MEAS LEFT PROX ICA EDV: -21 CM/SEC
BH CV XLRA MEAS LEFT PROX ICA PSV: -60.6 CM/SEC
BH CV XLRA MEAS LEFT PROX SCLA PSV: 111 CM/SEC
BH CV XLRA MEAS LEFT VERTEBRAL A PSV: 26.6 CM/SEC
BH CV XLRA MEAS RIGHT CCA RATIO VEL: 68.6 CM/SEC
BH CV XLRA MEAS RIGHT DIST CCA EDV: -16.5 CM/SEC
BH CV XLRA MEAS RIGHT DIST CCA PSV: -67 CM/SEC
BH CV XLRA MEAS RIGHT DIST ICA EDV: -21.6 CM/SEC
BH CV XLRA MEAS RIGHT DIST ICA PSV: -56.1 CM/SEC
BH CV XLRA MEAS RIGHT ICA RATIO VEL: -56.1 CM/SEC
BH CV XLRA MEAS RIGHT ICA/CCA RATIO: -0.82
BH CV XLRA MEAS RIGHT PROX CCA EDV: 16.5 CM/SEC
BH CV XLRA MEAS RIGHT PROX CCA PSV: 68.6 CM/SEC
BH CV XLRA MEAS RIGHT PROX ECA PSV: -81.2 CM/SEC
BH CV XLRA MEAS RIGHT PROX ICA EDV: -17.9 CM/SEC
BH CV XLRA MEAS RIGHT PROX ICA PSV: -49.2 CM/SEC
BH CV XLRA MEAS RIGHT PROX SCLA PSV: 92.5 CM/SEC
BH CV XLRA MEAS RIGHT VERTEBRAL A PSV: 40.1 CM/SEC
BILIRUB UR QL STRIP: ABNORMAL
BUN SERPL-MCNC: 12 MG/DL (ref 6–20)
BUN SERPL-MCNC: ABNORMAL MG/DL
BUN/CREAT SERPL: ABNORMAL
CALCIUM SPEC-SCNC: 7.6 MG/DL (ref 8.6–10.5)
CANNABINOIDS SERPL QL: NEGATIVE
CHLORIDE SERPL-SCNC: 104 MMOL/L (ref 98–107)
CLARITY UR: CLEAR
CO2 SERPL-SCNC: 28 MMOL/L (ref 22–29)
COCAINE UR QL: NEGATIVE
COLOR UR: YELLOW
CREAT SERPL-MCNC: 1.34 MG/DL (ref 0.76–1.27)
DEPRECATED RDW RBC AUTO: 50.8 FL (ref 37–54)
EOSINOPHIL # BLD AUTO: 0.1 10*3/MM3 (ref 0–0.4)
EOSINOPHIL NFR BLD AUTO: 0.8 % (ref 0.3–6.2)
ERYTHROCYTE [DISTWIDTH] IN BLOOD BY AUTOMATED COUNT: 15.4 % (ref 12.3–15.4)
GFR SERPL CREATININE-BSD FRML MDRD: 58 ML/MIN/1.73
GLUCOSE BLDC GLUCOMTR-MCNC: 107 MG/DL (ref 70–105)
GLUCOSE BLDC GLUCOMTR-MCNC: 142 MG/DL (ref 70–105)
GLUCOSE SERPL-MCNC: 113 MG/DL (ref 65–99)
GLUCOSE UR STRIP-MCNC: NEGATIVE MG/DL
HBA1C MFR BLD: 8.6 % (ref 3.5–5.6)
HCT VFR BLD AUTO: 40.8 % (ref 37.5–51)
HGB BLD-MCNC: 13.2 G/DL (ref 13–17.7)
HGB UR QL STRIP.AUTO: NEGATIVE
KETONES UR QL STRIP: NEGATIVE
LEUKOCYTE ESTERASE UR QL STRIP.AUTO: NEGATIVE
LYMPHOCYTES # BLD AUTO: 1 10*3/MM3 (ref 0.7–3.1)
LYMPHOCYTES NFR BLD AUTO: 11.2 % (ref 19.6–45.3)
MAGNESIUM SERPL-MCNC: 1.7 MG/DL (ref 1.6–2.6)
MCH RBC QN AUTO: 30.3 PG (ref 26.6–33)
MCHC RBC AUTO-ENTMCNC: 32.4 G/DL (ref 31.5–35.7)
MCV RBC AUTO: 93.5 FL (ref 79–97)
METHADONE UR QL SCN: NEGATIVE
MONOCYTES # BLD AUTO: 1 10*3/MM3 (ref 0.1–0.9)
MONOCYTES NFR BLD AUTO: 10.8 % (ref 5–12)
NEUTROPHILS NFR BLD AUTO: 7 10*3/MM3 (ref 1.7–7)
NEUTROPHILS NFR BLD AUTO: 76.2 % (ref 42.7–76)
NITRITE UR QL STRIP: NEGATIVE
NRBC BLD AUTO-RTO: 0 /100 WBC (ref 0–0.2)
OPIATES UR QL: POSITIVE
OXYCODONE UR QL SCN: POSITIVE
PH UR STRIP.AUTO: 5 [PH] (ref 5–8)
PLATELET # BLD AUTO: 210 10*3/MM3 (ref 140–450)
PMV BLD AUTO: 7.7 FL (ref 6–12)
POTASSIUM SERPL-SCNC: 3.8 MMOL/L (ref 3.5–5.2)
PROT UR QL STRIP: NEGATIVE
RBC # BLD AUTO: 4.37 10*6/MM3 (ref 4.14–5.8)
SODIUM SERPL-SCNC: 141 MMOL/L (ref 136–145)
SP GR UR STRIP: 1.02 (ref 1–1.03)
TSH SERPL DL<=0.05 MIU/L-ACNC: 0.7 UIU/ML (ref 0.27–4.2)
UROBILINOGEN UR QL STRIP: ABNORMAL
WBC # BLD AUTO: 9.2 10*3/MM3 (ref 3.4–10.8)

## 2020-10-06 PROCEDURE — 80307 DRUG TEST PRSMV CHEM ANLYZR: CPT | Performed by: INTERNAL MEDICINE

## 2020-10-06 PROCEDURE — 84443 ASSAY THYROID STIM HORMONE: CPT | Performed by: INTERNAL MEDICINE

## 2020-10-06 PROCEDURE — 25010000002 INFLUENZA VAC SPLIT QUAD 0.5 ML SUSPENSION PREFILLED SYRINGE: Performed by: INTERNAL MEDICINE

## 2020-10-06 PROCEDURE — 93880 EXTRACRANIAL BILAT STUDY: CPT

## 2020-10-06 PROCEDURE — 99213 OFFICE O/P EST LOW 20 MIN: CPT | Performed by: INTERNAL MEDICINE

## 2020-10-06 PROCEDURE — 93270 REMOTE 30 DAY ECG REV/REPORT: CPT

## 2020-10-06 PROCEDURE — 81003 URINALYSIS AUTO W/O SCOPE: CPT | Performed by: INTERNAL MEDICINE

## 2020-10-06 PROCEDURE — 90686 IIV4 VACC NO PRSV 0.5 ML IM: CPT | Performed by: INTERNAL MEDICINE

## 2020-10-06 PROCEDURE — 85025 COMPLETE CBC W/AUTO DIFF WBC: CPT | Performed by: PHYSICIAN ASSISTANT

## 2020-10-06 PROCEDURE — 83735 ASSAY OF MAGNESIUM: CPT | Performed by: PHYSICIAN ASSISTANT

## 2020-10-06 PROCEDURE — 80048 BASIC METABOLIC PNL TOTAL CA: CPT | Performed by: PHYSICIAN ASSISTANT

## 2020-10-06 PROCEDURE — G0008 ADMIN INFLUENZA VIRUS VAC: HCPCS | Performed by: INTERNAL MEDICINE

## 2020-10-06 PROCEDURE — G0378 HOSPITAL OBSERVATION PER HR: HCPCS

## 2020-10-06 PROCEDURE — 99217 PR OBSERVATION CARE DISCHARGE MANAGEMENT: CPT | Performed by: INTERNAL MEDICINE

## 2020-10-06 PROCEDURE — 82962 GLUCOSE BLOOD TEST: CPT

## 2020-10-06 RX ORDER — OXYCODONE HYDROCHLORIDE 5 MG/1
5 TABLET ORAL EVERY 4 HOURS PRN
Status: DISCONTINUED | OUTPATIENT
Start: 2020-10-06 | End: 2020-10-06 | Stop reason: HOSPADM

## 2020-10-06 RX ORDER — OXYCODONE HYDROCHLORIDE AND ACETAMINOPHEN 5; 325 MG/1; MG/1
1 TABLET ORAL EVERY 4 HOURS PRN
Qty: 30 TABLET | Refills: 0 | Status: SHIPPED | OUTPATIENT
Start: 2020-10-06 | End: 2021-01-13

## 2020-10-06 RX ADMIN — Medication 10 ML: at 07:35

## 2020-10-06 RX ADMIN — OXYCODONE 5 MG: 5 TABLET ORAL at 13:25

## 2020-10-06 RX ADMIN — ISOSORBIDE MONONITRATE 30 MG: 30 TABLET, EXTENDED RELEASE ORAL at 07:33

## 2020-10-06 RX ADMIN — ROPINIROLE 0.5 MG: 0.25 TABLET, FILM COATED ORAL at 16:03

## 2020-10-06 RX ADMIN — Medication 10 ML: at 03:16

## 2020-10-06 RX ADMIN — METOPROLOL TARTRATE 100 MG: 50 TABLET, FILM COATED ORAL at 07:32

## 2020-10-06 RX ADMIN — ATORVASTATIN CALCIUM 40 MG: 40 TABLET, FILM COATED ORAL at 07:33

## 2020-10-06 RX ADMIN — BUPROPION HYDROCHLORIDE 300 MG: 150 TABLET, EXTENDED RELEASE ORAL at 07:34

## 2020-10-06 RX ADMIN — ROPINIROLE 0.5 MG: 0.25 TABLET, FILM COATED ORAL at 07:35

## 2020-10-06 RX ADMIN — HYDROCODONE BITARTRATE AND ACETAMINOPHEN 1 TABLET: 5; 325 TABLET ORAL at 03:15

## 2020-10-06 RX ADMIN — LEVETIRACETAM 500 MG: 500 TABLET ORAL at 07:34

## 2020-10-06 RX ADMIN — BACLOFEN 10 MG: 10 TABLET ORAL at 07:34

## 2020-10-06 RX ADMIN — INFLUENZA VIRUS VACCINE 0.5 ML: 15; 15; 15; 15 SUSPENSION INTRAMUSCULAR at 13:59

## 2020-10-06 RX ADMIN — ACETAMINOPHEN 650 MG: 325 TABLET, FILM COATED ORAL at 16:03

## 2020-10-06 RX ADMIN — AMLODIPINE BESYLATE 10 MG: 5 TABLET ORAL at 07:32

## 2020-10-06 NOTE — PLAN OF CARE
Problem: Adult Inpatient Plan of Care  Goal: Plan of Care Review  Outcome: Ongoing, Progressing  Flowsheets (Taken 10/5/2020 2141)  Plan of Care Reviewed With: patient  Goal: Patient-Specific Goal (Individualized)  Outcome: Ongoing, Progressing  Goal: Absence of Hospital-Acquired Illness or Injury  Outcome: Ongoing, Progressing  Intervention: Identify and Manage Fall Risk  Recent Flowsheet Documentation  Taken 10/5/2020 2100 by Elis Cardona RN  Safety Promotion/Fall Prevention: safety round/check completed  Taken 10/5/2020 2000 by Elis Cardona RN  Safety Promotion/Fall Prevention:   safety round/check completed   activity supervised  Intervention: Prevent Skin Injury  Recent Flowsheet Documentation  Taken 10/5/2020 2100 by Elis Cardona RN  Body Position: supine  Intervention: Prevent Infection  Recent Flowsheet Documentation  Taken 10/5/2020 2100 by Elis Cardona RN  Infection Prevention:   personal protective equipment utilized   rest/sleep promoted  Taken 10/5/2020 2000 by Elis Cardona RN  Infection Prevention: personal protective equipment utilized  Goal: Optimal Comfort and Wellbeing  Outcome: Ongoing, Progressing  Goal: Readiness for Transition of Care  Outcome: Ongoing, Progressing     Problem: Fall Injury Risk  Goal: Absence of Fall and Fall-Related Injury  Outcome: Ongoing, Progressing  Intervention: Promote Injury-Free Environment  Recent Flowsheet Documentation  Taken 10/5/2020 2100 by Elis Cardona RN  Safety Promotion/Fall Prevention: safety round/check completed  Taken 10/5/2020 2000 by Elis Cardona RN  Safety Promotion/Fall Prevention:   safety round/check completed   activity supervised     Problem: Wound  Goal: Optimal Wound Healing  Outcome: Ongoing, Progressing  Intervention: Promote Effective Wound Healing  Recent Flowsheet Documentation  Taken 10/5/2020 2000 by Elis Cardona RN  Pain Management Interventions: position adjusted

## 2020-10-06 NOTE — PROGRESS NOTES
Discharge Planning Assessment   [facility]     Patient Name: Lonnie Arora  MRN: [id number]  Today's Date: 10/6/2020    Admit Date: 10/5/2020    Discharge Needs Assessment     Row Name 10/06/20 0733       Living Environment    Lives With  child(shruthi), dependent    Name(s) of Who Lives With Patient  Pt is  form spouse(Kassidy).  Lives with 15 yo daughter    Current Living Arrangements  home/apartment/condo    Primary Care Provided by  self    Able to Return to Prior Arrangements  yes       Resource/Environmental Concerns    Resource/Environmental Concerns  none    Transportation Concerns  car, none       Transition Planning    Patient/Family Anticipates Transition to  home with family    Patient/Family Anticipated Services at Transition  home health care Pt is current with [facility].  They see him once a week currently    Transportation Anticipated  car, drives self;family or friend will provide       Discharge Needs Assessment    Equipment Currently Used at Home  glucometer    Concerns to be Addressed  no discharge needs identified;denies needs/concerns at this time    Anticipated Changes Related to Illness  none    Equipment Needed After Discharge  none    Discharge Coordination/Progress  Due to Covid 19 pandemic CM gathered information per phone conversation with patient, chart review and collaboration with patient’s nurse to determine possible discharge needs.  No additional discharge needs are identified at this time.  Anticipate routine home with [facility]        Discharge Plan     Row Name 10/06/20 0737       Plan    Plan  Anticipate home with current [facility].  Pt denies any additional needs.  Pt states no difficulties affording medication.  PCP - Sanjay Chance    Patient/Family in Agreement with Plan  yes        Continued Care and Services - Admitted Since 10/5/2020     Home Medical Care     Service Provider Request Status Selected Services Address Phone Fax     [facility]  Accepted N/A [address] [place] [address] [phone] --       Internal Comment last updated by Meri Mendoza RN 10/5/2020 1530    Current patient                       Selected Continued Care - Prior Encounters Includes selections from prior encounters from 7/7/2020 to 10/6/2020    Discharged on 9/17/2020 Admission date: 9/10/2020 - Discharge disposition: Home or Self Care    Home Medical Care     Service Provider Selected Services Address Phone Fax    [facility] [address] [place] [address] [phone] --                      Demographic Summary     Row Name 10/06/20 0733       General Information    Admission Type  observation    Arrived From  emergency department    Required Notices Provided  Observation Status Notice    Referral Source  admission list    Reason for Consult  discharge planning    Preferred Language  English     Used During This Interaction  no        Kym Lang RN Case Manager  [address]  [address] [phone] office  [phone]  fax  [email or web address]  [email or web address]

## 2020-10-06 NOTE — OUTREACH NOTE
General Surgery Week 3 Survey      Responses   [facility] patient discharged from?  [facility]   Does the patient have one of the following disease processes/diagnoses(primary or secondary)?  General Surgery   Week 3 attempt successful?  No   Revoke  Readmitted          Ivanna Steward RN

## 2020-10-06 NOTE — PROGRESS NOTES
Reason for follow-up: Syncope  Left humerus fracture     Patient Care Team:  Sanjay Chance MD as PCP - General    Subjective .   Feels good wants to go home     Review of Systems   Constitution: Negative for fever and malaise/fatigue.   HENT: Negative for congestion and hearing loss.    Eyes: Negative for double vision and visual disturbance.   Cardiovascular: Negative for chest pain, claudication, dyspnea on exertion, leg swelling and syncope.   Respiratory: Negative for cough and shortness of breath.    Endocrine: Negative for cold intolerance.   Skin: Negative for color change and rash.   Musculoskeletal: Positive for joint pain. Negative for arthritis.   Gastrointestinal: Negative for abdominal pain and heartburn.   Genitourinary: Negative for hematuria.   Neurological: Negative for excessive daytime sleepiness and dizziness.   Psychiatric/Behavioral: Negative for depression. The patient is not nervous/anxious.    All other systems reviewed and are negative.      Patient has no known allergies.    Scheduled Meds:amLODIPine, 10 mg, Oral, Daily  atorvastatin, 40 mg, Oral, Daily  baclofen, 10 mg, Oral, Daily  buPROPion XL, 300 mg, Oral, QAM  insulin lispro, 0-14 Units, Subcutaneous, TID AC  isosorbide mononitrate, 30 mg, Oral, Daily  levETIRAcetam, 500 mg, Oral, Q12H  metoprolol tartrate, 100 mg, Oral, Q12H  rOPINIRole, 0.5 mg, Oral, TID  sodium chloride, 10 mL, Intravenous, Q12H      Continuous Infusions:   PRN Meds:.•  acetaminophen **OR** acetaminophen **OR** acetaminophen  •  aluminum-magnesium hydroxide-simethicone  •  bisacodyl  •  dextrose  •  dextrose  •  glucagon (human recombinant)  •  HYDROcodone-acetaminophen  •  insulin lispro **AND** insulin lispro  •  magnesium hydroxide  •  magnesium sulfate **OR** magnesium sulfate in D5W 1g/100mL (PREMIX)  •  melatonin  •  nitroglycerin  •  ondansetron **OR** ondansetron  •  oxyCODONE  •  potassium chloride  •  [COMPLETED] Insert peripheral IV **AND**  "sodium chloride  •  sodium chloride    Objective   Looks comfortable lying in the bed and walking around    VITAL SIGNS  Vitals:    10/06/20 0800 10/06/20 1200 10/06/20 1539 10/06/20 1600   BP:  123/76 122/89    Pulse:  79 85    Resp:       Temp: 97.6 °F (36.4 °C)   96.3 °F (35.7 °C)   TempSrc: Tympanic   Infrared   SpO2:  99% 97%    Weight:       Height:           Flowsheet Rows      First Filed Value   Admission Height  162.6 cm (64\") Documented at 10/05/2020 1343   Admission Weight  103 kg (227 lb 1.2 oz) Documented at 10/05/2020 1343           TELEMETRY: Sinus rhythm    Physical Exam:  Constitutional:       Appearance: Well-developed.   Eyes:      General: No scleral icterus.     Conjunctiva/sclera: Conjunctivae normal.   HENT:      Head: Normocephalic and atraumatic.    Mouth/Throat:      Mouth: No oral lesions.      Pharynx: Uvula midline.   Neck:      Musculoskeletal: Neck supple.      Thyroid: No thyromegaly.      Vascular: No carotid bruit or JVD.      Trachea: Trachea normal.   Pulmonary:      Effort: Pulmonary effort is normal.      Breath sounds: Normal breath sounds.   Cardiovascular:      Normal rate. Regular rhythm.      No gallop.   Pulses:     Intact distal pulses.   Abdominal:      General: Bowel sounds are normal.      Palpations: Abdomen is soft.   Musculoskeletal:         General: Deformity present.      Comments: Left upper extremity immobilized sling noted   Skin:     General: Skin is warm. There is no cyanosis.   Neurological:      Mental Status: Alert and oriented to person, place, and time.      Comments: No focal deficits   Psychiatric:         Behavior: Behavior is cooperative.                  LAB RESULTS (LAST 7 DAYS)    CBC  Results from last 7 days   Lab Units 10/06/20  0534 10/05/20  1345   WBC 10*3/mm3 9.20 10.10   RBC 10*6/mm3 4.37 5.05   HEMOGLOBIN g/dL 13.2 15.4   HEMATOCRIT % 40.8 47.6   MCV fL 93.5 94.3   PLATELETS 10*3/mm3 210 260       BMP  Results from last 7 days   Lab " Units 10/06/20  0534 10/05/20  1345   SODIUM mmol/L 141 141   POTASSIUM mmol/L 3.8 3.6   CHLORIDE mmol/L 104 101   CO2 mmol/L 28.0 27.0   BUN  12 11   CREATININE mg/dL 1.34* 1.53*   GLUCOSE mg/dL 113* 179*   MAGNESIUM mg/dL 1.7 1.8       CMP   Results from last 7 days   Lab Units 10/06/20  0534 10/05/20  1345   SODIUM mmol/L 141 141   POTASSIUM mmol/L 3.8 3.6   CHLORIDE mmol/L 104 101   CO2 mmol/L 28.0 27.0   BUN  12 11   CREATININE mg/dL 1.34* 1.53*   GLUCOSE mg/dL 113* 179*         BNP        TROPONIN  Results from last 7 days   Lab Units 10/05/20  2205   TROPONIN T ng/mL <0.010       CoAg  Results from last 7 days   Lab Units 10/05/20  1345   INR  0.97   APTT seconds 21.4*       Creatinine Clearance  Estimated Creatinine Clearance: 76.3 mL/min (A) (by C-G formula based on SCr of 1.34 mg/dL (H)).    ABG        Radiology  Xr Shoulder 2+ View Left    Result Date: 10/5/2020  Slightly displaced transverse impaction fracture of the left femoral neck.  Electronically Signed By-Clem Rawls On:10/5/2020 2:52 PM This report was finalized on [id number] by  Clem Rawls, .    Ct Head Without Contrast    Result Date: 10/5/2020   1. No acute intracranial findings. 2. Encephalomalacia consistent with chronic right MCA territory infarct, similar to prior exam.  Electronically Signed By-Dr. Rowena Carballo MD On:10/5/2020 3:16 PM This report was finalized on [id number] by Dr. Rowena Carballo MD.    Xr Chest 1 View    Result Date: 10/5/2020  Low lung volumes without definite acute cardiopulmonary abnormality.  Suspected proximal left humerus fracture. Recommend dedicated shoulder radiographs.  Electronically Signed By-Lisa Madison On:10/5/2020 2:09 PM This report was finalized on [id number] by  Lisa Madison, .            EKG        I personally viewed and interpreted the patient's EKG/Telemetry data:    ECHOCARDIOGRAM:       STRESS MYOVIEW:    CARDIAC CATHETERIZATION:    OTHER:         Assessment/Plan       Benign  essential hypertension    GERD (gastroesophageal reflux disease)    Hyperlipidemia, mixed    Major depression, chronic    Seizure disorder (CMS/[facility])    Type 2 diabetes mellitus (CMS/[facility])    CAD (coronary artery disease)    EDIL (acute kidney injury) (CMS/[facility])    CKD (chronic kidney disease), stage III    Restless leg syndrome    History of CVA (cerebrovascular accident)    Humeral fracture      Syncope  Multiple cardiac risk factors  I reviewed the stress Myoview and echocardiogram done to 3 months ago from [facility]  No ischemia LV function normal no's evidence of any severe aortic stenosis  Patient needs to be monitored long-term next 2 to 4 weeks for any bradycardia tachyarrhythmia  Okay for discharge and close follow-up as an outpatient      I discussed the patients findings and my recommendations with patient and attending nurse    Julian Cantrell MD  10/06/20  19:52 EDT

## 2020-10-06 NOTE — DISCHARGE SUMMARY
Date of Admission: 10/5/2020    Date of Discharge:  10/6/2020    Length of stay:  LOS: 0 days     Admission Diagnosis:   Syncope [R55]  Abnormal EKG [R94.31]  Syncope, unspecified syncope type [R55]  Other closed displaced fracture of proximal end of left humerus, initial encounter [S42.292A]      Discharge Diagnosis:     Syncope  - likely vasovagal in nature  - telemetry unremarkable  - previous echo reviewed   - CT head negative for acute process  - event monitor placed  - follow up with cardiology  - no driving      Renal insufficiency  - EDIL ruled out  - baseline Cr 1.2  - UA unremarkable      Left humeral neck fracture  - continue sling  - follow up with Dr. Chamberlain   - Percocet prn      CAD, HTN, HLP  - continue imdur, lopressor, norvasc, statin     Seizure disorder  - continue keppra     Diabetes mellitus type 2  - Hgb a1c- 8.6  - resume home medications     RLS/Muscle spasms  - continue Requip and baclofen       Active Hospital Problems    Diagnosis  POA   • CAD (coronary artery disease) [I25.10]  Yes   • EDIL (acute kidney injury) (CMS/[facility]) [N17.9]  Yes   • CKD (chronic kidney disease), stage III [N18.30]  Yes   • Restless leg syndrome [G25.81]  Yes   • History of CVA (cerebrovascular accident) [Z86.73]  Not Applicable   • Humeral fracture [S42.309A]  Yes   • Type 2 diabetes mellitus (CMS/[facility]) [E11.9]  Yes   • Seizure disorder (CMS/[facility]) [G40.909]  Yes   • Major depression, chronic [F32.9]  Yes   • Hyperlipidemia, mixed [E78.2]  Yes   • GERD (gastroesophageal reflux disease) [K21.9]  Yes   • Benign essential hypertension [I10]  Yes      Resolved Hospital Problems    Diagnosis Date Resolved POA   • **Syncope [R55] 10/06/2020 Yes       Hospital Course:  Patient is a 45 y.o. male presented to the ED following a syncopal episode while at [employer]. Patient reports this afternoon he was in the [employer] when he suddenly became nauseated and felt like he was going to throw up. He went outside and had dry heaving  but subsequently lost consciousness.  Patient landed on his left shoulder and is having moderate pain worse with movement. He denies any associated numbness or tingling.   Patient had the above work up. He was evaluated by cardiology and orthopedic surgery. He will have event monitor placed. He will follow up with cardiology after event monitor turned back in. He will follow up with Dr. Chamberlain as scheduled. Instructed not to drive for now given syncope.          Procedures Performed:  none       Consults:   Consults     Date and Time Order Name Status Description    10/6/2020 0032 Inpatient Orthopedic Surgery Consult Completed     10/5/2020 1804 Inpatient Cardiology Consult Completed           Vital Signs:  Temp:  [96.3 °F (35.7 °C)-97.6 °F (36.4 °C)] 96.3 °F (35.7 °C)  Heart Rate:  [68-85] 85  BP: (107-123)/(76-92) 122/89        Physical Exam:  Physical Exam  Vitals signs reviewed.   Constitutional:       Appearance: He is obese.   Cardiovascular:      Rate and Rhythm: Normal rate and regular rhythm.      Heart sounds: No murmur.   Pulmonary:      Effort: Pulmonary effort is normal. No respiratory distress.      Breath sounds: No wheezing.   Abdominal:      General: Bowel sounds are normal.      Tenderness: There is no abdominal tenderness.   Neurological:      General: No focal deficit present.      Mental Status: He is alert.   Psychiatric:         Mood and Affect: Mood normal.         Behavior: Behavior normal.           Pertinent Test Results:   Lab Results (last 72 hours)     Procedure Component Value Units Date/Time    POC Glucose Once [[id number]]  (Abnormal) Collected: 10/06/20 1601    Specimen: Blood Updated: 10/06/20 1603     Glucose 107 mg/dL      Comment: Serial Number: [id number]:  [id number]       Hemoglobin A1c [[id number]]  (Abnormal) Collected: 10/05/20 1345    Specimen: Blood Updated: 10/06/20 1132     Hemoglobin A1C 8.6 %     Narrative:      Hemoglobin A1C Reference Range:    <5.7 %         Normal  5.7-6.4 %     Increased risk for diabetes  > 6.4 %        Diabetes       These guidelines have been recommended by the American Diabetic Association for Hgb A1c.      The following 2010 guidelines have been recommended by the American Diabetes Association for Hemoglobin A1c.    HBA1c 5.7-6.4% Increased risk for future diabetes (pre-diabetes)  HBA1c     >6.4% Diabetes      POC Glucose Once [[id number]]  (Abnormal) Collected: 10/06/20 1107    Specimen: Blood Updated: 10/06/20 1109     Glucose 142 mg/dL      Comment: Serial Number: [id number]:  [id number]       BUN [[id number]]  (Normal) Collected: 10/06/20 0534    Specimen: Blood Updated: 10/06/20 0634     BUN 12 mg/dL     TSH [[id number]]  (Normal) Collected: 10/06/20 0534    Specimen: Blood Updated: 10/06/20 0630     TSH 0.702 uIU/mL     Basic Metabolic Panel [[id number]]  (Abnormal) Collected: 10/06/20 0534    Specimen: Blood Updated: 10/06/20 0626     Glucose 113 mg/dL      BUN --     Comment: Testing performed by alternate method        Creatinine 1.34 mg/dL      Sodium 141 mmol/L      Potassium 3.8 mmol/L      Comment: Slight hemolysis detected by analyzer. Results may be affected.        Chloride 104 mmol/L      CO2 28.0 mmol/L      Calcium 7.6 mg/dL      eGFR Non African Amer 58 mL/min/1.73      BUN/Creatinine Ratio --     Comment: Testing not performed        Anion Gap 9.0 mmol/L     Narrative:      GFR Normal >60  Chronic Kidney Disease <60  Kidney Failure <15      Magnesium [[id number]]  (Normal) Collected: 10/06/20 0534    Specimen: Blood Updated: 10/06/20 0626     Magnesium 1.7 mg/dL     Urinalysis With Microscopic If Indicated (No Culture) - Urine, Clean Catch [[id number]]  (Abnormal) Collected: 10/06/20 0533    Specimen: Urine, Clean Catch Updated: 10/06/20 0619     Color, UA Yellow     Appearance, UA Clear     pH, UA 5.0     Specific Gravity, UA 1.023     Glucose, UA Negative     Ketones, UA Negative     Bilirubin, UA Small (1+)     Comment:  Confirmation testing is unavailable.  A serum bilirubin is recommended for further assessment.        Blood, UA Negative     Protein, UA Negative     Leuk Esterase, UA Negative     Nitrite, UA Negative     Urobilinogen, UA 0.2 E.U./dL    Narrative:      Urine microscopic not indicated.    Urine Drug Screen - Urine, Clean Catch [[id number]]  (Abnormal) Collected: 10/06/20 0533    Specimen: Urine, Clean Catch Updated: 10/06/20 0615     Amphet/Methamphet, Screen Positive     Barbiturates Screen, Urine Negative     Benzodiazepine Screen, Urine Negative     Cocaine Screen, Urine Negative     Opiate Screen Positive     THC, Screen, Urine Negative     Methadone Screen, Urine Negative     Oxycodone Screen, Urine Positive    Narrative:      Negative Thresholds For Drugs Screened:     Amphetamines               500 ng/ml   Barbiturates               200 ng/ml   Benzodiazepines            100 ng/ml   Cocaine                    300 ng/ml   Methadone                  300 ng/ml   Opiates                    300 ng/ml   Oxycodone                  100 ng/ml   THC                        50 ng/ml    The Normal Value for all drugs tested is negative. This report includes final unconfirmed screening results to be used for medical treatment purposes only. Unconfirmed results must not be used for non-medical purposes such as employment or legal testing. Clinical consideration should be applied to any drug of abuse test, particulary when unconfirmed results are used.  All urine drugs of abuse requests without chain of custody are for medical screening purposes only.  False positives are possible.      CBC Auto Differential [[id number]]  (Abnormal) Collected: 10/06/20 0534    Specimen: Blood Updated: 10/06/20 0604     WBC 9.20 10*3/mm3      RBC 4.37 10*6/mm3      Hemoglobin 13.2 g/dL      Hematocrit 40.8 %      MCV 93.5 fL      MCH 30.3 pg      MCHC 32.4 g/dL      RDW 15.4 %      RDW-SD 50.8 fl      MPV 7.7 fL      Platelets 210 10*3/mm3       Neutrophil % 76.2 %      Lymphocyte % 11.2 %      Monocyte % 10.8 %      Eosinophil % 0.8 %      Basophil % 1.0 %      Neutrophils, Absolute 7.00 10*3/mm3      Lymphocytes, Absolute 1.00 10*3/mm3      Monocytes, Absolute 1.00 10*3/mm3      Eosinophils, Absolute 0.10 10*3/mm3      Basophils, Absolute 0.10 10*3/mm3      nRBC 0.0 /100 WBC     Troponin [[id number]]  (Normal) Collected: 10/05/20 2205    Specimen: Blood Updated: 10/05/20 2235     Troponin T <0.010 ng/mL     Narrative:      Troponin T Reference Range:  <= 0.03 ng/mL-   Negative for AMI  >0.03 ng/mL-     Abnormal for myocardial necrosis.  Clinicians would have to utilize clinical acumen, EKG, Troponin and serial changes to determine if it is an Acute Myocardial Infarction or myocardial injury due to an underlying chronic condition.       Results may be falsely decreased if patient taking Biotin.      POC Glucose Once [[id number]]  (Abnormal) Collected: 10/05/20 1807    Specimen: Blood Updated: 10/05/20 1809     Glucose 138 mg/dL      Comment: Serial Number: [id number]:  [id number]       BUN [[id number]]  (Normal) Collected: 10/05/20 1345    Specimen: Blood Updated: 10/05/20 1508     BUN 11 mg/dL     Basic Metabolic Panel [[id number]]  (Abnormal) Collected: 10/05/20 1345    Specimen: Blood Updated: 10/05/20 1453     Glucose 179 mg/dL      BUN --     Comment: Testing performed by alternate method        Creatinine 1.53 mg/dL      Sodium 141 mmol/L      Potassium 3.6 mmol/L      Chloride 101 mmol/L      CO2 27.0 mmol/L      Calcium 8.4 mg/dL      eGFR Non African Amer 50 mL/min/1.73      BUN/Creatinine Ratio --     Comment: Testing not performed        Anion Gap 13.0 mmol/L     Narrative:      GFR Normal >60  Chronic Kidney Disease <60  Kidney Failure <15      Troponin [[id number]]  (Normal) Collected: 10/05/20 1345    Specimen: Blood Updated: 10/05/20 1453     Troponin T <0.010 ng/mL     Narrative:      Troponin T Reference Range:  <= 0.03 ng/mL-   Negative  for AMI  >0.03 ng/mL-     Abnormal for myocardial necrosis.  Clinicians would have to utilize clinical acumen, EKG, Troponin and serial changes to determine if it is an Acute Myocardial Infarction or myocardial injury due to an underlying chronic condition.       Results may be falsely decreased if patient taking Biotin.      Magnesium [[id number]]  (Normal) Collected: 10/05/20 1345    Specimen: Blood Updated: 10/05/20 1453     Magnesium 1.8 mg/dL     aPTT [[id number]]  (Abnormal) Collected: 10/05/20 1345    Specimen: Blood Updated: 10/05/20 1446     PTT 21.4 seconds     Protime-INR [[id number]]  (Normal) Collected: 10/05/20 1345    Specimen: Blood Updated: 10/05/20 1446     Protime 10.7 Seconds      INR 0.97    Extra Tubes [[id number]] Collected: 10/05/20 1345    Specimen: Blood, Venous Line Updated: 10/05/20 1445    Narrative:      The following orders were created for panel order Extra Tubes.  Procedure                               Abnormality         Status                     ---------                               -----------         ------                     Gold Top - SST[[id number]]                                   Final result                 Please view results for these tests on the individual orders.    Gold Top - SST [[id number]] Collected: 10/05/20 1345    Specimen: Blood Updated: 10/05/20 1445     Extra Tube Hold for add-ons.     Comment: Auto resulted.       CBC & Differential [[id number]]  (Abnormal) Collected: 10/05/20 1345    Specimen: Blood Updated: 10/05/20 1430    Narrative:      The following orders were created for panel order CBC & Differential.  Procedure                               Abnormality         Status                     ---------                               -----------         ------                     CBC Auto Differential[[id number]]        Abnormal            Final result                 Please view results for these tests on the individual orders.    CBC Auto Differential  [[id number]]  (Abnormal) Collected: 10/05/20 1345    Specimen: Blood Updated: 10/05/20 1430     WBC 10.10 10*3/mm3      RBC 5.05 10*6/mm3      Hemoglobin 15.4 g/dL      Hematocrit 47.6 %      MCV 94.3 fL      MCH 30.6 pg      MCHC 32.4 g/dL      RDW 15.6 %      RDW-SD 52.1 fl      MPV 8.4 fL      Platelets 260 10*3/mm3      Neutrophil % 77.3 %      Lymphocyte % 13.1 %      Monocyte % 7.3 %      Eosinophil % 1.8 %      Basophil % 0.5 %      Neutrophils, Absolute 7.80 10*3/mm3      Lymphocytes, Absolute 1.30 10*3/mm3      Monocytes, Absolute 0.70 10*3/mm3      Eosinophils, Absolute 0.20 10*3/mm3      Basophils, Absolute 0.00 10*3/mm3      nRBC 0.0 /100 WBC             Imaging Results (All)     Procedure Component Value Units Date/Time    CT Head Without Contrast [[id number]] Collected: 10/05/20 1513     Updated: 10/05/20 1518    Narrative:      CT HEAD WO CONTRAST-     Date of Exam: 10/5/2020 3:05 PM     Indication: syncope.     Comparison: CT head without contrast 12/31/2019     Technique:  Without contrast, contiguous axial CT images of the head  were obtained from skull base to vertex.  Coronal and sagittal  reconstructions were performed.  Automated exposure control and  iterative reconstruction methods were used.     FINDINGS  Encephalomalacia is demonstrated within the right frontal and parietal  lobe consistent with old MCA distribution infarct. Chronic  encephalomalacia also extends into the posterior limb of the right  internal capsule. Preservation of gray matter-white matter junction  distinction otherwise, without convincing CT evidence of acute or  evolving infarct. There is compensatory ex vacuo dilation of the right  lateral ventricle. Otherwise, ventricular configuration is within normal  limits. No intracranial hemorrhage, mass lesion, mass effect or midline  shift is seen. Calvarium is within normal limits. Paranasal sinuses and  mastoid air cells are clear.             Impression:         1. No acute  intracranial findings.  2. Encephalomalacia consistent with chronic right MCA territory infarct,  similar to prior exam.     Electronically Signed By-Dr. Rowena Carballo MD On:10/5/2020 3:16 PM  This report was finalized on [id number] by Dr. Rowena Carballo MD.    XR Shoulder 2+ View Left [[id number]] Collected: 10/05/20 1450     Updated: 10/05/20 1454    Narrative:      DATE OF EXAM:  10/5/2020 2:41 PM     PROCEDURE:  XR SHOULDER 2+ VW LEFT-     INDICATIONS:  Fall, left shoulder pain.     COMPARISON:  No Comparisons Available     TECHNIQUE:   A minimum of two radiologic views of the left shoulder were obtained.        FINDINGS:  There is a transverse impaction fracture through the left femoral neck.  There is no significant anterior or posterior displacement. The proximal  shaft of the humerus is slightly displaced laterally approximately 3 mm.  The glenohumeral alignment remains intact. There is a mild soft tissue  swelling. There is spurring of the acromioclavicular joint indicating  moderate osteoarthritis.        Impression:      Slightly displaced transverse impaction fracture of the left femoral  neck.     Electronically Signed By-Clem Rawls On:10/5/2020 2:52 PM  This report was finalized on [id number] by  Clem Rawls, .    XR Chest 1 View [[id number]] Collected: 10/05/20 1407     Updated: 10/05/20 1411    Narrative:      DATE OF EXAM:  10/5/2020 1:57 PM     PROCEDURE:  XR CHEST 1 VW-     INDICATIONS:  syncope     COMPARISON:  2 view chest x-ray 04/20/2020.     TECHNIQUE:   Single radiographic AP view of the chest was obtained.     FINDINGS:  Cardiac silhouette remains prominent, but is exaggerated by portable AP  technique. Allowing for low lung volumes, the lungs appear grossly  clear. No pneumothorax or large pleural effusion is seen. There is  cortical irregularity of the proximal left humerus.       Impression:      Low lung volumes without definite acute cardiopulmonary abnormality.     Suspected  proximal left humerus fracture. Recommend dedicated shoulder  radiographs.     Electronically Signed By-Lisa Madison On:10/5/2020 2:09 PM  This report was finalized on [id number] by  Lisa Madison, .                Discharge Disposition:  Home or Self Care    Discharge Medications:     Discharge Medications      New Medications      Instructions Start Date   oxyCODONE-acetaminophen 5-325 MG per tablet  Commonly known as: PERCOCET   1 tablet, Oral, Every 4 Hours PRN         Continue These Medications      Instructions Start Date   amLODIPine 10 MG tablet  Commonly known as: NORVASC   10 mg, Oral, Daily      atorvastatin 40 MG tablet  Commonly known as: LIPITOR   40 mg, Oral, Daily      baclofen 10 MG tablet  Commonly known as: LIORESAL   10 mg, Oral, Daily      buPROPion  MG 24 hr tablet  Commonly known as: WELLBUTRIN XL   300 mg, Oral, Every Morning, Take preop      isosorbide mononitrate 30 MG 24 hr tablet  Commonly known as: IMDUR   30 mg, Oral, Daily      levETIRAcetam 500 MG tablet  Commonly known as: KEPPRA   500 mg, Oral, 2 Times Daily      Melatonin 5 MG tablet dispersible   1 tablet, Oral, Nightly PRN      metoprolol tartrate 100 MG tablet  Commonly known as: LOPRESSOR   100 mg, Oral, 2 Times Daily      rOPINIRole 0.5 MG tablet  Commonly known as: REQUIP   0.5 mg, Oral, 3 Times Daily      Soliqua 100-33 UNT-MCG/ML solution pen-injector injection  Generic drug: Insulin Glargine-Lixisenatide   15 Units, Subcutaneous, Daily PRN, Pt receives samples             Discharge Diet:   Diet Instructions     Diet: Cardiac      Discharge Diet: Cardiac          Activity at Discharge:   Activity Instructions     Driving Restrictions      Until after follow up with PCP    Type of Restriction: Driving    Driving Restrictions: No Driving          Follow-up Appointments:  Future Appointments   Date Time Provider Department Center   10/28/2020  9:30 AM Niraj Almanzar MD [facility] None         Test  Results Pending at Discharge:  None    Condition on Discharge:    Stable      Risk for Readmission (LACE): Score: 6 (10/6/2020  6:00 AM)          Frank Ho DO  10/06/20  18:54 EDT

## 2020-10-06 NOTE — CONSULTS
Referring Provider: Dr. Ho  Reason for Consultation: Syncope    Patient Care Team:  Sanjay Chance MD as PCP - General    Chief complaint passed out        History of present illness:  Lonnie Arora is a 45 y.o. male who presents with syncope.     45-year-old male patient admitted hospital apparently had a syncopal episode at [facility]  Patient felt nauseated feel like throwing up dry heaving and then felt dizzy and passed out  No obvious seizure-like activity  Since admission denies of any chest pain palpitations dizziness but he fractured his left humerus which is currently in a sling  Patient was seen by cardiologist at [place] had a stress test and echocardiogram done in the last few months apparently that was within normal limit  No exacerbating or relieving factors  Symptoms happened suddenly few hours prior to the hospitalization    Review of Systems   Constitution: Positive for malaise/fatigue. Negative for fever.   HENT: Negative for congestion and hearing loss.    Eyes: Negative for double vision and visual disturbance.   Cardiovascular: Negative for chest pain, claudication, dyspnea on exertion, leg swelling and syncope.   Respiratory: Negative for cough and shortness of breath.    Endocrine: Negative for cold intolerance.   Skin: Negative for color change and rash.   Musculoskeletal: Positive for joint pain. Negative for arthritis.   Gastrointestinal: Negative for abdominal pain and heartburn.   Genitourinary: Negative for hematuria.   Neurological: Negative for excessive daytime sleepiness and dizziness.   Psychiatric/Behavioral: Negative for depression. The patient is not nervous/anxious.    All other systems reviewed and are negative.      History  Past Medical History:   Diagnosis Date   • Diabetes (CMS/HCC)    • GERD (gastroesophageal reflux disease)    • Hypertension    • Seizures (CMS/HCC)    • Stroke (CMS/HCC) 2010       Past Surgical History:   Procedure Laterality Date   • COLOSTOMY  Left 10/21/2019    Procedure: Laparoscopic diverting colostomy;  Surgeon: Niraj Almanzar MD;  Location: [facility] MAIN OR;  Service: General   • COLOSTOMY N/A 9/10/2020    Procedure: COLOSTOMY REVERSAL;  Surgeon: Niraj Almanzar MD;  Location: [facility] MAIN OR;  Service: General;  Laterality: N/A;   • FRACTURE SURGERY      right wrist    • HERNIA REPAIR      umbilical    • INCISION AND DRAINAGE OF WOUND Left 10/15/2019    Procedure: INCISION AND DRAINAGE OF LEFT GLUTEAL REGION;  Surgeon: Niraj Almanzar MD;  Location: [facility] MAIN OR;  Service: General   • URETERAL STENT INSERTION      kidney stone stents        Family History   Problem Relation Age of Onset   • Breast cancer Mother    • Cervical cancer Mother        Social History     Tobacco Use   • Smoking status: Former Smoker     Types: Cigars     Quit date: 10/2019     Years since quittin.0   • Smokeless tobacco: Never Used   • Tobacco comment: Would have 2 to 3 cigars/week   Substance Use Topics   • Alcohol use: Not Currently     Frequency: Never     Comment: Quit several years ago at time of his CVA   • Drug use: Not Currently     Types: Marijuana        Medications Prior to Admission   Medication Sig Dispense Refill Last Dose   • amLODIPine (NORVASC) 10 MG tablet Take 10 mg by mouth Daily.   10/5/2020 at Unknown time   • atorvastatin (LIPITOR) 40 MG tablet Take 40 mg by mouth Daily.   10/5/2020 at Unknown time   • baclofen (LIORESAL) 10 MG tablet Take 10 mg by mouth Daily.   10/5/2020 at Unknown time   • buPROPion XL (WELLBUTRIN XL) 300 MG 24 hr tablet Take 300 mg by mouth Every Morning. Take preop   10/5/2020 at Unknown time   • Insulin Glargine-Lixisenatide (Soliqua) 100-33 UNT-MCG/ML solution pen-injector injection Inject 15 Units under the skin into the appropriate area as directed Daily As Needed (HOLD dose if BG <150). Pt receives samples    at Unknown time   • isosorbide mononitrate (IMDUR) 30 MG 24 hr tablet Take 30 mg by  mouth Daily.   10/5/2020 at Unknown time   • levETIRAcetam (KEPPRA) 500 MG tablet Take 500 mg by mouth 2 (Two) Times a Day.   10/5/2020 at Unknown time   • metoprolol tartrate (LOPRESSOR) 100 MG tablet Take 100 mg by mouth 2 (Two) Times a Day.   10/5/2020 at Unknown time   • rOPINIRole (REQUIP) 0.5 MG tablet Take 0.5 mg by mouth 3 (Three) Times a Day.   10/5/2020 at Unknown time   • Melatonin 5 MG tablet dispersible Take 1 tablet by mouth At Night As Needed (sleep).            Patient has no known allergies.    Scheduled Meds:[START ON 10/6/2020] amLODIPine, 10 mg, Oral, Daily  [START ON 10/6/2020] atorvastatin, 40 mg, Oral, Daily  [START ON 10/6/2020] baclofen, 10 mg, Oral, Daily  [START ON 10/6/2020] buPROPion XL, 300 mg, Oral, QAM  insulin lispro, 0-14 Units, Subcutaneous, TID AC  [START ON 10/6/2020] isosorbide mononitrate, 30 mg, Oral, Daily  levETIRAcetam, 500 mg, Oral, Q12H  metoprolol tartrate, 100 mg, Oral, Q12H  rOPINIRole, 0.5 mg, Oral, TID  sodium chloride, 1,000 mL, Intravenous, Once  sodium chloride, 10 mL, Intravenous, Q12H      Continuous Infusions:   PRN Meds:.•  acetaminophen **OR** acetaminophen **OR** acetaminophen  •  aluminum-magnesium hydroxide-simethicone  •  bisacodyl  •  dextrose  •  dextrose  •  glucagon (human recombinant)  •  HYDROcodone-acetaminophen  •  insulin lispro **AND** insulin lispro  •  magnesium hydroxide  •  magnesium sulfate **OR** magnesium sulfate in D5W 1g/100mL (PREMIX)  •  melatonin  •  nitroglycerin  •  ondansetron **OR** ondansetron  •  potassium chloride  •  [COMPLETED] Insert peripheral IV **AND** sodium chloride  •  sodium chloride        VITAL SIGNS  Vitals:    10/05/20 1631 10/05/20 1647 10/05/20 1701 10/05/20 1746   BP: 131/96 125/93 111/95 109/71   BP Location:       Patient Position:       Pulse: 86 88 87 83   Resp:       Temp:       TempSrc:       SpO2: 97% 93% 93% 92%   Weight:       Height:           Flowsheet Rows      First Filed Value   Admission Height   "162.6 cm (64\") Documented at 10/05/2020 1343   Admission Weight  103 kg (227 lb 1.2 oz) Documented at 10/05/2020 1343           TELEMETRY: Sinus rhythm    Physical Exam:  Constitutional:       Appearance: Well-developed.   Eyes:      General: No scleral icterus.     Conjunctiva/sclera: Conjunctivae normal.   HENT:      Head: Normocephalic and atraumatic.    Mouth/Throat:      Mouth: No oral lesions.      Pharynx: Uvula midline.   Neck:      Musculoskeletal: Neck supple.      Thyroid: No thyromegaly.      Vascular: No carotid bruit or JVD.      Trachea: Trachea normal.   Pulmonary:      Effort: Pulmonary effort is normal.      Breath sounds: Normal breath sounds.   Cardiovascular:      Normal rate. Regular rhythm.      No gallop.   Pulses:     Intact distal pulses.   Abdominal:      General: Bowel sounds are normal.      Palpations: Abdomen is soft.   Musculoskeletal:         General: Tenderness and deformity present.      Comments: Left upper extremity in a sling   Skin:     General: Skin is warm. There is no cyanosis.   Neurological:      Mental Status: Alert and oriented to person, place, and time.      Comments: No focal deficits   Psychiatric:         Behavior: Behavior is cooperative.                  LAB RESULTS (LAST 7 DAYS)    CBC  Results from last 7 days   Lab Units 10/05/20  1345   WBC 10*3/mm3 10.10   RBC 10*6/mm3 5.05   HEMOGLOBIN g/dL 15.4   HEMATOCRIT % 47.6   MCV fL 94.3   PLATELETS 10*3/mm3 260       BMP  Results from last 7 days   Lab Units 10/05/20  1345   SODIUM mmol/L 141   POTASSIUM mmol/L 3.6   CHLORIDE mmol/L 101   CO2 mmol/L 27.0   BUN  11   CREATININE mg/dL 1.53*   GLUCOSE mg/dL 179*   MAGNESIUM mg/dL 1.8       CMP   Results from last 7 days   Lab Units 10/05/20  1345   SODIUM mmol/L 141   POTASSIUM mmol/L 3.6   CHLORIDE mmol/L 101   CO2 mmol/L 27.0   BUN  11   CREATININE mg/dL 1.53*   GLUCOSE mg/dL 179*         BNP        TROPONIN  Results from last 7 days   Lab Units 10/05/20  1345 "   TROPONIN T ng/mL <0.010       CoAg  Results from last 7 days   Lab Units 10/05/20  1345   INR  0.97   APTT seconds 21.4*       Creatinine Clearance  Estimated Creatinine Clearance: 66.8 mL/min (A) (by C-G formula based on SCr of 1.53 mg/dL (H)).    ABG        Radiology  Xr Shoulder 2+ View Left    Result Date: 10/5/2020  Slightly displaced transverse impaction fracture of the left femoral neck.  Electronically Signed By-Clem Rawls On:10/5/2020 2:52 PM This report was finalized on [id number] by  Clem Rawls, .    Ct Head Without Contrast    Result Date: 10/5/2020   1. No acute intracranial findings. 2. Encephalomalacia consistent with chronic right MCA territory infarct, similar to prior exam.  Electronically Signed By-Dr. Rowena Carballo MD On:10/5/2020 3:16 PM This report was finalized on [id number] by Dr. Rowena Carballo MD.    Xr Chest 1 View    Result Date: 10/5/2020  Low lung volumes without definite acute cardiopulmonary abnormality.  Suspected proximal left humerus fracture. Recommend dedicated shoulder radiographs.  Electronically Signed By-Lisa Madison On:10/5/2020 2:09 PM This report was finalized on [id number] by  Lisa Madison, .          EKG          I personally viewed and interpreted the patient's EKG/Telemetry data:    ECHOCARDIOGRAM:         STRESS MYOVIEW:    CARDIAC CATHETERIZATION:    OTHER:         Assessment/Plan       Syncope    Benign essential hypertension    GERD (gastroesophageal reflux disease)    Hyperlipidemia, mixed    Major depression, chronic    Seizure disorder (CMS/[facility])    Type 2 diabetes mellitus (CMS/[facility])    CAD (coronary artery disease)    EDIL (acute kidney injury) (CMS/[facility])    CKD (chronic kidney disease), stage III    Restless leg syndrome    History of CVA (cerebrovascular accident)    Humeral fracture      Syncope of unclear etiology  Chronic renal insufficiency multiple cardiac risk factors  EKG abnormal  Patient had a work-up done needs to evaluate the stress  test and echo results  No symptoms of chest pain  Suggest long-term monitoring probably 4-week event monitor at the time of discharge  Modify cardiac risk factors    I discussed the patients findings and my recommendations with patient and attending nurse    Julian Cantrell MD  10/05/20  20:29 EDT

## 2020-10-06 NOTE — PLAN OF CARE
Problem: Adult Inpatient Plan of Care  Goal: Plan of Care Review  10/6/2020 0814 by Elis Cardona RN  Outcome: Ongoing, Progressing  Flowsheets (Taken 10/5/2020 2141)  Plan of Care Reviewed With: patient  10/5/2020 2141 by Elis Cardona RN  Outcome: Ongoing, Progressing  Flowsheets (Taken 10/5/2020 2141)  Plan of Care Reviewed With: patient  Goal: Patient-Specific Goal (Individualized)  10/6/2020 0814 by Elis Cardona RN  Outcome: Ongoing, Progressing  10/5/2020 2141 by Elis Cardona RN  Outcome: Ongoing, Progressing  Goal: Absence of Hospital-Acquired Illness or Injury  10/6/2020 0814 by Elis Cardona RN  Outcome: Ongoing, Progressing  10/5/2020 2141 by Elis Cardona RN  Outcome: Ongoing, Progressing  Intervention: Identify and Manage Fall Risk  Recent Flowsheet Documentation  Taken 10/6/2020 0800 by Elis Cardona RN  Safety Promotion/Fall Prevention: safety round/check completed  Taken 10/6/2020 0600 by Elis Cardona RN  Safety Promotion/Fall Prevention: safety round/check completed  Taken 10/6/2020 0500 by Elis Cardona RN  Safety Promotion/Fall Prevention: safety round/check completed  Taken 10/6/2020 0400 by Elis Cardona RN  Safety Promotion/Fall Prevention: safety round/check completed  Taken 10/6/2020 0300 by Elis Cardona RN  Safety Promotion/Fall Prevention: safety round/check completed  Taken 10/6/2020 0100 by Elis Cardona RN  Safety Promotion/Fall Prevention: safety round/check completed  Taken 10/6/2020 0000 by Elis Cardona RN  Safety Promotion/Fall Prevention: safety round/check completed  Taken 10/5/2020 2200 by Elis Cardona RN  Safety Promotion/Fall Prevention: safety round/check completed  Taken 10/5/2020 2100 by Elis Cardona RN  Safety Promotion/Fall Prevention: safety round/check completed  Taken 10/5/2020 2000 by Elis Cardona RN  Safety Promotion/Fall Prevention:   safety round/check completed   activity  supervised  Intervention: Prevent Skin Injury  Recent Flowsheet Documentation  Taken 10/5/2020 2100 by Elis Cardona RN  Body Position: supine  Intervention: Prevent Infection  Recent Flowsheet Documentation  Taken 10/6/2020 0800 by Elis Cardona RN  Infection Prevention: personal protective equipment utilized  Taken 10/5/2020 2301 by Elis Cardona RN  Infection Prevention: personal protective equipment utilized  Taken 10/5/2020 2100 by Elis Cardona RN  Infection Prevention:   personal protective equipment utilized   rest/sleep promoted  Taken 10/5/2020 2000 by Elis Cardona RN  Infection Prevention: personal protective equipment utilized  Goal: Optimal Comfort and Wellbeing  10/6/2020 0814 by Elis Cardona RN  Outcome: Ongoing, Progressing  10/5/2020 2141 by Elis Cardona RN  Outcome: Ongoing, Progressing  Goal: Readiness for Transition of Care  10/6/2020 0814 by Elis Cardona RN  Outcome: Ongoing, Progressing  10/5/2020 2141 by Elis Cardona RN  Outcome: Ongoing, Progressing     Problem: Fall Injury Risk  Goal: Absence of Fall and Fall-Related Injury  10/6/2020 0814 by Elis Cardona RN  Outcome: Ongoing, Progressing  10/5/2020 2141 by Elis Cardona RN  Outcome: Ongoing, Progressing  Intervention: Promote Injury-Free Environment  Recent Flowsheet Documentation  Taken 10/6/2020 0800 by Elis Cardona RN  Safety Promotion/Fall Prevention: safety round/check completed  Taken 10/6/2020 0600 by Elis Cardona RN  Safety Promotion/Fall Prevention: safety round/check completed  Taken 10/6/2020 0500 by Elis Cardona RN  Safety Promotion/Fall Prevention: safety round/check completed  Taken 10/6/2020 0400 by Elis Cardona RN  Safety Promotion/Fall Prevention: safety round/check completed  Taken 10/6/2020 0300 by Elis Cardona RN  Safety Promotion/Fall Prevention: safety round/check completed  Taken 10/6/2020 0100 by Elis Cardona RN  Safety Promotion/Fall  Prevention: safety round/check completed  Taken 10/6/2020 0000 by Elis Cardona RN  Safety Promotion/Fall Prevention: safety round/check completed  Taken 10/5/2020 2200 by Elis Cardona RN  Safety Promotion/Fall Prevention: safety round/check completed  Taken 10/5/2020 2100 by Elis Cardona RN  Safety Promotion/Fall Prevention: safety round/check completed  Taken 10/5/2020 2000 by Elis Cardona RN  Safety Promotion/Fall Prevention:   safety round/check completed   activity supervised     Problem: Wound  Goal: Optimal Wound Healing  10/6/2020 0814 by Elis Cardona RN  Outcome: Ongoing, Progressing  10/5/2020 2141 by Elis Cardona RN  Outcome: Ongoing, Progressing  Intervention: Promote Effective Wound Healing  Recent Flowsheet Documentation  Taken 10/5/2020 2000 by Elis Cardona RN  Pain Management Interventions: position adjusted

## 2020-10-06 NOTE — CONSULTS
Inpatient Orthopedic Surgery Consult  Consult performed by: Niraj Chamberlain MD  Consult ordered by: Frank Ho DO            Referring Provider: Dr. Ho  Reason for Consultation: Left proximal humerus fracture    Patient Care Team:  Sanjay Chance MD as PCP - General      Subjective .     History of present illness:  Lonnie Arora is a 45 y.o. male who [facility].  Patient is right-hand dominant but fractured his left shoulder.  He is currently on disability for previous stroke which affected the left side.  Had weakness and difficulty reaching of head with the shoulder before he fell.  Has no numbness or tingling.  Has 10/10 pain with motion but fairly comfortable at rest.  No significant other injuries.  Still being worked up for his syncope.    Review of Systems:    Chest pain shortness of breath fevers chills nausea vomiting      History  Past Medical History:   Diagnosis Date   • Diabetes (CMS/[facility])    • GERD (gastroesophageal reflux disease)    • Hypertension    • Seizures (CMS/[facility])    • Stroke (CMS/[facility]) 2010     Past Surgical History:   Procedure Laterality Date   • COLOSTOMY Left 10/21/2019    Procedure: Laparoscopic diverting colostomy;  Surgeon: Niraj Almanzar MD;  Location: [facility] OR;  Service: General   • COLOSTOMY N/A 9/10/2020    Procedure: COLOSTOMY REVERSAL;  Surgeon: Niraj Almanzar MD;  Location: [facility] OR;  Service: General;  Laterality: N/A;   • FRACTURE SURGERY      right wrist    • HERNIA REPAIR      umbilical    • INCISION AND DRAINAGE OF WOUND Left 10/15/2019    Procedure: INCISION AND DRAINAGE OF LEFT GLUTEAL REGION;  Surgeon: Niraj Almanzar MD;  Location: [facility] OR;  Service: General   • URETERAL STENT INSERTION      kidney stone stents      Family History   Problem Relation Age of Onset   • Breast cancer Mother    • Cervical cancer Mother       Social History     Tobacco Use   • Smoking  status: Former Smoker     Types: Cigars     Quit date: 10/2019     Years since quittin.0   • Smokeless tobacco: Never Used   • Tobacco comment: Would have 2 to 3 cigars/week   Substance Use Topics   • Alcohol use: Not Currently     Frequency: Never     Comment: Quit several years ago at time of his CVA   • Drug use: Not Currently     Types: Marijuana     Medications Prior to Admission   Medication Sig Dispense Refill Last Dose   • amLODIPine (NORVASC) 10 MG tablet Take 10 mg by mouth Daily.   10/5/2020 at Unknown time   • atorvastatin (LIPITOR) 40 MG tablet Take 40 mg by mouth Daily.   10/5/2020 at Unknown time   • baclofen (LIORESAL) 10 MG tablet Take 10 mg by mouth Daily.   10/5/2020 at Unknown time   • buPROPion XL (WELLBUTRIN XL) 300 MG 24 hr tablet Take 300 mg by mouth Every Morning. Take preop   10/5/2020 at Unknown time   • Insulin Glargine-Lixisenatide (Soliqua) 100-33 UNT-MCG/ML solution pen-injector injection Inject 15 Units under the skin into the appropriate area as directed Daily As Needed (HOLD dose if BG <150). Pt receives samples    at Unknown time   • isosorbide mononitrate (IMDUR) 30 MG 24 hr tablet Take 30 mg by mouth Daily.   10/5/2020 at Unknown time   • levETIRAcetam (KEPPRA) 500 MG tablet Take 500 mg by mouth 2 (Two) Times a Day.   10/5/2020 at Unknown time   • metoprolol tartrate (LOPRESSOR) 100 MG tablet Take 100 mg by mouth 2 (Two) Times a Day.   10/5/2020 at Unknown time   • rOPINIRole (REQUIP) 0.5 MG tablet Take 0.5 mg by mouth 3 (Three) Times a Day.   10/5/2020 at Unknown time   • Melatonin 5 MG tablet dispersible Take 1 tablet by mouth At Night As Needed (sleep).         Patient has no known allergies.    Scheduled Meds:amLODIPine, 10 mg, Oral, Daily  atorvastatin, 40 mg, Oral, Daily  baclofen, 10 mg, Oral, Daily  buPROPion XL, 300 mg, Oral, QAM  insulin lispro, 0-14 Units, Subcutaneous, TID AC  isosorbide mononitrate, 30 mg, Oral, Daily  levETIRAcetam, 500 mg, Oral,  Q12H  metoprolol tartrate, 100 mg, Oral, Q12H  rOPINIRole, 0.5 mg, Oral, TID  sodium chloride, 10 mL, Intravenous, Q12H      Continuous Infusions:   PRN Meds:.•  acetaminophen **OR** acetaminophen **OR** acetaminophen  •  aluminum-magnesium hydroxide-simethicone  •  bisacodyl  •  dextrose  •  dextrose  •  glucagon (human recombinant)  •  HYDROcodone-acetaminophen  •  influenza vaccine  •  insulin lispro **AND** insulin lispro  •  magnesium hydroxide  •  magnesium sulfate **OR** magnesium sulfate in D5W 1g/100mL (PREMIX)  •  melatonin  •  nitroglycerin  •  ondansetron **OR** ondansetron  •  potassium chloride  •  [COMPLETED] Insert peripheral IV **AND** sodium chloride  •  sodium chloride    Objective     Vital Signs   Vitals:    10/05/20 1746 10/05/20 2142 10/06/20 0122 10/06/20 0401   BP: 109/71 116/92 107/81 115/79   BP Location:       Patient Position:       Pulse: 83 79 72 68   Resp:       Temp:       TempSrc:       SpO2: 92% 97% 97% 96%   Weight:       Height:             Physical Exam:   Pleasant male, no apparent distress, alert and oriented x3, moderately obese, lying in bed  Left shoulder is in a sling.  No significant deformity or wounds or erythema.  Left hand has 5/5 radial median and ulnar nerve function with good sensation radial median and ulnar nerve distribution.  1+ radial pulse.  X-ray shows a 2 part humeral neck fracture with about 4 mm displacement superiorly of the shaft    Results Review:   I reviewed the patient's new clinical results.  I reviewed the patient's new imaging results    Lab Results (last 24 hours)     Procedure Component Value Units Date/Time    Hemoglobin A1c [[id number]]  (Abnormal) Collected: 10/05/20 1345    Specimen: Blood Updated: 10/06/20 1132     Hemoglobin A1C 8.6 %     Narrative:      Hemoglobin A1C Reference Range:    <5.7 %        Normal  5.7-6.4 %     Increased risk for diabetes  > 6.4 %        Diabetes       These guidelines have been recommended by the American  Diabetic Association for Hgb A1c.      The following 2010 guidelines have been recommended by the American Diabetes Association for Hemoglobin A1c.    HBA1c 5.7-6.4% Increased risk for future diabetes (pre-diabetes)  HBA1c     >6.4% Diabetes      POC Glucose Once [[id number]]  (Abnormal) Collected: 10/06/20 1107    Specimen: Blood Updated: 10/06/20 1109     Glucose 142 mg/dL      Comment: Serial Number: [id number]:  [id number]       BUN [[id number]]  (Normal) Collected: 10/06/20 0534    Specimen: Blood Updated: 10/06/20 0634     BUN 12 mg/dL     TSH [[id number]]  (Normal) Collected: 10/06/20 0534    Specimen: Blood Updated: 10/06/20 0630     TSH 0.702 uIU/mL     Basic Metabolic Panel [[id number]]  (Abnormal) Collected: 10/06/20 0534    Specimen: Blood Updated: 10/06/20 0626     Glucose 113 mg/dL      BUN --     Comment: Testing performed by alternate method        Creatinine 1.34 mg/dL      Sodium 141 mmol/L      Potassium 3.8 mmol/L      Comment: Slight hemolysis detected by analyzer. Results may be affected.        Chloride 104 mmol/L      CO2 28.0 mmol/L      Calcium 7.6 mg/dL      eGFR Non African Amer 58 mL/min/1.73      BUN/Creatinine Ratio --     Comment: Testing not performed        Anion Gap 9.0 mmol/L     Narrative:      GFR Normal >60  Chronic Kidney Disease <60  Kidney Failure <15      Magnesium [[id number]]  (Normal) Collected: 10/06/20 0534    Specimen: Blood Updated: 10/06/20 0626     Magnesium 1.7 mg/dL     Urinalysis With Microscopic If Indicated (No Culture) - Urine, Clean Catch [[id number]]  (Abnormal) Collected: 10/06/20 0533    Specimen: Urine, Clean Catch Updated: 10/06/20 0619     Color, UA Yellow     Appearance, UA Clear     pH, UA 5.0     Specific Gravity, UA 1.023     Glucose, UA Negative     Ketones, UA Negative     Bilirubin, UA Small (1+)     Comment: Confirmation testing is unavailable.  A serum bilirubin is recommended for further assessment.        Blood, UA Negative     Protein, UA  Negative     Leuk Esterase, UA Negative     Nitrite, UA Negative     Urobilinogen, UA 0.2 E.U./dL    Narrative:      Urine microscopic not indicated.    Urine Drug Screen - Urine, Clean Catch [[id number]]  (Abnormal) Collected: 10/06/20 0533    Specimen: Urine, Clean Catch Updated: 10/06/20 0615     Amphet/Methamphet, Screen Positive     Barbiturates Screen, Urine Negative     Benzodiazepine Screen, Urine Negative     Cocaine Screen, Urine Negative     Opiate Screen Positive     THC, Screen, Urine Negative     Methadone Screen, Urine Negative     Oxycodone Screen, Urine Positive    Narrative:      Negative Thresholds For Drugs Screened:     Amphetamines               500 ng/ml   Barbiturates               200 ng/ml   Benzodiazepines            100 ng/ml   Cocaine                    300 ng/ml   Methadone                  300 ng/ml   Opiates                    300 ng/ml   Oxycodone                  100 ng/ml   THC                        50 ng/ml    The Normal Value for all drugs tested is negative. This report includes final unconfirmed screening results to be used for medical treatment purposes only. Unconfirmed results must not be used for non-medical purposes such as employment or legal testing. Clinical consideration should be applied to any drug of abuse test, particulary when unconfirmed results are used.  All urine drugs of abuse requests without chain of custody are for medical screening purposes only.  False positives are possible.      CBC Auto Differential [[id number]]  (Abnormal) Collected: 10/06/20 0534    Specimen: Blood Updated: 10/06/20 0604     WBC 9.20 10*3/mm3      RBC 4.37 10*6/mm3      Hemoglobin 13.2 g/dL      Hematocrit 40.8 %      MCV 93.5 fL      MCH 30.3 pg      MCHC 32.4 g/dL      RDW 15.4 %      RDW-SD 50.8 fl      MPV 7.7 fL      Platelets 210 10*3/mm3      Neutrophil % 76.2 %      Lymphocyte % 11.2 %      Monocyte % 10.8 %      Eosinophil % 0.8 %      Basophil % 1.0 %      Neutrophils,  Absolute 7.00 10*3/mm3      Lymphocytes, Absolute 1.00 10*3/mm3      Monocytes, Absolute 1.00 10*3/mm3      Eosinophils, Absolute 0.10 10*3/mm3      Basophils, Absolute 0.10 10*3/mm3      nRBC 0.0 /100 WBC     Troponin [[id number]]  (Normal) Collected: 10/05/20 2205    Specimen: Blood Updated: 10/05/20 2235     Troponin T <0.010 ng/mL     Narrative:      Troponin T Reference Range:  <= 0.03 ng/mL-   Negative for AMI  >0.03 ng/mL-     Abnormal for myocardial necrosis.  Clinicians would have to utilize clinical acumen, EKG, Troponin and serial changes to determine if it is an Acute Myocardial Infarction or myocardial injury due to an underlying chronic condition.       Results may be falsely decreased if patient taking Biotin.      POC Glucose Once [[id number]]  (Abnormal) Collected: 10/05/20 1807    Specimen: Blood Updated: 10/05/20 1809     Glucose 138 mg/dL      Comment: Serial Number: [id number]:  [id number]       BUN [[id number]]  (Normal) Collected: 10/05/20 1345    Specimen: Blood Updated: 10/05/20 1508     BUN 11 mg/dL     Basic Metabolic Panel [[id number]]  (Abnormal) Collected: 10/05/20 1345    Specimen: Blood Updated: 10/05/20 1453     Glucose 179 mg/dL      BUN --     Comment: Testing performed by alternate method        Creatinine 1.53 mg/dL      Sodium 141 mmol/L      Potassium 3.6 mmol/L      Chloride 101 mmol/L      CO2 27.0 mmol/L      Calcium 8.4 mg/dL      eGFR Non African Amer 50 mL/min/1.73      BUN/Creatinine Ratio --     Comment: Testing not performed        Anion Gap 13.0 mmol/L     Narrative:      GFR Normal >60  Chronic Kidney Disease <60  Kidney Failure <15      Troponin [[id number]]  (Normal) Collected: 10/05/20 1345    Specimen: Blood Updated: 10/05/20 1453     Troponin T <0.010 ng/mL     Narrative:      Troponin T Reference Range:  <= 0.03 ng/mL-   Negative for AMI  >0.03 ng/mL-     Abnormal for myocardial necrosis.  Clinicians would have to utilize clinical acumen, EKG, Troponin and  serial changes to determine if it is an Acute Myocardial Infarction or myocardial injury due to an underlying chronic condition.       Results may be falsely decreased if patient taking Biotin.      Magnesium [[id number]]  (Normal) Collected: 10/05/20 1345    Specimen: Blood Updated: 10/05/20 1453     Magnesium 1.8 mg/dL     aPTT [[id number]]  (Abnormal) Collected: 10/05/20 1345    Specimen: Blood Updated: 10/05/20 1446     PTT 21.4 seconds     Protime-INR [[id number]]  (Normal) Collected: 10/05/20 1345    Specimen: Blood Updated: 10/05/20 1446     Protime 10.7 Seconds      INR 0.97    Extra Tubes [[id number]] Collected: 10/05/20 1345    Specimen: Blood, Venous Line Updated: 10/05/20 1445    Narrative:      The following orders were created for panel order Extra Tubes.  Procedure                               Abnormality         Status                     ---------                               -----------         ------                     Gold Top - SST[[id number]]                                   Final result                 Please view results for these tests on the individual orders.    Gold Top - SST [[id number]] Collected: 10/05/20 1345    Specimen: Blood Updated: 10/05/20 1445     Extra Tube Hold for add-ons.     Comment: Auto resulted.       CBC & Differential [[id number]]  (Abnormal) Collected: 10/05/20 1345    Specimen: Blood Updated: 10/05/20 1430    Narrative:      The following orders were created for panel order CBC & Differential.  Procedure                               Abnormality         Status                     ---------                               -----------         ------                     CBC Auto Differential[[id number]]        Abnormal            Final result                 Please view results for these tests on the individual orders.    CBC Auto Differential [[id number]]  (Abnormal) Collected: 10/05/20 1345    Specimen: Blood Updated: 10/05/20 1430     WBC 10.10 10*3/mm3      RBC 5.05  10*6/mm3      Hemoglobin 15.4 g/dL      Hematocrit 47.6 %      MCV 94.3 fL      MCH 30.6 pg      MCHC 32.4 g/dL      RDW 15.6 %      RDW-SD 52.1 fl      MPV 8.4 fL      Platelets 260 10*3/mm3      Neutrophil % 77.3 %      Lymphocyte % 13.1 %      Monocyte % 7.3 %      Eosinophil % 1.8 %      Basophil % 0.5 %      Neutrophils, Absolute 7.80 10*3/mm3      Lymphocytes, Absolute 1.30 10*3/mm3      Monocytes, Absolute 0.70 10*3/mm3      Eosinophils, Absolute 0.20 10*3/mm3      Basophils, Absolute 0.00 10*3/mm3      nRBC 0.0 /100 WBC           Imaging Results (Last 24 Hours)     Procedure Component Value Units Date/Time    CT Head Without Contrast [[id number]] Collected: 10/05/20 1513     Updated: 10/05/20 1518    Narrative:      CT HEAD WO CONTRAST-     Date of Exam: 10/5/2020 3:05 PM     Indication: syncope.     Comparison: CT head without contrast 12/31/2019     Technique:  Without contrast, contiguous axial CT images of the head  were obtained from skull base to vertex.  Coronal and sagittal  reconstructions were performed.  Automated exposure control and  iterative reconstruction methods were used.     FINDINGS  Encephalomalacia is demonstrated within the right frontal and parietal  lobe consistent with old MCA distribution infarct. Chronic  encephalomalacia also extends into the posterior limb of the right  internal capsule. Preservation of gray matter-white matter junction  distinction otherwise, without convincing CT evidence of acute or  evolving infarct. There is compensatory ex vacuo dilation of the right  lateral ventricle. Otherwise, ventricular configuration is within normal  limits. No intracranial hemorrhage, mass lesion, mass effect or midline  shift is seen. Calvarium is within normal limits. Paranasal sinuses and  mastoid air cells are clear.             Impression:         1. No acute intracranial findings.  2. Encephalomalacia consistent with chronic right MCA territory infarct,  similar to prior  exam.     Electronically Signed By-Dr. Rowena Carballo MD On:10/5/2020 3:16 PM  This report was finalized on [id number] by Dr. Rowena Carballo MD.    XR Shoulder 2+ View Left [[id number]] Collected: 10/05/20 1450     Updated: 10/05/20 1454    Narrative:      DATE OF EXAM:  10/5/2020 2:41 PM     PROCEDURE:  XR SHOULDER 2+ VW LEFT-     INDICATIONS:  Fall, left shoulder pain.     COMPARISON:  No Comparisons Available     TECHNIQUE:   A minimum of two radiologic views of the left shoulder were obtained.        FINDINGS:  There is a transverse impaction fracture through the left femoral neck.  There is no significant anterior or posterior displacement. The proximal  shaft of the humerus is slightly displaced laterally approximately 3 mm.  The glenohumeral alignment remains intact. There is a mild soft tissue  swelling. There is spurring of the acromioclavicular joint indicating  moderate osteoarthritis.        Impression:      Slightly displaced transverse impaction fracture of the left femoral  neck.     Electronically Signed By-Clem Rawls On:10/5/2020 2:52 PM  This report was finalized on [id number] by  Clem Rawls, .    XR Chest 1 View [[id number]] Collected: 10/05/20 1407     Updated: 10/05/20 1411    Narrative:      DATE OF EXAM:  10/5/2020 1:57 PM     PROCEDURE:  XR CHEST 1 VW-     INDICATIONS:  syncope     COMPARISON:  2 view chest x-ray 04/20/2020.     TECHNIQUE:   Single radiographic AP view of the chest was obtained.     FINDINGS:  Cardiac silhouette remains prominent, but is exaggerated by portable AP  technique. Allowing for low lung volumes, the lungs appear grossly  clear. No pneumothorax or large pleural effusion is seen. There is  cortical irregularity of the proximal left humerus.       Impression:      Low lung volumes without definite acute cardiopulmonary abnormality.     Suspected proximal left humerus fracture. Recommend dedicated shoulder  radiographs.     Electronically Signed By-Lisa Madison  On:10/5/2020 2:09 PM  This report was finalized on [id number] by  Lisa Madison, .            Assessment/Plan     Left 2 part proximal humerus fracture    Explained this will likely be treated nonoperatively.  Patient may do pendulum exercises 3 times a day.  He will treat with a sling.  May be more comfortable sleeping semi-reclined.  Patient will follow-up for repeat x-ray in 8 to 10 days.  If he displaces then could require open reduction internal fixation.  Will require narcotic pain medicine when going home      Niraj Chamberlain MD  10/06/20  12:33 EDT

## 2020-10-06 NOTE — PLAN OF CARE
Problem: Adult Inpatient Plan of Care  Goal: Plan of Care Review  Outcome: Met  Goal: Absence of Hospital-Acquired Illness or Injury  Outcome: Met  Intervention: Prevent Infection  Recent Flowsheet Documentation  Taken 10/6/2020 1613 by Cristiane Ortega RN  Infection Prevention: personal protective equipment utilized  Goal: Optimal Comfort and Wellbeing  Outcome: Met  Goal: Readiness for Transition of Care  Outcome: Met

## 2020-10-12 ENCOUNTER — TELEPHONE (OUTPATIENT)
Dept: CARDIOLOGY | Facility: CLINIC | Age: 45
End: 2020-10-12

## 2020-10-12 PROCEDURE — 93227 XTRNL ECG REC<48 HR R&I: CPT | Performed by: INTERNAL MEDICINE

## 2020-10-12 NOTE — TELEPHONE ENCOUNTER
Called patient to advise that we do not have those monitors in our office, so we would not have any supplies for them nor would we know how they operate. I advised him to go back to area in the hospital where they put the monitor on for him and they should be able to help him. Patient voiced his understanding.

## 2020-10-12 NOTE — TELEPHONE ENCOUNTER
Took monitor off to shower, but having issues getting it to connect. Said he can only get two to connect.

## 2020-10-28 ENCOUNTER — OFFICE VISIT (OUTPATIENT)
Dept: SURGERY | Facility: CLINIC | Age: 45
End: 2020-10-28

## 2020-10-28 VITALS
WEIGHT: 217 LBS | RESPIRATION RATE: 18 BRPM | HEART RATE: 93 BPM | DIASTOLIC BLOOD PRESSURE: 86 MMHG | SYSTOLIC BLOOD PRESSURE: 123 MMHG | OXYGEN SATURATION: 95 % | TEMPERATURE: 97.3 F | HEIGHT: 64 IN | BODY MASS INDEX: 37.05 KG/M2

## 2020-10-28 DIAGNOSIS — Z43.3 ATTENTION TO COLOSTOMY (HCC): Primary | ICD-10-CM

## 2020-10-28 PROCEDURE — 99024 POSTOP FOLLOW-UP VISIT: CPT | Performed by: SURGERY

## 2020-10-28 NOTE — PROGRESS NOTES
"Post-op Note    Subjective   Lonnie Arora is a 45 y.o. male status post open colostomy reversal performed on 9/10/2020.  The patient is doing well without any significant pain.  He is having bowel function and tolerating a regular diet without difficulty.  He is not having any fevers, minimal drainage coming from the site.  He states that they have been unable to pack the colostomy reversal site.  The patient did fall and fractured his left shoulder a few weeks ago and is now wearing a sling.      Objective   /86 (BP Location: Right arm, Patient Position: Sitting, Cuff Size: Adult)   Pulse 93   Temp 97.3 °F (36.3 °C) (Temporal)   Resp 18   Ht 162.6 cm (64\")   Wt 98.4 kg (217 lb)   SpO2 95%   BMI 37.25 kg/m²   Abdomen is obese, soft, nontender.  There is a healing pinpoint ostomy site in the left lower quadrant without any surrounding erythema, induration, or drainage.  No evidence of hernia at the ostomy site.    Assessment/Plan   Patient is a 45-year-old gentleman status post open colostomy reversal performed on 10/9/2020.    Ostomy site appears fully healed.  Continue regular diet as tolerated  Follow-up with me as needed    Niraj Almanzar MD  10/28/2020  09:53 EDT  "

## 2020-10-30 LAB
Lab: 7
TOAL ENROLLMENT DAYS: 23

## 2020-10-30 PROCEDURE — 93272 ECG/REVIEW INTERPRET ONLY: CPT | Performed by: INTERNAL MEDICINE

## 2020-11-11 ENCOUNTER — OFFICE VISIT (OUTPATIENT)
Dept: CARDIOLOGY | Facility: CLINIC | Age: 45
End: 2020-11-11

## 2020-11-11 VITALS
SYSTOLIC BLOOD PRESSURE: 118 MMHG | OXYGEN SATURATION: 98 % | DIASTOLIC BLOOD PRESSURE: 82 MMHG | BODY MASS INDEX: 37.05 KG/M2 | TEMPERATURE: 97.7 F | WEIGHT: 217 LBS | HEART RATE: 92 BPM | HEIGHT: 64 IN

## 2020-11-11 DIAGNOSIS — E11.9 TYPE 2 DIABETES MELLITUS WITHOUT COMPLICATION, WITH LONG-TERM CURRENT USE OF INSULIN (HCC): Chronic | ICD-10-CM

## 2020-11-11 DIAGNOSIS — I10 BENIGN ESSENTIAL HYPERTENSION: Primary | Chronic | ICD-10-CM

## 2020-11-11 DIAGNOSIS — Z79.4 TYPE 2 DIABETES MELLITUS WITHOUT COMPLICATION, WITH LONG-TERM CURRENT USE OF INSULIN (HCC): Chronic | ICD-10-CM

## 2020-11-11 DIAGNOSIS — E78.2 HYPERLIPIDEMIA, MIXED: Chronic | ICD-10-CM

## 2020-11-11 PROCEDURE — 99213 OFFICE O/P EST LOW 20 MIN: CPT | Performed by: INTERNAL MEDICINE

## 2020-11-11 NOTE — PROGRESS NOTES
Subjective:     Encounter Date:11/11/2020      Patient ID: Lonnie rAora is a 45 y.o. male.    Chief Complaint: hosp f/u     History of Present Illness     45-year-old male patient admitted hospital apparently had a syncopal episode at [facility]  Patient felt nauseated feel like throwing up dry heaving and then felt dizzy and passed out  No obvious seizure-like activity  Since admission denies of any chest pain palpitations dizziness but he fractured his left humerus and followed by Ortho  Patient was seen by cardiologist at [facility] had a stress test and echocardiogram done in the last few months apparently that was within normal limit 2020    Patient comes back for follow-up denies of any further symptoms of shortness of breath chest pain syncope  He had a monitor done for 23 days sinus rhythm no evidence of arrhythmia or pauses noted  Patient was advised to stop smoking modify cardiac risk factors follow-up with PCP regularly and I will see him as needed  Patient denies of any symptoms of chest pain       The following portions of the patient's history were reviewed and updated as appropriate: Allergies current medications past family history past medical history past social history past surgical history problem list and review of systems  Past Medical History:   Diagnosis Date   • Diabetes (CMS/[facility])    • GERD (gastroesophageal reflux disease)    • Hypertension    • Seizures (CMS/[facility])    • Stroke (CMS/[facility]) 2010     Past Surgical History:   Procedure Laterality Date   • COLOSTOMY Left 10/21/2019    Procedure: Laparoscopic diverting colostomy;  Surgeon: Niraj Almanzar MD;  Location: [facility] MAIN OR;  Service: General   • COLOSTOMY N/A 9/10/2020    Procedure: COLOSTOMY REVERSAL;  Surgeon: Niraj Almanzar MD;  Location: [facility] MAIN OR;  Service: General;  Laterality: N/A;   • FRACTURE SURGERY      right wrist    • HERNIA REPAIR      umbilical    • INCISION AND DRAINAGE OF WOUND Left 10/15/2019  "   Procedure: INCISION AND DRAINAGE OF LEFT GLUTEAL REGION;  Surgeon: Niraj Almanzar MD;  Location: [facility] MAIN OR;  Service: General   • URETERAL STENT INSERTION      kidney stone stents      /82 (BP Location: Left arm, Patient Position: Sitting, Cuff Size: Adult)   Pulse 92   Temp 97.7 °F (36.5 °C) (Infrared)   Ht 162.6 cm (64\")   Wt 98.4 kg (217 lb)   SpO2 98%   BMI 37.25 kg/m²   Family History   Problem Relation Age of Onset   • Breast cancer Mother    • Cervical cancer Mother        Current Outpatient Medications:   •  amLODIPine (NORVASC) 10 MG tablet, Take 10 mg by mouth Daily., Disp: , Rfl:   •  atorvastatin (LIPITOR) 40 MG tablet, Take 40 mg by mouth Daily., Disp: , Rfl:   •  baclofen (LIORESAL) 10 MG tablet, Take 10 mg by mouth Daily., Disp: , Rfl:   •  buPROPion XL (WELLBUTRIN XL) 300 MG 24 hr tablet, Take 300 mg by mouth Every Morning. Take preop, Disp: , Rfl:   •  Insulin Glargine-Lixisenatide (Soliqua) 100-33 UNT-MCG/ML solution pen-injector injection, Inject 15 Units under the skin into the appropriate area as directed Daily As Needed (HOLD dose if BG <150). Pt receives samples, Disp: , Rfl:   •  isosorbide mononitrate (IMDUR) 30 MG 24 hr tablet, Take 30 mg by mouth Daily., Disp: , Rfl:   •  levETIRAcetam (KEPPRA) 500 MG tablet, Take 500 mg by mouth 2 (Two) Times a Day., Disp: , Rfl:   •  Melatonin 5 MG tablet dispersible, Take 1 tablet by mouth At Night As Needed (sleep)., Disp: , Rfl:   •  metoprolol tartrate (LOPRESSOR) 100 MG tablet, Take 100 mg by mouth 2 (Two) Times a Day., Disp: , Rfl:   •  oxyCODONE-acetaminophen (PERCOCET) 5-325 MG per tablet, Take 1 tablet by mouth Every 4 (Four) Hours As Needed for Moderate Pain ., Disp: 30 tablet, Rfl: 0  •  rOPINIRole (REQUIP) 0.5 MG tablet, Take 0.5 mg by mouth 3 (Three) Times a Day., Disp: , Rfl:   Social History     Socioeconomic History   • Marital status:      Spouse name: Not on file   • Number of children: Not on file "   • Years of education: Not on file   • Highest education level: Not on file   Tobacco Use   • Smoking status: Current Every Day Smoker     Types: Cigars     Last attempt to quit: 10/2019     Years since quittin.1   • Smokeless tobacco: Never Used   • Tobacco comment: Would have 2 to 3 cigars/week   Substance and Sexual Activity   • Alcohol use: Not Currently     Frequency: Never     Comment: Quit several years ago at time of his CVA   • Drug use: Not Currently     Types: Marijuana   • Sexual activity: Defer     No Known Allergies  Review of Systems   Constitution: Negative for chills, fever and malaise/fatigue.   Cardiovascular: Positive for leg swelling. Negative for dyspnea on exertion, palpitations and syncope.   Respiratory: Negative for shortness of breath.    Skin: Negative for rash.   Gastrointestinal: Negative for nausea.   Neurological: Negative for dizziness, light-headedness and numbness.              Objective:     Constitutional:       Appearance: Well-developed.   Eyes:      General: No scleral icterus.     Conjunctiva/sclera: Conjunctivae normal.   HENT:      Head: Normocephalic and atraumatic.    Mouth/Throat:      Mouth: No oral lesions.      Pharynx: Uvula midline.   Neck:      Musculoskeletal: Neck supple.      Thyroid: No thyromegaly.      Vascular: No carotid bruit or JVD.      Trachea: Trachea normal.   Pulmonary:      Effort: Pulmonary effort is normal.      Breath sounds: Normal breath sounds.   Cardiovascular:      Normal rate. Regular rhythm.      No gallop.   Pulses:     Intact distal pulses.   Abdominal:      General: Bowel sounds are normal.      Palpations: Abdomen is soft.   Musculoskeletal: Normal range of motion.   Skin:     General: Skin is warm. There is no cyanosis.      Comments: Skin tattoos noted   Neurological:      Mental Status: Alert and oriented to person, place, and time.      Comments: No focal deficits   Psychiatric:         Behavior: Behavior is cooperative.          Procedures    Lab Review:       Assessment:          Diagnosis Plan   1. Benign essential hypertension     2. Hyperlipidemia, mixed     3. Type 2 diabetes mellitus without complication, with long-term current use of insulin (CMS/[facility])            Plan:       Aggressive control of hypertension dyslipidemia modify cardiac risk factors aggressively  Type 2 diabetes mellitus will be followed by PCP regularly  Patient will see me in the future if any symptoms of chest pain or recurrent syncopal episodes otherwise followed by PCP

## 2021-01-13 ENCOUNTER — APPOINTMENT (OUTPATIENT)
Dept: CARDIOLOGY | Facility: HOSPITAL | Age: 46
End: 2021-01-13

## 2021-01-13 ENCOUNTER — APPOINTMENT (OUTPATIENT)
Dept: GENERAL RADIOLOGY | Facility: HOSPITAL | Age: 46
End: 2021-01-13

## 2021-01-13 ENCOUNTER — APPOINTMENT (OUTPATIENT)
Dept: CT IMAGING | Facility: HOSPITAL | Age: 46
End: 2021-01-13

## 2021-01-13 ENCOUNTER — HOSPITAL ENCOUNTER (OUTPATIENT)
Facility: HOSPITAL | Age: 46
Setting detail: OBSERVATION
Discharge: HOME | End: 2021-01-15
Attending: INTERNAL MEDICINE | Admitting: INTERNAL MEDICINE

## 2021-01-13 DIAGNOSIS — I27.82 CHRONIC PULMONARY EMBOLISM WITHOUT ACUTE COR PULMONALE, UNSPECIFIED PULMONARY EMBOLISM TYPE (HCC): ICD-10-CM

## 2021-01-13 DIAGNOSIS — J18.9 PNEUMONIA OF BOTH LOWER LOBES DUE TO INFECTIOUS ORGANISM: ICD-10-CM

## 2021-01-13 DIAGNOSIS — U07.1 COVID-19: Primary | ICD-10-CM

## 2021-01-13 DIAGNOSIS — N28.9 RENAL IMPAIRMENT: ICD-10-CM

## 2021-01-13 LAB
ALBUMIN SERPL-MCNC: 3.4 G/DL (ref 3.5–5.2)
ALBUMIN/GLOB SERPL: 1 G/DL
ALP SERPL-CCNC: 151 U/L (ref 39–117)
ALT SERPL W P-5'-P-CCNC: 37 U/L (ref 1–41)
ANION GAP SERPL CALCULATED.3IONS-SCNC: 16 MMOL/L (ref 5–15)
AST SERPL-CCNC: 54 U/L (ref 1–40)
ATMOSPHERIC PRESS: ABNORMAL MM[HG]
BASE EXCESS BLDV CALC-SCNC: -3.7 MMOL/L
BASOPHILS # BLD AUTO: 0 10*3/MM3 (ref 0–0.2)
BASOPHILS NFR BLD AUTO: 0.4 % (ref 0–1.5)
BDY SITE: ABNORMAL
BH CV LOW VAS RIGHT COMMON FEMORAL SPONT: 1
BH CV LOW VAS RIGHT DISTAL FEMORAL SPONT: 1
BH CV LOW VAS RIGHT MID FEMORAL SPONT: 1
BH CV LOW VAS RIGHT POPLITEAL SPONT: 1
BH CV LOW VAS RIGHT PROXIMAL FEMORAL SPONT: 1
BH CV LOW VAS RIGHT SAPHENOFEMORAL JUNCTION SPONT: 1
BH CV LOWER VASCULAR LEFT COMMON FEMORAL AUGMENT: NORMAL
BH CV LOWER VASCULAR LEFT COMMON FEMORAL COMPETENT: NORMAL
BH CV LOWER VASCULAR LEFT COMMON FEMORAL COMPRESS: NORMAL
BH CV LOWER VASCULAR LEFT COMMON FEMORAL PHASIC: NORMAL
BH CV LOWER VASCULAR LEFT COMMON FEMORAL SPONT: NORMAL
BH CV LOWER VASCULAR LEFT DISTAL FEMORAL COMPRESS: NORMAL
BH CV LOWER VASCULAR LEFT GASTRONEMIUS COMPRESS: NORMAL
BH CV LOWER VASCULAR LEFT GREATER SAPH AK COMPRESS: NORMAL
BH CV LOWER VASCULAR LEFT LESSER SAPH COMPRESS: NORMAL
BH CV LOWER VASCULAR LEFT MID FEMORAL AUGMENT: NORMAL
BH CV LOWER VASCULAR LEFT MID FEMORAL COMPETENT: NORMAL
BH CV LOWER VASCULAR LEFT MID FEMORAL COMPRESS: NORMAL
BH CV LOWER VASCULAR LEFT MID FEMORAL PHASIC: NORMAL
BH CV LOWER VASCULAR LEFT MID FEMORAL SPONT: NORMAL
BH CV LOWER VASCULAR LEFT POPLITEAL AUGMENT: NORMAL
BH CV LOWER VASCULAR LEFT POPLITEAL COMPETENT: NORMAL
BH CV LOWER VASCULAR LEFT POPLITEAL COMPRESS: NORMAL
BH CV LOWER VASCULAR LEFT POPLITEAL PHASIC: NORMAL
BH CV LOWER VASCULAR LEFT POPLITEAL SPONT: NORMAL
BH CV LOWER VASCULAR LEFT PROXIMAL FEMORAL COMPRESS: NORMAL
BH CV LOWER VASCULAR LEFT SAPHENOFEMORAL JUNCTION COMPRESS: NORMAL
BH CV LOWER VASCULAR RIGHT COMMON FEMORAL AUGMENT: NORMAL
BH CV LOWER VASCULAR RIGHT COMMON FEMORAL COMPETENT: NORMAL
BH CV LOWER VASCULAR RIGHT COMMON FEMORAL COMPRESS: NORMAL
BH CV LOWER VASCULAR RIGHT COMMON FEMORAL PHASIC: NORMAL
BH CV LOWER VASCULAR RIGHT COMMON FEMORAL SPONT: NORMAL
BH CV LOWER VASCULAR RIGHT COMMON FEMORAL THROMBUS: NORMAL
BH CV LOWER VASCULAR RIGHT DISTAL FEMORAL AUGMENT: NORMAL
BH CV LOWER VASCULAR RIGHT DISTAL FEMORAL COMPETENT: NORMAL
BH CV LOWER VASCULAR RIGHT DISTAL FEMORAL COMPRESS: NORMAL
BH CV LOWER VASCULAR RIGHT DISTAL FEMORAL PHASIC: NORMAL
BH CV LOWER VASCULAR RIGHT DISTAL FEMORAL SPONT: NORMAL
BH CV LOWER VASCULAR RIGHT DISTAL FEMORAL THROMBUS: NORMAL
BH CV LOWER VASCULAR RIGHT GASTRONEMIUS COMPRESS: NORMAL
BH CV LOWER VASCULAR RIGHT GREATER SAPH AK COMPRESS: NORMAL
BH CV LOWER VASCULAR RIGHT LESSER SAPH COMPRESS: NORMAL
BH CV LOWER VASCULAR RIGHT MID FEMORAL AUGMENT: NORMAL
BH CV LOWER VASCULAR RIGHT MID FEMORAL COMPETENT: NORMAL
BH CV LOWER VASCULAR RIGHT MID FEMORAL COMPRESS: NORMAL
BH CV LOWER VASCULAR RIGHT MID FEMORAL PHASIC: NORMAL
BH CV LOWER VASCULAR RIGHT MID FEMORAL SPONT: NORMAL
BH CV LOWER VASCULAR RIGHT MID FEMORAL THROMBUS: NORMAL
BH CV LOWER VASCULAR RIGHT POPLITEAL AUGMENT: NORMAL
BH CV LOWER VASCULAR RIGHT POPLITEAL COMPETENT: NORMAL
BH CV LOWER VASCULAR RIGHT POPLITEAL COMPRESS: NORMAL
BH CV LOWER VASCULAR RIGHT POPLITEAL PHASIC: NORMAL
BH CV LOWER VASCULAR RIGHT POPLITEAL SPONT: NORMAL
BH CV LOWER VASCULAR RIGHT POPLITEAL THROMBUS: NORMAL
BH CV LOWER VASCULAR RIGHT PROXIMAL FEMORAL AUGMENT: NORMAL
BH CV LOWER VASCULAR RIGHT PROXIMAL FEMORAL COMPETENT: NORMAL
BH CV LOWER VASCULAR RIGHT PROXIMAL FEMORAL COMPRESS: NORMAL
BH CV LOWER VASCULAR RIGHT PROXIMAL FEMORAL PHASIC: NORMAL
BH CV LOWER VASCULAR RIGHT PROXIMAL FEMORAL SPONT: NORMAL
BH CV LOWER VASCULAR RIGHT PROXIMAL FEMORAL THROMBUS: NORMAL
BH CV LOWER VASCULAR RIGHT SAPHENOFEMORAL JUNCTION COMPRESS: NORMAL
BH CV LOWER VASCULAR RIGHT SAPHENOFEMORAL JUNCTION THROMBUS: NORMAL
BILIRUB SERPL-MCNC: 0.3 MG/DL (ref 0–1.2)
BUN SERPL-MCNC: 14 MG/DL (ref 6–20)
BUN/CREAT SERPL: 8.9 (ref 7–25)
CALCIUM SPEC-SCNC: 8.4 MG/DL (ref 8.6–10.5)
CHLORIDE SERPL-SCNC: 103 MMOL/L (ref 98–107)
CO2 BLDA-SCNC: 20.1 MMOL/L (ref 22–29)
CO2 SERPL-SCNC: 22 MMOL/L (ref 22–29)
CREAT SERPL-MCNC: 1.57 MG/DL (ref 0.76–1.27)
D DIMER PPP FEU-MCNC: 0.64 MG/L (FEU) (ref 0–0.59)
D DIMER PPP FEU-MCNC: 0.82 MG/L (FEU) (ref 0–0.59)
DEPRECATED RDW RBC AUTO: 51.6 FL (ref 37–54)
EOSINOPHIL # BLD AUTO: 0 10*3/MM3 (ref 0–0.4)
EOSINOPHIL NFR BLD AUTO: 0.2 % (ref 0.3–6.2)
ERYTHROCYTE [DISTWIDTH] IN BLOOD BY AUTOMATED COUNT: 16.3 % (ref 12.3–15.4)
FERRITIN SERPL-MCNC: 290.5 NG/ML (ref 30–400)
GFR SERPL CREATININE-BSD FRML MDRD: 48 ML/MIN/1.73
GLOBULIN UR ELPH-MCNC: 3.5 GM/DL
GLUCOSE SERPL-MCNC: 141 MG/DL (ref 65–99)
HCO3 BLDV-SCNC: 19.2 MMOL/L
HCT VFR BLD AUTO: 51.8 % (ref 37.5–51)
HGB BLD-MCNC: 16.9 G/DL (ref 13–17.7)
LDH SERPL-CCNC: 429 U/L (ref 135–225)
LYMPHOCYTES # BLD AUTO: 0.8 10*3/MM3 (ref 0.7–3.1)
LYMPHOCYTES NFR BLD AUTO: 10.6 % (ref 19.6–45.3)
MCH RBC QN AUTO: 29.1 PG (ref 26.6–33)
MCHC RBC AUTO-ENTMCNC: 32.6 G/DL (ref 31.5–35.7)
MCV RBC AUTO: 89.2 FL (ref 79–97)
MODALITY: ABNORMAL
MONOCYTES # BLD AUTO: 0.7 10*3/MM3 (ref 0.1–0.9)
MONOCYTES NFR BLD AUTO: 9.1 % (ref 5–12)
NEUTROPHILS NFR BLD AUTO: 6.3 10*3/MM3 (ref 1.7–7)
NEUTROPHILS NFR BLD AUTO: 79.7 % (ref 42.7–76)
NRBC BLD AUTO-RTO: 0.1 /100 WBC (ref 0–0.2)
PCO2 BLDV: 29.2 MM HG (ref 42–51)
PH BLDV: 7.43 PH UNITS (ref 7.32–7.43)
PLATELET # BLD AUTO: 237 10*3/MM3 (ref 140–450)
PMV BLD AUTO: 8.5 FL (ref 6–12)
PO2 BLDV: 107.4 MM HG
POTASSIUM SERPL-SCNC: 4.3 MMOL/L (ref 3.5–5.2)
PROCALCITONIN SERPL-MCNC: 47 NG/ML (ref 0–0.25)
PROT SERPL-MCNC: 6.9 G/DL (ref 6–8.5)
QT INTERVAL: 347 MS
RBC # BLD AUTO: 5.81 10*6/MM3 (ref 4.14–5.8)
SAO2 % BLDCOV: 98.4 %
SARS-COV-2 RNA PNL SPEC NAA+PROBE: DETECTED
SODIUM SERPL-SCNC: 141 MMOL/L (ref 136–145)
TROPONIN T SERPL-MCNC: <0.01 NG/ML (ref 0–0.03)
WBC # BLD AUTO: 7.9 10*3/MM3 (ref 3.4–10.8)

## 2021-01-13 PROCEDURE — 93970 EXTREMITY STUDY: CPT

## 2021-01-13 PROCEDURE — 96375 TX/PRO/DX INJ NEW DRUG ADDON: CPT

## 2021-01-13 PROCEDURE — 25010000002 ENOXAPARIN PER 10 MG: Performed by: INTERNAL MEDICINE

## 2021-01-13 PROCEDURE — 85025 COMPLETE CBC W/AUTO DIFF WBC: CPT | Performed by: NURSE PRACTITIONER

## 2021-01-13 PROCEDURE — 80053 COMPREHEN METABOLIC PANEL: CPT | Performed by: NURSE PRACTITIONER

## 2021-01-13 PROCEDURE — 71045 X-RAY EXAM CHEST 1 VIEW: CPT

## 2021-01-13 PROCEDURE — 93325 DOPPLER ECHO COLOR FLOW MAPG: CPT

## 2021-01-13 PROCEDURE — 85379 FIBRIN DEGRADATION QUANT: CPT | Performed by: NURSE PRACTITIONER

## 2021-01-13 PROCEDURE — 99284 EMERGENCY DEPT VISIT MOD MDM: CPT

## 2021-01-13 PROCEDURE — 93308 TTE F-UP OR LMTD: CPT

## 2021-01-13 PROCEDURE — 93005 ELECTROCARDIOGRAM TRACING: CPT | Performed by: NURSE PRACTITIONER

## 2021-01-13 PROCEDURE — 94799 UNLISTED PULMONARY SVC/PX: CPT

## 2021-01-13 PROCEDURE — 84145 PROCALCITONIN (PCT): CPT | Performed by: INTERNAL MEDICINE

## 2021-01-13 PROCEDURE — G0378 HOSPITAL OBSERVATION PER HR: HCPCS

## 2021-01-13 PROCEDURE — 85379 FIBRIN DEGRADATION QUANT: CPT | Performed by: INTERNAL MEDICINE

## 2021-01-13 PROCEDURE — 83615 LACTATE (LD) (LDH) ENZYME: CPT | Performed by: INTERNAL MEDICINE

## 2021-01-13 PROCEDURE — 93325 DOPPLER ECHO COLOR FLOW MAPG: CPT | Performed by: INTERNAL MEDICINE

## 2021-01-13 PROCEDURE — 25010000002 DEXAMETHASONE PER 1 MG: Performed by: NURSE PRACTITIONER

## 2021-01-13 PROCEDURE — 84484 ASSAY OF TROPONIN QUANT: CPT | Performed by: NURSE PRACTITIONER

## 2021-01-13 PROCEDURE — 82803 BLOOD GASES ANY COMBINATION: CPT

## 2021-01-13 PROCEDURE — 93308 TTE F-UP OR LMTD: CPT | Performed by: INTERNAL MEDICINE

## 2021-01-13 PROCEDURE — 93005 ELECTROCARDIOGRAM TRACING: CPT

## 2021-01-13 PROCEDURE — 87635 SARS-COV-2 COVID-19 AMP PRB: CPT | Performed by: NURSE PRACTITIONER

## 2021-01-13 PROCEDURE — 71275 CT ANGIOGRAPHY CHEST: CPT

## 2021-01-13 PROCEDURE — 93005 ELECTROCARDIOGRAM TRACING: CPT | Performed by: INTERNAL MEDICINE

## 2021-01-13 PROCEDURE — 96374 THER/PROPH/DIAG INJ IV PUSH: CPT

## 2021-01-13 PROCEDURE — 96372 THER/PROPH/DIAG INJ SC/IM: CPT

## 2021-01-13 PROCEDURE — 96376 TX/PRO/DX INJ SAME DRUG ADON: CPT

## 2021-01-13 PROCEDURE — 82728 ASSAY OF FERRITIN: CPT | Performed by: INTERNAL MEDICINE

## 2021-01-13 PROCEDURE — 0 IOPAMIDOL PER 1 ML: Performed by: NURSE PRACTITIONER

## 2021-01-13 PROCEDURE — 99222 1ST HOSP IP/OBS MODERATE 55: CPT | Performed by: INTERNAL MEDICINE

## 2021-01-13 PROCEDURE — 94640 AIRWAY INHALATION TREATMENT: CPT

## 2021-01-13 PROCEDURE — 25010000002 CEFTRIAXONE PER 250 MG: Performed by: NURSE PRACTITIONER

## 2021-01-13 PROCEDURE — 96361 HYDRATE IV INFUSION ADD-ON: CPT

## 2021-01-13 PROCEDURE — 87040 BLOOD CULTURE FOR BACTERIA: CPT | Performed by: INTERNAL MEDICINE

## 2021-01-13 RX ORDER — PANTOPRAZOLE SODIUM 40 MG/10ML
40 INJECTION, POWDER, LYOPHILIZED, FOR SOLUTION INTRAVENOUS EVERY 12 HOURS SCHEDULED
Status: DISCONTINUED | OUTPATIENT
Start: 2021-01-13 | End: 2021-01-14

## 2021-01-13 RX ORDER — DEXAMETHASONE SODIUM PHOSPHATE 4 MG/ML
6 INJECTION, SOLUTION INTRA-ARTICULAR; INTRALESIONAL; INTRAMUSCULAR; INTRAVENOUS; SOFT TISSUE EVERY 6 HOURS SCHEDULED
Status: DISCONTINUED | OUTPATIENT
Start: 2021-01-13 | End: 2021-01-14

## 2021-01-13 RX ORDER — AMLODIPINE BESYLATE 5 MG/1
10 TABLET ORAL DAILY
Status: DISCONTINUED | OUTPATIENT
Start: 2021-01-14 | End: 2021-01-15 | Stop reason: HOSPADM

## 2021-01-13 RX ORDER — LEVETIRACETAM 500 MG/1
500 TABLET ORAL EVERY 12 HOURS SCHEDULED
Status: DISCONTINUED | OUTPATIENT
Start: 2021-01-13 | End: 2021-01-15 | Stop reason: HOSPADM

## 2021-01-13 RX ORDER — SODIUM CHLORIDE 0.9 % (FLUSH) 0.9 %
10 SYRINGE (ML) INJECTION AS NEEDED
Status: DISCONTINUED | OUTPATIENT
Start: 2021-01-13 | End: 2021-01-15 | Stop reason: HOSPADM

## 2021-01-13 RX ORDER — ASCORBIC ACID 500 MG
1000 TABLET ORAL DAILY
Status: DISCONTINUED | OUTPATIENT
Start: 2021-01-13 | End: 2021-01-15 | Stop reason: HOSPADM

## 2021-01-13 RX ORDER — METOPROLOL TARTRATE 50 MG/1
100 TABLET, FILM COATED ORAL EVERY 12 HOURS SCHEDULED
Status: DISCONTINUED | OUTPATIENT
Start: 2021-01-13 | End: 2021-01-15 | Stop reason: HOSPADM

## 2021-01-13 RX ORDER — ISOSORBIDE MONONITRATE 30 MG/1
30 TABLET, EXTENDED RELEASE ORAL DAILY
Status: DISCONTINUED | OUTPATIENT
Start: 2021-01-14 | End: 2021-01-15 | Stop reason: HOSPADM

## 2021-01-13 RX ORDER — ALBUTEROL SULFATE 90 UG/1
2 AEROSOL, METERED RESPIRATORY (INHALATION) ONCE
Status: COMPLETED | OUTPATIENT
Start: 2021-01-13 | End: 2021-01-13

## 2021-01-13 RX ORDER — ZINC SULFATE 50(220)MG
220 CAPSULE ORAL DAILY
Status: DISCONTINUED | OUTPATIENT
Start: 2021-01-13 | End: 2021-01-15 | Stop reason: HOSPADM

## 2021-01-13 RX ORDER — ALBUTEROL SULFATE 90 UG/1
2 AEROSOL, METERED RESPIRATORY (INHALATION)
Status: DISCONTINUED | OUTPATIENT
Start: 2021-01-13 | End: 2021-01-15 | Stop reason: HOSPADM

## 2021-01-13 RX ORDER — BUPROPION HYDROCHLORIDE 150 MG/1
300 TABLET ORAL DAILY
Status: DISCONTINUED | OUTPATIENT
Start: 2021-01-14 | End: 2021-01-15 | Stop reason: HOSPADM

## 2021-01-13 RX ORDER — ACETAMINOPHEN 500 MG
1000 TABLET ORAL ONCE
Status: COMPLETED | OUTPATIENT
Start: 2021-01-13 | End: 2021-01-13

## 2021-01-13 RX ORDER — SODIUM CHLORIDE 9 MG/ML
125 INJECTION, SOLUTION INTRAVENOUS CONTINUOUS
Status: DISCONTINUED | OUTPATIENT
Start: 2021-01-13 | End: 2021-01-14

## 2021-01-13 RX ORDER — ROPINIROLE 0.25 MG/1
0.25 TABLET, FILM COATED ORAL DAILY
Status: DISCONTINUED | OUTPATIENT
Start: 2021-01-14 | End: 2021-01-15 | Stop reason: HOSPADM

## 2021-01-13 RX ORDER — ATORVASTATIN CALCIUM 40 MG/1
40 TABLET, FILM COATED ORAL DAILY
Status: DISCONTINUED | OUTPATIENT
Start: 2021-01-14 | End: 2021-01-15 | Stop reason: HOSPADM

## 2021-01-13 RX ORDER — AZITHROMYCIN 250 MG/1
500 TABLET, FILM COATED ORAL DAILY
Status: DISCONTINUED | OUTPATIENT
Start: 2021-01-13 | End: 2021-01-13 | Stop reason: SDUPTHER

## 2021-01-13 RX ORDER — AZITHROMYCIN 250 MG/1
500 TABLET, FILM COATED ORAL DAILY
Status: DISCONTINUED | OUTPATIENT
Start: 2021-01-13 | End: 2021-01-15 | Stop reason: HOSPADM

## 2021-01-13 RX ADMIN — SODIUM CHLORIDE 1000 ML: 9 INJECTION, SOLUTION INTRAVENOUS at 15:20

## 2021-01-13 RX ADMIN — PANTOPRAZOLE SODIUM 40 MG: 40 INJECTION, POWDER, FOR SOLUTION INTRAVENOUS at 21:39

## 2021-01-13 RX ADMIN — ZINC SULFATE 220 MG (50 MG) CAPSULE 220 MG: CAPSULE at 21:36

## 2021-01-13 RX ADMIN — AZITHROMYCIN 500 MG: 250 TABLET, FILM COATED ORAL at 22:04

## 2021-01-13 RX ADMIN — ACETAMINOPHEN 1000 MG: 500 TABLET, FILM COATED ORAL at 13:36

## 2021-01-13 RX ADMIN — LEVETIRACETAM 500 MG: 500 TABLET ORAL at 21:37

## 2021-01-13 RX ADMIN — CEFTRIAXONE SODIUM 2 G: 10 INJECTION, POWDER, FOR SOLUTION INTRAVENOUS at 13:40

## 2021-01-13 RX ADMIN — METOPROLOL TARTRATE 100 MG: 50 TABLET, FILM COATED ORAL at 21:36

## 2021-01-13 RX ADMIN — ALBUTEROL SULFATE 2 PUFF: 90 AEROSOL, METERED RESPIRATORY (INHALATION) at 11:45

## 2021-01-13 RX ADMIN — AZITHROMYCIN DIHYDRATE 500 MG: 250 TABLET, FILM COATED ORAL at 13:36

## 2021-01-13 RX ADMIN — AZITHROMYCIN DIHYDRATE 500 MG: 250 TABLET, FILM COATED ORAL at 21:38

## 2021-01-13 RX ADMIN — IOPAMIDOL 100 ML: 755 INJECTION, SOLUTION INTRAVENOUS at 15:41

## 2021-01-13 RX ADMIN — SODIUM CHLORIDE 125 ML/HR: 9 INJECTION, SOLUTION INTRAVENOUS at 18:46

## 2021-01-13 RX ADMIN — OXYCODONE HYDROCHLORIDE AND ACETAMINOPHEN 1000 MG: 500 TABLET ORAL at 21:36

## 2021-01-13 RX ADMIN — ENOXAPARIN SODIUM 90 MG: 100 INJECTION SUBCUTANEOUS at 18:43

## 2021-01-13 RX ADMIN — ALBUTEROL SULFATE 2 PUFF: 108 AEROSOL, METERED RESPIRATORY (INHALATION) at 23:55

## 2021-01-13 RX ADMIN — DEXAMETHASONE SODIUM PHOSPHATE 6 MG: 4 INJECTION, SOLUTION INTRAMUSCULAR; INTRAVENOUS at 13:39

## 2021-01-13 RX ADMIN — DEXAMETHASONE SODIUM PHOSPHATE 6 MG: 4 INJECTION, SOLUTION INTRAMUSCULAR; INTRAVENOUS at 19:11

## 2021-01-14 LAB
ALBUMIN SERPL-MCNC: 3.2 G/DL (ref 3.5–5.2)
ALBUMIN/GLOB SERPL: 1.1 G/DL
ALP SERPL-CCNC: 133 U/L (ref 39–117)
ALT SERPL W P-5'-P-CCNC: 30 U/L (ref 1–41)
ANION GAP SERPL CALCULATED.3IONS-SCNC: 13 MMOL/L (ref 5–15)
AST SERPL-CCNC: 39 U/L (ref 1–40)
BASOPHILS # BLD AUTO: 0 10*3/MM3 (ref 0–0.2)
BASOPHILS NFR BLD AUTO: 0.2 % (ref 0–1.5)
BILIRUB SERPL-MCNC: 0.3 MG/DL (ref 0–1.2)
BUN SERPL-MCNC: 15 MG/DL (ref 6–20)
BUN/CREAT SERPL: 11.8 (ref 7–25)
CALCIUM SPEC-SCNC: 7.8 MG/DL (ref 8.6–10.5)
CHLORIDE SERPL-SCNC: 109 MMOL/L (ref 98–107)
CK SERPL-CCNC: 74 U/L (ref 20–200)
CO2 SERPL-SCNC: 19 MMOL/L (ref 22–29)
CREAT SERPL-MCNC: 1.27 MG/DL (ref 0.76–1.27)
CRP SERPL-MCNC: 3.54 MG/DL (ref 0–0.5)
D DIMER PPP FEU-MCNC: 1.6 MG/L (FEU) (ref 0–0.59)
DEPRECATED RDW RBC AUTO: 51.2 FL (ref 37–54)
EOSINOPHIL # BLD AUTO: 0 10*3/MM3 (ref 0–0.4)
EOSINOPHIL NFR BLD AUTO: 0.1 % (ref 0.3–6.2)
ERYTHROCYTE [DISTWIDTH] IN BLOOD BY AUTOMATED COUNT: 16 % (ref 12.3–15.4)
FERRITIN SERPL-MCNC: 228.8 NG/ML (ref 30–400)
FIBRINOGEN PPP-MCNC: 439 MG/DL (ref 210–450)
GFR SERPL CREATININE-BSD FRML MDRD: 62 ML/MIN/1.73
GLOBULIN UR ELPH-MCNC: 3 GM/DL
GLUCOSE BLDC GLUCOMTR-MCNC: 265 MG/DL (ref 70–105)
GLUCOSE BLDC GLUCOMTR-MCNC: 296 MG/DL (ref 70–105)
GLUCOSE SERPL-MCNC: 197 MG/DL (ref 65–99)
HCT VFR BLD AUTO: 48.7 % (ref 37.5–51)
HGB BLD-MCNC: 15.4 G/DL (ref 13–17.7)
LDH SERPL-CCNC: 347 U/L (ref 135–225)
LV EF 2D ECHO EST: 55 %
LYMPHOCYTES # BLD AUTO: 0.4 10*3/MM3 (ref 0.7–3.1)
LYMPHOCYTES NFR BLD AUTO: 9.3 % (ref 19.6–45.3)
MAXIMAL PREDICTED HEART RATE: 176 BPM
MCH RBC QN AUTO: 28.7 PG (ref 26.6–33)
MCHC RBC AUTO-ENTMCNC: 31.6 G/DL (ref 31.5–35.7)
MCV RBC AUTO: 90.7 FL (ref 79–97)
MONOCYTES # BLD AUTO: 0.1 10*3/MM3 (ref 0.1–0.9)
MONOCYTES NFR BLD AUTO: 3.3 % (ref 5–12)
NEUTROPHILS NFR BLD AUTO: 3.8 10*3/MM3 (ref 1.7–7)
NEUTROPHILS NFR BLD AUTO: 87.1 % (ref 42.7–76)
NRBC BLD AUTO-RTO: 0.3 /100 WBC (ref 0–0.2)
PLATELET # BLD AUTO: 209 10*3/MM3 (ref 140–450)
PMV BLD AUTO: 8.5 FL (ref 6–12)
POTASSIUM SERPL-SCNC: 4 MMOL/L (ref 3.5–5.2)
PROT SERPL-MCNC: 6.2 G/DL (ref 6–8.5)
RBC # BLD AUTO: 5.37 10*6/MM3 (ref 4.14–5.8)
SODIUM SERPL-SCNC: 141 MMOL/L (ref 136–145)
STRESS TARGET HR: 150 BPM
WBC # BLD AUTO: 4.4 10*3/MM3 (ref 3.4–10.8)
WHOLE BLOOD HOLD SPECIMEN: NORMAL

## 2021-01-14 PROCEDURE — 83520 IMMUNOASSAY QUANT NOS NONAB: CPT | Performed by: NURSE PRACTITIONER

## 2021-01-14 PROCEDURE — 94799 UNLISTED PULMONARY SVC/PX: CPT

## 2021-01-14 PROCEDURE — 83615 LACTATE (LD) (LDH) ENZYME: CPT | Performed by: INTERNAL MEDICINE

## 2021-01-14 PROCEDURE — 85705 THROMBOPLASTIN INHIBITION: CPT | Performed by: NURSE PRACTITIONER

## 2021-01-14 PROCEDURE — 82962 GLUCOSE BLOOD TEST: CPT

## 2021-01-14 PROCEDURE — 96376 TX/PRO/DX INJ SAME DRUG ADON: CPT

## 2021-01-14 PROCEDURE — 85025 COMPLETE CBC W/AUTO DIFF WBC: CPT | Performed by: INTERNAL MEDICINE

## 2021-01-14 PROCEDURE — 85732 THROMBOPLASTIN TIME PARTIAL: CPT | Performed by: NURSE PRACTITIONER

## 2021-01-14 PROCEDURE — 99232 SBSQ HOSP IP/OBS MODERATE 35: CPT | Performed by: INTERNAL MEDICINE

## 2021-01-14 PROCEDURE — 80053 COMPREHEN METABOLIC PANEL: CPT | Performed by: INTERNAL MEDICINE

## 2021-01-14 PROCEDURE — 82550 ASSAY OF CK (CPK): CPT | Performed by: INTERNAL MEDICINE

## 2021-01-14 PROCEDURE — 63710000001 INSULIN LISPRO (HUMAN) PER 5 UNITS: Performed by: INTERNAL MEDICINE

## 2021-01-14 PROCEDURE — 82728 ASSAY OF FERRITIN: CPT | Performed by: INTERNAL MEDICINE

## 2021-01-14 PROCEDURE — 85379 FIBRIN DEGRADATION QUANT: CPT | Performed by: INTERNAL MEDICINE

## 2021-01-14 PROCEDURE — 85303 CLOT INHIBIT PROT C ACTIVITY: CPT | Performed by: NURSE PRACTITIONER

## 2021-01-14 PROCEDURE — 85384 FIBRINOGEN ACTIVITY: CPT | Performed by: INTERNAL MEDICINE

## 2021-01-14 PROCEDURE — 81241 F5 GENE: CPT | Performed by: NURSE PRACTITIONER

## 2021-01-14 PROCEDURE — 85598 HEXAGNAL PHOSPH PLTLT NEUTRL: CPT | Performed by: NURSE PRACTITIONER

## 2021-01-14 PROCEDURE — 96361 HYDRATE IV INFUSION ADD-ON: CPT

## 2021-01-14 PROCEDURE — 96372 THER/PROPH/DIAG INJ SC/IM: CPT

## 2021-01-14 PROCEDURE — 85305 CLOT INHIBIT PROT S TOTAL: CPT | Performed by: NURSE PRACTITIONER

## 2021-01-14 PROCEDURE — 85613 RUSSELL VIPER VENOM DILUTED: CPT | Performed by: NURSE PRACTITIONER

## 2021-01-14 PROCEDURE — G0378 HOSPITAL OBSERVATION PER HR: HCPCS

## 2021-01-14 PROCEDURE — 85670 THROMBIN TIME PLASMA: CPT | Performed by: NURSE PRACTITIONER

## 2021-01-14 PROCEDURE — 85300 ANTITHROMBIN III ACTIVITY: CPT | Performed by: NURSE PRACTITIONER

## 2021-01-14 PROCEDURE — 86147 CARDIOLIPIN ANTIBODY EA IG: CPT | Performed by: NURSE PRACTITIONER

## 2021-01-14 PROCEDURE — 85210 CLOT FACTOR II PROTHROM SPEC: CPT | Performed by: NURSE PRACTITIONER

## 2021-01-14 PROCEDURE — 25010000002 DEXAMETHASONE PER 1 MG: Performed by: INTERNAL MEDICINE

## 2021-01-14 PROCEDURE — 25010000002 ENOXAPARIN PER 10 MG: Performed by: INTERNAL MEDICINE

## 2021-01-14 PROCEDURE — 86146 BETA-2 GLYCOPROTEIN ANTIBODY: CPT | Performed by: NURSE PRACTITIONER

## 2021-01-14 PROCEDURE — 85306 CLOT INHIBIT PROT S FREE: CPT | Performed by: NURSE PRACTITIONER

## 2021-01-14 PROCEDURE — 86140 C-REACTIVE PROTEIN: CPT | Performed by: INTERNAL MEDICINE

## 2021-01-14 RX ORDER — INSULIN LISPRO 100 [IU]/ML
0-7 INJECTION, SOLUTION INTRAVENOUS; SUBCUTANEOUS
Status: DISCONTINUED | OUTPATIENT
Start: 2021-01-14 | End: 2021-01-15 | Stop reason: HOSPADM

## 2021-01-14 RX ORDER — DEXAMETHASONE 6 MG/1
6 TABLET ORAL EVERY 6 HOURS SCHEDULED
Status: DISCONTINUED | OUTPATIENT
Start: 2021-01-14 | End: 2021-01-14

## 2021-01-14 RX ORDER — NICOTINE POLACRILEX 4 MG
15 LOZENGE BUCCAL
Status: DISCONTINUED | OUTPATIENT
Start: 2021-01-14 | End: 2021-01-15 | Stop reason: HOSPADM

## 2021-01-14 RX ORDER — ACETAMINOPHEN 325 MG/1
650 TABLET ORAL EVERY 4 HOURS PRN
Status: DISCONTINUED | OUTPATIENT
Start: 2021-01-14 | End: 2021-01-15 | Stop reason: HOSPADM

## 2021-01-14 RX ORDER — DEXTROSE MONOHYDRATE 25 G/50ML
25 INJECTION, SOLUTION INTRAVENOUS
Status: DISCONTINUED | OUTPATIENT
Start: 2021-01-14 | End: 2021-01-15 | Stop reason: HOSPADM

## 2021-01-14 RX ORDER — INSULIN LISPRO 100 [IU]/ML
0-7 INJECTION, SOLUTION INTRAVENOUS; SUBCUTANEOUS AS NEEDED
Status: DISCONTINUED | OUTPATIENT
Start: 2021-01-14 | End: 2021-01-15 | Stop reason: HOSPADM

## 2021-01-14 RX ORDER — PANTOPRAZOLE SODIUM 40 MG/1
40 TABLET, DELAYED RELEASE ORAL
Status: DISCONTINUED | OUTPATIENT
Start: 2021-01-14 | End: 2021-01-15 | Stop reason: HOSPADM

## 2021-01-14 RX ORDER — DEXAMETHASONE 6 MG/1
6 TABLET ORAL
Status: DISCONTINUED | OUTPATIENT
Start: 2021-01-15 | End: 2021-01-15 | Stop reason: HOSPADM

## 2021-01-14 RX ADMIN — ACETAMINOPHEN 650 MG: 325 TABLET, FILM COATED ORAL at 21:35

## 2021-01-14 RX ADMIN — ALBUTEROL SULFATE 2 PUFF: 108 AEROSOL, METERED RESPIRATORY (INHALATION) at 08:05

## 2021-01-14 RX ADMIN — ENOXAPARIN SODIUM 90 MG: 100 INJECTION SUBCUTANEOUS at 05:35

## 2021-01-14 RX ADMIN — INSULIN LISPRO 4 UNITS: 100 INJECTION, SOLUTION INTRAVENOUS; SUBCUTANEOUS at 18:13

## 2021-01-14 RX ADMIN — ALBUTEROL SULFATE 2 PUFF: 108 AEROSOL, METERED RESPIRATORY (INHALATION) at 12:17

## 2021-01-14 RX ADMIN — LEVETIRACETAM 500 MG: 500 TABLET ORAL at 21:35

## 2021-01-14 RX ADMIN — METOPROLOL TARTRATE 100 MG: 50 TABLET, FILM COATED ORAL at 21:35

## 2021-01-14 RX ADMIN — DEXAMETHASONE SODIUM PHOSPHATE 6 MG: 4 INJECTION, SOLUTION INTRAMUSCULAR; INTRAVENOUS at 11:52

## 2021-01-14 RX ADMIN — DEXAMETHASONE SODIUM PHOSPHATE 6 MG: 4 INJECTION, SOLUTION INTRAMUSCULAR; INTRAVENOUS at 05:35

## 2021-01-14 RX ADMIN — ALBUTEROL SULFATE 2 PUFF: 108 AEROSOL, METERED RESPIRATORY (INHALATION) at 21:24

## 2021-01-14 RX ADMIN — ATORVASTATIN CALCIUM 40 MG: 40 TABLET, FILM COATED ORAL at 09:02

## 2021-01-14 RX ADMIN — LEVETIRACETAM 500 MG: 500 TABLET ORAL at 09:02

## 2021-01-14 RX ADMIN — AZITHROMYCIN DIHYDRATE 500 MG: 250 TABLET, FILM COATED ORAL at 09:01

## 2021-01-14 RX ADMIN — ZINC SULFATE 220 MG (50 MG) CAPSULE 220 MG: CAPSULE at 09:02

## 2021-01-14 RX ADMIN — OXYCODONE HYDROCHLORIDE AND ACETAMINOPHEN 1000 MG: 500 TABLET ORAL at 09:02

## 2021-01-14 RX ADMIN — ALBUTEROL SULFATE 2 PUFF: 108 AEROSOL, METERED RESPIRATORY (INHALATION) at 14:38

## 2021-01-14 RX ADMIN — PANTOPRAZOLE SODIUM 40 MG: 40 TABLET, DELAYED RELEASE ORAL at 18:14

## 2021-01-14 RX ADMIN — AMLODIPINE BESYLATE 10 MG: 5 TABLET ORAL at 09:02

## 2021-01-14 RX ADMIN — SODIUM CHLORIDE 125 ML/HR: 9 INJECTION, SOLUTION INTRAVENOUS at 05:49

## 2021-01-14 RX ADMIN — BUPROPION HYDROCHLORIDE 300 MG: 150 TABLET, FILM COATED, EXTENDED RELEASE ORAL at 09:01

## 2021-01-14 RX ADMIN — DEXAMETHASONE SODIUM PHOSPHATE 6 MG: 4 INJECTION, SOLUTION INTRAMUSCULAR; INTRAVENOUS at 00:13

## 2021-01-14 RX ADMIN — METOPROLOL TARTRATE 100 MG: 50 TABLET, FILM COATED ORAL at 09:02

## 2021-01-14 RX ADMIN — APIXABAN 10 MG: 5 TABLET, FILM COATED ORAL at 18:14

## 2021-01-14 RX ADMIN — ISOSORBIDE MONONITRATE 30 MG: 30 TABLET, EXTENDED RELEASE ORAL at 09:02

## 2021-01-14 RX ADMIN — PANTOPRAZOLE SODIUM 40 MG: 40 TABLET, DELAYED RELEASE ORAL at 09:02

## 2021-01-14 RX ADMIN — ROPINIROLE 0.25 MG: 0.25 TABLET, FILM COATED ORAL at 09:02

## 2021-01-14 RX ADMIN — Medication 10 ML: at 21:36

## 2021-01-15 ENCOUNTER — READMISSION MANAGEMENT (OUTPATIENT)
Dept: CALL CENTER | Facility: HOSPITAL | Age: 46
End: 2021-01-15

## 2021-01-15 VITALS
HEART RATE: 82 BPM | HEIGHT: 63 IN | RESPIRATION RATE: 18 BRPM | BODY MASS INDEX: 38.24 KG/M2 | OXYGEN SATURATION: 92 % | DIASTOLIC BLOOD PRESSURE: 67 MMHG | TEMPERATURE: 97.9 F | SYSTOLIC BLOOD PRESSURE: 110 MMHG | WEIGHT: 215.83 LBS

## 2021-01-15 LAB
ALBUMIN SERPL-MCNC: 2.7 G/DL (ref 3.5–5.2)
ALBUMIN SERPL-MCNC: 3.2 G/DL (ref 3.5–5.2)
ALBUMIN/GLOB SERPL: 0.9 G/DL
ALP SERPL-CCNC: 113 U/L (ref 39–117)
ALP SERPL-CCNC: 121 U/L (ref 39–117)
ALT SERPL W P-5'-P-CCNC: 29 U/L (ref 1–41)
ALT SERPL W P-5'-P-CCNC: 34 U/L (ref 1–41)
ANION GAP SERPL CALCULATED.3IONS-SCNC: 11 MMOL/L (ref 5–15)
AST SERPL-CCNC: 41 U/L (ref 1–40)
AST SERPL-CCNC: 48 U/L (ref 1–40)
AT III PPP CHRO-ACNC: 87 % (ref 75–120)
BASOPHILS # BLD AUTO: 0 10*3/MM3 (ref 0–0.2)
BASOPHILS NFR BLD AUTO: 0.1 % (ref 0–1.5)
BILIRUB CONJ SERPL-MCNC: <0.2 MG/DL (ref 0–0.3)
BILIRUB INDIRECT SERPL-MCNC: ABNORMAL MG/DL
BILIRUB SERPL-MCNC: 0.3 MG/DL (ref 0–1.2)
BILIRUB SERPL-MCNC: 0.4 MG/DL (ref 0–1.2)
BUN SERPL-MCNC: 21 MG/DL (ref 6–20)
BUN/CREAT SERPL: 14.7 (ref 7–25)
CALCIUM SPEC-SCNC: 7.9 MG/DL (ref 8.6–10.5)
CARDIOLIPIN IGG SER IA-ACNC: <9 GPL U/ML (ref 0–14)
CARDIOLIPIN IGM SER IA-ACNC: 18 MPL U/ML (ref 0–12)
CHLORIDE SERPL-SCNC: 110 MMOL/L (ref 98–107)
CK SERPL-CCNC: 70 U/L (ref 20–200)
CO2 SERPL-SCNC: 20 MMOL/L (ref 22–29)
CREAT SERPL-MCNC: 1.43 MG/DL (ref 0.76–1.27)
CREAT SERPL-MCNC: 1.44 MG/DL (ref 0.76–1.27)
CRP SERPL-MCNC: 1.4 MG/DL (ref 0–0.5)
DEPRECATED RDW RBC AUTO: 50.3 FL (ref 37–54)
EOSINOPHIL # BLD AUTO: 0 10*3/MM3 (ref 0–0.4)
EOSINOPHIL NFR BLD AUTO: 0 % (ref 0.3–6.2)
ERYTHROCYTE [DISTWIDTH] IN BLOOD BY AUTOMATED COUNT: 15.7 % (ref 12.3–15.4)
F5 GENE MUT ANL BLD/T: NORMAL
FERRITIN SERPL-MCNC: 234.5 NG/ML (ref 30–400)
GFR SERPL CREATININE-BSD FRML MDRD: 53 ML/MIN/1.73
GFR SERPL CREATININE-BSD FRML MDRD: 54 ML/MIN/1.73
GLOBULIN UR ELPH-MCNC: 3 GM/DL
GLUCOSE BLDC GLUCOMTR-MCNC: 187 MG/DL (ref 70–105)
GLUCOSE BLDC GLUCOMTR-MCNC: 238 MG/DL (ref 70–105)
GLUCOSE SERPL-MCNC: 216 MG/DL (ref 65–99)
HCT VFR BLD AUTO: 43.4 % (ref 37.5–51)
HGB BLD-MCNC: 13.8 G/DL (ref 13–17.7)
LYMPHOCYTES # BLD AUTO: 0.6 10*3/MM3 (ref 0.7–3.1)
LYMPHOCYTES NFR BLD AUTO: 6.3 % (ref 19.6–45.3)
MCH RBC QN AUTO: 28.9 PG (ref 26.6–33)
MCHC RBC AUTO-ENTMCNC: 31.9 G/DL (ref 31.5–35.7)
MCV RBC AUTO: 90.6 FL (ref 79–97)
MONOCYTES # BLD AUTO: 0.6 10*3/MM3 (ref 0.1–0.9)
MONOCYTES NFR BLD AUTO: 6.2 % (ref 5–12)
NEUTROPHILS NFR BLD AUTO: 7.8 10*3/MM3 (ref 1.7–7)
NEUTROPHILS NFR BLD AUTO: 87.4 % (ref 42.7–76)
NRBC BLD AUTO-RTO: 0.2 /100 WBC (ref 0–0.2)
PLATELET # BLD AUTO: 247 10*3/MM3 (ref 140–450)
PMV BLD AUTO: 8.4 FL (ref 6–12)
POTASSIUM SERPL-SCNC: 4.5 MMOL/L (ref 3.5–5.2)
PROT C ACT/NOR PPP: 101 % (ref 70–130)
PROT SERPL-MCNC: 5.7 G/DL (ref 6–8.5)
PROT SERPL-MCNC: 6.1 G/DL (ref 6–8.5)
RBC # BLD AUTO: 4.78 10*6/MM3 (ref 4.14–5.8)
SODIUM SERPL-SCNC: 141 MMOL/L (ref 136–145)
WBC # BLD AUTO: 8.9 10*3/MM3 (ref 3.4–10.8)

## 2021-01-15 PROCEDURE — 80076 HEPATIC FUNCTION PANEL: CPT | Performed by: INTERNAL MEDICINE

## 2021-01-15 PROCEDURE — 94760 N-INVAS EAR/PLS OXIMETRY 1: CPT

## 2021-01-15 PROCEDURE — G0378 HOSPITAL OBSERVATION PER HR: HCPCS

## 2021-01-15 PROCEDURE — 82962 GLUCOSE BLOOD TEST: CPT

## 2021-01-15 PROCEDURE — 85025 COMPLETE CBC W/AUTO DIFF WBC: CPT | Performed by: INTERNAL MEDICINE

## 2021-01-15 PROCEDURE — 63710000001 INSULIN LISPRO (HUMAN) PER 5 UNITS: Performed by: INTERNAL MEDICINE

## 2021-01-15 PROCEDURE — 94799 UNLISTED PULMONARY SVC/PX: CPT

## 2021-01-15 PROCEDURE — 82565 ASSAY OF CREATININE: CPT | Performed by: INTERNAL MEDICINE

## 2021-01-15 PROCEDURE — 82728 ASSAY OF FERRITIN: CPT | Performed by: INTERNAL MEDICINE

## 2021-01-15 PROCEDURE — 80053 COMPREHEN METABOLIC PANEL: CPT | Performed by: INTERNAL MEDICINE

## 2021-01-15 PROCEDURE — 86140 C-REACTIVE PROTEIN: CPT | Performed by: INTERNAL MEDICINE

## 2021-01-15 PROCEDURE — 82550 ASSAY OF CK (CPK): CPT | Performed by: INTERNAL MEDICINE

## 2021-01-15 PROCEDURE — 82248 BILIRUBIN DIRECT: CPT | Performed by: INTERNAL MEDICINE

## 2021-01-15 PROCEDURE — 99239 HOSP IP/OBS DSCHRG MGMT >30: CPT | Performed by: INTERNAL MEDICINE

## 2021-01-15 RX ORDER — ALBUTEROL SULFATE 90 UG/1
2 AEROSOL, METERED RESPIRATORY (INHALATION)
Qty: 8.5 G | Refills: 0 | Status: SHIPPED | OUTPATIENT
Start: 2021-01-15

## 2021-01-15 RX ORDER — ZINC SULFATE 50(220)MG
220 CAPSULE ORAL 2 TIMES DAILY
Qty: 14 CAPSULE | Refills: 0 | Status: SHIPPED | OUTPATIENT
Start: 2021-01-15 | End: 2021-01-22

## 2021-01-15 RX ORDER — AZITHROMYCIN 500 MG/1
TABLET, FILM COATED ORAL
Qty: 2 TABLET | Refills: 0 | Status: SHIPPED | OUTPATIENT
Start: 2021-01-15 | End: 2022-01-19

## 2021-01-15 RX ORDER — PANTOPRAZOLE SODIUM 40 MG/1
40 TABLET, DELAYED RELEASE ORAL DAILY
Qty: 30 TABLET | Refills: 1 | Status: SHIPPED | OUTPATIENT
Start: 2021-01-15 | End: 2022-01-15

## 2021-01-15 RX ADMIN — PANTOPRAZOLE SODIUM 40 MG: 40 TABLET, DELAYED RELEASE ORAL at 09:22

## 2021-01-15 RX ADMIN — BUPROPION HYDROCHLORIDE 300 MG: 150 TABLET, FILM COATED, EXTENDED RELEASE ORAL at 09:22

## 2021-01-15 RX ADMIN — ALBUTEROL SULFATE 2 PUFF: 108 AEROSOL, METERED RESPIRATORY (INHALATION) at 08:04

## 2021-01-15 RX ADMIN — INSULIN LISPRO 3 UNITS: 100 INJECTION, SOLUTION INTRAVENOUS; SUBCUTANEOUS at 13:08

## 2021-01-15 RX ADMIN — Medication 10 ML: at 09:23

## 2021-01-15 RX ADMIN — INSULIN LISPRO 2 UNITS: 100 INJECTION, SOLUTION INTRAVENOUS; SUBCUTANEOUS at 09:21

## 2021-01-15 RX ADMIN — METOPROLOL TARTRATE 100 MG: 50 TABLET, FILM COATED ORAL at 09:22

## 2021-01-15 RX ADMIN — ISOSORBIDE MONONITRATE 30 MG: 30 TABLET, EXTENDED RELEASE ORAL at 09:22

## 2021-01-15 RX ADMIN — AZITHROMYCIN DIHYDRATE 500 MG: 250 TABLET, FILM COATED ORAL at 09:22

## 2021-01-15 RX ADMIN — OXYCODONE HYDROCHLORIDE AND ACETAMINOPHEN 1000 MG: 500 TABLET ORAL at 09:22

## 2021-01-15 RX ADMIN — ZINC SULFATE 220 MG (50 MG) CAPSULE 220 MG: CAPSULE at 09:22

## 2021-01-15 RX ADMIN — AMLODIPINE BESYLATE 10 MG: 5 TABLET ORAL at 09:22

## 2021-01-15 RX ADMIN — ATORVASTATIN CALCIUM 40 MG: 40 TABLET, FILM COATED ORAL at 09:23

## 2021-01-15 RX ADMIN — LEVETIRACETAM 500 MG: 500 TABLET ORAL at 09:23

## 2021-01-15 RX ADMIN — ALBUTEROL SULFATE 2 PUFF: 108 AEROSOL, METERED RESPIRATORY (INHALATION) at 12:26

## 2021-01-15 RX ADMIN — APIXABAN 10 MG: 5 TABLET, FILM COATED ORAL at 09:23

## 2021-01-15 RX ADMIN — ROPINIROLE 0.25 MG: 0.25 TABLET, FILM COATED ORAL at 09:23

## 2021-01-15 RX ADMIN — DEXAMETHASONE 6 MG: 6 TABLET ORAL at 09:22

## 2021-01-15 NOTE — OUTREACH NOTE
Prep Survey      Responses   [facility] patient discharged from?  [facility]   Is LACE score < 7 ?  No   Emergency Room discharge w/ pulse ox?  No   Eligibility  Readm Mgmt   Discharge diagnosis  COVID-19 virus detected    Does the patient have one of the following disease processes/diagnoses(primary or secondary)?  COVID-19   Does the patient have Home health ordered?  No   Is there a DME ordered?  No   Prep survey completed?  Yes          Sahnnon Christopher RN

## 2021-01-16 ENCOUNTER — READMISSION MANAGEMENT (OUTPATIENT)
Dept: CALL CENTER | Facility: HOSPITAL | Age: 46
End: 2021-01-16

## 2021-01-16 LAB
APTT HEX PL PPP: 0 SEC (ref 0–11)
APTT SCREEN TO CONFIRM RATIO: 1.21 RATIO (ref 0–1.4)
APTT-LA 1H NP PPP: 51.9 SEC (ref 0–48.9)
CONFIRM APTT/NORMAL: 59 SEC (ref 0–55)
DRVVT SCREEN TO CONFIRM RATIO: 1.5 RATIO (ref 0.8–1.2)
LA 2 SCREEN W REFLEX-IMP: ABNORMAL
MIXING APTT: 48.9 SEC (ref 0–48.9)
MIXING DRVVT: 44.4 SEC (ref 0–40.4)
PROT S AG ACT/NOR PPP IA: 71 % (ref 60–150)
PROT S FREE AG ACT/NOR PPP IA: 60 % (ref 57–157)
PROTHROM ACT/NOR PPP: 98 % (ref 50–154)
SCREEN APTT: 52.2 SEC (ref 0–51.9)
SCREEN DRVVT: 66.8 SEC (ref 0–47)
THROMBIN TIME: 28.3 SEC (ref 0–23)

## 2021-01-16 NOTE — OUTREACH NOTE
COVID-19 Week 1 Survey      Responses   [facility] patient discharged from?  [facility]   Does the patient have one of the following disease processes/diagnoses(primary or secondary)?  COVID-19   COVID-19 underlying condition?  None   Call Number  Call 1   Week 1 Call successful?  No   Discharge diagnosis  COVID-19 virus detected           Shannon Christopher RN

## 2021-01-17 ENCOUNTER — READMISSION MANAGEMENT (OUTPATIENT)
Dept: CALL CENTER | Facility: HOSPITAL | Age: 46
End: 2021-01-17

## 2021-01-17 NOTE — OUTREACH NOTE
"COVID-19 Week 1 Survey      Responses   [facility] patient discharged from?  [facility]   Does the patient have one of the following disease processes/diagnoses(primary or secondary)?  COVID-19   COVID-19 underlying condition?  None   Call Number  Call 2   Week 1 Call successful?  Yes   Call start time  1120   Call end time  1127   Discharge diagnosis  COVID-19 virus detected    Meds reviewed with patient/caregiver?  Yes   Is the patient having any side effects they believe may be caused by any medication additions or changes?  No   Does the patient have all medications ordered at discharge?  Yes   Is the patient taking all medications as directed (includes completed medication regime)?  Yes   Medication comments  has taken eliquis before and understands how to take it   Does the patient have a primary care provider?   Yes   Does the patient have an appointment with their PCP or specialist within 7 days of discharge?  No   What is preventing the patient from scheduling follow up appointments within 7 days of discharge?  Haven't had time   Nursing Interventions  Verified appointment date/time/provider, Educated patient on importance of making appointment   Has the patient kept scheduled appointments due by today?  N/A   Comments  PCP in [facility] and I have enc him to touch base with him for f/u appt. Has appt on 1/28 with pulm--I told him to call for appt as well as call [facility] for appt   Has home health visited the patient within 72 hours of discharge?  N/A   Psychosocial issues?  No   Did the patient receive a copy of their discharge instructions?  Yes   Did the patient receive a copy of COVID-19 specific instructions?  Yes   Nursing interventions  Reviewed instructions with patient   What is the patient's perception of their health status since discharge?  Same [States \"I feel like shit\". Called EMS this AM but VSS and he elected to stay at home]   Does the patient have any of the following symptoms?  Shortness of " breath, Cough   Nursing Interventions  Nurse provided patient education   Pulse Ox monitoring  None   Is the patient/caregiver able to teach back steps to recovery at home?  Set small, achievable goals for return to baseline health, Eat a well-balance diet   Is the patient/caregiver able to teach back the hierarchy of who to call/visit for symptoms/problems? PCP, Specialist, Home health nurse, Urgent Care, ED, 911  Yes   COVID-19 call completed?  Yes   Wrap up additional comments  Enc good po intake--states EMS worker told him to drink pedialyte, broths and soups. Enc good pulmonary toilet.          Shae Ross RN

## 2021-01-18 ENCOUNTER — TELEPHONE (OUTPATIENT)
Dept: ONCOLOGY | Facility: CLINIC | Age: 46
End: 2021-01-18

## 2021-01-18 ENCOUNTER — READMISSION MANAGEMENT (OUTPATIENT)
Dept: CALL CENTER | Facility: HOSPITAL | Age: 46
End: 2021-01-18

## 2021-01-18 DIAGNOSIS — I82.412 ACUTE DEEP VEIN THROMBOSIS (DVT) OF FEMORAL VEIN OF LEFT LOWER EXTREMITY (HCC): Primary | ICD-10-CM

## 2021-01-18 LAB — BACTERIA SPEC AEROBE CULT: NORMAL

## 2021-01-18 NOTE — TELEPHONE ENCOUNTER
Notified patient that we need to set up a appt. Patient requests that we mail his appts to him since he is not really able to move at this time.   Advised patient that we would send him a list with his appt date and time.   Patient v/u of date and time of appt scheduled.   Letter placed in the outgoing mail with appts.

## 2021-01-18 NOTE — TELEPHONE ENCOUNTER
----- Message from Gucci Sheridan MD sent at 1/15/2021  7:51 PM EST -----   Needs a follow-up appointment in 3 to 4 weeks

## 2021-01-18 NOTE — OUTREACH NOTE
COVID-19 Week 1 Survey      Responses   [facility] patient discharged from?  [facility]   Does the patient have one of the following disease processes/diagnoses(primary or secondary)?  COVID-19   COVID-19 underlying condition?  None   Call Number  Call 3   Week 1 Call successful?  Yes   Call start time  1045   Call end time  1046   Discharge diagnosis  COVID-19 virus detected    Meds reviewed with patient/caregiver?  Yes   Is the patient having any side effects they believe may be caused by any medication additions or changes?  No   Does the patient have all medications ordered at discharge?  Yes   Is the patient taking all medications as directed (includes completed medication regime)?  Yes   Does the patient have a primary care provider?   Yes   Has the patient kept scheduled appointments due by today?  N/A   Psychosocial issues?  No   Did the patient receive a copy of their discharge instructions?  Yes   Did the patient receive a copy of COVID-19 specific instructions?  Yes   Nursing interventions  Reviewed instructions with patient   What is the patient's perception of their health status since discharge?  Same   Does the patient have any of the following symptoms?  Cough, Shortness of breath   Nursing Interventions  Nurse provided patient education   Pulse Ox monitoring  None   Is the patient/caregiver able to teach back steps to recovery at home?  Set small, achievable goals for return to baseline health, Rest and rebuild strength, gradually increase activity   If the patient is a current smoker, are they able to teach back resources for cessation?  Not a smoker   Is the patient/caregiver able to teach back the hierarchy of who to call/visit for symptoms/problems? PCP, Specialist, Home health nurse, Urgent Care, ED, 911  Yes   COVID-19 call completed?  Yes          Alice Cano RN

## 2021-01-19 LAB
B2 GLYCOPROT1 IGA SER-ACNC: <9 GPI IGA UNITS (ref 0–25)
B2 GLYCOPROT1 IGG SER-ACNC: <9 GPI IGG UNITS (ref 0–20)
B2 GLYCOPROT1 IGM SER-ACNC: <9 GPI IGM UNITS (ref 0–32)

## 2021-01-21 ENCOUNTER — READMISSION MANAGEMENT (OUTPATIENT)
Dept: CALL CENTER | Facility: HOSPITAL | Age: 46
End: 2021-01-21

## 2021-01-21 NOTE — OUTREACH NOTE
COVID-19 Week 1 Survey      Responses   [facility] patient discharged from?  [facility]   Does the patient have one of the following disease processes/diagnoses(primary or secondary)?  COVID-19   COVID-19 underlying condition?  None   Call Number  Call 3   Week 1 Call successful?  No   Discharge diagnosis  COVID-19 virus detected           Shannon Christopher RN

## 2021-01-22 LAB
ANTI-PHOSPHATIDIC ACID: NORMAL
ANTI-PHOSPHATIDYL GLYCEROL: NORMAL
ANTI-PHOSPHATIDYL INOSITOL: NORMAL
ANTI-PHOSPHATIDYLETHANOLAMINE: NORMAL
PE IGA SER-ACNC: 8.6 U/ML
PE IGG SER-ACNC: 0.7 U/ML
PE IGM SER-ACNC: 6.7 U/ML
PG IGA SER-ACNC: 3.2 U/ML
PG IGG SER-ACNC: 2.1 U/ML
PG IGM SER-ACNC: 2.9 U/ML
PHOSPHATIDATE IGA SER-ACNC: 5.7 U/ML
PHOSPHATIDATE IGG SER-ACNC: 6.5 U/ML
PHOSPHATIDATE IGM SER-ACNC: 6 U/ML
PI IGA SER-ACNC: 6 U/ML
PI IGG SER-ACNC: 5.5 U/ML
PI IGM SER-ACNC: 6.2 U/ML

## 2021-01-24 ENCOUNTER — READMISSION MANAGEMENT (OUTPATIENT)
Dept: CALL CENTER | Facility: HOSPITAL | Age: 46
End: 2021-01-24

## 2021-01-24 NOTE — OUTREACH NOTE
COVID-19 Week 1 Survey      Responses   [facility] patient discharged from?  [facility]   Does the patient have one of the following disease processes/diagnoses(primary or secondary)?  COVID-19   COVID-19 underlying condition?  None   Call Number  Call 4   Week 1 Call successful?  No          Naila Malhotra RN

## 2021-01-27 ENCOUNTER — READMISSION MANAGEMENT (OUTPATIENT)
Dept: CALL CENTER | Facility: HOSPITAL | Age: 46
End: 2021-01-27

## 2021-01-27 NOTE — OUTREACH NOTE
COVID-19 Week 2 Survey      Responses   [facility] patient discharged from?  [facility]   Does the patient have one of the following disease processes/diagnoses(primary or secondary)?  COVID-19   COVID-19 underlying condition?  None   Call Number  Call 1   COVID-19 Week 2: Call 1 attempt successful?  No          Naila Malhotra RN

## 2021-01-28 ENCOUNTER — DOCUMENTATION (OUTPATIENT)
Dept: PULMONOLOGY | Facility: HOSPITAL | Age: 46
End: 2021-01-28

## 2021-01-29 NOTE — PROGRESS NOTES
Pt had telehealth visit with Dr. Mojica on 1/28/2021 and there was no answer. MD left VM for pt @ or about 3:30pm

## 2021-02-15 ENCOUNTER — APPOINTMENT (OUTPATIENT)
Dept: LAB | Facility: HOSPITAL | Age: 46
End: 2021-02-15

## 2021-03-03 ENCOUNTER — APPOINTMENT (OUTPATIENT)
Dept: LAB | Facility: HOSPITAL | Age: 46
End: 2021-03-03

## 2022-01-19 ENCOUNTER — APPOINTMENT (OUTPATIENT)
Dept: GENERAL RADIOLOGY | Facility: HOSPITAL | Age: 47
End: 2022-01-19

## 2022-01-19 ENCOUNTER — HOSPITAL ENCOUNTER (EMERGENCY)
Facility: HOSPITAL | Age: 47
Discharge: HOME | End: 2022-01-19
Attending: EMERGENCY MEDICINE | Admitting: EMERGENCY MEDICINE

## 2022-01-19 VITALS
BODY MASS INDEX: 42.35 KG/M2 | RESPIRATION RATE: 16 BRPM | TEMPERATURE: 98.9 F | SYSTOLIC BLOOD PRESSURE: 131 MMHG | WEIGHT: 239 LBS | DIASTOLIC BLOOD PRESSURE: 81 MMHG | OXYGEN SATURATION: 96 % | HEIGHT: 63 IN | HEART RATE: 86 BPM

## 2022-01-19 DIAGNOSIS — R07.9 CHEST PAIN, UNSPECIFIED TYPE: Primary | ICD-10-CM

## 2022-01-19 DIAGNOSIS — J12.82 PNEUMONIA DUE TO COVID-19 VIRUS: ICD-10-CM

## 2022-01-19 DIAGNOSIS — U07.1 PNEUMONIA DUE TO COVID-19 VIRUS: ICD-10-CM

## 2022-01-19 DIAGNOSIS — U07.1 COVID-19: ICD-10-CM

## 2022-01-19 LAB
ALBUMIN SERPL-MCNC: 4 G/DL (ref 3.5–5.2)
ALBUMIN/GLOB SERPL: 0.9 G/DL
ALP SERPL-CCNC: 219 U/L (ref 39–117)
ALT SERPL W P-5'-P-CCNC: 34 U/L (ref 1–41)
ANION GAP SERPL CALCULATED.3IONS-SCNC: 14 MMOL/L (ref 5–15)
AST SERPL-CCNC: 26 U/L (ref 1–40)
BASOPHILS # BLD AUTO: 0.1 10*3/MM3 (ref 0–0.2)
BASOPHILS NFR BLD AUTO: 1.1 % (ref 0–1.5)
BILIRUB SERPL-MCNC: 1 MG/DL (ref 0–1.2)
BUN SERPL-MCNC: 13 MG/DL (ref 6–20)
BUN/CREAT SERPL: 10.4 (ref 7–25)
CALCIUM SPEC-SCNC: 9.6 MG/DL (ref 8.6–10.5)
CHLORIDE SERPL-SCNC: 101 MMOL/L (ref 98–107)
CO2 SERPL-SCNC: 25 MMOL/L (ref 22–29)
CREAT SERPL-MCNC: 1.25 MG/DL (ref 0.76–1.27)
DEPRECATED RDW RBC AUTO: 49.4 FL (ref 37–54)
EOSINOPHIL # BLD AUTO: 0.2 10*3/MM3 (ref 0–0.4)
EOSINOPHIL NFR BLD AUTO: 2.1 % (ref 0.3–6.2)
ERYTHROCYTE [DISTWIDTH] IN BLOOD BY AUTOMATED COUNT: 15.3 % (ref 12.3–15.4)
GFR SERPL CREATININE-BSD FRML MDRD: 62 ML/MIN/1.73
GLOBULIN UR ELPH-MCNC: 4.3 GM/DL
GLUCOSE SERPL-MCNC: 281 MG/DL (ref 65–99)
HCT VFR BLD AUTO: 50.9 % (ref 37.5–51)
HGB BLD-MCNC: 17.1 G/DL (ref 13–17.7)
HOLD SPECIMEN: NORMAL
LYMPHOCYTES # BLD AUTO: 0.9 10*3/MM3 (ref 0.7–3.1)
LYMPHOCYTES NFR BLD AUTO: 10.6 % (ref 19.6–45.3)
MCH RBC QN AUTO: 31.1 PG (ref 26.6–33)
MCHC RBC AUTO-ENTMCNC: 33.6 G/DL (ref 31.5–35.7)
MCV RBC AUTO: 92.6 FL (ref 79–97)
MONOCYTES # BLD AUTO: 0.7 10*3/MM3 (ref 0.1–0.9)
MONOCYTES NFR BLD AUTO: 7.7 % (ref 5–12)
NEUTROPHILS NFR BLD AUTO: 7 10*3/MM3 (ref 1.7–7)
NEUTROPHILS NFR BLD AUTO: 78.5 % (ref 42.7–76)
NRBC BLD AUTO-RTO: 0 /100 WBC (ref 0–0.2)
PLATELET # BLD AUTO: 234 10*3/MM3 (ref 140–450)
PMV BLD AUTO: 7.8 FL (ref 6–12)
POTASSIUM SERPL-SCNC: 3.9 MMOL/L (ref 3.5–5.2)
PROT SERPL-MCNC: 8.3 G/DL (ref 6–8.5)
RBC # BLD AUTO: 5.5 10*6/MM3 (ref 4.14–5.8)
SARS-COV-2 RNA PNL SPEC NAA+PROBE: DETECTED
SODIUM SERPL-SCNC: 140 MMOL/L (ref 136–145)
TROPONIN T SERPL-MCNC: <0.01 NG/ML (ref 0–0.03)
WBC NRBC COR # BLD: 9 10*3/MM3 (ref 3.4–10.8)
WHOLE BLOOD HOLD SPECIMEN: NORMAL

## 2022-01-19 PROCEDURE — 84484 ASSAY OF TROPONIN QUANT: CPT | Performed by: EMERGENCY MEDICINE

## 2022-01-19 PROCEDURE — 99283 EMERGENCY DEPT VISIT LOW MDM: CPT

## 2022-01-19 PROCEDURE — 85025 COMPLETE CBC W/AUTO DIFF WBC: CPT | Performed by: EMERGENCY MEDICINE

## 2022-01-19 PROCEDURE — 87635 SARS-COV-2 COVID-19 AMP PRB: CPT | Performed by: EMERGENCY MEDICINE

## 2022-01-19 PROCEDURE — 71045 X-RAY EXAM CHEST 1 VIEW: CPT

## 2022-01-19 PROCEDURE — 80053 COMPREHEN METABOLIC PANEL: CPT | Performed by: EMERGENCY MEDICINE

## 2022-01-19 PROCEDURE — 93005 ELECTROCARDIOGRAM TRACING: CPT | Performed by: EMERGENCY MEDICINE

## 2022-01-19 PROCEDURE — 93005 ELECTROCARDIOGRAM TRACING: CPT

## 2022-01-19 RX ORDER — AZITHROMYCIN 500 MG/1
500 TABLET, FILM COATED ORAL DAILY
Qty: 5 TABLET | Refills: 0 | Status: SHIPPED | OUTPATIENT
Start: 2022-01-19

## 2022-01-19 RX ORDER — ASPIRIN 325 MG
325 TABLET ORAL ONCE
Status: COMPLETED | OUTPATIENT
Start: 2022-01-19 | End: 2022-01-19

## 2022-01-19 RX ORDER — SODIUM CHLORIDE 0.9 % (FLUSH) 0.9 %
10 SYRINGE (ML) INJECTION AS NEEDED
Status: DISCONTINUED | OUTPATIENT
Start: 2022-01-19 | End: 2022-01-19 | Stop reason: HOSPADM

## 2022-01-19 RX ORDER — AZITHROMYCIN 250 MG/1
500 TABLET, FILM COATED ORAL ONCE
Status: COMPLETED | OUTPATIENT
Start: 2022-01-19 | End: 2022-01-19

## 2022-01-19 RX ADMIN — AZITHROMYCIN MONOHYDRATE 500 MG: 250 TABLET ORAL at 21:08

## 2022-01-19 RX ADMIN — ASPIRIN 325 MG ORAL TABLET 325 MG: 325 PILL ORAL at 19:05

## 2022-01-20 NOTE — ED NOTES
Pt c/o anterior chest pain every morning when waking up x past week as well as nonproductive cough ans congestion. Pt denies any cardiac hx, states the CP goes away as the day goes on. Does have hx of stroke 11 yrs ago with some residual weakness and tingling on left side.     Marisa Gill, RN  01/19/22 1925       Marisa Gill, RN  01/19/22 1926

## 2022-01-20 NOTE — DISCHARGE INSTRUCTIONS
Quarantine  Zithromax sent to pharmacy  Return for decrease in oxygen saturations persistently below 90% increasing shortness of breath altered mental status increasing pain neck arm jaw pain dizziness passing out leg pain swelling or any other new or worse problems or concerns  Outpatient antibiotic therapy paper has been faxed should get a phone call tomorrow

## 2022-01-20 NOTE — ED PROVIDER NOTES
Subjective   Chief complaint cough congestion pressure and chest    History of present illness 46-year-old male with a 1 week history of cough congestion runny nose no fever chills.  He states that every day for the last week he has been waking up with pressure in his chest in the morning that goes away throughout the day.  There is no neck arm or jaw pain or shortness of breath.  No vomiting or diarrhea.  There is no exertional discomfort.  Nothing really makes this better or worse ongoing for a week.  Scribes it as pressure only in the morning improved throughout the day and no exertional component no other complaints but associated with cough congestion.  No recent long car ride plane ride immobilization no leg pain or swelling.          Review of Systems   Constitutional: Positive for fatigue. Negative for chills and fever.   HENT: Positive for congestion. Negative for sinus pressure.    Eyes: Negative for photophobia and visual disturbance.   Respiratory: Positive for cough and chest tightness. Negative for shortness of breath.    Cardiovascular: Positive for chest pain. Negative for palpitations and leg swelling.   Gastrointestinal: Negative for abdominal pain and vomiting.   Endocrine: Negative for cold intolerance and heat intolerance.   Genitourinary: Negative for difficulty urinating and dysuria.   Musculoskeletal: Negative for arthralgias, back pain and neck pain.   Skin: Negative for color change and rash.   Neurological: Positive for weakness. Negative for dizziness, light-headedness and headaches.   Psychiatric/Behavioral: Negative for agitation and behavioral problems.       Past Medical History:   Diagnosis Date   • Diabetes (HCC)    • GERD (gastroesophageal reflux disease)    • Hypertension    • Seizures (HCC)    • Stroke (HCC) 2010       No Known Allergies    Past Surgical History:   Procedure Laterality Date   • COLOSTOMY Left 10/21/2019    Procedure: Laparoscopic diverting colostomy;  Surgeon:  Niraj Almanzar MD;  Location: [facility] MAIN OR;  Service: General   • COLOSTOMY N/A 9/10/2020    Procedure: COLOSTOMY REVERSAL;  Surgeon: Niraj Almanzar MD;  Location: [facility] MAIN OR;  Service: General;  Laterality: N/A;   • FRACTURE SURGERY      right wrist    • HERNIA REPAIR      umbilical    • HERNIA REPAIR      UMBILICAL   • INCISION AND DRAINAGE OF WOUND Left 10/15/2019    Procedure: INCISION AND DRAINAGE OF LEFT GLUTEAL REGION;  Surgeon: Niraj Almanzar MD;  Location: [facility] MAIN OR;  Service: General   • URETERAL STENT INSERTION      kidney stone stents        Family History   Problem Relation Age of Onset   • Breast cancer Mother    • Cervical cancer Mother        Social History     Socioeconomic History   • Marital status:    Tobacco Use   • Smoking status: Former Smoker     Types: Cigars     Quit date: 10/2019     Years since quittin.3   • Smokeless tobacco: Never Used   • Tobacco comment: Would have 2 to 3 cigars/week   Vaping Use   • Vaping Use: Every day   • Substances: Flavoring   Substance and Sexual Activity   • Alcohol use: Not Currently     Comment: Quit several years ago at time of his CVA   • Drug use: Not Currently     Types: Marijuana   • Sexual activity: Defer     Prior to Admission medications    Medication Sig Start Date End Date Taking? Authorizing Provider   albuterol sulfate  (90 Base) MCG/ACT inhaler Inhale 2 puffs 4 (Four) Times a Day. 1/15/21   Angely Radford MD   amLODIPine (NORVASC) 10 MG tablet Take 10 mg by mouth Daily.    Provider, MD Zunilda   apixaban (ELIQUIS) 5 MG tablet tablet Take 2 tablets by mouth Every 12 (Twelve) Hours for 12 doses. STARTING the EVENING of 21 begin taking 1 tablet by mouth every 12 hours. 1/15/21   Angely Radford MD   apixaban (ELIQUIS) 5 MG tablet tablet Take 1 tablet by mouth Every 12 (Twelve) Hours. Indications: DVT/PE (active thrombosis) 21   Angely Radford MD   atorvastatin  (LIPITOR) 40 MG tablet Take 40 mg by mouth Daily.    Zunilda Olmedo MD   azithromycin (ZITHROMAX) 500 MG tablet Take 1 tablet by mouth Daily. 1/19/22   Niraj Massey MD   baclofen (LIORESAL) 10 MG tablet Take 10 mg by mouth Daily.    Zunilda Olmedo MD   buPROPion XL (WELLBUTRIN XL) 300 MG 24 hr tablet Take 300 mg by mouth Every Morning. Take preop 12/23/19   Zunilda Olmedo MD   Insulin Glargine-Lixisenatide (Soliqua) 100-33 UNT-MCG/ML solution pen-injector injection Inject 15 Units under the skin into the appropriate area as directed Daily As Needed (HOLD dose if BG <150). Pt receives samples 9/3/20   Zunilda Olmedo MD   isosorbide mononitrate (IMDUR) 30 MG 24 hr tablet Take 30 mg by mouth Daily.    Zunilda Olmedo MD   levETIRAcetam (KEPPRA) 500 MG tablet Take 500 mg by mouth 2 (Two) Times a Day.    Zunilda Olmedo MD   Melatonin 5 MG tablet dispersible Take 1 tablet by mouth At Night As Needed (sleep). 12/23/19   Zunilda Olmedo MD   metoprolol tartrate (LOPRESSOR) 100 MG tablet Take 100 mg by mouth 2 (Two) Times a Day.    Zunilda Olmedo MD   rOPINIRole (REQUIP) 0.5 MG tablet Take 0.5 mg by mouth 3 (Three) Times a Day.    Zunilda Olmedo MD   azithromycin (ZITHROMAX) 500 MG tablet Take 1 tablet by mouth x 2 days. Start taking Saturday morning (1/16/21) 1/15/21 1/19/22  Angely Radford MD           Objective   Physical Exam  46-year-old awake alert no acute distress at this time sats at 97% on room air HEENT extraocular muscles are intact pupils equal round reactive sclerae clear mouth clear neck supple no adenopathy no JVD no bruits no meningeal signs lungs clear no retraction no use of accessories heart regular without murmur abdomen soft without tenderness no pulsatile masses good bowel sounds extremities pulses are equal throughout upper and lower extremities no edema cords or Homans' sign or evidence of DVT skin warm dry without rashes or  cellulitic changes neurologic awake alert orientated x4 no facial asymmetry normal speech without focal weakness  Procedures           ED Course      Results for orders placed or performed during the hospital encounter of 01/19/22   COVID-19,CEPHEID/LIAM,COR/CHEKO/PAD/JAIMIE IN-HOUSE(OR EMERGENT/ADD-ON),NP SWAB IN TRANSPORT MEDIA 3-4 HR TAT, RT-PCR - Swab, Nasopharynx    Specimen: Nasopharynx; Swab   Result Value Ref Range    COVID19 Detected (C) Not Detected - Ref. Range   Comprehensive Metabolic Panel    Specimen: Blood   Result Value Ref Range    Glucose 281 (H) 65 - 99 mg/dL    BUN 13 6 - 20 mg/dL    Creatinine 1.25 0.76 - 1.27 mg/dL    Sodium 140 136 - 145 mmol/L    Potassium 3.9 3.5 - 5.2 mmol/L    Chloride 101 98 - 107 mmol/L    CO2 25.0 22.0 - 29.0 mmol/L    Calcium 9.6 8.6 - 10.5 mg/dL    Total Protein 8.3 6.0 - 8.5 g/dL    Albumin 4.00 3.50 - 5.20 g/dL    ALT (SGPT) 34 1 - 41 U/L    AST (SGOT) 26 1 - 40 U/L    Alkaline Phosphatase 219 (H) 39 - 117 U/L    Total Bilirubin 1.0 0.0 - 1.2 mg/dL    eGFR Non African Amer 62 >60 mL/min/1.73    Globulin 4.3 gm/dL    A/G Ratio 0.9 g/dL    BUN/Creatinine Ratio 10.4 7.0 - 25.0    Anion Gap 14.0 5.0 - 15.0 mmol/L   Troponin    Specimen: Blood   Result Value Ref Range    Troponin T <0.010 0.000 - 0.030 ng/mL   CBC Auto Differential    Specimen: Blood   Result Value Ref Range    WBC 9.00 3.40 - 10.80 10*3/mm3    RBC 5.50 4.14 - 5.80 10*6/mm3    Hemoglobin 17.1 13.0 - 17.7 g/dL    Hematocrit 50.9 37.5 - 51.0 %    MCV 92.6 79.0 - 97.0 fL    MCH 31.1 26.6 - 33.0 pg    MCHC 33.6 31.5 - 35.7 g/dL    RDW 15.3 12.3 - 15.4 %    RDW-SD 49.4 37.0 - 54.0 fl    MPV 7.8 6.0 - 12.0 fL    Platelets 234 140 - 450 10*3/mm3    Neutrophil % 78.5 (H) 42.7 - 76.0 %    Lymphocyte % 10.6 (L) 19.6 - 45.3 %    Monocyte % 7.7 5.0 - 12.0 %    Eosinophil % 2.1 0.3 - 6.2 %    Basophil % 1.1 0.0 - 1.5 %    Neutrophils, Absolute 7.00 1.70 - 7.00 10*3/mm3    Lymphocytes, Absolute 0.90 0.70 - 3.10 10*3/mm3     Monocytes, Absolute 0.70 0.10 - 0.90 10*3/mm3    Eosinophils, Absolute 0.20 0.00 - 0.40 10*3/mm3    Basophils, Absolute 0.10 0.00 - 0.20 10*3/mm3    nRBC 0.0 0.0 - 0.2 /100 WBC   ECG 12 Lead   Result Value Ref Range    QT Interval 356 ms   Gold Top - SST   Result Value Ref Range    Extra Tube Hold for add-ons.    Light Blue Top   Result Value Ref Range    Extra Tube hold for add-on      No radiology results for the last day  Medications   sodium chloride 0.9 % flush 10 mL (has no administration in time range)   aspirin tablet 325 mg (325 mg Oral Given 1/19/22 1905)   azithromycin (ZITHROMAX) tablet 500 mg (500 mg Oral Given 1/19/22 2108)       EKG my interpretation normal sinus rhythm rate 91 normal axis hypertrophy QTC of 438 no acute changes otherwise normal EKG                                           MDM  Number of Diagnoses or Management Options  Chest pain, unspecified type: new and requires workup  COVID-19: new and requires workup  Pneumonia due to COVID-19 virus: new and requires workup  Diagnosis management comments: Medical decision making.  Patient IV established placed on a monitor and had the above exam evaluation given aspirin p.o. EKG was a sinus rhythm without acute findings.  Chest x-ray had small infiltrate to the left side.  This is actually improved from a year ago.  Over 19 was positive blood sugar 281 otherwise chemistries unremarkable alk phos 219 troponin negative CBC normal.  Patient repeat exam was resting comfortably.  The patient has tested positive for COVID-19 he is not hypoxic he has had symptoms for about a week he has had some tightness in his chest is only present in the morning and goes away throughout the day and there is no exertional component and there is no neck arm or jaw pain.  I see no leg pain or swelling no evidence of acute cardiac ischemia DVT or pulmonary embolism or aortic dissection.  He has a mild pneumonia no evidence of pericarditis or myocarditis no evidence  of sepsis he looks well otherwise.  Patient is candidate for outpatient therapy patient has NIH of 3.  We talked about outpatient inpatient but desires to go home.  I think that is reasonable I think his symptoms are really probably related to the COVID.  He was given Zithromax 500 mg p.o. and discharged home for outpatient management we talked about what to return for he will monitor his oxygen saturations and his respiratory status he was discharged home stable otherwise unremarkable ER course.       Amount and/or Complexity of Data Reviewed  Clinical lab tests: reviewed  Tests in the radiology section of CPT®: reviewed  Tests in the medicine section of CPT®: reviewed    Risk of Complications, Morbidity, and/or Mortality  Presenting problems: high  Diagnostic procedures: high  Management options: high    Patient Progress  Patient progress: stable      Final diagnoses:   Chest pain, unspecified type   COVID-19   Pneumonia due to COVID-19 virus       ED Disposition  ED Disposition     ED Disposition Condition Comment    Discharge Stable           Sanjay Chance MD  [address] [address]  [phone]    In 1 day           Medication List      New Prescriptions    azithromycin 500 MG tablet  Commonly known as: ZITHROMAX  Take 1 tablet by mouth Daily.           Where to Get Your Medications      These medications were sent to [facility]/pharmacy #[id number] - [place], [place] - [address] [place] [address] - [phone]  - [phone] [address] [place] [address]    Hours: 24-hours Phone: [phone]   · azithromycin 500 MG tablet          Niraj Massey MD  01/19/22 0476

## 2022-01-24 LAB — QT INTERVAL: 356 MS

## 2023-08-03 NOTE — ED PROVIDER NOTES
CIED form completed and sent back to [facility]/preadmitting dept.   Subjective   Chief complaint: Syncope left shoulder pain      Context: Patient is a 45-year-old male who comes in complaining of a syncopal episode and left shoulder pain.  He states he was at [facility] when he became nauseated and walked outside to throw up when he passed out by his car.  EMS reports he was still on the ground when they got there but he was awake, bystanders did not report any seizure-like activity and there was no incontinence.  He denies any chest pain or shortness of breath.  He states he chronically has some left upper extremity and left lower extremity weakness due to prior CVA but denies any new symptoms or visual changes.  He denies any neck or back pain or saddle anesthesia.  Status post colostomy reversal several weeks ago but denies any abdominal pain nausea vomiting or diarrhea.  He denies any diaphoresis.    Duration: Shortly prior to arrival    Timing: Waxes and wanes    Severity: Moderate    Associated symptoms: Left shoulder pain worse with range of motion          PCP:   hammer  avery          Review of Systems   Constitutional: Negative for fever.   HENT: Negative.    Eyes: Negative for visual disturbance.   Respiratory: Negative.    Gastrointestinal: Negative.    Genitourinary: Negative.    Musculoskeletal: Positive for arthralgias.   Skin: Negative.    Allergic/Immunologic: Negative for immunocompromised state.   Neurological: Positive for syncope.   Psychiatric/Behavioral: Negative for confusion.       Past Medical History:   Diagnosis Date   • Diabetes (CMS/[facility])    • GERD (gastroesophageal reflux disease)    • Hypertension    • Seizures (CMS/[facility])    • Stroke (CMS/[facility]) 2010       No Known Allergies    Past Surgical History:   Procedure Laterality Date   • COLOSTOMY Left 10/21/2019    Procedure: Laparoscopic diverting colostomy;  Surgeon: Niraj Almanzar MD;  Location: [facility] MAIN OR;  Service: General   • COLOSTOMY N/A 9/10/2020    Procedure: COLOSTOMY REVERSAL;  Surgeon:  Niraj Almanzar MD;  Location: [facility] MAIN OR;  Service: General;  Laterality: N/A;   • FRACTURE SURGERY      right wrist    • HERNIA REPAIR      umbilical    • INCISION AND DRAINAGE OF WOUND Left 10/15/2019    Procedure: INCISION AND DRAINAGE OF LEFT GLUTEAL REGION;  Surgeon: Niraj Almanzar MD;  Location: [facility] MAIN OR;  Service: General   • URETERAL STENT INSERTION      kidney stone stents        Family History   Problem Relation Age of Onset   • Breast cancer Mother    • Cervical cancer Mother        Social History     Socioeconomic History   • Marital status:      Spouse name: Not on file   • Number of children: Not on file   • Years of education: Not on file   • Highest education level: Not on file   Tobacco Use   • Smoking status: Former Smoker     Types: Cigars     Quit date: 10/2019     Years since quittin.0   • Smokeless tobacco: Never Used   Substance and Sexual Activity   • Alcohol use: Not Currently     Frequency: Never     Comment: Quit several years ago at time of his CVA   • Drug use: Not Currently     Types: Marijuana   • Sexual activity: Defer           Objective   Physical Exam     Vital signs in triage nurse note reviewed.  Constitutional: Awake, alert,    HEENT: Normocephalic, atraumatic; pupils are PERRL with intact EOM; oropharynx is pink and moist without exudate or erythema.  No oropharynx trauma  Neck: Supple, full range of motion without pain; no midline tenderness  Cardiovascular: Regular rate and rhythm, normal S1-S2.    Pulmonary: Respiratory effort regular, nonlabored; breath sounds clear to auscultation all fields.  Abdomen: Soft, nontender, nondistended with normoactive bowel sounds; no rebound or guarding.  Bandage to the left lower quadrant  Musculoskeletal: Independent range of motion of right upper and lower extremities, chronic contracture of the left upper extremity and chronic left lower extremity weakness that he reports is at his baseline.   There is no deformity or dislocation noted to the left shoulder but there is tenderness to palpation over the proximal humerus.  +3 radial pulse  Neuro: Alert oriented x3, speech is clear and appropriate. no peripheral vision loss, no facial asymmetry      ECG 12 Lead      Date/Time: 10/5/2020 3:48 PM  Performed by: Sparkle Castillo APRN  Authorized by: Sparkle Castillo APRN   Interpreted by physician (antelmo)  Comparison: compared with previous ECG from 8/4/2020  Comparison to previous ECG: Sinus rhythm rate of 69  Rhythm: sinus rhythm  BPM: 79  Clinical impression: normal ECG                 ED Course      Labs Reviewed   BASIC METABOLIC PANEL - Abnormal; Notable for the following components:       Result Value    Glucose 179 (*)     Creatinine 1.53 (*)     Calcium 8.4 (*)     eGFR Non  Amer 50 (*)     All other components within normal limits    Narrative:     GFR Normal >60  Chronic Kidney Disease <60  Kidney Failure <15     APTT - Abnormal; Notable for the following components:    PTT 21.4 (*)     All other components within normal limits   CBC WITH AUTO DIFFERENTIAL - Abnormal; Notable for the following components:    RDW 15.6 (*)     Neutrophil % 77.3 (*)     Lymphocyte % 13.1 (*)     Neutrophils, Absolute 7.80 (*)     All other components within normal limits   PROTIME-INR - Normal   TROPONIN (IN-HOUSE) - Normal    Narrative:     Troponin T Reference Range:  <= 0.03 ng/mL-   Negative for AMI  >0.03 ng/mL-     Abnormal for myocardial necrosis.  Clinicians would have to utilize clinical acumen, EKG, Troponin and serial changes to determine if it is an Acute Myocardial Infarction or myocardial injury due to an underlying chronic condition.       Results may be falsely decreased if patient taking Biotin.     MAGNESIUM - Normal   BUN - Normal   URINALYSIS W/ MICROSCOPIC IF INDICATED (NO CULTURE)   CBC AND DIFFERENTIAL    Narrative:     The following orders were created for panel order CBC &  Differential.  Procedure                               Abnormality         Status                     ---------                               -----------         ------                     CBC Auto Differential[[id number]]        Abnormal            Final result                 Please view results for these tests on the individual orders.   EXTRA TUBES    Narrative:     The following orders were created for panel order Extra Tubes.  Procedure                               Abnormality         Status                     ---------                               -----------         ------                     Gold Top - SST[[id number]]                                   Final result                 Please view results for these tests on the individual orders.   GOLD TOP - SST     Medications   sodium chloride 0.9 % flush 10 mL (has no administration in time range)   acetaminophen (TYLENOL) tablet 1,000 mg (1,000 mg Oral Given 10/5/20 1529)   Morphine sulfate (PF) injection 4 mg (4 mg Intravenous Given 10/5/20 1545)   ondansetron (ZOFRAN) injection 4 mg (4 mg Intravenous Given 10/5/20 1545)     Xr Shoulder 2+ View Left    Result Date: 10/5/2020  Slightly displaced transverse impaction fracture of the left femoral neck.  Electronically Signed By-Clem Rawls On:10/5/2020 2:52 PM This report was finalized on [id number] by  Clem Rawls, .    Ct Head Without Contrast    Result Date: 10/5/2020   1. No acute intracranial findings. 2. Encephalomalacia consistent with chronic right MCA territory infarct, similar to prior exam.  Electronically Signed By-Dr. Rowena Carballo MD On:10/5/2020 3:16 PM This report was finalized on [id number] by Dr. Rowena Carballo MD.    Xr Chest 1 View    Result Date: 10/5/2020  Low lung volumes without definite acute cardiopulmonary abnormality.  Suspected proximal left humerus fracture. Recommend dedicated shoulder radiographs.  Electronically Signed By-Lisa Madison On:10/5/2020 2:09 PM This report  was finalized on [id number] by  Lisa Madison, .                                         [facility]  Number of Diagnoses or Management Options  Other closed displaced fracture of proximal end of left humerus, initial encounter:   Syncope, unspecified syncope type:   Diagnosis management comments:       Comorbidities:  has a past medical history of Diabetes (CMS/[facility]), GERD (gastroesophageal reflux disease), Hypertension, Seizures (CMS/[facility]), and Stroke (CMS/[facility]) (2010).  Differentials: TIA CVA electrolyte abnormalities dehydration not all inclusive of differentials considered  Discussion with provider: aristeo tomas [facility]  Radiology interpretation:  X-rays reviewed by me and interpreted by radiologist,   Xr Shoulder 2+ View Left    Result Date: 10/5/2020  Slightly displaced transverse impaction fracture of the left femoral neck.  Electronically Signed By-Clem Rawls On:10/5/2020 2:52 PM This report was finalized on [id number] by  Clem Rawls, .    Ct Head Without Contrast    Result Date: 10/5/2020   1. No acute intracranial findings. 2. Encephalomalacia consistent with chronic right MCA territory infarct, similar to prior exam.  Electronically Signed By-Dr. Rowena Carballo MD On:10/5/2020 3:16 PM This report was finalized on [id number] by Dr. Rowena Carballo MD.    Xr Chest 1 View    Result Date: 10/5/2020  Low lung volumes without definite acute cardiopulmonary abnormality.  Suspected proximal left humerus fracture. Recommend dedicated shoulder radiographs.  Electronically Signed By-Lisa Madison On:10/5/2020 2:09 PM This report was finalized on [id number] by  Lisa Madison, .    Lab interpretation:  Labs viewed by me significant for,    Appropriate PPE worn during exam.  Patient was given Tylenol.  On reexam continues to complain of pain and was given Zofran and morphine.  He was placed in a left arm sling  On reexam he remains awake alert and neurologically intact.  Review of rhythm strip from 1406 concerning  for asymptomatic V. Tach-patient was placed on a Holter monitor    i discussed findings with patient who voices understanding of admission  I discussed with the hospitalist who agreed to admit for further evaluation      Final diagnoses:   Syncope, unspecified syncope type   Other closed displaced fracture of proximal end of left humerus, initial encounter   Abnormal EKG            Sparkle Castillo, APRN  10/05/20 1550

## 2023-10-09 ENCOUNTER — INPATIENT HOSPITAL (OUTPATIENT)
Dept: URBAN - METROPOLITAN AREA HOSPITAL 76 | Facility: HOSPITAL | Age: 48
End: 2023-10-09
Payer: MEDICARE

## 2023-10-09 DIAGNOSIS — R93.3 ABNORMAL FINDINGS ON DIAGNOSTIC IMAGING OF OTHER PARTS OF DI: ICD-10-CM

## 2023-10-09 DIAGNOSIS — R94.5 ABNORMAL RESULTS OF LIVER FUNCTION STUDIES: ICD-10-CM

## 2023-10-09 PROCEDURE — 99222 1ST HOSP IP/OBS MODERATE 55: CPT | Performed by: NURSE PRACTITIONER

## 2023-10-10 ENCOUNTER — INPATIENT HOSPITAL (OUTPATIENT)
Dept: URBAN - METROPOLITAN AREA HOSPITAL 76 | Facility: HOSPITAL | Age: 48
End: 2023-10-10
Payer: MEDICARE

## 2023-10-10 DIAGNOSIS — R94.5 ABNORMAL RESULTS OF LIVER FUNCTION STUDIES: ICD-10-CM

## 2023-10-10 DIAGNOSIS — R93.3 ABNORMAL FINDINGS ON DIAGNOSTIC IMAGING OF OTHER PARTS OF DI: ICD-10-CM

## 2023-10-10 PROCEDURE — 99232 SBSQ HOSP IP/OBS MODERATE 35: CPT | Performed by: NURSE PRACTITIONER

## 2024-06-21 ENCOUNTER — TRANSCRIBE ORDERS (OUTPATIENT)
Dept: ADMINISTRATIVE | Facility: HOSPITAL | Age: 49
End: 2024-06-21
Payer: MEDICARE

## 2024-06-21 DIAGNOSIS — M25.562 LEFT KNEE PAIN, UNSPECIFIED CHRONICITY: Primary | ICD-10-CM

## (undated) DEVICE — BANDAGE,GAUZE,BULKEE II,4.5"X4.1YD,STRL: Brand: MEDLINE

## (undated) DEVICE — GOWN,REINFORCE,POLY,SIRUS,BREATH SLV,XLG: Brand: MEDLINE

## (undated) DEVICE — SOL IRRIG H2O 1000ML STRL

## (undated) DEVICE — TP SXN YANKR BULB STRL

## (undated) DEVICE — SPNG LAP PREWSH SFTPK 18X18IN STRL PK/5

## (undated) DEVICE — 2, DISPOSABLE SUCTION/IRRIGATOR WITH DISPOSABLE TIP: Brand: STRYKEFLOW

## (undated) DEVICE — SKIN AFFIX SURG ADHESIVE 72/CS 0.55ML: Brand: MEDLINE

## (undated) DEVICE — ENDOPATH 5MM CURVED SCISSORS WITH MONOPOLAR CAUTERY: Brand: ENDOPATH

## (undated) DEVICE — PENCL HND ROCKRSWTCH HOLSTR EZ CLEAN TP CRD 10FT

## (undated) DEVICE — CUFF SCD HEMOFORCE SEQ CALF STD MD

## (undated) DEVICE — SUT SILK 3/0 SH CR8 18IN C013D

## (undated) DEVICE — SOL IRRIG NACL 9PCT 1000ML BTL

## (undated) DEVICE — ENDOPATH XCEL WITH OPTIVIEW TECHNOLOGY UNIVERSAL TROCAR STABILITY SLEEVES: Brand: ENDOPATH XCEL OPTIVIEW

## (undated) DEVICE — VIOLET BRAIDED (POLYGLACTIN 910), SYNTHETIC ABSORBABLE SUTURE: Brand: COATED VICRYL

## (undated) DEVICE — 40580 - THE PINK PAD - ADVANCED TRENDELENBURG POSITIONING KIT: Brand: 40580 - THE PINK PAD - ADVANCED TRENDELENBURG POSITIONING KIT

## (undated) DEVICE — GLV SURG DERMASSURE GRN LF PF 8.5

## (undated) DEVICE — GLV SURG TRIUMPH GREEN W/ALOE PF LTX 8.5 STRL

## (undated) DEVICE — SOL IRR NACL 0.9PCT 1000ML

## (undated) DEVICE — DRSNG TELFA PAD NONADH STR 1S 3X8IN

## (undated) DEVICE — PAD,ABDOMINAL,5"X9",STERILE,LF,1/PK: Brand: MEDLINE INDUSTRIES, INC.

## (undated) DEVICE — DUAL LUMEN STOMACH TUBE: Brand: SALEM SUMP

## (undated) DEVICE — KT SURG TURNOVER 050

## (undated) DEVICE — GENERAL LAPAROSCOPY CDS: Brand: MEDLINE INDUSTRIES, INC.

## (undated) DEVICE — LAPAROSCOPIC GAS CONDITIONING DEVICE.: Brand: INSUFLOW

## (undated) DEVICE — APPL CHLORAPREP HI/LITE 26ML ORNG

## (undated) DEVICE — GLV SURG TRIUMPH LT PF LTX 7.5 STRL

## (undated) DEVICE — PENCL EVAC ULTRAVAC SMOKE W/BLD

## (undated) DEVICE — COVER,MAYO STAND,STERILE: Brand: MEDLINE

## (undated) DEVICE — IRRIGATOR BULB ASEPTO 60CC STRL

## (undated) DEVICE — PK MINOR LAPAROTOMY 50

## (undated) DEVICE — SUT PDS2 CTX 60IN

## (undated) DEVICE — SUT VIC 3/0 SH CR8 18IN J864D

## (undated) DEVICE — SUT VIC 0/0 UR6 27IN DYED J603H

## (undated) DEVICE — PK MAJ LAPAROTOMY 50

## (undated) DEVICE — ENDOPATH XCEL DILATING TIP TROCARS WITH STABILITY SLEEVES: Brand: ENDOPATH XCEL

## (undated) DEVICE — SUT SILK 2/0 SH 30IN K833H

## (undated) DEVICE — TUBING, SUCTION, 1/4" X 12', STRAIGHT: Brand: MEDLINE

## (undated) DEVICE — 3M™ WARMING BLANKET, UPPER BODY, 10 PER CASE, 42268: Brand: BAIR HUGGER™

## (undated) DEVICE — SUT SILK 2/0 30IN A305H

## (undated) DEVICE — GOWN,REINFRCE,POLY,SIRUS,BREATH SLV,XXLG: Brand: MEDLINE

## (undated) DEVICE — SUT MONOCRYL 4/0 PS2 27IN Y426H ETY426H

## (undated) DEVICE — GLV SURG TRIUMPH LT PF LTX 8 STRL

## (undated) DEVICE — ELECTRD BLD EZ CLN MOD XLNG 2.75IN

## (undated) DEVICE — GLV SURG TRIUMPH GREEN W/ALOE PF LTX 8 STRL

## (undated) DEVICE — PROXIMATE RH ROTATING HEAD SKIN STAPLERS (35 WIDE) CONTAINS 35 STAINLESS STEEL STAPLES: Brand: PROXIMATE

## (undated) DEVICE — GLV SURG SENSICARE SLT PF LF 8 STRL

## (undated) DEVICE — TOWEL,OR,DSP,ST,WHITE,DLX,4/PK,20PK/CS: Brand: MEDLINE

## (undated) DEVICE — DECANTER: Brand: UNBRANDED

## (undated) DEVICE — WET SKIN PREP TRAY: Brand: MEDLINE INDUSTRIES, INC.

## (undated) DEVICE — PASS SUT PRO BARIATRIC XL W/TROC SWABS

## (undated) DEVICE — TOTAL TRAY, DB, 100% SILI FOLEY, 16FR 10: Brand: MEDLINE

## (undated) DEVICE — COLOSTOMY/ILEOSTOMY KIT,FORMAFLEX: Brand: NEW IMAGE

## (undated) DEVICE — ENDOPATH XCEL WITH OPTIVIEW TECHNOLOGY BLADELESS TROCARS WITH STABILITY SLEEVES: Brand: ENDOPATH XCEL OPTIVIEW

## (undated) DEVICE — 3M™ PATIENT PLATE, CORDED, SPLIT, LARGE, 40 PER CASE, 1179: Brand: 3M™

## (undated) DEVICE — SUT PDS 1 TP1 48IN Z880G BX/12

## (undated) DEVICE — PK PROC TURNOVER

## (undated) DEVICE — SUT SILK 2/0 FS BLK 18IN 685G